# Patient Record
Sex: FEMALE | Race: BLACK OR AFRICAN AMERICAN | Employment: OTHER | URBAN - METROPOLITAN AREA
[De-identification: names, ages, dates, MRNs, and addresses within clinical notes are randomized per-mention and may not be internally consistent; named-entity substitution may affect disease eponyms.]

---

## 2017-10-09 ENCOUNTER — OP HISTORICAL/CONVERTED ENCOUNTER (OUTPATIENT)
Dept: OTHER | Age: 62
End: 2017-10-09

## 2018-02-22 ENCOUNTER — OP HISTORICAL/CONVERTED ENCOUNTER (OUTPATIENT)
Dept: OTHER | Age: 63
End: 2018-02-22

## 2018-02-23 ENCOUNTER — OP HISTORICAL/CONVERTED ENCOUNTER (OUTPATIENT)
Dept: OTHER | Age: 63
End: 2018-02-23

## 2019-05-30 ENCOUNTER — OP HISTORICAL/CONVERTED ENCOUNTER (OUTPATIENT)
Dept: OTHER | Age: 64
End: 2019-05-30

## 2019-06-13 ENCOUNTER — OP HISTORICAL/CONVERTED ENCOUNTER (OUTPATIENT)
Dept: OTHER | Age: 64
End: 2019-06-13

## 2019-08-15 ENCOUNTER — OP HISTORICAL/CONVERTED ENCOUNTER (OUTPATIENT)
Dept: OTHER | Age: 64
End: 2019-08-15

## 2019-09-04 ENCOUNTER — OP HISTORICAL/CONVERTED ENCOUNTER (OUTPATIENT)
Dept: OTHER | Age: 64
End: 2019-09-04

## 2020-01-11 ENCOUNTER — IP HISTORICAL/CONVERTED ENCOUNTER (OUTPATIENT)
Dept: OTHER | Age: 65
End: 2020-01-11

## 2021-06-30 RX ORDER — MONTELUKAST SODIUM 10 MG/1
10 TABLET ORAL DAILY
COMMUNITY

## 2021-06-30 RX ORDER — METHOTREXATE 2.5 MG/1
12.5 TABLET ORAL
COMMUNITY

## 2021-06-30 RX ORDER — METOPROLOL TARTRATE 100 MG/1
100 TABLET ORAL 2 TIMES DAILY
COMMUNITY

## 2021-06-30 RX ORDER — ASPIRIN 81 MG/1
81 TABLET ORAL DAILY
COMMUNITY

## 2021-06-30 RX ORDER — AMLODIPINE BESYLATE 10 MG/1
10 TABLET ORAL DAILY
COMMUNITY

## 2021-06-30 RX ORDER — ATORVASTATIN CALCIUM 40 MG/1
40 TABLET, FILM COATED ORAL DAILY
COMMUNITY

## 2021-06-30 RX ORDER — FOLIC ACID 1 MG/1
1 TABLET ORAL DAILY
COMMUNITY

## 2021-07-02 ENCOUNTER — HOSPITAL ENCOUNTER (OUTPATIENT)
Dept: CT IMAGING | Age: 66
Discharge: HOME | End: 2021-07-02
Attending: INTERNAL MEDICINE
Payer: COMMERCIAL

## 2021-07-02 VITALS
TEMPERATURE: 97.1 F | RESPIRATION RATE: 16 BRPM | OXYGEN SATURATION: 96 % | SYSTOLIC BLOOD PRESSURE: 108 MMHG | HEIGHT: 60 IN | HEART RATE: 66 BPM | WEIGHT: 130 LBS | BODY MASS INDEX: 25.52 KG/M2 | DIASTOLIC BLOOD PRESSURE: 53 MMHG

## 2021-07-02 DIAGNOSIS — D47.2 MONOCLONAL PARAPROTEINEMIA: ICD-10-CM

## 2021-07-02 LAB
ANION GAP SERPL CALC-SCNC: 6 MMOL/L (ref 5–15)
BASOPHILS # BLD: 0 K/UL (ref 0–0.1)
BASOPHILS NFR BLD: 0 % (ref 0–1)
BUN SERPL-MCNC: 39 MG/DL (ref 6–20)
BUN/CREAT SERPL: 26 (ref 12–20)
CA-I BLD-MCNC: 8.8 MG/DL (ref 8.5–10.1)
CHLORIDE SERPL-SCNC: 108 MMOL/L (ref 97–108)
CO2 SERPL-SCNC: 24 MMOL/L (ref 21–32)
CREAT SERPL-MCNC: 1.51 MG/DL (ref 0.55–1.02)
DIFFERENTIAL METHOD BLD: ABNORMAL
EOSINOPHIL # BLD: 0.2 K/UL (ref 0–0.4)
EOSINOPHIL NFR BLD: 3 % (ref 0–7)
ERYTHROCYTE [DISTWIDTH] IN BLOOD BY AUTOMATED COUNT: 14.9 % (ref 11.5–14.5)
GLUCOSE BLD STRIP.AUTO-MCNC: 128 MG/DL (ref 65–117)
GLUCOSE SERPL-MCNC: 131 MG/DL (ref 65–100)
HCT VFR BLD AUTO: 31.1 % (ref 35–47)
HGB BLD-MCNC: 9.8 G/DL (ref 11.5–16)
IMM GRANULOCYTES # BLD AUTO: 0 K/UL (ref 0–0.04)
IMM GRANULOCYTES NFR BLD AUTO: 0 % (ref 0–0.5)
INR PPP: 1.1 (ref 0.9–1.1)
LYMPHOCYTES # BLD: 0.8 K/UL (ref 0.8–3.5)
LYMPHOCYTES NFR BLD: 13 % (ref 12–49)
MCH RBC QN AUTO: 32.9 PG (ref 26–34)
MCHC RBC AUTO-ENTMCNC: 31.5 G/DL (ref 30–36.5)
MCV RBC AUTO: 104.4 FL (ref 80–99)
MONOCYTES # BLD: 0.5 K/UL (ref 0–1)
MONOCYTES NFR BLD: 9 % (ref 5–13)
NEUTS SEG # BLD: 4.5 K/UL (ref 1.8–8)
NEUTS SEG NFR BLD: 75 % (ref 32–75)
NRBC # BLD: 0 K/UL (ref 0–0.01)
NRBC BLD-RTO: 0 PER 100 WBC
PERFORMED BY, TECHID: ABNORMAL
PLATELET # BLD AUTO: 202 K/UL (ref 150–400)
PMV BLD AUTO: 9.3 FL (ref 8.9–12.9)
POTASSIUM SERPL-SCNC: 4.6 MMOL/L (ref 3.5–5.1)
PROTHROMBIN TIME: 13.5 SEC (ref 11.9–14.7)
RBC # BLD AUTO: 2.98 M/UL (ref 3.8–5.2)
SODIUM SERPL-SCNC: 138 MMOL/L (ref 136–145)
WBC # BLD AUTO: 6.1 K/UL (ref 3.6–11)

## 2021-07-02 PROCEDURE — 74011250636 HC RX REV CODE- 250/636: Performed by: RADIOLOGY

## 2021-07-02 PROCEDURE — 85025 COMPLETE CBC W/AUTO DIFF WBC: CPT

## 2021-07-02 PROCEDURE — 36415 COLL VENOUS BLD VENIPUNCTURE: CPT

## 2021-07-02 PROCEDURE — 99152 MOD SED SAME PHYS/QHP 5/>YRS: CPT

## 2021-07-02 PROCEDURE — 85610 PROTHROMBIN TIME: CPT

## 2021-07-02 PROCEDURE — 82962 GLUCOSE BLOOD TEST: CPT

## 2021-07-02 PROCEDURE — 80048 BASIC METABOLIC PNL TOTAL CA: CPT

## 2021-07-02 PROCEDURE — 77030028872 CT BX BONE MARROW AND ASPIRATION

## 2021-07-02 RX ORDER — OXYCODONE HYDROCHLORIDE 10 MG/1
10 TABLET ORAL
Status: DISCONTINUED | OUTPATIENT
Start: 2021-07-02 | End: 2021-07-11 | Stop reason: HOSPADM

## 2021-07-02 RX ORDER — FENTANYL CITRATE 50 UG/ML
25-100 INJECTION, SOLUTION INTRAMUSCULAR; INTRAVENOUS
Status: DISCONTINUED | OUTPATIENT
Start: 2021-07-02 | End: 2021-07-11 | Stop reason: HOSPADM

## 2021-07-02 RX ORDER — MIDAZOLAM HYDROCHLORIDE 1 MG/ML
.25-2 INJECTION, SOLUTION INTRAMUSCULAR; INTRAVENOUS
Status: DISCONTINUED | OUTPATIENT
Start: 2021-07-02 | End: 2021-07-11 | Stop reason: HOSPADM

## 2021-07-02 RX ORDER — OXYCODONE HYDROCHLORIDE 5 MG/1
5 TABLET ORAL
Status: DISCONTINUED | OUTPATIENT
Start: 2021-07-02 | End: 2021-07-11 | Stop reason: HOSPADM

## 2021-07-02 RX ORDER — ONDANSETRON 2 MG/ML
4 INJECTION INTRAMUSCULAR; INTRAVENOUS
Status: DISCONTINUED | OUTPATIENT
Start: 2021-07-02 | End: 2021-07-11 | Stop reason: HOSPADM

## 2021-07-02 RX ADMIN — MIDAZOLAM HYDROCHLORIDE 1 MG: 1 INJECTION, SOLUTION INTRAMUSCULAR; INTRAVENOUS at 09:22

## 2021-07-02 RX ADMIN — FENTANYL CITRATE 50 MCG: 0.05 INJECTION, SOLUTION INTRAMUSCULAR; INTRAVENOUS at 09:32

## 2021-07-02 RX ADMIN — FENTANYL CITRATE 50 MCG: 0.05 INJECTION, SOLUTION INTRAMUSCULAR; INTRAVENOUS at 09:22

## 2021-07-02 NOTE — ROUTINE PROCESS
Attempted  To obtai labs unsucessful x3 ,  duran x 2 and balta x1  Saline lock obtained #[id number] left hand

## 2021-07-02 NOTE — PROCEDURES
Interventional Radiology Post Procedure Note    7/2/2021    Indications: Igg kappa MGUS    Procedure(s): CT-guided BM aspirate and biopsy (right iliac)    Preliminary Findings (full report to follow):  Successful biopsy    Contrast: None    Specimen: None    Estimated blood loss: Minimal    Complications: None    Plan: Bedrest till 1130 before discharge home     Follow Up: PRN

## 2022-02-15 ENCOUNTER — TRANSCRIBE ORDER (OUTPATIENT)
Dept: SCHEDULING | Age: 67
End: 2022-02-15

## 2022-02-15 DIAGNOSIS — Z12.31 ENCOUNTER FOR SCREENING MAMMOGRAM FOR MALIGNANT NEOPLASM OF BREAST: Primary | ICD-10-CM

## 2022-03-22 ENCOUNTER — HOSPITAL ENCOUNTER (OUTPATIENT)
Dept: MAMMOGRAPHY | Age: 67
Discharge: HOME | End: 2022-03-22
Payer: MEDICAID

## 2022-03-22 DIAGNOSIS — Z12.31 ENCOUNTER FOR SCREENING MAMMOGRAM FOR MALIGNANT NEOPLASM OF BREAST: ICD-10-CM

## 2022-03-22 PROCEDURE — 77063 BREAST TOMOSYNTHESIS BI: CPT

## 2022-07-01 ENCOUNTER — HOSPITAL ENCOUNTER (EMERGENCY)
Age: 67
Discharge: HOME | End: 2022-07-01
Attending: EMERGENCY MEDICINE
Payer: MEDICAID

## 2022-07-01 ENCOUNTER — APPOINTMENT (OUTPATIENT)
Dept: NON INVASIVE DIAGNOSTICS | Age: 67
End: 2022-07-01
Attending: NURSE PRACTITIONER
Payer: MEDICAID

## 2022-07-01 ENCOUNTER — APPOINTMENT (OUTPATIENT)
Dept: GENERAL RADIOLOGY | Age: 67
End: 2022-07-01
Attending: EMERGENCY MEDICINE
Payer: MEDICAID

## 2022-07-01 VITALS
SYSTOLIC BLOOD PRESSURE: 139 MMHG | HEART RATE: 72 BPM | TEMPERATURE: 98 F | WEIGHT: 140 LBS | DIASTOLIC BLOOD PRESSURE: 80 MMHG | RESPIRATION RATE: 20 BRPM | BODY MASS INDEX: 26.43 KG/M2 | HEIGHT: 61 IN | OXYGEN SATURATION: 96 %

## 2022-07-01 DIAGNOSIS — Z87.09 HISTORY OF COPD: ICD-10-CM

## 2022-07-01 DIAGNOSIS — R06.2 WHEEZING: ICD-10-CM

## 2022-07-01 DIAGNOSIS — R06.02 SOB (SHORTNESS OF BREATH): Primary | ICD-10-CM

## 2022-07-01 LAB
ALBUMIN SERPL-MCNC: 3.2 G/DL (ref 3.5–5)
ALBUMIN/GLOB SERPL: 0.6 {RATIO} (ref 1.1–2.2)
ALP SERPL-CCNC: 93 U/L (ref 45–117)
ALT SERPL-CCNC: 23 U/L (ref 12–78)
ANION GAP SERPL CALC-SCNC: 4 MMOL/L (ref 5–15)
AST SERPL W P-5'-P-CCNC: 48 U/L (ref 15–37)
ATRIAL RATE: 66 BPM
BASOPHILS # BLD: 0 K/UL (ref 0–0.1)
BASOPHILS NFR BLD: 0 % (ref 0–1)
BILIRUB SERPL-MCNC: 0.4 MG/DL (ref 0.2–1)
BNP SERPL-MCNC: 453 PG/ML
BUN SERPL-MCNC: 26 MG/DL (ref 6–20)
BUN/CREAT SERPL: 16 (ref 12–20)
CA-I BLD-MCNC: 8.8 MG/DL (ref 8.5–10.1)
CALCULATED P AXIS, ECG09: 57 DEGREES
CALCULATED R AXIS, ECG10: 21 DEGREES
CALCULATED T AXIS, ECG11: 59 DEGREES
CHLORIDE SERPL-SCNC: 106 MMOL/L (ref 97–108)
CO2 SERPL-SCNC: 27 MMOL/L (ref 21–32)
CREAT SERPL-MCNC: 1.61 MG/DL (ref 0.55–1.02)
DIAGNOSIS, 93000: NORMAL
DIFFERENTIAL METHOD BLD: ABNORMAL
EOSINOPHIL # BLD: 0.1 K/UL (ref 0–0.4)
EOSINOPHIL NFR BLD: 2 % (ref 0–7)
ERYTHROCYTE [DISTWIDTH] IN BLOOD BY AUTOMATED COUNT: 14.5 % (ref 11.5–14.5)
GLOBULIN SER CALC-MCNC: 5.1 G/DL (ref 2–4)
GLUCOSE SERPL-MCNC: 173 MG/DL (ref 65–100)
HCT VFR BLD AUTO: 29.5 % (ref 35–47)
HGB BLD-MCNC: 9.6 G/DL (ref 11.5–16)
IMM GRANULOCYTES # BLD AUTO: 0 K/UL (ref 0–0.04)
IMM GRANULOCYTES NFR BLD AUTO: 0 % (ref 0–0.5)
LYMPHOCYTES # BLD: 0.5 K/UL (ref 0.8–3.5)
LYMPHOCYTES NFR BLD: 11 % (ref 12–49)
MAGNESIUM SERPL-MCNC: 2.1 MG/DL (ref 1.6–2.4)
MAGNESIUM SERPL-MCNC: 2.2 MG/DL (ref 1.6–2.4)
MCH RBC QN AUTO: 33.7 PG (ref 26–34)
MCHC RBC AUTO-ENTMCNC: 32.5 G/DL (ref 30–36.5)
MCV RBC AUTO: 103.5 FL (ref 80–99)
MONOCYTES # BLD: 0.2 K/UL (ref 0–1)
MONOCYTES NFR BLD: 5 % (ref 5–13)
NEUTS SEG # BLD: 3.5 K/UL (ref 1.8–8)
NEUTS SEG NFR BLD: 82 % (ref 32–75)
NRBC # BLD: 0 K/UL (ref 0–0.01)
NRBC BLD-RTO: 0 PER 100 WBC
P-R INTERVAL, ECG05: 186 MS
PHOSPHATE SERPL-MCNC: 3.7 MG/DL (ref 2.6–4.7)
PLATELET # BLD AUTO: 184 K/UL (ref 150–400)
PMV BLD AUTO: 9.7 FL (ref 8.9–12.9)
POTASSIUM SERPL-SCNC: 4.1 MMOL/L (ref 3.5–5.1)
POTASSIUM SERPL-SCNC: 5 MMOL/L (ref 3.5–5.1)
PROT SERPL-MCNC: 8.3 G/DL (ref 6.4–8.2)
Q-T INTERVAL, ECG07: 402 MS
QRS DURATION, ECG06: 82 MS
QTC CALCULATION (BEZET), ECG08: 421 MS
RBC # BLD AUTO: 2.85 M/UL (ref 3.8–5.2)
SODIUM SERPL-SCNC: 137 MMOL/L (ref 136–145)
TROPONIN-HIGH SENSITIVITY: 13 NG/L (ref 0–51)
TROPONIN-HIGH SENSITIVITY: 14 NG/L (ref 0–51)
VENTRICULAR RATE, ECG03: 66 BPM
WBC # BLD AUTO: 4.3 K/UL (ref 3.6–11)

## 2022-07-01 PROCEDURE — 93970 EXTREMITY STUDY: CPT

## 2022-07-01 PROCEDURE — 74011000250 HC RX REV CODE- 250: Performed by: NURSE PRACTITIONER

## 2022-07-01 PROCEDURE — 99285 EMERGENCY DEPT VISIT HI MDM: CPT

## 2022-07-01 PROCEDURE — 85025 COMPLETE CBC W/AUTO DIFF WBC: CPT

## 2022-07-01 PROCEDURE — 93005 ELECTROCARDIOGRAM TRACING: CPT

## 2022-07-01 PROCEDURE — 84484 ASSAY OF TROPONIN QUANT: CPT

## 2022-07-01 PROCEDURE — 74011250637 HC RX REV CODE- 250/637: Performed by: NURSE PRACTITIONER

## 2022-07-01 PROCEDURE — 96374 THER/PROPH/DIAG INJ IV PUSH: CPT

## 2022-07-01 PROCEDURE — 84100 ASSAY OF PHOSPHORUS: CPT

## 2022-07-01 PROCEDURE — 84132 ASSAY OF SERUM POTASSIUM: CPT

## 2022-07-01 PROCEDURE — 83735 ASSAY OF MAGNESIUM: CPT

## 2022-07-01 PROCEDURE — 71045 X-RAY EXAM CHEST 1 VIEW: CPT

## 2022-07-01 PROCEDURE — 36415 COLL VENOUS BLD VENIPUNCTURE: CPT

## 2022-07-01 PROCEDURE — 94640 AIRWAY INHALATION TREATMENT: CPT

## 2022-07-01 PROCEDURE — 74011250636 HC RX REV CODE- 250/636: Performed by: NURSE PRACTITIONER

## 2022-07-01 PROCEDURE — 83880 ASSAY OF NATRIURETIC PEPTIDE: CPT

## 2022-07-01 RX ORDER — PREDNISONE 20 MG/1
20 TABLET ORAL DAILY
Qty: 5 TABLET | Refills: 0 | Status: SHIPPED | OUTPATIENT
Start: 2022-07-01 | End: 2022-07-06

## 2022-07-01 RX ORDER — ALBUTEROL SULFATE 90 UG/1
AEROSOL, METERED RESPIRATORY (INHALATION)
COMMUNITY

## 2022-07-01 RX ORDER — ERGOCALCIFEROL 1.25 MG/1
50000 CAPSULE ORAL
COMMUNITY

## 2022-07-01 RX ORDER — IPRATROPIUM BROMIDE AND ALBUTEROL SULFATE 2.5; .5 MG/3ML; MG/3ML
3 SOLUTION RESPIRATORY (INHALATION)
Status: COMPLETED | OUTPATIENT
Start: 2022-07-01 | End: 2022-07-01

## 2022-07-01 RX ORDER — EZETIMIBE 10 MG/1
TABLET ORAL
COMMUNITY

## 2022-07-01 RX ORDER — BUDESONIDE AND FORMOTEROL FUMARATE DIHYDRATE 80; 4.5 UG/1; UG/1
2 AEROSOL RESPIRATORY (INHALATION)
COMMUNITY

## 2022-07-01 RX ORDER — GUAIFENESIN 600 MG/1
600 TABLET, EXTENDED RELEASE ORAL 2 TIMES DAILY
Qty: 20 TABLET | Refills: 0 | Status: SHIPPED | OUTPATIENT
Start: 2022-07-01

## 2022-07-01 RX ORDER — GUAIFENESIN 600 MG/1
1200 TABLET, EXTENDED RELEASE ORAL
Status: COMPLETED | OUTPATIENT
Start: 2022-07-01 | End: 2022-07-01

## 2022-07-01 RX ADMIN — IPRATROPIUM BROMIDE AND ALBUTEROL SULFATE 3 ML: 2.5; .5 SOLUTION RESPIRATORY (INHALATION) at 14:42

## 2022-07-01 RX ADMIN — IPRATROPIUM BROMIDE AND ALBUTEROL SULFATE 3 ML: 2.5; .5 SOLUTION RESPIRATORY (INHALATION) at 17:45

## 2022-07-01 RX ADMIN — GUAIFENESIN 1200 MG: 600 TABLET, EXTENDED RELEASE ORAL at 15:08

## 2022-07-01 RX ADMIN — METHYLPREDNISOLONE SODIUM SUCCINATE 40 MG: 40 INJECTION, POWDER, FOR SOLUTION INTRAMUSCULAR; INTRAVENOUS at 15:08

## 2022-07-01 NOTE — ED PROVIDER NOTES
EMERGENCY DEPARTMENT HISTORY AND PHYSICAL EXAM      Date: 7/1/2022  Patient Name: Lavonne Young    History of Presenting Illness     Chief Complaint   Patient presents with    Shortness of Breath       History Provided By: Patient and Patient's Daughter    HPI: Lavonne Young, 79 y.o. female with a significant past medical history of CKD III, DM, Htn,, COPD, and other chronic conditions as listed and reviewed below presents to the ED with intermittent shortness of breath more frequent since COVID earlier this year and bilateral lower extremity swelling right greater than left with intermittent right calf pain. She was sent by her PCP today due to her concerning breath sounds and leg swelling. Her shortness of breath is exacerbated by exertion and unimproved with albuterol inhaler, nebs, or symbicort. Additionally she has had increased urinary frequency and volume without dysuria or GYN symptoms. She denies chest pain currently but reports intermittent upper chest pain when she is short of breath and \"wheezing\". She denies any fever, cough, abdominal pain or swelling, N/V/D, appetite change, constipation, headache, dizziness, or confusion. There are no other complaints, changes, or physical findings at this time. PCP: Maribel Bernal, DO    No current facility-administered medications on file prior to encounter. Current Outpatient Medications on File Prior to Encounter   Medication Sig Dispense Refill    albuterol (PROVENTIL HFA, VENTOLIN HFA, PROAIR HFA) 90 mcg/actuation inhaler Take  by inhalation.  budesonide-formoteroL (Symbicort) 80-4.5 mcg/actuation HFAA Take 2 Puffs by inhalation.  ergocalciferol (ERGOCALCIFEROL) 1,250 mcg (50,000 unit) capsule Take 50,000 Units by mouth.  SITagliptin (Januvia) 50 mg tablet Take 50 mg by mouth daily.  ezetimibe (Zetia) 10 mg tablet Take  by mouth.  aspirin delayed-release 81 mg tablet Take 81 mg by mouth daily.       metoprolol tartrate (LOPRESSOR) 100 mg IR tablet Take 100 mg by mouth two (2) times a day.  amLODIPine (NORVASC) 10 mg tablet Take 10 mg by mouth daily.  montelukast (Singulair) 10 mg tablet Take 10 mg by mouth daily.  folic acid (FOLVITE) 1 mg tablet Take 1 mg by mouth daily.  atorvastatin (LIPITOR) 40 mg tablet Take 40 mg by mouth daily.  methotrexate (RHEUMATREX) 2.5 mg tablet Take 12.5 mg by mouth every Monday. Past History     Past Medical History:  Past Medical History:   Diagnosis Date    Arthritis     Asthma     Chronic kidney disease     Chronic obstructive pulmonary disease ([facility]     Diabetes ([facility]     Hypertension        Past Surgical History:  Past Surgical History:   Procedure Laterality Date    HX OTHER SURGICAL      Eye surgery       Family History:  Family History   Problem Relation Age of Onset    Diabetes Mother     Hypertension Mother    Yamilet Me Diabetes Father     Hypertension Father        Social History:  Social History     Tobacco Use    Smoking status: Never Smoker    Smokeless tobacco: Never Used   Substance Use Topics    Alcohol use: Never    Drug use: Never       Allergies: Allergies   Allergen Reactions    Lisinopril Swelling     Lip swelling    Pineapple Swelling     Lip swelling       Review of Systems   Review of Systems   Constitutional: Negative. HENT: Negative. Eyes: Negative. Respiratory: Positive for chest tightness, shortness of breath and wheezing. Negative for cough. Cardiovascular: Positive for chest pain and leg swelling. Negative for palpitations. Gastrointestinal: Negative. Negative for abdominal distention. Endocrine: Positive for polyuria. Negative for polydipsia and polyphagia. Genitourinary: Positive for frequency. Negative for decreased urine volume, dysuria, flank pain, pelvic pain, urgency and vaginal discharge. Musculoskeletal: Negative. Negative for back pain. Skin: Negative. Neurological: Negative. Hematological: Negative. Psychiatric/Behavioral: Negative. All other systems reviewed and are negative. Physical Exam   Physical Exam  Vitals and nursing note reviewed. Constitutional:       General: She is not in acute distress. Appearance: She is well-developed and normal weight. She is not ill-appearing, toxic-appearing or diaphoretic. HENT:      Head: Normocephalic and atraumatic. Mouth/Throat:      Mouth: Mucous membranes are moist.   Cardiovascular:      Rate and Rhythm: Normal rate and regular rhythm. Pulses: Normal pulses. Pulmonary:      Effort: Pulmonary effort is normal. No tachypnea, accessory muscle usage or respiratory distress. Breath sounds: Examination of the right-middle field reveals rhonchi. Examination of the left-middle field reveals rhonchi. Examination of the right-lower field reveals rhonchi. Examination of the left-lower field reveals rhonchi. Rhonchi present. No wheezing or rales. Chest:      Chest wall: No tenderness. Abdominal:      General: Bowel sounds are normal.      Palpations: Abdomen is soft. Tenderness: There is no abdominal tenderness. Musculoskeletal:         General: Normal range of motion. Cervical back: Normal range of motion and neck supple. Right lower leg: Tenderness present. Edema present. Left lower leg: No tenderness. Edema present. Skin:     General: Skin is warm and dry. Capillary Refill: Capillary refill takes less than 2 seconds. Coloration: Skin is not cyanotic. Findings: No ecchymosis or erythema. Neurological:      General: No focal deficit present. Mental Status: She is alert and oriented to person, place, and time.    Psychiatric:         Behavior: Behavior normal.         Lab and Diagnostic Study Results   Labs -     Recent Results (from the past 12 hour(s))   EKG, 12 LEAD, INITIAL    Collection Time: 07/01/22 10:21 AM   Result Value Ref Range    Ventricular Rate 66 BPM Atrial Rate 66 BPM    P-R Interval 186 ms    QRS Duration 82 ms    Q-T Interval 402 ms    QTC Calculation (Bezet) 421 ms    Calculated P Axis 57 degrees    Calculated R Axis 21 degrees    Calculated T Axis 59 degrees    Diagnosis       Normal sinus rhythm  Normal ECG  No previous ECGs available  Confirmed by WILBER MAYS, Kevin Hein ([id number]) on 7/1/2022 39:89:12 PM     METABOLIC PANEL, COMPREHENSIVE    Collection Time: 07/01/22 11:24 AM   Result Value Ref Range    Sodium 137 136 - 145 mmol/L    Potassium 5.0 3.5 - 5.1 mmol/L    Chloride 106 97 - 108 mmol/L    CO2 27 21 - 32 mmol/L    Anion gap 4 (L) 5 - 15 mmol/L    Glucose 173 (H) 65 - 100 mg/dL    BUN 26 (H) 6 - 20 mg/dL    Creatinine 1.61 (H) 0.55 - 1.02 mg/dL    BUN/Creatinine ratio 16 12 - 20      GFR est AA 39 (L) >60 ml/min/1.73m2    GFR est non-AA 32 (L) >60 ml/min/1.73m2    Calcium 8.8 8.5 - 10.1 mg/dL    Bilirubin, total 0.4 0.2 - 1.0 mg/dL    AST (SGOT) 48 (H) 15 - 37 U/L    ALT (SGPT) 23 12 - 78 U/L    Alk.  phosphatase 93 45 - 117 U/L    Protein, total 8.3 (H) 6.4 - 8.2 g/dL    Albumin 3.2 (L) 3.5 - 5.0 g/dL    Globulin 5.1 (H) 2.0 - 4.0 g/dL    A-G Ratio 0.6 (L) 1.1 - 2.2     TROPONIN-HIGH SENSITIVITY    Collection Time: 07/01/22 11:24 AM   Result Value Ref Range    Troponin-High Sensitivity 13 0 - 51 ng/L   NT-PRO BNP    Collection Time: 07/01/22 11:24 AM   Result Value Ref Range    NT pro- (H) <125 pg/mL   PHOSPHORUS    Collection Time: 07/01/22 11:24 AM   Result Value Ref Range    Phosphorus 3.7 2.6 - 4.7 mg/dL   MAGNESIUM    Collection Time: 07/01/22 11:24 AM   Result Value Ref Range    Magnesium 2.2 1.6 - 2.4 mg/dL   CBC WITH AUTOMATED DIFF    Collection Time: 07/01/22  1:04 PM   Result Value Ref Range    WBC 4.3 3.6 - 11.0 K/uL    RBC 2.85 (L) 3.80 - 5.20 M/uL    HGB 9.6 (L) 11.5 - 16.0 g/dL    HCT 29.5 (L) 35.0 - 47.0 %    .5 (H) 80.0 - 99.0 FL    MCH 33.7 26.0 - 34.0 PG    MCHC 32.5 30.0 - 36.5 g/dL    RDW 14.5 11.5 - 14.5 %    PLATELET 847 150 - 400 K/uL    MPV 9.7 8.9 - 12.9 FL    NRBC 0.0 0.0  WBC    ABSOLUTE NRBC 0.00 0.00 - 0.01 K/uL    NEUTROPHILS 82 (H) 32 - 75 %    LYMPHOCYTES 11 (L) 12 - 49 %    MONOCYTES 5 5 - 13 %    EOSINOPHILS 2 0 - 7 %    BASOPHILS 0 0 - 1 %    IMMATURE GRANULOCYTES 0 0 - 0.5 %    ABS. NEUTROPHILS 3.5 1.8 - 8.0 K/UL    ABS. LYMPHOCYTES 0.5 (L) 0.8 - 3.5 K/UL    ABS. MONOCYTES 0.2 0.0 - 1.0 K/UL    ABS. EOSINOPHILS 0.1 0.0 - 0.4 K/UL    ABS. BASOPHILS 0.0 0.0 - 0.1 K/UL    ABS. IMM. GRANS. 0.0 0.00 - 0.04 K/UL    DF AUTOMATED     TROPONIN-HIGH SENSITIVITY    Collection Time: 07/01/22  3:33 PM   Result Value Ref Range    Troponin-High Sensitivity 14 0 - 51 ng/L   MAGNESIUM    Collection Time: 07/01/22  3:33 PM   Result Value Ref Range    Magnesium 2.1 1.6 - 2.4 mg/dL   POTASSIUM    Collection Time: 07/01/22  3:33 PM   Result Value Ref Range    Potassium 4.1 3.5 - 5.1 mmol/L       Radiologic Studies -   @lastxrresult@  CT Results  (Last 48 hours)    None        CXR Results  (Last 48 hours)               07/01/22 1044  XR CHEST PORT Final result    Impression:  1. Enlarged cardiac silhouette, bibasilar atelectasis           Narrative:  INDICATION:  sob        EXAM: Single portable view of chest 1128 . Comparison:1/31/2020       Findings: Cardiac silhouette is enlarged. Pulmonary vasculature is not engorged. There are no focal parenchymal opacities, effusions, or pneumothorax. Bibasilar   atelectasis                 Medical Decision Making and ED Course   - I am the first provider for this patient. I reviewed the vital signs, available nursing notes, past medical history, past surgical history, family history and social history. - Initial assessment performed. The patients presenting problems have been discussed, and they are in agreement with the care plan formulated and outlined with them. I have encouraged them to ask questions as they arise throughout their visit.     Vital Signs-Reviewed the patient's vital signs. Patient Vitals for the past 12 hrs:   Temp Pulse Resp BP SpO2   07/01/22 1442 -- -- -- -- 96 %   07/01/22 1026 98 °F (36.7 °C) 72 20 139/80 97 %       Differential Diagnosis & Medical Decision Making Provider Note:     66-year-old female presents from PCP office with shortness of breath and lower extremity edema. Differentials include COPD exacerbation, heart failure exacerbation, PE, DVT, pneumonia, pulmonary edema, LEIGHTON, DKA, UTI, sequela of COVID, bronchitis. She is afebrile, no hypoxia on room air. Will work-up with basic and cardiac labs, chest x-ray, duplex of lower extremities to rule out DVT given calf pain and subjective report of right lower extremity edema more often and greater than left. Urinalysis for evidence of UTI. Chemistry for evidence of DKA or hyperglycemia with polyuria however she has no polydipsia or polyphagia. No sudden onset of chest pain, tachycardia or hypoxia concerning for pulmonary embolism and she has no history. Await duplex results. Serial troponins and EKG. Symptomatic treatment after chest x-ray, BNP resulted for COPD versus CHF. MDM  Number of Diagnoses or Management Options  History of COPD: new, needed workup  SOB (shortness of breath): new, needed workup  Wheezing: new, needed workup     Amount and/or Complexity of Data Reviewed  Clinical lab tests: ordered and reviewed  Tests in the radiology section of CPT®: ordered and reviewed  Review and summarize past medical records: yes  Discuss the patient with other providers: yes    Patient Progress  Patient progress: improved       ED Course:   ED Course as of 07/01/22 2202 Fri Jul 01, 2022   1054 No evidence of pulmonary edema or effusion on CXR. Otherwise unremarkable. [KR]   1130 EKG ventricular rate 66 bpm, ID interval 186 ms, QRS duration 82 ms,  ms,  ms, interpretation normal sinus rhythm, normal ECG. Reviewed by Dr. Padmaja Clark. No previous for comparison.  [KR]   1231 Per technician preliminary duplex results negative for DVT bilaterally. [KR]   1416 Renal function appears at baseline for CKD III, negative troponin, Mildly elevated BNP. Will repeat potassium and magnesium as well as troponin. Will treat with steroids and nebulizer and monitor response in absence of acute fluid overload, elevated BNP which can reflect renal status as well. [KR]   1446 Updated patient and daughter. Remains with no hypoxia. Receiving neb treatment. Plan for reassessment of breath sounds post medications for improvement. Await repeat labs and troponin and CBC for disposition. [KR]   0660 Still with scant wheezing but improved, patient subjectively reports improvement in breathing as well as daughter voices she believes there is been improvement as well. We will give 1 more nebulizer and await the labs with plan for discharge if no acute findings and she continues to improve. Remains without hypoxia [KR]   1644 Signed out to JUDI Tellez NP to continue care, await labs and disposition. [KR]   1730 Troponin normal, no chest pain. CBC stable-anemia at baseline. Discussed results with patient. She states she is feeling better and is ready to go home. No hypoxia, lungs diminished throughout,no further wheezing. [LP]      ED Course User Index  [KR] Rinku Gallardo NP  [LP] Tiffany Perry NP         Disposition   Disposition: Condition stable and improved  DC- Adult Discharges: All of the diagnostic tests were reviewed and questions answered. Diagnosis, care plan and treatment options were discussed. The patient understands the instructions and will follow up as directed. The patients results have been reviewed with them. They have been counseled regarding their diagnosis. The patient and family member patient and daughter verbally convey understanding and agreement of the signs, symptoms, diagnosis, treatment and prognosis and additionally agrees to follow up as recommended with their PCP in 24 - 48 hours. They also agree with the care-plan and convey that all of their questions have been answered. I have also put together some discharge instructions for them that include: 1) educational information regarding their diagnosis, 2) how to care for their diagnosis at home, as well a 3) list of reasons why they would want to return to the ED prior to their follow-up appointment, should their condition change. DISCHARGE PLAN:  1. Current Discharge Medication List      CONTINUE these medications which have NOT CHANGED    Details   albuterol (PROVENTIL HFA, VENTOLIN HFA, PROAIR HFA) 90 mcg/actuation inhaler Take  by inhalation. budesonide-formoteroL (Symbicort) 80-4.5 mcg/actuation HFAA Take 2 Puffs by inhalation. ergocalciferol (ERGOCALCIFEROL) 1,250 mcg (50,000 unit) capsule Take 50,000 Units by mouth. SITagliptin (Januvia) 50 mg tablet Take 50 mg by mouth daily. ezetimibe (Zetia) 10 mg tablet Take  by mouth. aspirin delayed-release 81 mg tablet Take 81 mg by mouth daily. metoprolol tartrate (LOPRESSOR) 100 mg IR tablet Take 100 mg by mouth two (2) times a day. amLODIPine (NORVASC) 10 mg tablet Take 10 mg by mouth daily. montelukast (Singulair) 10 mg tablet Take 10 mg by mouth daily. folic acid (FOLVITE) 1 mg tablet Take 1 mg by mouth daily. atorvastatin (LIPITOR) 40 mg tablet Take 40 mg by mouth daily. methotrexate (RHEUMATREX) 2.5 mg tablet Take 12.5 mg by mouth every Monday. 2.   Follow-up Information     Follow up With Specialties Details Why [id number] [facility] [address] [place] [address] In 2 days  Mariah Waite [place] [address]  [phone]      Follow up with primary care, urgent care, or this Emergency department   As needed, If symptoms worsen     [facility]  Go to   [address]  [phone]        3. Return to ED if worse   4.    Discharge Medication List as of 7/1/2022  5:59 PM      START taking these medications    Details   predniSONE (DELTASONE) 20 mg tablet Take 1 Tablet by mouth daily for 5 days. With Breakfast, Normal, Disp-5 Tablet, R-0      guaiFENesin ER (MUCINEX) 600 mg ER tablet Take 1 Tablet by mouth two (2) times a day., Normal, Disp-20 Tablet, R-0         CONTINUE these medications which have NOT CHANGED    Details   albuterol (PROVENTIL HFA, VENTOLIN HFA, PROAIR HFA) 90 mcg/actuation inhaler Take  by inhalation. , Historical Med      budesonide-formoteroL (Symbicort) 80-4.5 mcg/actuation HFAA Take 2 Puffs by inhalation. , Historical Med      ergocalciferol (ERGOCALCIFEROL) 1,250 mcg (50,000 unit) capsule Take 50,000 Units by mouth., Historical Med      SITagliptin (Januvia) 50 mg tablet Take 50 mg by mouth daily. , Historical Med      ezetimibe (Zetia) 10 mg tablet Take  by mouth., Historical Med      aspirin delayed-release 81 mg tablet Take 81 mg by mouth daily. , Historical Med      metoprolol tartrate (LOPRESSOR) 100 mg IR tablet Take 100 mg by mouth two (2) times a day., Historical Med      amLODIPine (NORVASC) 10 mg tablet Take 10 mg by mouth daily. , Historical Med      montelukast (Singulair) 10 mg tablet Take 10 mg by mouth daily. , Historical Med      folic acid (FOLVITE) 1 mg tablet Take 1 mg by mouth daily. , Historical Med      atorvastatin (LIPITOR) 40 mg tablet Take 40 mg by mouth daily. , Historical Med      methotrexate (RHEUMATREX) 2.5 mg tablet Take 12.5 mg by mouth every Monday., Historical Med               Diagnosis/Clinical Impression     Clinical Impression:   1. SOB (shortness of breath)    2. Wheezing    3. History of COPD        Attestations: Pawan Forte NP    Please note that this dictation was completed with [employer], the [employer] voice recognition software. Quite often unanticipated grammatical, syntax, homophones, and other interpretive errors are inadvertently transcribed by the computer software.   Please disregard these errors. Please excuse any errors that have escaped final proofreading. Thank you.

## 2022-07-01 NOTE — DISCHARGE INSTRUCTIONS
Continue to use your nebulizer and your inhalers as prescribed  Monitor your blood sugar closely while on prednisone  Return immediately for worsening shortness of breath, any development of chest pain, fever, return of swelling in legs and ankles.

## 2022-07-01 NOTE — ED TRIAGE NOTES
Pt sent by PCP for BLE swelling and shortness of breath over past 2 weeks, pt reports increasingly worse edema and pain.

## 2022-12-07 ENCOUNTER — HOSPITAL ENCOUNTER (INPATIENT)
Age: 67
LOS: 7 days | Discharge: HOME | DRG: 133 | End: 2022-12-14
Attending: EMERGENCY MEDICINE | Admitting: INTERNAL MEDICINE
Payer: MEDICAID

## 2022-12-07 ENCOUNTER — APPOINTMENT (OUTPATIENT)
Dept: GENERAL RADIOLOGY | Age: 67
DRG: 133 | End: 2022-12-07
Attending: EMERGENCY MEDICINE
Payer: MEDICAID

## 2022-12-07 DIAGNOSIS — J18.9 COMMUNITY ACQUIRED PNEUMONIA, UNSPECIFIED LATERALITY: Primary | ICD-10-CM

## 2022-12-07 LAB
ANION GAP SERPL CALC-SCNC: 6 MMOL/L (ref 5–15)
APPEARANCE UR: CLEAR
ATRIAL RATE: 58 BPM
BACTERIA URNS QL MICRO: NEGATIVE /HPF
BASE EXCESS BLD CALC-SCNC: 0.1 MMOL/L
BASOPHILS # BLD: 0 K/UL (ref 0–0.1)
BASOPHILS NFR BLD: 1 % (ref 0–1)
BILIRUB UR QL: NEGATIVE
BNP SERPL-MCNC: 2981 PG/ML
BUN SERPL-MCNC: 23 MG/DL (ref 6–20)
BUN/CREAT SERPL: 15 (ref 12–20)
CA-I BLD-MCNC: 8.7 MG/DL (ref 8.5–10.1)
CALCULATED P AXIS, ECG09: 54 DEGREES
CALCULATED R AXIS, ECG10: 22 DEGREES
CALCULATED T AXIS, ECG11: 58 DEGREES
CHLORIDE SERPL-SCNC: 103 MMOL/L (ref 97–108)
CO2 SERPL-SCNC: 31 MMOL/L (ref 21–32)
COLOR UR: ABNORMAL
CREAT SERPL-MCNC: 1.57 MG/DL (ref 0.55–1.02)
DIAGNOSIS, 93000: NORMAL
DIFFERENTIAL METHOD BLD: ABNORMAL
EOSINOPHIL # BLD: 0.1 K/UL (ref 0–0.4)
EOSINOPHIL NFR BLD: 1 % (ref 0–7)
EPITH CASTS URNS QL MICRO: ABNORMAL /LPF
ERYTHROCYTE [DISTWIDTH] IN BLOOD BY AUTOMATED COUNT: 13.7 % (ref 11.5–14.5)
FLUAV AG NPH QL IA: NEGATIVE
FLUAV RNA SPEC QL NAA+PROBE: NOT DETECTED
FLUBV AG NOSE QL IA: NEGATIVE
FLUBV RNA SPEC QL NAA+PROBE: NOT DETECTED
GLUCOSE SERPL-MCNC: 147 MG/DL (ref 65–100)
GLUCOSE UR STRIP.AUTO-MCNC: NEGATIVE MG/DL
HCO3 BLD-SCNC: 26.2 MMOL/L (ref 19–28)
HCT VFR BLD AUTO: 27.1 % (ref 35–47)
HGB BLD-MCNC: 8.5 G/DL (ref 11.5–16)
HGB UR QL STRIP: NEGATIVE
IMM GRANULOCYTES # BLD AUTO: 0 K/UL (ref 0–0.04)
IMM GRANULOCYTES NFR BLD AUTO: 0 % (ref 0–0.5)
KETONES UR QL STRIP.AUTO: NEGATIVE MG/DL
LEUKOCYTE ESTERASE UR QL STRIP.AUTO: ABNORMAL
LYMPHOCYTES # BLD: 0.6 K/UL (ref 0.8–3.5)
LYMPHOCYTES NFR BLD: 8 % (ref 12–49)
MCH RBC QN AUTO: 32.7 PG (ref 26–34)
MCHC RBC AUTO-ENTMCNC: 31.4 G/DL (ref 30–36.5)
MCV RBC AUTO: 104.2 FL (ref 80–99)
MONOCYTES # BLD: 1 K/UL (ref 0–1)
MONOCYTES NFR BLD: 12 % (ref 5–13)
NEUTS SEG # BLD: 6.4 K/UL (ref 1.8–8)
NEUTS SEG NFR BLD: 78 % (ref 32–75)
NITRITE UR QL STRIP.AUTO: NEGATIVE
P-R INTERVAL, ECG05: 194 MS
PCO2 BLD: 48.6 MMHG (ref 35–45)
PERFORMED BY, TECHID: ABNORMAL
PH BLD: 7.34 [PH] (ref 7.35–7.45)
PH UR STRIP: 5 [PH] (ref 5–8)
PLATELET # BLD AUTO: 185 K/UL (ref 150–400)
PMV BLD AUTO: 9.5 FL (ref 8.9–12.9)
PO2 BLD: 100 MMHG (ref 75–100)
POTASSIUM SERPL-SCNC: 4.3 MMOL/L (ref 3.5–5.1)
PROT UR STRIP-MCNC: 100 MG/DL
Q-T INTERVAL, ECG07: 406 MS
QRS DURATION, ECG06: 90 MS
QTC CALCULATION (BEZET), ECG08: 398 MS
RBC # BLD AUTO: 2.6 M/UL (ref 3.8–5.2)
RBC #/AREA URNS HPF: ABNORMAL /HPF (ref 0–5)
RSV AG NPH QL IA: NEGATIVE
SAO2 % BLD: 97.2 %
SARS-COV-2, COV2: NOT DETECTED
SODIUM SERPL-SCNC: 140 MMOL/L (ref 136–145)
SP GR UR REFRACTOMETRY: 1.02 (ref 1–1.03)
SPECIMEN TYPE: ABNORMAL
TROPONIN-HIGH SENSITIVITY: 13 NG/L (ref 0–51)
UA: UC IF INDICATED,UAUC: ABNORMAL
UROBILINOGEN UR QL STRIP.AUTO: 0.1 EU/DL (ref 0.2–1)
VENTRICULAR RATE, ECG03: 58 BPM
WBC # BLD AUTO: 8.1 K/UL (ref 3.6–11)
WBC URNS QL MICRO: ABNORMAL /HPF (ref 0–4)

## 2022-12-07 PROCEDURE — 87807 RSV ASSAY W/OPTIC: CPT

## 2022-12-07 PROCEDURE — 80048 BASIC METABOLIC PNL TOTAL CA: CPT

## 2022-12-07 PROCEDURE — 65270000029 HC RM PRIVATE

## 2022-12-07 PROCEDURE — 74011000250 HC RX REV CODE- 250

## 2022-12-07 PROCEDURE — 87636 SARSCOV2 & INF A&B AMP PRB: CPT

## 2022-12-07 PROCEDURE — 74011250636 HC RX REV CODE- 250/636

## 2022-12-07 PROCEDURE — 85025 COMPLETE CBC W/AUTO DIFF WBC: CPT

## 2022-12-07 PROCEDURE — 87804 INFLUENZA ASSAY W/OPTIC: CPT

## 2022-12-07 PROCEDURE — 87086 URINE CULTURE/COLONY COUNT: CPT

## 2022-12-07 PROCEDURE — 96365 THER/PROPH/DIAG IV INF INIT: CPT

## 2022-12-07 PROCEDURE — 96375 TX/PRO/DX INJ NEW DRUG ADDON: CPT

## 2022-12-07 PROCEDURE — 99285 EMERGENCY DEPT VISIT HI MDM: CPT

## 2022-12-07 PROCEDURE — 81001 URINALYSIS AUTO W/SCOPE: CPT

## 2022-12-07 PROCEDURE — 83880 ASSAY OF NATRIURETIC PEPTIDE: CPT

## 2022-12-07 PROCEDURE — 84484 ASSAY OF TROPONIN QUANT: CPT

## 2022-12-07 PROCEDURE — 93005 ELECTROCARDIOGRAM TRACING: CPT

## 2022-12-07 PROCEDURE — 71046 X-RAY EXAM CHEST 2 VIEWS: CPT

## 2022-12-07 RX ORDER — SODIUM CHLORIDE 9 MG/ML
100 INJECTION, SOLUTION INTRAVENOUS CONTINUOUS
Status: DISCONTINUED | OUTPATIENT
Start: 2022-12-07 | End: 2022-12-08

## 2022-12-07 RX ORDER — IPRATROPIUM BROMIDE AND ALBUTEROL SULFATE 2.5; .5 MG/3ML; MG/3ML
3 SOLUTION RESPIRATORY (INHALATION)
Status: COMPLETED | OUTPATIENT
Start: 2022-12-07 | End: 2022-12-07

## 2022-12-07 RX ADMIN — SODIUM CHLORIDE 100 ML/HR: 9 INJECTION, SOLUTION INTRAVENOUS at 17:43

## 2022-12-07 RX ADMIN — IPRATROPIUM BROMIDE AND ALBUTEROL SULFATE 3 ML: .5; 3 SOLUTION RESPIRATORY (INHALATION) at 13:02

## 2022-12-07 RX ADMIN — CEFTRIAXONE SODIUM 1 G: 1 INJECTION, POWDER, FOR SOLUTION INTRAMUSCULAR; INTRAVENOUS at 12:48

## 2022-12-07 RX ADMIN — METHYLPREDNISOLONE SODIUM SUCCINATE 125 MG: 125 INJECTION, POWDER, FOR SOLUTION INTRAMUSCULAR; INTRAVENOUS at 12:46

## 2022-12-07 RX ADMIN — IPRATROPIUM BROMIDE AND ALBUTEROL SULFATE 3 ML: .5; 3 SOLUTION RESPIRATORY (INHALATION) at 11:50

## 2022-12-07 RX ADMIN — SODIUM CHLORIDE 500 ML: 9 INJECTION, SOLUTION INTRAVENOUS at 13:34

## 2022-12-07 RX ADMIN — AZITHROMYCIN DIHYDRATE 500 MG: 500 INJECTION, POWDER, LYOPHILIZED, FOR SOLUTION INTRAVENOUS at 12:51

## 2022-12-07 NOTE — Clinical Note
Status[de-identified] INPATIENT [101]   Type of Bed: Telemetry [19]   Cardiac Monitoring Required?: Yes   Inpatient Hospitalization Certified Necessary for the Following Reasons: 3.  Patient receiving treatment that can only be provided in an inpatient setting (further clarification in H&P documentation)   Admitting Diagnosis: Community acquired pneumonia [082959]   Admitting Physician: Kristie Flores [[id number]]   Attending Physician: Kristie Flores [[id number]]   Estimated Length of Stay: 2 Midnights   Discharge Plan[de-identified] Home with Office Follow-up

## 2022-12-07 NOTE — ED TRIAGE NOTES
Nausea, congestion, cough, body aches, nausea and diarrhea. Sx began Sunday. Pt is on home O2 @ 2L currently but usually is just on O2 while she is up and walking around. Pt daughter states O2 level last night was 45, currently on 2L NC SpO2 is 98%. Pt is speaking in full sentences, no difficulty breathing observed. +hx of COPD.

## 2022-12-07 NOTE — ED PROVIDER NOTES
EMERGENCY DEPARTMENT HISTORY AND PHYSICAL EXAM      Date: 12/7/2022  Patient Name: Keesha Joshi    History of Presenting Illness     Chief Complaint   Patient presents with    Flu Like Symptoms       History Provided By: Patient and Patient's Daughter    HPI: Keesha Joshi, 79 y.o. female with a history of asthma, COPD, kidney disease, diabetes, and hypertension presents with congestion, rhinorrhea, nausea, and diarrhea x2 days. Daughter states patient uses home oxygen as needed but has increased requirement at this time. She reports room air saturation of 45%. They deny fevers, chest pain, difficulty breathing, abdominal pain, vomiting, dysuria, confusion, or rash. They have not used anything to treat her symptoms. There are no other complaints, changes, or physical findings at this time. Past History     Past Medical History:  Past Medical History:   Diagnosis Date    Arthritis     Asthma     Chronic kidney disease     Chronic obstructive pulmonary disease ([facility] [address]     Diabetes ([facility] [address]     Hypertension        Past Surgical History:  Past Surgical History:   Procedure Laterality Date    HX OTHER SURGICAL      Eye surgery       Family History:  Family History   Problem Relation Age of Onset    Diabetes Mother     Hypertension Mother     Diabetes Father     Hypertension Father        Social History:  Social History     Tobacco Use    Smoking status: Never    Smokeless tobacco: Never   Substance Use Topics    Alcohol use: Never    Drug use: Never       Allergies: Allergies   Allergen Reactions    Lisinopril Swelling     Lip swelling    Pineapple Swelling     Lip swelling       PCP: Crystal Whaley, DO    No current facility-administered medications on file prior to encounter. Current Outpatient Medications on File Prior to Encounter   Medication Sig Dispense Refill    albuterol (PROVENTIL HFA, VENTOLIN HFA, PROAIR HFA) 90 mcg/actuation inhaler Take  by inhalation.       budesonide-formoteroL (Symbicort) 80-4.5 mcg/actuation HFAA Take 2 Puffs by inhalation. ergocalciferol (ERGOCALCIFEROL) 1,250 mcg (50,000 unit) capsule Take 50,000 Units by mouth. SITagliptin (Januvia) 50 mg tablet Take 50 mg by mouth daily. ezetimibe (Zetia) 10 mg tablet Take  by mouth.      guaiFENesin ER (MUCINEX) 600 mg ER tablet Take 1 Tablet by mouth two (2) times a day. 20 Tablet 0    aspirin delayed-release 81 mg tablet Take 81 mg by mouth daily. metoprolol tartrate (LOPRESSOR) 100 mg IR tablet Take 100 mg by mouth two (2) times a day. amLODIPine (NORVASC) 10 mg tablet Take 10 mg by mouth daily. montelukast (Singulair) 10 mg tablet Take 10 mg by mouth daily. folic acid (FOLVITE) 1 mg tablet Take 1 mg by mouth daily. atorvastatin (LIPITOR) 40 mg tablet Take 40 mg by mouth daily. methotrexate (RHEUMATREX) 2.5 mg tablet Take 12.5 mg by mouth every Monday. Review of Systems   Review of Systems   Constitutional:  Positive for fatigue. Negative for appetite change, chills, fever and unexpected weight change. HENT:  Positive for congestion and rhinorrhea. Negative for sore throat and trouble swallowing. Eyes: Negative. Negative for photophobia. Respiratory:  Positive for cough and wheezing. Negative for shortness of breath. Cardiovascular: Negative. Negative for chest pain, palpitations and leg swelling. Gastrointestinal:  Positive for diarrhea and nausea. Negative for abdominal pain, constipation and vomiting. Endocrine: Negative. Genitourinary: Negative. Negative for decreased urine volume, dysuria, frequency and hematuria. Musculoskeletal: Negative. Negative for back pain and myalgias. Skin: Negative. Allergic/Immunologic: Negative. Neurological:  Positive for weakness. Negative for dizziness, speech difficulty, light-headedness and numbness. Hematological: Negative. Psychiatric/Behavioral: Negative. Negative for confusion.       Physical Exam   Physical Exam  Vitals and nursing note reviewed. Constitutional:       General: She is not in acute distress. Appearance: She is not ill-appearing. HENT:      Head: Normocephalic. Nose: Congestion and rhinorrhea present. Mouth/Throat:      Mouth: Mucous membranes are moist.      Pharynx: Oropharynx is clear. No posterior oropharyngeal erythema. Eyes:      Extraocular Movements: Extraocular movements intact. Conjunctiva/sclera: Conjunctivae normal.      Pupils: Pupils are equal, round, and reactive to light. Cardiovascular:      Rate and Rhythm: Normal rate and regular rhythm. Pulses: Normal pulses. Heart sounds: Murmur heard. Pulmonary:      Effort: Pulmonary effort is normal.      Breath sounds: Wheezing present. Abdominal:      General: Bowel sounds are normal. There is no distension. Palpations: Abdomen is soft. Tenderness: There is no abdominal tenderness. There is left CVA tenderness. There is no guarding. Musculoskeletal:         General: Normal range of motion. Cervical back: Normal range of motion and neck supple. No tenderness. Lymphadenopathy:      Cervical: No cervical adenopathy. Skin:     General: Skin is warm and dry. Neurological:      General: No focal deficit present. Mental Status: She is alert and oriented to person, place, and time. Cranial Nerves: No cranial nerve deficit. Sensory: No sensory deficit. Motor: No weakness.    Psychiatric:         Mood and Affect: Mood normal.         Behavior: Behavior normal.       Lab and Diagnostic Study Results   Labs -     Recent Results (from the past 12 hour(s))   INFLUENZA A & B AG (RAPID TEST)    Collection Time: 12/07/22 11:01 AM   Result Value Ref Range    Influenza A Antigen Negative Negative      Influenza B Antigen Negative Negative     EKG, 12 LEAD, INITIAL    Collection Time: 12/07/22 11:55 AM   Result Value Ref Range    Ventricular Rate 58 BPM    Atrial Rate 58 BPM    P-R Interval 194 ms    QRS Duration 90 ms    Q-T Interval 406 ms    QTC Calculation (Bezet) 398 ms    Calculated P Axis 54 degrees    Calculated R Axis 22 degrees    Calculated T Axis 58 degrees    Diagnosis       Sinus bradycardia with Premature atrial complexes  Otherwise normal ECG  When compared with ECG of 01-JUL-2022 10:21,  Premature atrial complexes are now Present  Confirmed by Dany Laguerre ([id number]) on 12/7/2022 1:09:58 PM     CBC WITH AUTOMATED DIFF    Collection Time: 12/07/22 11:56 AM   Result Value Ref Range    WBC 8.1 3.6 - 11.0 K/uL    RBC 2.60 (L) 3.80 - 5.20 M/uL    HGB 8.5 (L) 11.5 - 16.0 g/dL    HCT 27.1 (L) 35.0 - 47.0 %    .2 (H) 80.0 - 99.0 FL    MCH 32.7 26.0 - 34.0 PG    MCHC 31.4 30.0 - 36.5 g/dL    RDW 13.7 11.5 - 14.5 %    PLATELET 431 198 - 934 K/uL    MPV 9.5 8.9 - 12.9 FL    NEUTROPHILS 78 (H) 32 - 75 %    LYMPHOCYTES 8 (L) 12 - 49 %    MONOCYTES 12 5 - 13 %    EOSINOPHILS 1 0 - 7 %    BASOPHILS 1 0 - 1 %    IMMATURE GRANULOCYTES 0 0.0 - 0.5 %    ABS. NEUTROPHILS 6.4 1.8 - 8.0 K/UL    ABS. LYMPHOCYTES 0.6 (L) 0.8 - 3.5 K/UL    ABS. MONOCYTES 1.0 0.0 - 1.0 K/UL    ABS. EOSINOPHILS 0.1 0.0 - 0.4 K/UL    ABS. BASOPHILS 0.0 0.0 - 0.1 K/UL    ABS. IMM.  GRANS. 0.0 0.00 - 0.04 K/UL    DF AUTOMATED     METABOLIC PANEL, BASIC    Collection Time: 12/07/22 11:56 AM   Result Value Ref Range    Sodium 140 136 - 145 mmol/L    Potassium 4.3 3.5 - 5.1 mmol/L    Chloride 103 97 - 108 mmol/L    CO2 31 21 - 32 mmol/L    Anion gap 6 5 - 15 mmol/L    Glucose 147 (H) 65 - 100 mg/dL    BUN 23 (H) 6 - 20 mg/dL    Creatinine 1.57 (H) 0.55 - 1.02 mg/dL    BUN/Creatinine ratio 15 12 - 20      eGFR 36 (L) >60 ml/min/1.73m2    Calcium 8.7 8.5 - 10.1 mg/dL   TROPONIN-HIGH SENSITIVITY    Collection Time: 12/07/22 11:56 AM   Result Value Ref Range    Troponin-High Sensitivity 13 0 - 51 ng/L   COVID-19 WITH INFLUENZA A/B    Collection Time: 12/07/22  1:00 PM   Result Value Ref Range    SARS-CoV-2 by PCR Not Detected Not Detected      Influenza A by PCR Not Detected Not Detected      Influenza B by PCR Not Detected Not Detected     RSV AG - RAPID    Collection Time: 12/07/22  1:01 PM   Result Value Ref Range    RSV Antigen Negative Negative     NT-PRO BNP    Collection Time: 12/07/22  1:45 PM   Result Value Ref Range    NT pro-BNP 2,981 (H) <125 pg/mL   BLOOD GAS, ARTERIAL POC    Collection Time: 12/07/22  2:10 PM   Result Value Ref Range    pH (POC) 7.34 (L) 7.35 - 7.45      pCO2 (POC) 48.6 (H) 35.0 - 45.0 mmHg    pO2 (POC) 100 75 - 100 mmHg    HCO3 (POC) 26.2 19.0 - 28.0 mmol/L    sO2 (POC) 97.2 %    Base excess (POC) 0.1 mmol/L    Specimen type (POC) Arterial      Performed by Ashu Eduardo    URINALYSIS W/ REFLEX CULTURE    Collection Time: 12/07/22  2:54 PM    Specimen: Urine   Result Value Ref Range    Color Yellow/Straw      Appearance Clear Clear      Specific gravity 1.020 1.003 - 1.030      pH (UA) 5.0 5.0 - 8.0      Protein 100 (A) Negative mg/dL    Glucose Negative Negative mg/dL    Ketone Negative Negative mg/dL    Bilirubin Negative Negative      Blood Negative Negative      Urobilinogen 0.1 (L) 0.2 - 1.0 EU/dL    Nitrites Negative Negative      Leukocyte Esterase Large (A) Negative      WBC 10-20 0 - 4 /hpf    RBC 0-5 0 - 5 /hpf    Epithelial cells Few Few /lpf    Bacteria Negative Negative /hpf    UA:UC IF INDICATED Urine Culture Ordered (A) Culture not indicated by UA result         Radiologic Studies -   @[email or web address]@  CT Results  (Last 48 hours)      None          CXR Results  (Last 48 hours)                 12/07/22 1127  XR CHEST PA LAT Final result    Impression:      Multilobar pneumonia. This is most pronounced in the lingula, but also present   in the right upper lobe and left lower lobe       COPD       Narrative:  EXAM: XR CHEST PA LAT       INDICATION: COPD and hypoxia       COMPARISON: 7/1/2022       TECHNIQUE: PA and lateral chest views       FINDINGS: The cardiac size is stable.  The pulmonary vasculature is within normal   limits. There is a patchy large infiltrate in the left upper lobe. A subcentimeter   opacity is present in the right upper lobe and similar findings are also present   in the left lower lobe. The lungs are hyperinflated. The visualized bones and   upper abdomen are age-appropriate. Medical Decision Making and ED Course   Differential Diagnosis & Medical Decision Making Provider Note:     - I am the first provider for this patient. I reviewed the vital signs, available nursing notes, past medical history, past surgical history, family history and social history. The patients presenting problems have been discussed, and they are in agreement with the care plan formulated and outlined with them. I have encouraged them to ask questions as they arise throughout their visit. Vital Signs-Reviewed the patient's vital signs. Patient Vitals for the past 12 hrs:   Temp Pulse Resp BP SpO2   12/07/22 1819 -- 71 18 112/63 98 %   12/07/22 1648 -- 67 20 103/65 99 %   12/07/22 1536 98.5 °F (36.9 °C) 69 20 127/69 96 %   12/07/22 1400 -- -- -- -- 98 %   12/07/22 1339 -- 69 20 100/73 (!) 82 %   12/07/22 1300 -- -- -- -- 97 %   12/07/22 1217 -- -- -- -- (!) 88 %   12/07/22 1205 -- 61 (!) 36 114/72 96 %   12/07/22 1057 98.7 °F (37.1 °C) 64 16 118/66 100 %       ED Course:   ED Course as of 12/07/22 1915   Wed Dec 07, 2022   [age]-year-old female presents with congestion, rhinorrhea, nausea, and diarrhea. She is on home oxygen as needed but has increased requirement at this time. Differentials include URI, UTI, pyelonephritis, gastritis, pneumonia, pneumothorax, influenza, COVID, MI. [KW]   1138 Will assess labs, EKG, urine, CXR, and provide breathing treatment. Serial reassessments. [BH]   1779 Chest x-ray with pneumonia. Results conveyed to patient. [KW]   1206 Breathing treatment in progress. Reassessment unchanged.   Respiratory rate 36. [KW]   1315 EKG interpreted by Dr. Monica Lagunas. Sinus bradycardia with a rate of 58. No ectopy or STEMI. [KW]   1330 Respiratory rate 26. Lungs are clear, reduced in the bases. No increased work of breathing. [KW]   1332 COVID and flu negative. [KW]   1344 ABG and BMP ordered. Oxygen saturation 82% on room air. Return to oxygen. [KW]   R [address] contacted. Requested return phone call upon completion of results to determine level of care. [KW]   1433 ABG with mild respiratory acidosis. [KW]   1433 Awaiting BNP result. [KW]   6420 UA with large leukocyte Estrace and proteinuria. [KW]   [id number] Spoke with Dr. Lio Stephen via phone. Will accept patient for admission to telemetry. [KW]   1607 BNP elevated. [KW]   1730 Repeat antibiotics ordered for tomorrow and maintenance fluid. Patient taking p.o. without difficulty. Daughter remains at bedside. Updated regarding plan of care. [KW]      ED Course User Index  [KW] Jeffrey Marquez NP     CURB-65 Score: 3      Procedures   Performed by: Mark Jacobs NP  Procedures      Disposition   Disposition: Admitted to telemetry the case was discussed with the admitting physician Dr. Lio Stephen. Diagnosis/Clinical Impression     Clinical Impression:   1. Community acquired pneumonia, unspecified laterality        Attestations: Mark RICH NP, am the primary clinician of record. Please note that this dictation was completed with [employer], the computer voice recognition software. Quite often unanticipated grammatical, syntax, homophones, and other interpretive errors are inadvertently transcribed by the computer software. Please disregard these errors. Please excuse any errors that have escaped final proofreading. Thank you.

## 2022-12-08 ENCOUNTER — APPOINTMENT (OUTPATIENT)
Dept: CT IMAGING | Age: 67
DRG: 133 | End: 2022-12-08
Attending: INTERNAL MEDICINE
Payer: MEDICAID

## 2022-12-08 PROBLEM — J18.9 PNA (PNEUMONIA): Status: ACTIVE | Noted: 2022-12-08

## 2022-12-08 PROBLEM — J44.1 COPD WITH ACUTE EXACERBATION (HCC): Status: ACTIVE | Noted: 2022-12-08

## 2022-12-08 LAB
BACTERIA SPEC CULT: NORMAL
COLONY COUNT,CNT: NORMAL
GLUCOSE BLD STRIP.AUTO-MCNC: 299 MG/DL (ref 65–100)
GLUCOSE BLD STRIP.AUTO-MCNC: 367 MG/DL (ref 65–100)
PERFORMED BY, TECHID: ABNORMAL
PERFORMED BY, TECHID: ABNORMAL
SPECIAL REQUESTS,SREQ: NORMAL

## 2022-12-08 PROCEDURE — 74011636637 HC RX REV CODE- 636/637: Performed by: INTERNAL MEDICINE

## 2022-12-08 PROCEDURE — 74011000250 HC RX REV CODE- 250: Performed by: INTERNAL MEDICINE

## 2022-12-08 PROCEDURE — 94640 AIRWAY INHALATION TREATMENT: CPT

## 2022-12-08 PROCEDURE — 74011250637 HC RX REV CODE- 250/637: Performed by: INTERNAL MEDICINE

## 2022-12-08 PROCEDURE — 71250 CT THORAX DX C-: CPT

## 2022-12-08 PROCEDURE — 65270000029 HC RM PRIVATE

## 2022-12-08 PROCEDURE — 74011250636 HC RX REV CODE- 250/636

## 2022-12-08 PROCEDURE — 74011000250 HC RX REV CODE- 250

## 2022-12-08 PROCEDURE — 74011250636 HC RX REV CODE- 250/636: Performed by: INTERNAL MEDICINE

## 2022-12-08 PROCEDURE — 82962 GLUCOSE BLOOD TEST: CPT

## 2022-12-08 PROCEDURE — 74011000250 HC RX REV CODE- 250: Performed by: EMERGENCY MEDICINE

## 2022-12-08 PROCEDURE — 87040 BLOOD CULTURE FOR BACTERIA: CPT

## 2022-12-08 PROCEDURE — 36415 COLL VENOUS BLD VENIPUNCTURE: CPT

## 2022-12-08 PROCEDURE — 93005 ELECTROCARDIOGRAM TRACING: CPT

## 2022-12-08 RX ORDER — ONDANSETRON 2 MG/ML
4 INJECTION INTRAMUSCULAR; INTRAVENOUS
Status: DISCONTINUED | OUTPATIENT
Start: 2022-12-08 | End: 2022-12-14 | Stop reason: HOSPADM

## 2022-12-08 RX ORDER — EZETIMIBE 10 MG/1
10 TABLET ORAL DAILY
Status: DISCONTINUED | OUTPATIENT
Start: 2022-12-08 | End: 2022-12-14 | Stop reason: HOSPADM

## 2022-12-08 RX ORDER — METOPROLOL TARTRATE 50 MG/1
100 TABLET ORAL 2 TIMES DAILY
Status: DISCONTINUED | OUTPATIENT
Start: 2022-12-08 | End: 2022-12-11

## 2022-12-08 RX ORDER — AMLODIPINE BESYLATE 5 MG/1
10 TABLET ORAL DAILY
Status: DISCONTINUED | OUTPATIENT
Start: 2022-12-08 | End: 2022-12-14 | Stop reason: HOSPADM

## 2022-12-08 RX ORDER — INSULIN LISPRO 100 [IU]/ML
INJECTION, SOLUTION INTRAVENOUS; SUBCUTANEOUS
Status: DISCONTINUED | OUTPATIENT
Start: 2022-12-08 | End: 2022-12-14 | Stop reason: HOSPADM

## 2022-12-08 RX ORDER — POLYETHYLENE GLYCOL 3350 17 G/17G
17 POWDER, FOR SOLUTION ORAL DAILY PRN
Status: DISCONTINUED | OUTPATIENT
Start: 2022-12-08 | End: 2022-12-14 | Stop reason: HOSPADM

## 2022-12-08 RX ORDER — DEXTROSE MONOHYDRATE 100 MG/ML
0-250 INJECTION, SOLUTION INTRAVENOUS AS NEEDED
Status: DISCONTINUED | OUTPATIENT
Start: 2022-12-08 | End: 2022-12-14 | Stop reason: HOSPADM

## 2022-12-08 RX ORDER — SODIUM CHLORIDE 0.9 % (FLUSH) 0.9 %
5-40 SYRINGE (ML) INJECTION AS NEEDED
Status: DISCONTINUED | OUTPATIENT
Start: 2022-12-08 | End: 2022-12-14 | Stop reason: HOSPADM

## 2022-12-08 RX ORDER — ACETAMINOPHEN 650 MG/1
650 SUPPOSITORY RECTAL
Status: DISCONTINUED | OUTPATIENT
Start: 2022-12-08 | End: 2022-12-14 | Stop reason: HOSPADM

## 2022-12-08 RX ORDER — ALOGLIPTIN 12.5 MG/1
12.5 TABLET, FILM COATED ORAL DAILY
Status: DISCONTINUED | OUTPATIENT
Start: 2022-12-12 | End: 2022-12-14 | Stop reason: HOSPADM

## 2022-12-08 RX ORDER — ACETAMINOPHEN 325 MG/1
650 TABLET ORAL
Status: DISCONTINUED | OUTPATIENT
Start: 2022-12-08 | End: 2022-12-14 | Stop reason: HOSPADM

## 2022-12-08 RX ORDER — MAGNESIUM SULFATE 100 %
4 CRYSTALS MISCELLANEOUS AS NEEDED
Status: DISCONTINUED | OUTPATIENT
Start: 2022-12-08 | End: 2022-12-09 | Stop reason: SDUPTHER

## 2022-12-08 RX ORDER — ENOXAPARIN SODIUM 100 MG/ML
30 INJECTION SUBCUTANEOUS DAILY
Status: DISCONTINUED | OUTPATIENT
Start: 2022-12-08 | End: 2022-12-14 | Stop reason: HOSPADM

## 2022-12-08 RX ORDER — IPRATROPIUM BROMIDE AND ALBUTEROL SULFATE 2.5; .5 MG/3ML; MG/3ML
3 SOLUTION RESPIRATORY (INHALATION)
Status: COMPLETED | OUTPATIENT
Start: 2022-12-08 | End: 2022-12-08

## 2022-12-08 RX ORDER — FOLIC ACID 1 MG/1
1 TABLET ORAL DAILY
Status: DISCONTINUED | OUTPATIENT
Start: 2022-12-08 | End: 2022-12-14 | Stop reason: HOSPADM

## 2022-12-08 RX ORDER — ATORVASTATIN CALCIUM 40 MG/1
40 TABLET, FILM COATED ORAL DAILY
Status: DISCONTINUED | OUTPATIENT
Start: 2022-12-08 | End: 2022-12-14 | Stop reason: HOSPADM

## 2022-12-08 RX ORDER — SODIUM CHLORIDE 0.9 % (FLUSH) 0.9 %
5-40 SYRINGE (ML) INJECTION EVERY 8 HOURS
Status: DISCONTINUED | OUTPATIENT
Start: 2022-12-08 | End: 2022-12-14 | Stop reason: HOSPADM

## 2022-12-08 RX ORDER — ASPIRIN 81 MG/1
81 TABLET ORAL DAILY
Status: DISCONTINUED | OUTPATIENT
Start: 2022-12-08 | End: 2022-12-14 | Stop reason: HOSPADM

## 2022-12-08 RX ORDER — IPRATROPIUM BROMIDE AND ALBUTEROL SULFATE 2.5; .5 MG/3ML; MG/3ML
3 SOLUTION RESPIRATORY (INHALATION)
Status: DISCONTINUED | OUTPATIENT
Start: 2022-12-08 | End: 2022-12-09

## 2022-12-08 RX ORDER — FAMOTIDINE 20 MG/1
20 TABLET, FILM COATED ORAL 2 TIMES DAILY
Status: DISCONTINUED | OUTPATIENT
Start: 2022-12-08 | End: 2022-12-09 | Stop reason: DRUGHIGH

## 2022-12-08 RX ORDER — ONDANSETRON 4 MG/1
4 TABLET, ORALLY DISINTEGRATING ORAL
Status: DISCONTINUED | OUTPATIENT
Start: 2022-12-08 | End: 2022-12-14 | Stop reason: HOSPADM

## 2022-12-08 RX ORDER — BUDESONIDE AND FORMOTEROL FUMARATE DIHYDRATE 80; 4.5 UG/1; UG/1
2 AEROSOL RESPIRATORY (INHALATION)
Status: DISCONTINUED | OUTPATIENT
Start: 2022-12-08 | End: 2022-12-14 | Stop reason: HOSPADM

## 2022-12-08 RX ORDER — GUAIFENESIN 600 MG/1
600 TABLET, EXTENDED RELEASE ORAL 2 TIMES DAILY
Status: DISCONTINUED | OUTPATIENT
Start: 2022-12-08 | End: 2022-12-09

## 2022-12-08 RX ORDER — MONTELUKAST SODIUM 10 MG/1
10 TABLET ORAL DAILY
Status: DISCONTINUED | OUTPATIENT
Start: 2022-12-08 | End: 2022-12-14 | Stop reason: HOSPADM

## 2022-12-08 RX ADMIN — SODIUM CHLORIDE 100 ML/HR: 9 INJECTION, SOLUTION INTRAVENOUS at 04:33

## 2022-12-08 RX ADMIN — BUDESONIDE AND FORMOTEROL FUMARATE DIHYDRATE 2 PUFF: 80; 4.5 AEROSOL RESPIRATORY (INHALATION) at 21:29

## 2022-12-08 RX ADMIN — IPRATROPIUM BROMIDE AND ALBUTEROL SULFATE 3 ML: .5; 3 SOLUTION RESPIRATORY (INHALATION) at 03:56

## 2022-12-08 RX ADMIN — ASPIRIN 81 MG: 81 TABLET, COATED ORAL at 17:22

## 2022-12-08 RX ADMIN — CEFTRIAXONE SODIUM 1 G: 1 INJECTION, POWDER, FOR SOLUTION INTRAMUSCULAR; INTRAVENOUS at 17:23

## 2022-12-08 RX ADMIN — INSULIN LISPRO 9 UNITS: 100 INJECTION, SOLUTION INTRAVENOUS; SUBCUTANEOUS at 21:46

## 2022-12-08 RX ADMIN — IPRATROPIUM BROMIDE AND ALBUTEROL SULFATE 3 ML: .5; 2.5 SOLUTION RESPIRATORY (INHALATION) at 20:54

## 2022-12-08 RX ADMIN — AZITHROMYCIN DIHYDRATE 500 MG: 500 INJECTION, POWDER, LYOPHILIZED, FOR SOLUTION INTRAVENOUS at 17:27

## 2022-12-08 RX ADMIN — FAMOTIDINE 20 MG: 20 TABLET, FILM COATED ORAL at 21:37

## 2022-12-08 RX ADMIN — METHYLPREDNISOLONE SODIUM SUCCINATE 40 MG: 40 INJECTION, POWDER, FOR SOLUTION INTRAMUSCULAR; INTRAVENOUS at 21:38

## 2022-12-08 RX ADMIN — FAMOTIDINE 20 MG: 20 TABLET, FILM COATED ORAL at 17:21

## 2022-12-08 RX ADMIN — SODIUM CHLORIDE, PRESERVATIVE FREE 10 ML: 5 INJECTION INTRAVENOUS at 21:48

## 2022-12-08 RX ADMIN — METHYLPREDNISOLONE SODIUM SUCCINATE 40 MG: 40 INJECTION, POWDER, FOR SOLUTION INTRAMUSCULAR; INTRAVENOUS at 17:24

## 2022-12-08 RX ADMIN — INSULIN LISPRO 15 UNITS: 100 INJECTION, SOLUTION INTRAVENOUS; SUBCUTANEOUS at 17:18

## 2022-12-08 RX ADMIN — FOLIC ACID 1 MG: 1 TABLET ORAL at 17:22

## 2022-12-08 RX ADMIN — SODIUM CHLORIDE, PRESERVATIVE FREE 10 ML: 5 INJECTION INTRAVENOUS at 17:25

## 2022-12-08 RX ADMIN — IPRATROPIUM BROMIDE AND ALBUTEROL SULFATE 3 ML: .5; 2.5 SOLUTION RESPIRATORY (INHALATION) at 13:08

## 2022-12-08 RX ADMIN — GUAIFENESIN 600 MG: 600 TABLET, EXTENDED RELEASE ORAL at 17:22

## 2022-12-08 RX ADMIN — GUAIFENESIN 600 MG: 600 TABLET, EXTENDED RELEASE ORAL at 21:37

## 2022-12-08 NOTE — ED NOTES
Bedside and Verbal shift change report given to [facility] [address] (oncoming nurse) by Turner Dexter RN (offgoing nurse). Report included the following information SBAR, ED Summary and MAR.

## 2022-12-08 NOTE — ACP (ADVANCE CARE PLANNING)
Advance Care Planning   Healthcare Decision Maker:       Primary Decision Maker: Corry Arthur - Daughter - [phone]

## 2022-12-08 NOTE — ED NOTES
Pt arrived via ems, from freestanding ed in [facility], pt moved on to our ed stretcher in room 16, vitals taken, o2 via nasal cannula @2L, normal saline infusing through 22 g iv in right hand , only complaint is still not feeling well and pain all over

## 2022-12-08 NOTE — H&P
History and Physical    Patient: Breanna Aguirre MRN: [id number]  SSN: [id number]    YOB: 1955  Age: 79 y.o. Sex: female      Subjective:      Breanna Aguirre is a 79 y.o. female who presents with 2-3d of n/d, chest congestion and sob. Patient is on home O2 but required increase. Work-up in [facility] showed CXR with bilat PNA. Covid, RSV and Flu negative. Patient does have a hx of COPD and is on Symbicort/Albuterol. Remainder of ROS is negative. Past Medical History:   Diagnosis Date    Arthritis     Asthma     Chronic kidney disease     Chronic obstructive pulmonary disease ([facility] [address]     Diabetes ([facility] [address]     Hypertension      Past Surgical History:   Procedure Laterality Date    HX OTHER SURGICAL      Eye surgery      Family History   Problem Relation Age of Onset    Diabetes Mother     Hypertension Mother     Diabetes Father     Hypertension Father      Social History     Tobacco Use    Smoking status: Never    Smokeless tobacco: Never   Substance Use Topics    Alcohol use: Never      Prior to Admission medications    Medication Sig Start Date End Date Taking? Authorizing Provider   albuterol (PROVENTIL HFA, VENTOLIN HFA, PROAIR HFA) 90 mcg/actuation inhaler Take  by inhalation. Narinder Rossi MD   budesonide-formoteroL (Symbicort) 80-4.5 mcg/actuation HFAA Take 2 Puffs by inhalation. Narinder Rossi MD   ergocalciferol (ERGOCALCIFEROL) 1,250 mcg (50,000 unit) capsule Take 50,000 Units by mouth. Narinder Rossi MD   SITagliptin (Januvia) 50 mg tablet Take 50 mg by mouth daily. Narinder Rossi MD   ezetimibe (Zetia) 10 mg tablet Take  by mouth. Narinder Rossi MD   guaiFENesin ER (MUCINEX) 600 mg ER tablet Take 1 Tablet by mouth two (2) times a day. 7/1/22   Deborah Silva NP   aspirin delayed-release 81 mg tablet Take 81 mg by mouth daily. Provider, Historical   metoprolol tartrate (LOPRESSOR) 100 mg IR tablet Take 100 mg by mouth two (2) times a day.     Provider, Historical amLODIPine (NORVASC) 10 mg tablet Take 10 mg by mouth daily. Provider, Historical   montelukast (Singulair) 10 mg tablet Take 10 mg by mouth daily. Provider, Historical   folic acid (FOLVITE) 1 mg tablet Take 1 mg by mouth daily. Provider, Historical   atorvastatin (LIPITOR) 40 mg tablet Take 40 mg by mouth daily. Provider, Historical   methotrexate (RHEUMATREX) 2.5 mg tablet Take 12.5 mg by mouth every Monday. Provider, Historical        Allergies   Allergen Reactions    Lisinopril Swelling     Lip swelling    Pineapple Swelling     Lip swelling       Review of Systems:  Constitutional: No fevers, No chills, No night sweats, No fatigue, No weakness, No significant weight change  Eyes: No visual disturbance, No loss of vision  HENT: No nasal congestion, No sore throat, No head lesion, No hearing deficit  Respiratory: + Sob, congestion, chest tightness  Cardiovascular: No chest pain, No lower extremity edema, No palpitations, No PND, No orthopnea, No AGUILAR  Gastrointestinal: + n/d, No constipation, No abdominal pain, No Melena, No hematemesis, No BRBPR  Genitourinary: No frequency, No dysuria, No hematuria, No discharge  Integument: No rash, No new skin lesion(s), No dryness, No new palpable nodule  Musculoskeletal: No arthralgias, No neck pain, No back pain, No joint pain, No fall or injury  Neurological: No headaches, No dizziness, No confusion,  No seizures, No focal weakness, No LOC, No paresthesia  Heme/Onc: No overt bleeding noted, No obvious swollen glands  Behavioral/Psychiatric: No change in mood; no SI; no hallucination      Objective:     Vitals:    12/08/22 0821 12/08/22 0823 12/08/22 0830 12/08/22 0840   BP: 104/87      Pulse: 72  67 72   Resp: 13  15 22   Temp:  97.7 °F (36.5 °C)     SpO2: 97%  97% 99%   Weight:       Height:            Physical Exam:    General: Awake and responsive, NAD  Head: atraumatic  Eye: No overt orbital findings, PERRLA   ENT: No overt hearing loss noted;  No nasal lesion; Throat clear  Neck: Supple, No overt palpable mass, No significant palpable lymph nodes  Vascular: No carotid bruit, palpable pulses bilat  Lung: Poor bilat breath sounds with tight wheeze  Heart: S1S2, No significant murmur appreciated  Abdomen: Soft, NT, No rigidity, No rebound, Bowel sounds +  Extremities: No edema  M/S: No traumatic change, active mobility noted  Skin: No cyanosis, No rash of significance (other than chronic lesions)  Neurologic: No overt focal motor deficit. AxOx3. Psychiatric: Coherent and age appropriate affect    Recent Results (from the past 24 hour(s))   INFLUENZA A & B AG (RAPID TEST)    Collection Time: 12/07/22 11:01 AM   Result Value Ref Range    Influenza A Antigen Negative Negative      Influenza B Antigen Negative Negative     EKG, 12 LEAD, INITIAL    Collection Time: 12/07/22 11:55 AM   Result Value Ref Range    Ventricular Rate 58 BPM    Atrial Rate 58 BPM    P-R Interval 194 ms    QRS Duration 90 ms    Q-T Interval 406 ms    QTC Calculation (Bezet) 398 ms    Calculated P Axis 54 degrees    Calculated R Axis 22 degrees    Calculated T Axis 58 degrees    Diagnosis       Sinus bradycardia with Premature atrial complexes  Otherwise normal ECG  When compared with ECG of 01-JUL-2022 10:21,  Premature atrial complexes are now Present  Confirmed by Pamela Ugalde ([id number]) on 12/7/2022 1:09:58 PM     CBC WITH AUTOMATED DIFF    Collection Time: 12/07/22 11:56 AM   Result Value Ref Range    WBC 8.1 3.6 - 11.0 K/uL    RBC 2.60 (L) 3.80 - 5.20 M/uL    HGB 8.5 (L) 11.5 - 16.0 g/dL    HCT 27.1 (L) 35.0 - 47.0 %    .2 (H) 80.0 - 99.0 FL    MCH 32.7 26.0 - 34.0 PG    MCHC 31.4 30.0 - 36.5 g/dL    RDW 13.7 11.5 - 14.5 %    PLATELET 154 341 - 848 K/uL    MPV 9.5 8.9 - 12.9 FL    NEUTROPHILS 78 (H) 32 - 75 %    LYMPHOCYTES 8 (L) 12 - 49 %    MONOCYTES 12 5 - 13 %    EOSINOPHILS 1 0 - 7 %    BASOPHILS 1 0 - 1 %    IMMATURE GRANULOCYTES 0 0.0 - 0.5 %    ABS.  NEUTROPHILS 6.4 1.8 - 8.0 K/UL    ABS. LYMPHOCYTES 0.6 (L) 0.8 - 3.5 K/UL    ABS. MONOCYTES 1.0 0.0 - 1.0 K/UL    ABS. EOSINOPHILS 0.1 0.0 - 0.4 K/UL    ABS. BASOPHILS 0.0 0.0 - 0.1 K/UL    ABS. IMM.  GRANS. 0.0 0.00 - 0.04 K/UL    DF AUTOMATED     METABOLIC PANEL, BASIC    Collection Time: 12/07/22 11:56 AM   Result Value Ref Range    Sodium 140 136 - 145 mmol/L    Potassium 4.3 3.5 - 5.1 mmol/L    Chloride 103 97 - 108 mmol/L    CO2 31 21 - 32 mmol/L    Anion gap 6 5 - 15 mmol/L    Glucose 147 (H) 65 - 100 mg/dL    BUN 23 (H) 6 - 20 mg/dL    Creatinine 1.57 (H) 0.55 - 1.02 mg/dL    BUN/Creatinine ratio 15 12 - 20      eGFR 36 (L) >60 ml/min/1.73m2    Calcium 8.7 8.5 - 10.1 mg/dL   TROPONIN-HIGH SENSITIVITY    Collection Time: 12/07/22 11:56 AM   Result Value Ref Range    Troponin-High Sensitivity 13 0 - 51 ng/L   COVID-19 WITH INFLUENZA A/B    Collection Time: 12/07/22  1:00 PM   Result Value Ref Range    SARS-CoV-2 by PCR Not Detected Not Detected      Influenza A by PCR Not Detected Not Detected      Influenza B by PCR Not Detected Not Detected     RSV AG - RAPID    Collection Time: 12/07/22  1:01 PM   Result Value Ref Range    RSV Antigen Negative Negative     NT-PRO BNP    Collection Time: 12/07/22  1:45 PM   Result Value Ref Range    NT pro-BNP 2,981 (H) <125 pg/mL   BLOOD GAS, ARTERIAL POC    Collection Time: 12/07/22  2:10 PM   Result Value Ref Range    pH (POC) 7.34 (L) 7.35 - 7.45      pCO2 (POC) 48.6 (H) 35.0 - 45.0 mmHg    pO2 (POC) 100 75 - 100 mmHg    HCO3 (POC) 26.2 19.0 - 28.0 mmol/L    sO2 (POC) 97.2 %    Base excess (POC) 0.1 mmol/L    Specimen type (POC) Arterial      Performed by Missael Duarte    URINALYSIS W/ REFLEX CULTURE    Collection Time: 12/07/22  2:54 PM    Specimen: Urine   Result Value Ref Range    Color Yellow/Straw      Appearance Clear Clear      Specific gravity 1.020 1.003 - 1.030      pH (UA) 5.0 5.0 - 8.0      Protein 100 (A) Negative mg/dL    Glucose Negative Negative mg/dL    Ketone Negative Negative mg/dL    Bilirubin Negative Negative      Blood Negative Negative      Urobilinogen 0.1 (L) 0.2 - 1.0 EU/dL    Nitrites Negative Negative      Leukocyte Esterase Large (A) Negative      WBC 10-20 0 - 4 /hpf    RBC 0-5 0 - 5 /hpf    Epithelial cells Few Few /lpf    Bacteria Negative Negative /hpf    UA:UC IF INDICATED Urine Culture Ordered (A) Culture not indicated by UA result         XR Results (maximum last 3): Results from [place] encounter on 12/07/22    XR CHEST PA LAT    Narrative  EXAM: XR CHEST PA LAT    INDICATION: COPD and hypoxia    COMPARISON: 7/1/2022    TECHNIQUE: PA and lateral chest views    FINDINGS: The cardiac size is stable. The pulmonary vasculature is within normal  limits. There is a patchy large infiltrate in the left upper lobe. A subcentimeter  opacity is present in the right upper lobe and similar findings are also present  in the left lower lobe. The lungs are hyperinflated. The visualized bones and  upper abdomen are age-appropriate. Impression  Multilobar pneumonia. This is most pronounced in the lingula, but also present  in the right upper lobe and left lower lobe    COPD      Results from Hospital Encounter encounter on 07/01/22    XR CHEST PORT    Narrative  INDICATION:  sob    EXAM: Single portable view of chest 1128 . Comparison:1/31/2020    Findings: Cardiac silhouette is enlarged. Pulmonary vasculature is not engorged. There are no focal parenchymal opacities, effusions, or pneumothorax. Bibasilar  atelectasis    Impression  1. Enlarged cardiac silhouette, bibasilar atelectasis    Assessment/Plan:     AECOPD with Bilat CAP and Chronic Resp Failure on Home O2  - IV Rocephin/Zithro  - Solu-medrol, Duoneb  - Continue home O2  - CT Chest for better assessment  - Covid, Flu and RSV negative  - Bld Cx; procal    # Elevated BNP (etiology?; d/t COPD/PNA with CKD? )  - Echo    # Hx of CKD, DM, HTN    Disposition Status: Admit to inpatient  DVT Prophylaxis: Lovenox  GI Prophylaxis: Pepcid  Code Status: Presumed Full  POA: Daughter    Signed By: Roscoe Easley MD     December 8, 2022

## 2022-12-09 LAB
ALBUMIN SERPL-MCNC: 2.7 G/DL (ref 3.5–5)
ALBUMIN/GLOB SERPL: 0.6 {RATIO} (ref 1.1–2.2)
ALP SERPL-CCNC: 93 U/L (ref 45–117)
ALT SERPL-CCNC: 21 U/L (ref 12–78)
ANION GAP SERPL CALC-SCNC: 4 MMOL/L (ref 5–15)
AST SERPL W P-5'-P-CCNC: 16 U/L (ref 15–37)
BASOPHILS # BLD: 0 K/UL (ref 0–0.1)
BASOPHILS NFR BLD: 0 % (ref 0–1)
BILIRUB SERPL-MCNC: 0.2 MG/DL (ref 0.2–1)
BNP SERPL-MCNC: 7156 PG/ML
BUN SERPL-MCNC: 22 MG/DL (ref 6–20)
BUN/CREAT SERPL: 17 (ref 12–20)
CA-I BLD-MCNC: 8.7 MG/DL (ref 8.5–10.1)
CHLORIDE SERPL-SCNC: 109 MMOL/L (ref 97–108)
CO2 SERPL-SCNC: 28 MMOL/L (ref 21–32)
CREAT SERPL-MCNC: 1.3 MG/DL (ref 0.55–1.02)
CRP SERPL-MCNC: 8.04 MG/DL (ref 0–0.6)
DIFFERENTIAL METHOD BLD: ABNORMAL
EOSINOPHIL # BLD: 0 K/UL (ref 0–0.4)
EOSINOPHIL NFR BLD: 0 % (ref 0–7)
ERYTHROCYTE [DISTWIDTH] IN BLOOD BY AUTOMATED COUNT: 14.2 % (ref 11.5–14.5)
EST. AVERAGE GLUCOSE BLD GHB EST-MCNC: 131 MG/DL
GLOBULIN SER CALC-MCNC: 4.6 G/DL (ref 2–4)
GLUCOSE BLD STRIP.AUTO-MCNC: 125 MG/DL (ref 65–100)
GLUCOSE BLD STRIP.AUTO-MCNC: 312 MG/DL (ref 65–100)
GLUCOSE BLD STRIP.AUTO-MCNC: 366 MG/DL (ref 65–100)
GLUCOSE BLD STRIP.AUTO-MCNC: 373 MG/DL (ref 65–100)
GLUCOSE SERPL-MCNC: 382 MG/DL (ref 65–100)
HBA1C MFR BLD: 6.2 % (ref 4–5.6)
HCT VFR BLD AUTO: 25.7 % (ref 35–47)
HGB BLD-MCNC: 8.1 G/DL (ref 11.5–16)
IMM GRANULOCYTES # BLD AUTO: 0.1 K/UL (ref 0–0.04)
IMM GRANULOCYTES NFR BLD AUTO: 1 % (ref 0–0.5)
LYMPHOCYTES # BLD: 0.3 K/UL (ref 0.8–3.5)
LYMPHOCYTES NFR BLD: 2 % (ref 12–49)
MAGNESIUM SERPL-MCNC: 2.3 MG/DL (ref 1.6–2.4)
MCH RBC QN AUTO: 32 PG (ref 26–34)
MCHC RBC AUTO-ENTMCNC: 31.5 G/DL (ref 30–36.5)
MCV RBC AUTO: 101.6 FL (ref 80–99)
MONOCYTES # BLD: 0.4 K/UL (ref 0–1)
MONOCYTES NFR BLD: 3 % (ref 5–13)
NEUTS SEG # BLD: 13.6 K/UL (ref 1.8–8)
NEUTS SEG NFR BLD: 94 % (ref 32–75)
NRBC # BLD: 0 K/UL (ref 0–0.01)
NRBC BLD-RTO: 0 PER 100 WBC
PERFORMED BY, TECHID: ABNORMAL
PHOSPHATE SERPL-MCNC: 2.8 MG/DL (ref 2.6–4.7)
PLATELET # BLD AUTO: 241 K/UL (ref 150–400)
PMV BLD AUTO: 9.7 FL (ref 8.9–12.9)
POTASSIUM SERPL-SCNC: 4.4 MMOL/L (ref 3.5–5.1)
PROCALCITONIN SERPL-MCNC: 0.31 NG/ML
PROT SERPL-MCNC: 7.3 G/DL (ref 6.4–8.2)
RBC # BLD AUTO: 2.53 M/UL (ref 3.8–5.2)
SODIUM SERPL-SCNC: 141 MMOL/L (ref 136–145)
TROPONIN-HIGH SENSITIVITY: 41 NG/L (ref 0–51)
WBC # BLD AUTO: 14.4 K/UL (ref 3.6–11)

## 2022-12-09 PROCEDURE — 84100 ASSAY OF PHOSPHORUS: CPT

## 2022-12-09 PROCEDURE — 74011250637 HC RX REV CODE- 250/637: Performed by: INTERNAL MEDICINE

## 2022-12-09 PROCEDURE — 83735 ASSAY OF MAGNESIUM: CPT

## 2022-12-09 PROCEDURE — 82962 GLUCOSE BLOOD TEST: CPT

## 2022-12-09 PROCEDURE — 80053 COMPREHEN METABOLIC PANEL: CPT

## 2022-12-09 PROCEDURE — 77010033678 HC OXYGEN DAILY

## 2022-12-09 PROCEDURE — 84484 ASSAY OF TROPONIN QUANT: CPT

## 2022-12-09 PROCEDURE — 74011250636 HC RX REV CODE- 250/636: Performed by: INTERNAL MEDICINE

## 2022-12-09 PROCEDURE — 74011250636 HC RX REV CODE- 250/636

## 2022-12-09 PROCEDURE — 84145 PROCALCITONIN (PCT): CPT

## 2022-12-09 PROCEDURE — 74011000250 HC RX REV CODE- 250: Performed by: INTERNAL MEDICINE

## 2022-12-09 PROCEDURE — 85025 COMPLETE CBC W/AUTO DIFF WBC: CPT

## 2022-12-09 PROCEDURE — 86140 C-REACTIVE PROTEIN: CPT

## 2022-12-09 PROCEDURE — 36415 COLL VENOUS BLD VENIPUNCTURE: CPT

## 2022-12-09 PROCEDURE — 74011000250 HC RX REV CODE- 250: Performed by: NURSE PRACTITIONER

## 2022-12-09 PROCEDURE — 74011250636 HC RX REV CODE- 250/636: Performed by: NURSE PRACTITIONER

## 2022-12-09 PROCEDURE — 74011250637 HC RX REV CODE- 250/637: Performed by: NURSE PRACTITIONER

## 2022-12-09 PROCEDURE — 94640 AIRWAY INHALATION TREATMENT: CPT

## 2022-12-09 PROCEDURE — 65270000029 HC RM PRIVATE

## 2022-12-09 PROCEDURE — 94761 N-INVAS EAR/PLS OXIMETRY MLT: CPT

## 2022-12-09 PROCEDURE — 83036 HEMOGLOBIN GLYCOSYLATED A1C: CPT

## 2022-12-09 PROCEDURE — 74011636637 HC RX REV CODE- 636/637: Performed by: NURSE PRACTITIONER

## 2022-12-09 PROCEDURE — 83880 ASSAY OF NATRIURETIC PEPTIDE: CPT

## 2022-12-09 PROCEDURE — 74011636637 HC RX REV CODE- 636/637: Performed by: INTERNAL MEDICINE

## 2022-12-09 RX ORDER — GUAIFENESIN 100 MG/5ML
200 SOLUTION ORAL
Status: DISCONTINUED | OUTPATIENT
Start: 2022-12-09 | End: 2022-12-14 | Stop reason: HOSPADM

## 2022-12-09 RX ORDER — MAGNESIUM SULFATE 100 %
4 CRYSTALS MISCELLANEOUS AS NEEDED
Status: DISCONTINUED | OUTPATIENT
Start: 2022-12-09 | End: 2022-12-14 | Stop reason: HOSPADM

## 2022-12-09 RX ORDER — INSULIN GLARGINE 100 [IU]/ML
10 INJECTION, SOLUTION SUBCUTANEOUS
Status: DISCONTINUED | OUTPATIENT
Start: 2022-12-09 | End: 2022-12-10

## 2022-12-09 RX ORDER — IPRATROPIUM BROMIDE AND ALBUTEROL SULFATE 2.5; .5 MG/3ML; MG/3ML
3 SOLUTION RESPIRATORY (INHALATION)
Status: DISCONTINUED | OUTPATIENT
Start: 2022-12-09 | End: 2022-12-10

## 2022-12-09 RX ORDER — FAMOTIDINE 20 MG/1
20 TABLET, FILM COATED ORAL DAILY
Status: DISCONTINUED | OUTPATIENT
Start: 2022-12-09 | End: 2022-12-14 | Stop reason: HOSPADM

## 2022-12-09 RX ORDER — INSULIN LISPRO 100 [IU]/ML
5 INJECTION, SOLUTION INTRAVENOUS; SUBCUTANEOUS
Status: DISCONTINUED | OUTPATIENT
Start: 2022-12-09 | End: 2022-12-10

## 2022-12-09 RX ORDER — BENZONATATE 100 MG/1
200 CAPSULE ORAL 3 TIMES DAILY
Status: DISCONTINUED | OUTPATIENT
Start: 2022-12-09 | End: 2022-12-14 | Stop reason: HOSPADM

## 2022-12-09 RX ADMIN — BUDESONIDE AND FORMOTEROL FUMARATE DIHYDRATE 2 PUFF: 80; 4.5 AEROSOL RESPIRATORY (INHALATION) at 09:17

## 2022-12-09 RX ADMIN — INSULIN LISPRO 15 UNITS: 100 INJECTION, SOLUTION INTRAVENOUS; SUBCUTANEOUS at 23:12

## 2022-12-09 RX ADMIN — BUDESONIDE AND FORMOTEROL FUMARATE DIHYDRATE 2 PUFF: 80; 4.5 AEROSOL RESPIRATORY (INHALATION) at 19:40

## 2022-12-09 RX ADMIN — IPRATROPIUM BROMIDE AND ALBUTEROL SULFATE 3 ML: .5; 3 SOLUTION RESPIRATORY (INHALATION) at 19:38

## 2022-12-09 RX ADMIN — GUAIFENESIN 600 MG: 600 TABLET, EXTENDED RELEASE ORAL at 10:12

## 2022-12-09 RX ADMIN — MONTELUKAST 10 MG: 10 TABLET, FILM COATED ORAL at 10:13

## 2022-12-09 RX ADMIN — METHYLPREDNISOLONE SODIUM SUCCINATE 40 MG: 40 INJECTION, POWDER, FOR SOLUTION INTRAMUSCULAR; INTRAVENOUS at 21:34

## 2022-12-09 RX ADMIN — METHYLPREDNISOLONE SODIUM SUCCINATE 40 MG: 40 INJECTION, POWDER, FOR SOLUTION INTRAMUSCULAR; INTRAVENOUS at 16:11

## 2022-12-09 RX ADMIN — SODIUM CHLORIDE, PRESERVATIVE FREE 10 ML: 5 INJECTION INTRAVENOUS at 21:42

## 2022-12-09 RX ADMIN — AMLODIPINE BESYLATE 10 MG: 5 TABLET ORAL at 10:13

## 2022-12-09 RX ADMIN — INSULIN GLARGINE 10 UNITS: 100 INJECTION, SOLUTION SUBCUTANEOUS at 21:38

## 2022-12-09 RX ADMIN — SODIUM CHLORIDE, PRESERVATIVE FREE 10 ML: 5 INJECTION INTRAVENOUS at 10:12

## 2022-12-09 RX ADMIN — INSULIN LISPRO 12 UNITS: 100 INJECTION, SOLUTION INTRAVENOUS; SUBCUTANEOUS at 13:25

## 2022-12-09 RX ADMIN — EZETIMIBE 10 MG: 10 TABLET ORAL at 10:12

## 2022-12-09 RX ADMIN — METOPROLOL TARTRATE 100 MG: 50 TABLET, FILM COATED ORAL at 21:34

## 2022-12-09 RX ADMIN — IPRATROPIUM BROMIDE AND ALBUTEROL SULFATE 3 ML: .5; 3 SOLUTION RESPIRATORY (INHALATION) at 13:24

## 2022-12-09 RX ADMIN — ASPIRIN 81 MG: 81 TABLET, COATED ORAL at 10:13

## 2022-12-09 RX ADMIN — FAMOTIDINE 20 MG: 20 TABLET, FILM COATED ORAL at 10:13

## 2022-12-09 RX ADMIN — INSULIN LISPRO 5 UNITS: 100 INJECTION, SOLUTION INTRAVENOUS; SUBCUTANEOUS at 13:25

## 2022-12-09 RX ADMIN — BENZONATATE 200 MG: 100 CAPSULE ORAL at 21:34

## 2022-12-09 RX ADMIN — SODIUM CHLORIDE, PRESERVATIVE FREE 10 ML: 5 INJECTION INTRAVENOUS at 16:11

## 2022-12-09 RX ADMIN — CEFTRIAXONE SODIUM 1 G: 1 INJECTION, POWDER, FOR SOLUTION INTRAMUSCULAR; INTRAVENOUS at 18:10

## 2022-12-09 RX ADMIN — METOPROLOL TARTRATE 100 MG: 50 TABLET, FILM COATED ORAL at 10:12

## 2022-12-09 RX ADMIN — FOLIC ACID 1 MG: 1 TABLET ORAL at 10:12

## 2022-12-09 RX ADMIN — ATORVASTATIN CALCIUM 40 MG: 40 TABLET, FILM COATED ORAL at 10:13

## 2022-12-09 RX ADMIN — BENZONATATE 200 MG: 100 CAPSULE ORAL at 16:11

## 2022-12-09 RX ADMIN — INSULIN LISPRO 15 UNITS: 100 INJECTION, SOLUTION INTRAVENOUS; SUBCUTANEOUS at 10:13

## 2022-12-09 RX ADMIN — IPRATROPIUM BROMIDE AND ALBUTEROL SULFATE 3 ML: .5; 3 SOLUTION RESPIRATORY (INHALATION) at 09:17

## 2022-12-09 RX ADMIN — AZITHROMYCIN DIHYDRATE 500 MG: 500 INJECTION, POWDER, LYOPHILIZED, FOR SOLUTION INTRAVENOUS at 00:37

## 2022-12-09 NOTE — PROGRESS NOTES
Vascular Access Team Consult - Ultrasound (US)-guided Peripheral IV (PIV) Placement:     22 g 1.75 inch PIV placed with US-guidance x 1 attempt in right [id number] forearm cephalic vein. Brisk blood return noted and flushed easily with 10 mL NS. Patient tolerated well, no complaints. Patient continues to benefit from US-guided PIV placement due to difficulty IV placement. 12/09/22 0910   Peripheral IV 12/09/22 Anterior;Right Forearm   Placement Date/Time: 12/09/22 8516   Number of Attempts: 1  Inserted By: Jeanne Romero RN (US-guided)  Present on Admission/Arrival: No  Size: (c) 22 G  Orientation: (c) Anterior;Right  Location: (c) Forearm   Site Assessment Clean, dry, & intact   Phlebitis Assessment 0   Infiltration Assessment 0   Dressing Status Clean, dry, & intact; New   Dressing Type Transparent;Tape   Hub Color/Line Status Blue;Flushed;Patent; End cap changed; Capped   Action Taken Open ports on tubing capped; Other (comment)  (Primary & secondary tubing discarded for primary care nurse to hang new with next dose due per [facility] protocol.)   Alcohol Cap Used Yes     Primary care nurse, Mariah Carr, attempted by phone; charge nurse, Que Quinteros, notified. Please enter IP PICC Team consult for any further vascular access needs.

## 2022-12-09 NOTE — PROGRESS NOTES
Primary Nurse Ely Diaz RN and John Luis RN performed a dual skin assessment on this patient No impairment noted

## 2022-12-09 NOTE — PROGRESS NOTES
Physician Progress Note      PATIENT:               Bon Hoyos  [facility] #:                  [id number]  :                       1955  ADMIT DATE:       2022 11:03 AM  DISCH DATE:  RESPONDING  PROVIDER #:        Cassandra Castro MD        QUERY TEXT:    Stage of Chronic Kidney Disease: Please provide further specificity, if known. Clinical indicators include: chronic kidney disease, bun, creatinine, bun/creatinine, bnp, pro-bnp, ckd  Options provided:  -- Chronic kidney disease stage 1  -- Chronic kidney disease stage 2  -- Chronic kidney disease stage 3  -- Chronic kidney disease stage 3a  -- Chronic kidney disease stage 3b  -- Chronic kidney disease stage 4  -- Chronic kidney disease stage 5  -- Chronic kidney disease stage 5, requiring dialysis  -- End stage renal disease  -- Other - I will add my own diagnosis  -- Disagree - Not applicable / Not valid  -- Disagree - Clinically Unable to determine / Unknown        PROVIDER RESPONSE TEXT:    The patient has chronic kidney disease stage 2. QUERY TEXT:    Pt admitted with SOB/ Chest congestion and has chronic respiratory failure documented. If possible, please document in progress notes and discharge summary further specificity regarding the type and acuity of respiratory failure: The medical record reflects the following:  Risk Factors: COPD, PNA, Arthritis, Asthma, DM II, HTN, CKD  Clinical Indicators: ED DR: \" Oxygen saturation 82% on room air. \" H&P: \"Patient is on home O2 but required increase. \" \"Respiratory: + Sob, congestion, chest tightness. \"  Treatment: Oxygen supplement, Solu-medrol & Duo nebs, CXR, CT Chest, Pulse Ox monitoring.     Thank you,  ELZBIETA Dubon, RN, [place], [facility] Specialist  [phone] or [email or web address]  Can also be reached on [employer]  Options provided:  -- Acute on chronic respiratory failure with hypoxia  -- Acute on chronic respiratory failure with hypercapnia  -- Acute on chronic respiratory failure with hypoxia and hypercapnia  -- Chronic respiratory failure with hypoxia  -- Chronic respiratory failure with hypercapnia  -- Chronic respiratory failure with hypoxia and hypercapnia  -- Other - I will add my own diagnosis  -- Disagree - Not applicable / Not valid  -- Disagree - Clinically unable to determine / Unknown  -- Refer to Clinical Documentation Reviewer    PROVIDER RESPONSE TEXT:    This patient is in acute on chronic respiratory failure with hypoxia.     Query created by: Ab Kuhn on 12/8/2022 5:04 PM      Electronically signed by:  Claudeen Book MD 12/9/2022 4:15 PM

## 2022-12-09 NOTE — PROGRESS NOTES
Hospitalist Progress Note            Daily Progress Note: 12/9/2022 8:06 AM  Hospital course: The patient is a 59-year-old female with a PMH significant for COPD/asthma, CKD, DM and HTN. She presents to freestanding emergency room with symptoms of congestion, rhinorrhea, nausea without vomiting and diarrhea for the past 2 days PTA. Patient does have home oxygen and has increased her use since the symptoms began. Family state she became hypoxic with saturations of 45% at home prompting a call to EMS. Significant lab findings on admission UA with large leukocyte esterase, negative nitrates or bacteria, the positive pyuria. BUN 23 creatinine 1.57 BNP 2981, ABG pH 7.34 with CO2 48.6, PO2 100. CT of chest revealing bilateral Multi lobar pneumonia with trace bilateral pleural effusions. COVID, flu and RSV tests are negative. Patient was transferred to [facility] for further management of COPD/asthma exacerbation, acute on chronic respiratory failure with hypoxia, community-acquired pneumonia and will assess and rule in or out CHF exacerbation with elevated BNP. Started on IV ceftriaxone for both pneumonia and possible UTI which will be ruled out by urine culture and azithromycin. Also started on IV Solu-Medrol. 2D echo . Subjective:     Examined patient at bedside. She appears to be breathing comfortably on nasal cannula oxygen. No current complaints    Assessment/Plan:   Active Problems:    Community acquired pneumonia (12/7/2022)      COPD with acute exacerbation ([address] (12/8/2022)      PNA (pneumonia) (12/8/2022)      AECOPD with Bilat CAP and Chronic Resp Failure on Home O2  - IV Rocephin/Zithro  - Solu-medrol, Duoneb  - Continue home O2  - CT Chest for better assessment  - Covid, Flu and RSV negative  - Bld Cx; procal     # Elevated BNP (etiology?; d/t COPD/PNA with CKD? )  - Echo     # Hx of CKD  DM with hyperglycemia  HTN  -Accu-Cheks with SSI, will add mealtime bolus of 5 units and add Lantus at night 10 units  -Continue PTA medications aspirin, amlodipine, metoprolol, Zetia and atorvastatin      DVT Prophylaxis: Lovenox  Code Status: Full Code  POA/NOK:    Disposition and discharge barriers:   Improvement in symptoms  Wean oxygen  IV antibiotics  Expected length of stay 48 hours  Care Plan discussed with:     Current Facility-Administered Medications   Medication Dose Route Frequency    albuterol-ipratropium (DUO-NEB) 2.5 MG-0.5 MG/3 ML  3 mL Nebulization Q6HWA RT    famotidine (PEPCID) tablet 20 mg  20 mg Oral DAILY    methylPREDNISolone (PF) (SOLU-MEDROL) injection 40 mg  40 mg IntraVENous Q8H    sodium chloride (NS) flush 5-40 mL  5-40 mL IntraVENous Q8H    sodium chloride (NS) flush 5-40 mL  5-40 mL IntraVENous PRN    acetaminophen (TYLENOL) tablet 650 mg  650 mg Oral Q6H PRN    Or    acetaminophen (TYLENOL) suppository 650 mg  650 mg Rectal Q6H PRN    polyethylene glycol (MIRALAX) packet 17 g  17 g Oral DAILY PRN    ondansetron (ZOFRAN ODT) tablet 4 mg  4 mg Oral Q8H PRN    Or    ondansetron (ZOFRAN) injection 4 mg  4 mg IntraVENous Q6H PRN    enoxaparin (LOVENOX) injection 30 mg  30 mg SubCUTAneous DAILY    amLODIPine (NORVASC) tablet 10 mg  10 mg Oral DAILY    aspirin delayed-release tablet 81 mg  81 mg Oral DAILY    atorvastatin (LIPITOR) tablet 40 mg  40 mg Oral DAILY    budesonide-formoterol (SYMBICORT) 80-4.5 mcg inhaler  2 Puff Inhalation BID RT    ezetimibe (ZETIA) tablet 10 mg  10 mg Oral DAILY    folic acid (FOLVITE) tablet 1 mg  1 mg Oral DAILY    guaiFENesin ER (MUCINEX) tablet 600 mg  600 mg Oral BID    metoprolol tartrate (LOPRESSOR) tablet 100 mg  100 mg Oral BID    montelukast (SINGULAIR) tablet 10 mg  10 mg Oral DAILY    . PHARMACY TO SUBSTITUTE PER PROTOCOL (Reordered from: SITagliptin (Januvia) 50 mg tablet)    Per Protocol    insulin lispro (HUMALOG) injection   SubCUTAneous AC&HS    glucose chewable tablet 16 g  4 Tablet Oral PRN    glucagon (GLUCAGEN) injection 1 mg  1 mg IntraMUSCular PRN    dextrose 10% infusion 0-250 mL  0-250 mL IntraVENous PRN    cefTRIAXone (ROCEPHIN) 1 g in sterile water (preservative free) 10 mL IV syringe  1 g IntraVENous Q24H        REVIEW OF SYSTEMS    Review of Systems   Constitutional:  Positive for malaise/fatigue. Respiratory:  Positive for cough, sputum production and shortness of breath. Musculoskeletal:  Positive for myalgias. Neurological:  Positive for weakness. Psychiatric/Behavioral:  The patient is nervous/anxious. Objective:     Visit Vitals  /67   Pulse 77   Temp 97.7 °F (36.5 °C)   Resp 22   Ht 5' (1.524 m)   Wt 68.5 kg (151 lb 0.2 oz)   SpO2 96%   BMI 29.49 kg/m²    O2 Flow Rate (L/min): 2 l/min O2 Device: Nasal cannula    Temp (24hrs), Av.8 °F (36.6 °C), Min:97.7 °F (36.5 °C), Max:98 °F (36.7 °C)        PHYSICAL EXAM:    Physical Exam  Constitutional:       Appearance: She is obese. She is ill-appearing. HENT:      Nose: Congestion present. Cardiovascular:      Rate and Rhythm: Normal rate and regular rhythm. Pulmonary:      Effort: Respiratory distress present. Abdominal:      General: There is distension. Tenderness: There is no abdominal tenderness. Musculoskeletal:      Right lower leg: Edema present. Left lower leg: Edema present. Comments: Generalized weakness   Skin:     General: Skin is dry. Neurological:      Motor: Weakness present. Gait: Gait abnormal.        Data Review    Recent Results (from the past 24 hour(s))   GLUCOSE, POC    Collection Time: 22  5:02 PM   Result Value Ref Range    Glucose (POC) 367 (H) 65 - 100 mg/dL    Performed by Rio Matthew    GLUCOSE, POC    Collection Time: 22  9:35 PM   Result Value Ref Range    Glucose (POC) 299 (H) 65 - 100 mg/dL    Performed by Miko Cleary        CT CHEST WO CONT   Final Result   Bilateral multilobar pneumonia with trace bilateral pleural effusions.       XR CHEST PA LAT   Final Result      Multilobar pneumonia. This is most pronounced in the lingula, but also present   in the right upper lobe and left lower lobe      COPD          Intake and Output:  Current Shift: No intake/output data recorded. Last three shifts: 12/07 1901 - 12/09 0700  In: 1678.3 [I.V.:1678.3]  Out: -       Lab/Data Review:  Recent Labs     12/07/22  1156   WBC 8.1   HGB 8.5*   HCT 27.1*        Recent Labs     12/07/22  1156      K 4.3      CO2 31   *   BUN 23*   CREA 1.57*   CA 8.7     No results for input(s): PH, PCO2, PO2, HCO3, FIO2 in the last 72 hours. Recent Results (from the past 24 hour(s))   GLUCOSE, POC    Collection Time: 12/08/22  5:02 PM   Result Value Ref Range    Glucose (POC) 367 (H) 65 - 100 mg/dL    Performed by Vinicius Gross    GLUCOSE, POC    Collection Time: 12/08/22  9:35 PM   Result Value Ref Range    Glucose (POC) 299 (H) 65 - 100 mg/dL    Performed by Jules Overton            _____________________________________________________________________________  Time spent in direct care including coordination of service, review of data and examination: > 35 minutes    ______________________________________________________________________________    Seven Stewart NP    This is dictation was done by dragon, computer voice recognition software. Quite often unanticipated grammatical, syntax, homophones and other interpretive errors or inadvertently transcribed by the computer software. Please excuse errors that have escaped final proofreading. Thank you.

## 2022-12-09 NOTE — PROGRESS NOTES
After 30 mins running IV zithromycin, the patient complain arm pain. Stop running the IV. Call supervisor give new IV, succusses. Will call PICC team to give patient IV. IV medication will hold.

## 2022-12-09 NOTE — ED NOTES
TRANSFER - OUT REPORT:    Tubed report given to Jocelyn Rothman on Destiney Hudson  being transferred to [facility](unit) for routine progression of care       Report consisted of patients Situation, Background, Assessment and   Recommendations(SBAR). Information from the following report(s) SBAR, ED Summary, MAR, and Recent Results was reviewed with the receiving nurse. Lines:   Peripheral IV 12/08/22 Distal;Posterior;Right Forearm (Active)        Opportunity for questions and clarification was provided.       Patient transported with:   Registered Nurse  Tech

## 2022-12-10 ENCOUNTER — APPOINTMENT (OUTPATIENT)
Dept: NON INVASIVE DIAGNOSTICS | Age: 67
DRG: 133 | End: 2022-12-10
Attending: INTERNAL MEDICINE
Payer: MEDICAID

## 2022-12-10 LAB
ANION GAP SERPL CALC-SCNC: 5 MMOL/L (ref 5–15)
BASOPHILS # BLD: 0 K/UL (ref 0–0.1)
BASOPHILS NFR BLD: 0 % (ref 0–1)
BUN SERPL-MCNC: 24 MG/DL (ref 6–20)
BUN/CREAT SERPL: 20 (ref 12–20)
CA-I BLD-MCNC: 9.4 MG/DL (ref 8.5–10.1)
CHLORIDE SERPL-SCNC: 105 MMOL/L (ref 97–108)
CO2 SERPL-SCNC: 29 MMOL/L (ref 21–32)
CREAT SERPL-MCNC: 1.21 MG/DL (ref 0.55–1.02)
DIFFERENTIAL METHOD BLD: ABNORMAL
ECHO AO ROOT DIAM: 3.5 CM
ECHO AO ROOT INDEX: 2.11 CM/M2
ECHO AR MAX VEL PISA: 2.4 M/S
ECHO AV PEAK GRADIENT: 13 MMHG
ECHO AV PEAK VELOCITY: 1.8 M/S
ECHO AV REGURGITANT PHT: 1193 MS
ECHO AV VELOCITY RATIO: 0.61
ECHO EST RA PRESSURE: 10 MMHG
ECHO LA DIAMETER INDEX: 2.35 CM/M2
ECHO LA DIAMETER: 3.9 CM
ECHO LA TO AORTIC ROOT RATIO: 1.11
ECHO LV E' SEPTAL VELOCITY: 9 CM/S
ECHO LV FRACTIONAL SHORTENING: 34 % (ref 28–44)
ECHO LV INTERNAL DIMENSION DIASTOLE INDEX: 3.01 CM/M2
ECHO LV INTERNAL DIMENSION DIASTOLIC: 5 CM (ref 3.9–5.3)
ECHO LV INTERNAL DIMENSION SYSTOLIC INDEX: 1.99 CM/M2
ECHO LV INTERNAL DIMENSION SYSTOLIC: 3.3 CM
ECHO LV IVSD: 0.8 CM (ref 0.6–0.9)
ECHO LV MASS 2D: 135.8 G (ref 67–162)
ECHO LV MASS INDEX 2D: 81.8 G/M2 (ref 43–95)
ECHO LV POSTERIOR WALL DIASTOLIC: 0.8 CM (ref 0.6–0.9)
ECHO LV RELATIVE WALL THICKNESS RATIO: 0.32
ECHO LVOT PEAK GRADIENT: 5 MMHG
ECHO LVOT PEAK VELOCITY: 1.1 M/S
ECHO MV A VELOCITY: 0.99 M/S
ECHO MV E DECELERATION TIME (DT): 184 MS
ECHO MV E VELOCITY: 1.19 M/S
ECHO MV E/A RATIO: 1.2
ECHO MV E/E' SEPTAL: 13.22
ECHO PULMONARY ARTERY END DIASTOLIC PRESSURE: 13 MMHG
ECHO PV MAX VELOCITY: 1.5 M/S
ECHO PV PEAK GRADIENT: 9 MMHG
ECHO PV REGURGITANT MAX VELOCITY: 1.8 M/S
ECHO RA AREA 4C: 14.4 CM2
ECHO RA END SYSTOLIC VOLUME APICAL 4 CHAMBER INDEX BSA: 19 ML/M2
ECHO RA VOLUME: 31 ML
ECHO RIGHT VENTRICULAR SYSTOLIC PRESSURE (RVSP): 46 MMHG
ECHO RV BASAL DIMENSION: 3.7 CM
ECHO TV REGURGITANT MAX VELOCITY: 3 M/S
ECHO TV REGURGITANT PEAK GRADIENT: 36 MMHG
EOSINOPHIL # BLD: 0 K/UL (ref 0–0.4)
EOSINOPHIL NFR BLD: 0 % (ref 0–7)
ERYTHROCYTE [DISTWIDTH] IN BLOOD BY AUTOMATED COUNT: 14.6 % (ref 11.5–14.5)
GLUCOSE BLD STRIP.AUTO-MCNC: 162 MG/DL (ref 65–100)
GLUCOSE BLD STRIP.AUTO-MCNC: 191 MG/DL (ref 65–100)
GLUCOSE BLD STRIP.AUTO-MCNC: 286 MG/DL (ref 65–100)
GLUCOSE BLD STRIP.AUTO-MCNC: 425 MG/DL (ref 65–100)
GLUCOSE SERPL-MCNC: 272 MG/DL (ref 65–100)
HCT VFR BLD AUTO: 27.6 % (ref 35–47)
HGB BLD-MCNC: 8.6 G/DL (ref 11.5–16)
IMM GRANULOCYTES # BLD AUTO: 0.1 K/UL (ref 0–0.04)
IMM GRANULOCYTES NFR BLD AUTO: 1 % (ref 0–0.5)
LYMPHOCYTES # BLD: 0.4 K/UL (ref 0.8–3.5)
LYMPHOCYTES NFR BLD: 3 % (ref 12–49)
MCH RBC QN AUTO: 31.9 PG (ref 26–34)
MCHC RBC AUTO-ENTMCNC: 31.2 G/DL (ref 30–36.5)
MCV RBC AUTO: 102.2 FL (ref 80–99)
MONOCYTES # BLD: 0.3 K/UL (ref 0–1)
MONOCYTES NFR BLD: 2 % (ref 5–13)
NEUTS SEG # BLD: 13.9 K/UL (ref 1.8–8)
NEUTS SEG NFR BLD: 94 % (ref 32–75)
NRBC # BLD: 0 K/UL (ref 0–0.01)
NRBC BLD-RTO: 0 PER 100 WBC
PERFORMED BY, TECHID: ABNORMAL
PLATELET # BLD AUTO: 262 K/UL (ref 150–400)
PMV BLD AUTO: 9.8 FL (ref 8.9–12.9)
POTASSIUM SERPL-SCNC: 4.3 MMOL/L (ref 3.5–5.1)
RBC # BLD AUTO: 2.7 M/UL (ref 3.8–5.2)
SODIUM SERPL-SCNC: 139 MMOL/L (ref 136–145)
WBC # BLD AUTO: 14.7 K/UL (ref 3.6–11)

## 2022-12-10 PROCEDURE — 36415 COLL VENOUS BLD VENIPUNCTURE: CPT

## 2022-12-10 PROCEDURE — 74011250636 HC RX REV CODE- 250/636: Performed by: INTERNAL MEDICINE

## 2022-12-10 PROCEDURE — 80048 BASIC METABOLIC PNL TOTAL CA: CPT

## 2022-12-10 PROCEDURE — 74011250636 HC RX REV CODE- 250/636: Performed by: NURSE PRACTITIONER

## 2022-12-10 PROCEDURE — 74011000250 HC RX REV CODE- 250: Performed by: INTERNAL MEDICINE

## 2022-12-10 PROCEDURE — 65270000029 HC RM PRIVATE

## 2022-12-10 PROCEDURE — 74011000250 HC RX REV CODE- 250: Performed by: NURSE PRACTITIONER

## 2022-12-10 PROCEDURE — 74011250637 HC RX REV CODE- 250/637: Performed by: NURSE PRACTITIONER

## 2022-12-10 PROCEDURE — 93306 TTE W/DOPPLER COMPLETE: CPT

## 2022-12-10 PROCEDURE — 94761 N-INVAS EAR/PLS OXIMETRY MLT: CPT

## 2022-12-10 PROCEDURE — 94640 AIRWAY INHALATION TREATMENT: CPT

## 2022-12-10 PROCEDURE — 82962 GLUCOSE BLOOD TEST: CPT

## 2022-12-10 PROCEDURE — 74011636637 HC RX REV CODE- 636/637: Performed by: NURSE PRACTITIONER

## 2022-12-10 PROCEDURE — 74011636637 HC RX REV CODE- 636/637: Performed by: INTERNAL MEDICINE

## 2022-12-10 PROCEDURE — 93005 ELECTROCARDIOGRAM TRACING: CPT

## 2022-12-10 PROCEDURE — 77010033678 HC OXYGEN DAILY

## 2022-12-10 PROCEDURE — 74011250637 HC RX REV CODE- 250/637: Performed by: INTERNAL MEDICINE

## 2022-12-10 PROCEDURE — 85025 COMPLETE CBC W/AUTO DIFF WBC: CPT

## 2022-12-10 RX ORDER — INSULIN LISPRO 100 [IU]/ML
24 INJECTION, SOLUTION INTRAVENOUS; SUBCUTANEOUS ONCE
Status: COMPLETED | OUTPATIENT
Start: 2022-12-10 | End: 2022-12-10

## 2022-12-10 RX ORDER — INSULIN LISPRO 100 [IU]/ML
10 INJECTION, SOLUTION INTRAVENOUS; SUBCUTANEOUS
Status: DISCONTINUED | OUTPATIENT
Start: 2022-12-10 | End: 2022-12-14 | Stop reason: HOSPADM

## 2022-12-10 RX ORDER — IPRATROPIUM BROMIDE AND ALBUTEROL SULFATE 2.5; .5 MG/3ML; MG/3ML
3 SOLUTION RESPIRATORY (INHALATION)
Status: DISCONTINUED | OUTPATIENT
Start: 2022-12-10 | End: 2022-12-14 | Stop reason: HOSPADM

## 2022-12-10 RX ORDER — AZITHROMYCIN 500 MG/1
500 TABLET, FILM COATED ORAL DAILY
Status: DISCONTINUED | OUTPATIENT
Start: 2022-12-11 | End: 2022-12-14 | Stop reason: HOSPADM

## 2022-12-10 RX ORDER — INSULIN GLARGINE 100 [IU]/ML
20 INJECTION, SOLUTION SUBCUTANEOUS
Status: DISCONTINUED | OUTPATIENT
Start: 2022-12-10 | End: 2022-12-11

## 2022-12-10 RX ADMIN — INSULIN LISPRO 3 UNITS: 100 INJECTION, SOLUTION INTRAVENOUS; SUBCUTANEOUS at 18:20

## 2022-12-10 RX ADMIN — SODIUM CHLORIDE, PRESERVATIVE FREE 10 ML: 5 INJECTION INTRAVENOUS at 13:29

## 2022-12-10 RX ADMIN — INSULIN GLARGINE 20 UNITS: 100 INJECTION, SOLUTION SUBCUTANEOUS at 21:17

## 2022-12-10 RX ADMIN — SODIUM CHLORIDE, PRESERVATIVE FREE 10 ML: 5 INJECTION INTRAVENOUS at 21:18

## 2022-12-10 RX ADMIN — ASPIRIN 81 MG: 81 TABLET, COATED ORAL at 08:55

## 2022-12-10 RX ADMIN — FOLIC ACID 1 MG: 1 TABLET ORAL at 08:55

## 2022-12-10 RX ADMIN — INSULIN LISPRO 3 UNITS: 100 INJECTION, SOLUTION INTRAVENOUS; SUBCUTANEOUS at 21:29

## 2022-12-10 RX ADMIN — METHYLPREDNISOLONE SODIUM SUCCINATE 40 MG: 40 INJECTION, POWDER, FOR SOLUTION INTRAMUSCULAR; INTRAVENOUS at 06:11

## 2022-12-10 RX ADMIN — SODIUM CHLORIDE, PRESERVATIVE FREE 10 ML: 5 INJECTION INTRAVENOUS at 06:11

## 2022-12-10 RX ADMIN — METHYLPREDNISOLONE SODIUM SUCCINATE 40 MG: 40 INJECTION, POWDER, FOR SOLUTION INTRAMUSCULAR; INTRAVENOUS at 13:28

## 2022-12-10 RX ADMIN — BUDESONIDE AND FORMOTEROL FUMARATE DIHYDRATE 2 PUFF: 80; 4.5 AEROSOL RESPIRATORY (INHALATION) at 08:01

## 2022-12-10 RX ADMIN — EZETIMIBE 10 MG: 10 TABLET ORAL at 08:55

## 2022-12-10 RX ADMIN — METHYLPREDNISOLONE SODIUM SUCCINATE 40 MG: 40 INJECTION, POWDER, FOR SOLUTION INTRAMUSCULAR; INTRAVENOUS at 21:17

## 2022-12-10 RX ADMIN — FAMOTIDINE 20 MG: 20 TABLET, FILM COATED ORAL at 08:55

## 2022-12-10 RX ADMIN — METOPROLOL TARTRATE 100 MG: 50 TABLET, FILM COATED ORAL at 09:12

## 2022-12-10 RX ADMIN — BENZONATATE 200 MG: 100 CAPSULE ORAL at 18:21

## 2022-12-10 RX ADMIN — INSULIN LISPRO 10 UNITS: 100 INJECTION, SOLUTION INTRAVENOUS; SUBCUTANEOUS at 18:19

## 2022-12-10 RX ADMIN — IPRATROPIUM BROMIDE AND ALBUTEROL SULFATE 3 ML: .5; 3 SOLUTION RESPIRATORY (INHALATION) at 08:01

## 2022-12-10 RX ADMIN — CEFTRIAXONE SODIUM 1 G: 1 INJECTION, POWDER, FOR SOLUTION INTRAMUSCULAR; INTRAVENOUS at 18:19

## 2022-12-10 RX ADMIN — INSULIN LISPRO 9 UNITS: 100 INJECTION, SOLUTION INTRAVENOUS; SUBCUTANEOUS at 09:01

## 2022-12-10 RX ADMIN — METOPROLOL TARTRATE 100 MG: 50 TABLET, FILM COATED ORAL at 21:17

## 2022-12-10 RX ADMIN — MONTELUKAST 10 MG: 10 TABLET, FILM COATED ORAL at 09:12

## 2022-12-10 RX ADMIN — BENZONATATE 200 MG: 100 CAPSULE ORAL at 08:55

## 2022-12-10 RX ADMIN — BENZONATATE 200 MG: 100 CAPSULE ORAL at 21:17

## 2022-12-10 RX ADMIN — INSULIN LISPRO 5 UNITS: 100 INJECTION, SOLUTION INTRAVENOUS; SUBCUTANEOUS at 09:02

## 2022-12-10 RX ADMIN — AMLODIPINE BESYLATE 10 MG: 5 TABLET ORAL at 08:55

## 2022-12-10 RX ADMIN — BUDESONIDE AND FORMOTEROL FUMARATE DIHYDRATE 2 PUFF: 80; 4.5 AEROSOL RESPIRATORY (INHALATION) at 20:39

## 2022-12-10 RX ADMIN — INSULIN LISPRO 24 UNITS: 100 INJECTION, SOLUTION INTRAVENOUS; SUBCUTANEOUS at 13:28

## 2022-12-10 RX ADMIN — ATORVASTATIN CALCIUM 40 MG: 40 TABLET, FILM COATED ORAL at 08:55

## 2022-12-10 NOTE — PROGRESS NOTES
Hospitalist Progress Note            Daily Progress Note: 12/10/2022 8:06 AM  Hospital course: The patient is a 20-year-old female with a PMH significant for COPD/asthma, CKD, DM and HTN. She presents to freestanding emergency room with symptoms of congestion, rhinorrhea, nausea without vomiting and diarrhea for the past 2 days PTA. Patient does have home oxygen and has increased her use since the symptoms began. Family state she became hypoxic with saturations of 45% at home prompting a call to EMS. Significant lab findings on admission UA with large leukocyte esterase, negative nitrates or bacteria, the positive pyuria. BUN 23 creatinine 1.57 BNP 2981, ABG pH 7.34 with CO2 48.6, PO2 100. CT of chest revealing bilateral Multi lobar pneumonia with trace bilateral pleural effusions. COVID, flu and RSV tests are negative. Patient was transferred to [address] [place] [address] for further management of COPD/asthma exacerbation, acute on chronic respiratory failure with hypoxia, community-acquired pneumonia and will assess and rule in or out CHF exacerbation with elevated BNP. Started on IV ceftriaxone for both pneumonia and possible UTI which will be ruled out by urine culture and azithromycin. Also started on IV Solu-Medrol. 2D echo has been ordered. Subjective: Follow-up examination of patient at bedside. This morning she is complaining of chest pain associated with shortness of breath. Will repeat EKG. Review echo results and consult cardiology. Chest pain likely associated with deep breathing due to pneumonia.     Assessment/Plan:   Active Problems:    Community acquired pneumonia (12/7/2022)      COPD with acute exacerbation ([address] (12/8/2022)      PNA (pneumonia) (12/8/2022)    AECOPD with Bilat CAP and Chronic Resp Failure on Home O2  - IV Rocephin/Zithro  - Solu-medrol, Duoneb  - Continue home O2  - CT Chest for better assessment  - Covid, Flu and RSV negative  - Bld Cx; procal     #Chest pain  # Elevated BNP (etiology?; d/t COPD/PNA with CKD? )  - Echo pending  - Consult cardiology     # Hx of CKD  DM with hyperglycemia  HTN  -Accu-Cheks with SSI, will add mealtime bolus of 10 units and add Lantus at night 20 units  -Continue PTA medications aspirin, amlodipine, metoprolol, Zetia and atorvastatin    #UTI  -UA positive for large leukocyte esterase and pyuria  -Urine cultures pending  -On ceftriaxone IV    DVT Prophylaxis: Lovenox  Code Status: Full Code  POA/NOK:    Disposition and discharge barriers:   Improvement in symptoms  Wean oxygen  IV and oral antibiotics  Expected length of stay 48 hours  Care Plan discussed with:     Current Facility-Administered Medications   Medication Dose Route Frequency    albuterol-ipratropium (DUO-NEB) 2.5 MG-0.5 MG/3 ML  3 mL Nebulization Q6HWA RT    famotidine (PEPCID) tablet 20 mg  20 mg Oral DAILY    cefTRIAXone (ROCEPHIN) 1 g in sterile water (preservative free) 10 mL IV syringe  1 g IntraVENous Q24H    glucose chewable tablet 16 g  4 Tablet Oral PRN    glucagon (GLUCAGEN) injection 1 mg  1 mg IntraMUSCular PRN    insulin glargine (LANTUS) injection 10 Units  10 Units SubCUTAneous QHS    insulin lispro (HUMALOG) injection 5 Units  5 Units SubCUTAneous TIDAC    guaiFENesin (ROBITUSSIN) 100 mg/5 mL oral liquid 200 mg  200 mg Oral Q4H PRN    benzonatate (TESSALON) capsule 200 mg  200 mg Oral TID    methylPREDNISolone (PF) (SOLU-MEDROL) injection 40 mg  40 mg IntraVENous Q8H    sodium chloride (NS) flush 5-40 mL  5-40 mL IntraVENous Q8H    sodium chloride (NS) flush 5-40 mL  5-40 mL IntraVENous PRN    acetaminophen (TYLENOL) tablet 650 mg  650 mg Oral Q6H PRN    Or    acetaminophen (TYLENOL) suppository 650 mg  650 mg Rectal Q6H PRN    polyethylene glycol (MIRALAX) packet 17 g  17 g Oral DAILY PRN    ondansetron (ZOFRAN ODT) tablet 4 mg  4 mg Oral Q8H PRN    Or    ondansetron (ZOFRAN) injection 4 mg  4 mg IntraVENous Q6H PRN    enoxaparin (LOVENOX) injection 30 mg  30 mg SubCUTAneous DAILY    amLODIPine (NORVASC) tablet 10 mg  10 mg Oral DAILY    aspirin delayed-release tablet 81 mg  81 mg Oral DAILY    atorvastatin (LIPITOR) tablet 40 mg  40 mg Oral DAILY    budesonide-formoterol (SYMBICORT) 80-4.5 mcg inhaler  2 Puff Inhalation BID RT    ezetimibe (ZETIA) tablet 10 mg  10 mg Oral DAILY    folic acid (FOLVITE) tablet 1 mg  1 mg Oral DAILY    metoprolol tartrate (LOPRESSOR) tablet 100 mg  100 mg Oral BID    montelukast (SINGULAIR) tablet 10 mg  10 mg Oral DAILY    . PHARMACY TO SUBSTITUTE PER PROTOCOL (Reordered from: SITagliptin (Januvia) 50 mg tablet)    Per Protocol    insulin lispro (HUMALOG) injection   SubCUTAneous AC&HS    dextrose 10% infusion 0-250 mL  0-250 mL IntraVENous PRN        REVIEW OF SYSTEMS    Review of Systems   Constitutional:  Positive for malaise/fatigue. Respiratory:  Positive for cough, sputum production and shortness of breath. Cardiovascular:  Positive for chest pain. Associated with deep breathing and cough   Musculoskeletal:  Positive for myalgias. Neurological:  Positive for weakness. Psychiatric/Behavioral:  The patient is nervous/anxious. Objective:     Visit Vitals  BP (!) 144/70   Pulse 82   Temp 98.1 °F (36.7 °C)   Resp 18   Ht 5' (1.524 m)   Wt 69.3 kg (152 lb 12.5 oz)   SpO2 99%   BMI 29.84 kg/m²    O2 Flow Rate (L/min): 3 l/min O2 Device: Nasal cannula    Temp (24hrs), Av °F (36.7 °C), Min:97.4 °F (36.3 °C), Max:98.6 °F (37 °C)        PHYSICAL EXAM:    Physical Exam  Constitutional:       Appearance: She is obese. She is ill-appearing. HENT:      Nose: Congestion present. Cardiovascular:      Rate and Rhythm: Normal rate and regular rhythm. Pulmonary:      Effort: Respiratory distress present. Abdominal:      General: There is distension. Tenderness: There is no abdominal tenderness. Musculoskeletal:      Right lower leg: Edema present. Left lower leg: Edema present.       Comments: Generalized weakness   Skin:     General: Skin is dry. Neurological:      Motor: Weakness present. Gait: Gait abnormal.        Data Review    Recent Results (from the past 24 hour(s))   CBC WITH AUTOMATED DIFF    Collection Time: 12/09/22  9:36 AM   Result Value Ref Range    WBC 14.4 (H) 3.6 - 11.0 K/uL    RBC 2.53 (L) 3.80 - 5.20 M/uL    HGB 8.1 (L) 11.5 - 16.0 g/dL    HCT 25.7 (L) 35.0 - 47.0 %    .6 (H) 80.0 - 99.0 FL    MCH 32.0 26.0 - 34.0 PG    MCHC 31.5 30.0 - 36.5 g/dL    RDW 14.2 11.5 - 14.5 %    PLATELET 262 879 - 842 K/uL    MPV 9.7 8.9 - 12.9 FL    NRBC 0.0 0.0  WBC    ABSOLUTE NRBC 0.00 0.00 - 0.01 K/uL    NEUTROPHILS 94 (H) 32 - 75 %    LYMPHOCYTES 2 (L) 12 - 49 %    MONOCYTES 3 (L) 5 - 13 %    EOSINOPHILS 0 0 - 7 %    BASOPHILS 0 0 - 1 %    IMMATURE GRANULOCYTES 1 (H) 0 - 0.5 %    ABS. NEUTROPHILS 13.6 (H) 1.8 - 8.0 K/UL    ABS. LYMPHOCYTES 0.3 (L) 0.8 - 3.5 K/UL    ABS. MONOCYTES 0.4 0.0 - 1.0 K/UL    ABS. EOSINOPHILS 0.0 0.0 - 0.4 K/UL    ABS. BASOPHILS 0.0 0.0 - 0.1 K/UL    ABS. IMM. GRANS. 0.1 (H) 0.00 - 0.04 K/UL    DF AUTOMATED     METABOLIC PANEL, COMPREHENSIVE    Collection Time: 12/09/22  9:36 AM   Result Value Ref Range    Sodium 141 136 - 145 mmol/L    Potassium 4.4 3.5 - 5.1 mmol/L    Chloride 109 (H) 97 - 108 mmol/L    CO2 28 21 - 32 mmol/L    Anion gap 4 (L) 5 - 15 mmol/L    Glucose 382 (H) 65 - 100 mg/dL    BUN 22 (H) 6 - 20 mg/dL    Creatinine 1.30 (H) 0.55 - 1.02 mg/dL    BUN/Creatinine ratio 17 12 - 20      eGFR 45 (L) >60 ml/min/1.73m2    Calcium 8.7 8.5 - 10.1 mg/dL    Bilirubin, total 0.2 0.2 - 1.0 mg/dL    AST (SGOT) 16 15 - 37 U/L    ALT (SGPT) 21 12 - 78 U/L    Alk.  phosphatase 93 45 - 117 U/L    Protein, total 7.3 6.4 - 8.2 g/dL    Albumin 2.7 (L) 3.5 - 5.0 g/dL    Globulin 4.6 (H) 2.0 - 4.0 g/dL    A-G Ratio 0.6 (L) 1.1 - 2.2     C REACTIVE PROTEIN, QT    Collection Time: 12/09/22  9:36 AM   Result Value Ref Range    C-Reactive protein 8.04 (H) 0.00 - 0.60 mg/dL MAGNESIUM    Collection Time: 12/09/22  9:36 AM   Result Value Ref Range    Magnesium 2.3 1.6 - 2.4 mg/dL   PHOSPHORUS    Collection Time: 12/09/22  9:36 AM   Result Value Ref Range    Phosphorus 2.8 2.6 - 4.7 mg/dL   PROCALCITONIN    Collection Time: 12/09/22  9:36 AM   Result Value Ref Range    Procalcitonin 0.31 (H) 0 ng/mL   TROPONIN-HIGH SENSITIVITY    Collection Time: 12/09/22  9:36 AM   Result Value Ref Range    Troponin-High Sensitivity 41 0 - 51 ng/L   NT-PRO BNP    Collection Time: 12/09/22  9:36 AM   Result Value Ref Range    NT pro-BNP 7,156 (H) <125 pg/mL   HEMOGLOBIN A1C WITH EAG    Collection Time: 12/09/22  9:36 AM   Result Value Ref Range    Hemoglobin A1c 6.2 (H) 4.0 - 5.6 %    Est. average glucose 131 mg/dL   GLUCOSE, POC    Collection Time: 12/09/22 12:48 PM   Result Value Ref Range    Glucose (POC) 312 (H) 65 - 100 mg/dL    Performed by [address] [facility], POC    Collection Time: 12/09/22  4:10 PM   Result Value Ref Range    Glucose (POC) 125 (H) 65 - 100 mg/dL    Performed by [id number] [facility], POC    Collection Time: 12/09/22  9:32 PM   Result Value Ref Range    Glucose (POC) 373 (H) 65 - 100 mg/dL    Performed by Filiberto Rodrigez    GLUCOSE, POC    Collection Time: 12/10/22  8:53 AM   Result Value Ref Range    Glucose (POC) 286 (H) 65 - 100 mg/dL    Performed by Sunni Pascual        CT CHEST WO CONT   Final Result   Bilateral multilobar pneumonia with trace bilateral pleural effusions. XR CHEST PA LAT   Final Result      Multilobar pneumonia. This is most pronounced in the lingula, but also present   in the right upper lobe and left lower lobe      COPD          Intake and Output:  Current Shift: No intake/output data recorded. Last three shifts: No intake/output data recorded.       Lab/Data Review:  Recent Labs     12/09/22 0936 12/07/22  1156   WBC 14.4* 8.1   HGB 8.1* 8.5*   HCT 25.7* 27.1*    185       Recent Labs     12/09/22 0936 12/07/22  1156   NA 141 140   K 4.4 4.3   * 103   CO2 28 31   * 147*   BUN 22* 23*   CREA 1.30* 1.57*   CA 8.7 8.7   MG 2.3  --    PHOS 2.8  --    ALB 2.7*  --    TBILI 0.2  --    ALT 21  --        No results for input(s): PH, PCO2, PO2, HCO3, FIO2 in the last 72 hours. Recent Results (from the past 24 hour(s))   CBC WITH AUTOMATED DIFF    Collection Time: 12/09/22  9:36 AM   Result Value Ref Range    WBC 14.4 (H) 3.6 - 11.0 K/uL    RBC 2.53 (L) 3.80 - 5.20 M/uL    HGB 8.1 (L) 11.5 - 16.0 g/dL    HCT 25.7 (L) 35.0 - 47.0 %    .6 (H) 80.0 - 99.0 FL    MCH 32.0 26.0 - 34.0 PG    MCHC 31.5 30.0 - 36.5 g/dL    RDW 14.2 11.5 - 14.5 %    PLATELET 675 692 - 332 K/uL    MPV 9.7 8.9 - 12.9 FL    NRBC 0.0 0.0  WBC    ABSOLUTE NRBC 0.00 0.00 - 0.01 K/uL    NEUTROPHILS 94 (H) 32 - 75 %    LYMPHOCYTES 2 (L) 12 - 49 %    MONOCYTES 3 (L) 5 - 13 %    EOSINOPHILS 0 0 - 7 %    BASOPHILS 0 0 - 1 %    IMMATURE GRANULOCYTES 1 (H) 0 - 0.5 %    ABS. NEUTROPHILS 13.6 (H) 1.8 - 8.0 K/UL    ABS. LYMPHOCYTES 0.3 (L) 0.8 - 3.5 K/UL    ABS. MONOCYTES 0.4 0.0 - 1.0 K/UL    ABS. EOSINOPHILS 0.0 0.0 - 0.4 K/UL    ABS. BASOPHILS 0.0 0.0 - 0.1 K/UL    ABS. IMM. GRANS. 0.1 (H) 0.00 - 0.04 K/UL    DF AUTOMATED     METABOLIC PANEL, COMPREHENSIVE    Collection Time: 12/09/22  9:36 AM   Result Value Ref Range    Sodium 141 136 - 145 mmol/L    Potassium 4.4 3.5 - 5.1 mmol/L    Chloride 109 (H) 97 - 108 mmol/L    CO2 28 21 - 32 mmol/L    Anion gap 4 (L) 5 - 15 mmol/L    Glucose 382 (H) 65 - 100 mg/dL    BUN 22 (H) 6 - 20 mg/dL    Creatinine 1.30 (H) 0.55 - 1.02 mg/dL    BUN/Creatinine ratio 17 12 - 20      eGFR 45 (L) >60 ml/min/1.73m2    Calcium 8.7 8.5 - 10.1 mg/dL    Bilirubin, total 0.2 0.2 - 1.0 mg/dL    AST (SGOT) 16 15 - 37 U/L    ALT (SGPT) 21 12 - 78 U/L    Alk.  phosphatase 93 45 - 117 U/L    Protein, total 7.3 6.4 - 8.2 g/dL    Albumin 2.7 (L) 3.5 - 5.0 g/dL    Globulin 4.6 (H) 2.0 - 4.0 g/dL    A-G Ratio 0.6 (L) 1.1 - 2.2     C REACTIVE PROTEIN, QT    Collection Time: 12/09/22  9:36 AM   Result Value Ref Range    C-Reactive protein 8.04 (H) 0.00 - 0.60 mg/dL   MAGNESIUM    Collection Time: 12/09/22  9:36 AM   Result Value Ref Range    Magnesium 2.3 1.6 - 2.4 mg/dL   PHOSPHORUS    Collection Time: 12/09/22  9:36 AM   Result Value Ref Range    Phosphorus 2.8 2.6 - 4.7 mg/dL   PROCALCITONIN    Collection Time: 12/09/22  9:36 AM   Result Value Ref Range    Procalcitonin 0.31 (H) 0 ng/mL   TROPONIN-HIGH SENSITIVITY    Collection Time: 12/09/22  9:36 AM   Result Value Ref Range    Troponin-High Sensitivity 41 0 - 51 ng/L   NT-PRO BNP    Collection Time: 12/09/22  9:36 AM   Result Value Ref Range    NT pro-BNP 7,156 (H) <125 pg/mL   HEMOGLOBIN A1C WITH EAG    Collection Time: 12/09/22  9:36 AM   Result Value Ref Range    Hemoglobin A1c 6.2 (H) 4.0 - 5.6 %    Est. average glucose 131 mg/dL   GLUCOSE, POC    Collection Time: 12/09/22 12:48 PM   Result Value Ref Range    Glucose (POC) 312 (H) 65 - 100 mg/dL    Performed by [address] [place] [address], POC    Collection Time: 12/09/22  4:10 PM   Result Value Ref Range    Glucose (POC) 125 (H) 65 - 100 mg/dL    Performed by [address] [place] [address], POC    Collection Time: 12/09/22  9:32 PM   Result Value Ref Range    Glucose (POC) 373 (H) 65 - 100 mg/dL    Performed by Rikki Broussard    GLUCOSE, POC    Collection Time: 12/10/22  8:53 AM   Result Value Ref Range    Glucose (POC) 286 (H) 65 - 100 mg/dL    Performed by Brayden Ortega              _____________________________________________________________________________  Time spent in direct care including coordination of service, review of data and examination: > 35 minutes    ______________________________________________________________________________    Lennox Edgar NP    This is dictation was done by [employer], computer voice recognition software.   Quite often unanticipated grammatical, syntax, homophones and other interpretive errors or inadvertently transcribed by the computer software. Please excuse errors that have escaped final proofreading. Thank you.

## 2022-12-10 NOTE — CONSULTS
CARDIOLOGY CONSULTATION      REASON FOR CONSULT: Chest pain     REQUESTING PROVIDER: David Sweeney NP    CHIEF COMPLAINT:  Chest pain    HISTORY OF PRESENT ILLNESS:  Santino Blanco is a 79y.o. year-old female with past medical history significant for asthma, COPD, CKD, diabetes, and hypertension who was evaluated today due to chest pain. She reports mid sternal chest pain that is constant, worsens with deep breathing and cough. Reports dyspnea on exertion as well. Denies any cardiac history. Hypoxic on admission. Being treated for community acquired pneumonia. Troponins have been negative. No acute ischemic changes on EKG. Records from hospital admission course thus far reviewed. Telemetry reviewed. No events overnight. Sinus rhythm. INPATIENT MEDICATIONS:  Home medications reviewed.     Current Facility-Administered Medications:     albuterol-ipratropium (DUO-NEB) 2.5 MG-0.5 MG/3 ML, 3 mL, Nebulization, Q6H PRN, Shelli Kumar MD    famotidine (PEPCID) tablet 20 mg, 20 mg, Oral, DAILY, ElodiaShelli MD, 20 mg at 12/10/22 0855    cefTRIAXone (ROCEPHIN) 1 g in sterile water (preservative free) 10 mL IV syringe, 1 g, IntraVENous, Q24H, Brian Vazquez NP, 1 g at 12/09/22 1810    glucose chewable tablet 16 g, 4 Tablet, Oral, PRN, Brian Vazquez NP    glucagon (GLUCAGEN) injection 1 mg, 1 mg, IntraMUSCular, PRN, Brian Vazquez NP    insulin glargine (LANTUS) injection 10 Units, 10 Units, SubCUTAneous, QHS, Brian Vazquez NP, 10 Units at 12/09/22 2138    insulin lispro (HUMALOG) injection 5 Units, 5 Units, SubCUTAneous, TIDAC, Brian Vazquez NP, 5 Units at 12/10/22 0902    guaiFENesin (ROBITUSSIN) 100 mg/5 mL oral liquid 200 mg, 200 mg, Oral, Q4H PRN, Brian Vazquez NP    benzonatate (TESSALON) capsule 200 mg, 200 mg, Oral, TID, Brian Vazquez NP, 200 mg at 12/10/22 0855    methylPREDNISolone (PF) (SOLU-MEDROL) injection 40 mg, 40 mg, IntraVENous, Q8H, Shelli Clifton MD, 40 mg at 12/10/22 0611    sodium chloride (NS) flush 5-40 mL, 5-40 mL, IntraVENous, Q8H, Mehrdad Clifton MD, 10 mL at 12/10/22 0657    sodium chloride (NS) flush 5-40 mL, 5-40 mL, IntraVENous, PRN, Mehrdad Ceja MD    acetaminophen (TYLENOL) tablet 650 mg, 650 mg, Oral, Q6H PRN **OR** acetaminophen (TYLENOL) suppository 650 mg, 650 mg, Rectal, Q6H PRN, Mehrdad Ceja MD    polyethylene glycol (MIRALAX) packet 17 g, 17 g, Oral, DAILY PRN, Mehrdad Ceja MD    ondansetron (ZOFRAN ODT) tablet 4 mg, 4 mg, Oral, Q8H PRN **OR** ondansetron (ZOFRAN) injection 4 mg, 4 mg, IntraVENous, Q6H PRN, Mehrdad Clifton MD    enoxaparin (LOVENOX) injection 30 mg, 30 mg, SubCUTAneous, DAILY, Mehrdad Clifton MD    amLODIPine (NORVASC) tablet 10 mg, 10 mg, Oral, DAILY, Gilda Marks MD, 10 mg at 12/10/22 8717    aspirin delayed-release tablet 81 mg, 81 mg, Oral, DAILY, Gilda Marks MD, 81 mg at 12/10/22 0855    atorvastatin (LIPITOR) tablet 40 mg, 40 mg, Oral, DAILY, Gilda Marks MD, 40 mg at 12/10/22 0855    budesonide-formoterol (SYMBICORT) 80-4.5 mcg inhaler, 2 Puff, Inhalation, BID RT, Gilda Marks MD, 2 Puff at 12/10/22 0801    ezetimibe (ZETIA) tablet 10 mg, 10 mg, Oral, DAILY, Gilda Marks MD, 10 mg at 19/32/36 4280    folic acid (FOLVITE) tablet 1 mg, 1 mg, Oral, DAILY, Mehrdad Clifton MD, 1 mg at 12/10/22 0855    metoprolol tartrate (LOPRESSOR) tablet 100 mg, 100 mg, Oral, BID, Gilda Marks MD, 100 mg at 12/10/22 0912    montelukast (SINGULAIR) tablet 10 mg, 10 mg, Oral, DAILY, Gilda Marks MD, 10 mg at 12/10/22 0912    . PHARMACY TO SUBSTITUTE PER PROTOCOL (Reordered from: SITagliptin (Januvia) 50 mg tablet), , , Per Protocol, Mehrdad Clifton MD    insulin lispro (HUMALOG) injection, , SubCUTAneous, AC&HS, Gilda Marks MD, 9 Units at 12/10/22 0901    dextrose 10% infusion 0-250 mL, 0-250 mL, IntraVENous, PRN, Gilda Marks MD     ALLERGIES:  Allergies reviewed with the patient,  Allergies   Allergen Reactions    Lisinopril Swelling     Lip swelling    Pineapple Swelling     Lip swelling    . FAMILY HISTORY:  Family history reviewed. SOCIAL HISTORY:  Notable for denies  tobacco use, no heavy alcohol or illicit drug use. REVIEW OF SYSTEMS:  Complete review of systems performed, pertinents noted above, all other systems are negative. PHYSICAL EXAMINATION:  Vital sign assessment reveal a blood pressure of  137/90  and pulse rate of 83. Cardiovascular exam has a heart with a regular rate and rhythm, normal S1 and S2. No murmur present. There are no rubs or gallops. Good peripheral pulses. No jugular venous distension. No carotid bruits are present. Respiratory exam reveals clear lung fields, no rales or rhonchi. Gastrointestinal exam has soft, nontender abdomen with normal bowel sounds. Lymphatic exam reveals no edema and no varicosities. No notable skin changes. Neurologic exam is nonfocal.  Musculoskeletal exam is notable for a normal gait. Visit Vitals  BP (!) 144/70   Pulse 82   Temp 98.1 °F (36.7 °C)   Resp 18   Ht 5' (1.524 m)   Wt 69.3 kg (152 lb 12.5 oz)   SpO2 99%   BMI 29.84 kg/m²         Recent labs results and imaging reviewed.   Notable findings include   Lab Results   Component Value Date/Time    WBC 14.7 (H) 12/10/2022 08:38 AM    HGB 8.6 (L) 12/10/2022 08:38 AM    HCT 27.6 (L) 12/10/2022 08:38 AM    PLATELET 156 74/22/3986 08:38 AM    .2 (H) 12/10/2022 08:38 AM     Lab Results   Component Value Date/Time    NT pro-BNP 7,156 (H) 12/09/2022 09:36 AM    NT pro-BNP 2,981 (H) 12/07/2022 01:45 PM    NT pro- (H) 07/01/2022 11:24 AM      Lab Results   Component Value Date/Time    Sodium 139 12/10/2022 08:38 AM    Potassium 4.3 12/10/2022 08:38 AM    Chloride 105 12/10/2022 08:38 AM    CO2 29 12/10/2022 08:38 AM    Anion gap 5 12/10/2022 08:38 AM    Glucose 272 (H) 12/10/2022 08:38 AM    BUN 24 (H) 12/10/2022 08:38 AM    Creatinine 1.21 (H) 12/10/2022 08:38 AM BUN/Creatinine ratio 20 12/10/2022 08:38 AM    GFR est AA 39 (L) 07/01/2022 11:24 AM    GFR est non-AA 32 (L) 07/01/2022 11:24 AM    Calcium 9.4 12/10/2022 08:38 AM     .       Discussed case with Dr. Inocencio Plummer and our impression and recommendations are as follows: Atypical chest pain: likely pleuritic given PNA and description. Troponin negative. No acute ischemic changes on EKG. Echocardiogram pending. Consider outpatient ischemic evaluation given increased risk factors. Recommend aspirin 81mg daily. Continue statin. Elevated proBNP: Does not appear to be overtly volume overloaded. CXR without vascular congestion. Echocardiogram pending. Hypertension: Blood pressure is close to goal. Continue home medications. Thank you for involving us in the care of this patient. Please do not hesitate to call me or Dr. Inocencio Plummer if additional questions arise.     Haylie Nixon, LICHA  12/10/2022

## 2022-12-10 NOTE — ROUTINE PROCESS
Bedside shift change report given to Leonel Goetz RN (oncoming nurse) by Teja Rich RN (offgoing nurse). Report included the following information SBAR, Kardex, Intake/Output, MAR, and Recent Results.

## 2022-12-10 NOTE — PROGRESS NOTES
Problem: Falls - Risk of  Goal: *Absence of Falls  Description: Document Corina Wallace Fall Risk and appropriate interventions in the flowsheet.   Outcome: Progressing Towards Goal  Note: Fall Risk Interventions:            Medication Interventions: Assess postural VS orthostatic hypotension, Bed/chair exit alarm, Patient to call before getting OOB, Teach patient to arise slowly                   Problem: Patient Education: Go to Patient Education Activity  Goal: Patient/Family Education  Outcome: Progressing Towards Goal     Problem: Discharge Planning  Goal: *Discharge to safe environment  Outcome: Progressing Towards Goal  Goal: *Knowledge of medication management  Outcome: Progressing Towards Goal  Goal: *Knowledge of discharge instructions  Outcome: Progressing Towards Goal     Problem: Patient Education: Go to Patient Education Activity  Goal: Patient/Family Education  Outcome: Progressing Towards Goal     Problem: General Medical Care Plan  Goal: *Vital signs within specified parameters  Outcome: Progressing Towards Goal  Goal: *Labs within defined limits  Outcome: Progressing Towards Goal  Goal: *Absence of infection signs and symptoms  Outcome: Progressing Towards Goal  Goal: *Optimal pain control at patient's stated goal  Outcome: Progressing Towards Goal  Goal: *Skin integrity maintained  Outcome: Progressing Towards Goal  Goal: *Fluid volume balance  Outcome: Progressing Towards Goal  Goal: *Optimize nutritional status  Outcome: Progressing Towards Goal  Goal: *Anxiety reduced or absent  Outcome: Progressing Towards Goal  Goal: *Progressive mobility and function (eg: ADL's)  Outcome: Progressing Towards Goal     Problem: Patient Education: Go to Patient Education Activity  Goal: Patient/Family Education  Outcome: Progressing Towards Goal

## 2022-12-11 LAB
ANION GAP SERPL CALC-SCNC: 3 MMOL/L (ref 5–15)
ATRIAL RATE: 69 BPM
BASOPHILS # BLD: 0 K/UL (ref 0–0.1)
BASOPHILS NFR BLD: 0 % (ref 0–1)
BUN SERPL-MCNC: 31 MG/DL (ref 6–20)
BUN/CREAT SERPL: 22 (ref 12–20)
CA-I BLD-MCNC: 9.3 MG/DL (ref 8.5–10.1)
CALCULATED P AXIS, ECG09: 57 DEGREES
CALCULATED R AXIS, ECG10: 14 DEGREES
CALCULATED T AXIS, ECG11: 61 DEGREES
CHLORIDE SERPL-SCNC: 101 MMOL/L (ref 97–108)
CO2 SERPL-SCNC: 33 MMOL/L (ref 21–32)
CREAT SERPL-MCNC: 1.38 MG/DL (ref 0.55–1.02)
DIAGNOSIS, 93000: NORMAL
DIFFERENTIAL METHOD BLD: ABNORMAL
EOSINOPHIL # BLD: 0 K/UL (ref 0–0.4)
EOSINOPHIL NFR BLD: 0 % (ref 0–7)
ERYTHROCYTE [DISTWIDTH] IN BLOOD BY AUTOMATED COUNT: 14.4 % (ref 11.5–14.5)
GLUCOSE BLD STRIP.AUTO-MCNC: 193 MG/DL (ref 65–100)
GLUCOSE BLD STRIP.AUTO-MCNC: 282 MG/DL (ref 65–100)
GLUCOSE BLD STRIP.AUTO-MCNC: 286 MG/DL (ref 65–100)
GLUCOSE BLD STRIP.AUTO-MCNC: 290 MG/DL (ref 65–100)
GLUCOSE SERPL-MCNC: 252 MG/DL (ref 65–100)
HCT VFR BLD AUTO: 30.4 % (ref 35–47)
HGB BLD-MCNC: 9.5 G/DL (ref 11.5–16)
IMM GRANULOCYTES # BLD AUTO: 0.2 K/UL (ref 0–0.04)
IMM GRANULOCYTES NFR BLD AUTO: 1 % (ref 0–0.5)
LYMPHOCYTES # BLD: 0.4 K/UL (ref 0.8–3.5)
LYMPHOCYTES NFR BLD: 3 % (ref 12–49)
MCH RBC QN AUTO: 31.7 PG (ref 26–34)
MCHC RBC AUTO-ENTMCNC: 31.3 G/DL (ref 30–36.5)
MCV RBC AUTO: 101.3 FL (ref 80–99)
MONOCYTES # BLD: 0.2 K/UL (ref 0–1)
MONOCYTES NFR BLD: 1 % (ref 5–13)
NEUTS SEG # BLD: 15.9 K/UL (ref 1.8–8)
NEUTS SEG NFR BLD: 95 % (ref 32–75)
NRBC # BLD: 0.02 K/UL (ref 0–0.01)
NRBC BLD-RTO: 0.1 PER 100 WBC
P-R INTERVAL, ECG05: 174 MS
PERFORMED BY, TECHID: ABNORMAL
PLATELET # BLD AUTO: 283 K/UL (ref 150–400)
PMV BLD AUTO: 10.3 FL (ref 8.9–12.9)
POTASSIUM SERPL-SCNC: 3.8 MMOL/L (ref 3.5–5.1)
Q-T INTERVAL, ECG07: 392 MS
QRS DURATION, ECG06: 92 MS
QTC CALCULATION (BEZET), ECG08: 420 MS
RBC # BLD AUTO: 3 M/UL (ref 3.8–5.2)
SODIUM SERPL-SCNC: 137 MMOL/L (ref 136–145)
VENTRICULAR RATE, ECG03: 69 BPM
WBC # BLD AUTO: 16.7 K/UL (ref 3.6–11)

## 2022-12-11 PROCEDURE — 74011250637 HC RX REV CODE- 250/637: Performed by: INTERNAL MEDICINE

## 2022-12-11 PROCEDURE — 94761 N-INVAS EAR/PLS OXIMETRY MLT: CPT

## 2022-12-11 PROCEDURE — 77010033678 HC OXYGEN DAILY

## 2022-12-11 PROCEDURE — 82962 GLUCOSE BLOOD TEST: CPT

## 2022-12-11 PROCEDURE — 74011000250 HC RX REV CODE- 250: Performed by: INTERNAL MEDICINE

## 2022-12-11 PROCEDURE — 94640 AIRWAY INHALATION TREATMENT: CPT

## 2022-12-11 PROCEDURE — 74011250637 HC RX REV CODE- 250/637: Performed by: NURSE PRACTITIONER

## 2022-12-11 PROCEDURE — 74011250636 HC RX REV CODE- 250/636: Performed by: NURSE PRACTITIONER

## 2022-12-11 PROCEDURE — 65270000029 HC RM PRIVATE

## 2022-12-11 PROCEDURE — 74011636637 HC RX REV CODE- 636/637: Performed by: INTERNAL MEDICINE

## 2022-12-11 PROCEDURE — 74011000258 HC RX REV CODE- 258: Performed by: NURSE PRACTITIONER

## 2022-12-11 PROCEDURE — 36415 COLL VENOUS BLD VENIPUNCTURE: CPT

## 2022-12-11 PROCEDURE — 85025 COMPLETE CBC W/AUTO DIFF WBC: CPT

## 2022-12-11 PROCEDURE — 74011636637 HC RX REV CODE- 636/637: Performed by: NURSE PRACTITIONER

## 2022-12-11 PROCEDURE — 80048 BASIC METABOLIC PNL TOTAL CA: CPT

## 2022-12-11 PROCEDURE — 74011250636 HC RX REV CODE- 250/636: Performed by: INTERNAL MEDICINE

## 2022-12-11 RX ORDER — METOPROLOL TARTRATE 50 MG/1
50 TABLET ORAL 2 TIMES DAILY
Status: DISCONTINUED | OUTPATIENT
Start: 2022-12-11 | End: 2022-12-13

## 2022-12-11 RX ORDER — HYDRALAZINE HYDROCHLORIDE 50 MG/1
50 TABLET, FILM COATED ORAL 3 TIMES DAILY
Status: DISCONTINUED | OUTPATIENT
Start: 2022-12-11 | End: 2022-12-14 | Stop reason: HOSPADM

## 2022-12-11 RX ORDER — HYDRALAZINE HYDROCHLORIDE 20 MG/ML
10 INJECTION INTRAMUSCULAR; INTRAVENOUS
Status: DISCONTINUED | OUTPATIENT
Start: 2022-12-11 | End: 2022-12-14 | Stop reason: HOSPADM

## 2022-12-11 RX ORDER — INSULIN GLARGINE 100 [IU]/ML
30 INJECTION, SOLUTION SUBCUTANEOUS
Status: DISCONTINUED | OUTPATIENT
Start: 2022-12-11 | End: 2022-12-14 | Stop reason: HOSPADM

## 2022-12-11 RX ADMIN — DOXYCYCLINE 100 MG: 100 INJECTION, POWDER, LYOPHILIZED, FOR SOLUTION INTRAVENOUS at 22:24

## 2022-12-11 RX ADMIN — BENZONATATE 200 MG: 100 CAPSULE ORAL at 08:07

## 2022-12-11 RX ADMIN — BUDESONIDE AND FORMOTEROL FUMARATE DIHYDRATE 2 PUFF: 80; 4.5 AEROSOL RESPIRATORY (INHALATION) at 20:55

## 2022-12-11 RX ADMIN — FOLIC ACID 1 MG: 1 TABLET ORAL at 08:39

## 2022-12-11 RX ADMIN — INSULIN LISPRO 10 UNITS: 100 INJECTION, SOLUTION INTRAVENOUS; SUBCUTANEOUS at 17:55

## 2022-12-11 RX ADMIN — SODIUM CHLORIDE, PRESERVATIVE FREE 10 ML: 5 INJECTION INTRAVENOUS at 22:24

## 2022-12-11 RX ADMIN — METOPROLOL TARTRATE 50 MG: 50 TABLET, FILM COATED ORAL at 21:21

## 2022-12-11 RX ADMIN — METHYLPREDNISOLONE SODIUM SUCCINATE 40 MG: 40 INJECTION, POWDER, FOR SOLUTION INTRAMUSCULAR; INTRAVENOUS at 17:55

## 2022-12-11 RX ADMIN — ENOXAPARIN SODIUM 30 MG: 100 INJECTION SUBCUTANEOUS at 08:39

## 2022-12-11 RX ADMIN — DOXYCYCLINE 100 MG: 100 INJECTION, POWDER, LYOPHILIZED, FOR SOLUTION INTRAVENOUS at 10:27

## 2022-12-11 RX ADMIN — SODIUM CHLORIDE, PRESERVATIVE FREE 10 ML: 5 INJECTION INTRAVENOUS at 18:04

## 2022-12-11 RX ADMIN — INSULIN LISPRO 10 UNITS: 100 INJECTION, SOLUTION INTRAVENOUS; SUBCUTANEOUS at 08:06

## 2022-12-11 RX ADMIN — HYDRALAZINE HYDROCHLORIDE 50 MG: 50 TABLET, FILM COATED ORAL at 21:21

## 2022-12-11 RX ADMIN — INSULIN GLARGINE 30 UNITS: 100 INJECTION, SOLUTION SUBCUTANEOUS at 21:26

## 2022-12-11 RX ADMIN — INSULIN LISPRO 3 UNITS: 100 INJECTION, SOLUTION INTRAVENOUS; SUBCUTANEOUS at 18:00

## 2022-12-11 RX ADMIN — AMLODIPINE BESYLATE 10 MG: 5 TABLET ORAL at 08:08

## 2022-12-11 RX ADMIN — BENZONATATE 200 MG: 100 CAPSULE ORAL at 21:21

## 2022-12-11 RX ADMIN — METHYLPREDNISOLONE SODIUM SUCCINATE 40 MG: 40 INJECTION, POWDER, FOR SOLUTION INTRAMUSCULAR; INTRAVENOUS at 21:21

## 2022-12-11 RX ADMIN — SODIUM CHLORIDE, PRESERVATIVE FREE 10 ML: 5 INJECTION INTRAVENOUS at 05:21

## 2022-12-11 RX ADMIN — ASPIRIN 81 MG: 81 TABLET, COATED ORAL at 08:39

## 2022-12-11 RX ADMIN — INSULIN LISPRO 10 UNITS: 100 INJECTION, SOLUTION INTRAVENOUS; SUBCUTANEOUS at 12:01

## 2022-12-11 RX ADMIN — METHYLPREDNISOLONE SODIUM SUCCINATE 40 MG: 40 INJECTION, POWDER, FOR SOLUTION INTRAMUSCULAR; INTRAVENOUS at 05:21

## 2022-12-11 RX ADMIN — BUDESONIDE AND FORMOTEROL FUMARATE DIHYDRATE 2 PUFF: 80; 4.5 AEROSOL RESPIRATORY (INHALATION) at 08:17

## 2022-12-11 RX ADMIN — EZETIMIBE 10 MG: 10 TABLET ORAL at 08:39

## 2022-12-11 RX ADMIN — HYDRALAZINE HYDROCHLORIDE 50 MG: 50 TABLET, FILM COATED ORAL at 17:55

## 2022-12-11 RX ADMIN — AZITHROMYCIN MONOHYDRATE 500 MG: 500 TABLET ORAL at 08:39

## 2022-12-11 RX ADMIN — HYDRALAZINE HYDROCHLORIDE 50 MG: 50 TABLET, FILM COATED ORAL at 10:27

## 2022-12-11 RX ADMIN — ATORVASTATIN CALCIUM 40 MG: 40 TABLET, FILM COATED ORAL at 08:39

## 2022-12-11 RX ADMIN — FAMOTIDINE 20 MG: 20 TABLET, FILM COATED ORAL at 08:39

## 2022-12-11 RX ADMIN — MONTELUKAST 10 MG: 10 TABLET, FILM COATED ORAL at 08:08

## 2022-12-11 RX ADMIN — BENZONATATE 200 MG: 100 CAPSULE ORAL at 17:55

## 2022-12-11 RX ADMIN — INSULIN LISPRO 9 UNITS: 100 INJECTION, SOLUTION INTRAVENOUS; SUBCUTANEOUS at 08:06

## 2022-12-11 RX ADMIN — INSULIN LISPRO 9 UNITS: 100 INJECTION, SOLUTION INTRAVENOUS; SUBCUTANEOUS at 21:26

## 2022-12-11 RX ADMIN — INSULIN LISPRO 9 UNITS: 100 INJECTION, SOLUTION INTRAVENOUS; SUBCUTANEOUS at 12:00

## 2022-12-11 NOTE — PROGRESS NOTES
CARDIOLOGY PROGRESS NOTE      Patient Name: Wendy Douglas  Age: 79 y.o. Gender:female  VLAD:6/23/[birth year]6  MRN: [id number]        Patient seen and examined. Wendy Douglas is a 79y.o. year-old female with past medical history significant for asthma, COPD, CKD, diabetes, and hypertension who was evaluated today due to chest pain. Being treated for pneumonia. No other complaints reported. Telemetry reviewed, there were no events noted in the past 24 hours. Sinus bradycardia, HR noted at 50 this am.    Pertinent review of systems items noted above, all other systems are negative. Current medications reviewed. Physical Examination    Allergies   Allergen Reactions    Lisinopril Swelling     Lip swelling    Pineapple Swelling     Lip swelling       Vital signs are stable. Blood pressure 190/77, Pulse 50  No apparent distress. Heart is regular, rate and rhythm. Normal S1, S2, no murmurs are appreciated. Lungs are clear bilaterally. Abdomen is soft, nontender, normal bowel sounds. Extremities have no edema. Labs reviewed: All lab results for the last 24 hours reviewed. Recent Results (from the past 12 hour(s))   GLUCOSE, POC    Collection Time: 12/11/22  7:51 AM   Result Value Ref Range    Glucose (POC) 286 (H) 65 - 100 mg/dL    Performed by Foreign Me[facility]           Case discussed with Dr. Ina Snowden and our impression and recommendations are as follows: Atypical chest pain: likely pleuritic given PNA and description. Troponin negative. No acute ischemic changes on EKG. -  EF 60-65%, no wall motion abnormality.   -  Consider outpatient ischemic evaluation given increased risk factors. -  Recommend aspirin 81mg daily. Continue statin. Elevated proBNP:   -  Possibly r/t LEIGHTON. Does not appear to be overtly volume overloaded. CXR without vascular congestion.   -  Echocardiogram normal per above.   Hypertension: Blood pressure is above goal.   -  Agree w/ addition of hydralazine  -  Continue other home medications however will reduce metroprolol to 50mg BID as HR 50 this am.     Please do not hesitate to call me or Dr. Mg Ayers if additional questions arise.     LIHCA Gordon  12/11/2022

## 2022-12-11 NOTE — PROGRESS NOTES
Problem: Falls - Risk of  Goal: *Absence of Falls  Description: Document [place] Fall Risk and appropriate interventions in the flowsheet.   Outcome: Progressing Towards Goal  Note: Fall Risk Interventions:            Medication Interventions: Patient to call before getting OOB

## 2022-12-11 NOTE — PROGRESS NOTES
Problem: Falls - Risk of  Goal: *Absence of Falls  Description: Document Kathy Don Fall Risk and appropriate interventions in the flowsheet.   Outcome: Progressing Towards Goal  Note: Fall Risk Interventions:            Medication Interventions: Assess postural VS orthostatic hypotension, Bed/chair exit alarm, Patient to call before getting OOB, Teach patient to arise slowly

## 2022-12-11 NOTE — PROGRESS NOTES
Hospitalist Progress Note            Daily Progress Note: 12/11/2022 8:06 AM  Hospital course: The patient is a 59-year-old female with a PMH significant for COPD/asthma, CKD, DM and HTN. She presents to freestanding emergency room with symptoms of congestion, rhinorrhea, nausea without vomiting and diarrhea for the past 2 days PTA. Patient does have home oxygen and has increased her use since the symptoms began. Family state she became hypoxic with saturations of 45% prompting a call to EMS. Significant lab findings on admission UA with large leukocyte esterase, negative nitrates or bacteria, the positive pyuria. BUN 23 creatinine 1.57 BNP 2981, ABG pH 7.34 with CO2 48.6, PO2 100. CT of chest revealing bilateral Multi lobar pneumonia with trace bilateral pleural effusions. COVID, flu and RSV tests are negative. Patient was transferred to [facility] for further management of COPD/asthma exacerbation, acute on chronic respiratory failure with hypoxia, community-acquired pneumonia and will assess and rule in or out CHF exacerbation with elevated BNP. Started on IV ceftriaxone for both pneumonia and possible UTI which will be ruled out by urine culture and azithromycin. Also started on IV Solu-Medrol. 2D echo has been ordered. Blood pressure is remaining high with low heart rate started on hydralazine oral 50 mg 3 times daily and a as needed IV hydralazine for SBP greater than 160. We will add IV doxycycline to antibiotic regimen due to lack of improvement in pneumonia symptoms. 2D echo with normal findings cardiology recommending outpatient work-up for ischemic evaluation no further studies at this time recommended. Chest pain likely pleuritic. Drink she has    Subjective: Follow-up examination of patient at bedside. Patient is resting comfortably in bed. No complaints today.     Assessment/Plan:   Active Problems:    Community acquired pneumonia (12/7/2022)      COPD with acute exacerbation ([address] (12/8/2022)      PNA (pneumonia) (12/8/2022)    AECOPD with Bilat pneumonitis container  Chronic Resp Failure on Home O2  - IV Rocephin completed 3 days  -Oral Zithro, added IV doxycycline  - Solu-medrol, Duoneb  - Continue home O2  - CT Chest bilateral Multi lobar pneumonia with trace bilateral pleural effusions  - Covid, Flu and RSV negative  - Bld Cx; procal     #Chest pain  # Elevated BNP (etiology?; d/t COPD/PNA with CKD? )  - Echo normal EF 60 to 11% normal diastolic function, mild to moderate MVR, elevated RVSP  - Cardiology following-chest pain likely pleuritic. Consider outpatient ischemic evaluation given increased risk factors. Recommend aspirin 81mg daily.  Continue statin     # Hx of CKD  DM with hyperglycemia  HTN  -Accu-Cheks with SSI, will add mealtime bolus of 10 units and add Lantus at night 30 units  -Continue PTA medications aspirin, amlodipine, metoprolol, Zetia and atorvastatin  -Added oral hydralazine 50 mg 3 times daily and as needed IV hydralazine 10 mg IV push for SBP greater than 160    #UTI  -UA positive for large leukocyte esterase and pyuria  -Urine cultures negative  -ceftriaxone IV treated for 3 days    DVT Prophylaxis: Lovenox  Code Status: Full Code  POA/NOK:    Disposition and discharge barriers:   Improvement in symptoms  Wean oxygen  IV and oral antibiotics  Expected length of stay 48 hours  Care Plan discussed with:     Current Facility-Administered Medications   Medication Dose Route Frequency    insulin glargine (LANTUS) injection 30 Units  30 Units SubCUTAneous QHS    hydrALAZINE (APRESOLINE) tablet 50 mg  50 mg Oral TID    hydrALAZINE (APRESOLINE) 20 mg/mL injection 10 mg  10 mg IntraVENous Q4H PRN    doxycycline (VIBRAMYCIN) 100 mg in 0.9% sodium chloride (MBP/ADV) 100 mL MBP  100 mg IntraVENous Q12H    albuterol-ipratropium (DUO-NEB) 2.5 MG-0.5 MG/3 ML  3 mL Nebulization Q6H PRN    insulin lispro (HUMALOG) injection 10 Units  10 Units SubCUTAneous TIDAC    azithromycin (ZITHROMAX) tablet 500 mg  500 mg Oral DAILY    famotidine (PEPCID) tablet 20 mg  20 mg Oral DAILY    glucose chewable tablet 16 g  4 Tablet Oral PRN    glucagon (GLUCAGEN) injection 1 mg  1 mg IntraMUSCular PRN    guaiFENesin (ROBITUSSIN) 100 mg/5 mL oral liquid 200 mg  200 mg Oral Q4H PRN    benzonatate (TESSALON) capsule 200 mg  200 mg Oral TID    methylPREDNISolone (PF) (SOLU-MEDROL) injection 40 mg  40 mg IntraVENous Q8H    sodium chloride (NS) flush 5-40 mL  5-40 mL IntraVENous Q8H    sodium chloride (NS) flush 5-40 mL  5-40 mL IntraVENous PRN    acetaminophen (TYLENOL) tablet 650 mg  650 mg Oral Q6H PRN    Or    acetaminophen (TYLENOL) suppository 650 mg  650 mg Rectal Q6H PRN    polyethylene glycol (MIRALAX) packet 17 g  17 g Oral DAILY PRN    ondansetron (ZOFRAN ODT) tablet 4 mg  4 mg Oral Q8H PRN    Or    ondansetron (ZOFRAN) injection 4 mg  4 mg IntraVENous Q6H PRN    enoxaparin (LOVENOX) injection 30 mg  30 mg SubCUTAneous DAILY    amLODIPine (NORVASC) tablet 10 mg  10 mg Oral DAILY    aspirin delayed-release tablet 81 mg  81 mg Oral DAILY    atorvastatin (LIPITOR) tablet 40 mg  40 mg Oral DAILY    budesonide-formoterol (SYMBICORT) 80-4.5 mcg inhaler  2 Puff Inhalation BID RT    ezetimibe (ZETIA) tablet 10 mg  10 mg Oral DAILY    folic acid (FOLVITE) tablet 1 mg  1 mg Oral DAILY    metoprolol tartrate (LOPRESSOR) tablet 100 mg  100 mg Oral BID    montelukast (SINGULAIR) tablet 10 mg  10 mg Oral DAILY    . PHARMACY TO SUBSTITUTE PER PROTOCOL (Reordered from: SITagliptin (Januvia) 50 mg tablet)    Per Protocol    insulin lispro (HUMALOG) injection   SubCUTAneous AC&HS    dextrose 10% infusion 0-250 mL  0-250 mL IntraVENous PRN        REVIEW OF SYSTEMS    Review of Systems   Constitutional:  Positive for malaise/fatigue. Respiratory:  Positive for cough, sputum production and shortness of breath. Cardiovascular:  Positive for chest pain.         Associated with deep breathing and cough   Musculoskeletal: Positive for myalgias. Neurological:  Positive for weakness. Psychiatric/Behavioral:  The patient is nervous/anxious. Objective:     Visit Vitals  BP (!) 190/77 (BP 1 Location: Right upper arm, BP Patient Position: Semi fowlers)   Pulse (!) 54   Temp 98 °F (36.7 °C)   Resp 17   Ht 5' (1.524 m)   Wt 69.3 kg (152 lb 12.5 oz)   SpO2 99%   BMI 29.84 kg/m²    O2 Flow Rate (L/min): 2 l/min (Home O2) O2 Device: Nasal cannula    Temp (24hrs), Av °F (36.7 °C), Min:97.7 °F (36.5 °C), Max:98.1 °F (36.7 °C)        PHYSICAL EXAM:    Physical Exam  Constitutional:       Appearance: She is obese. She is ill-appearing. HENT:      Nose: Congestion present. Cardiovascular:      Rate and Rhythm: Normal rate and regular rhythm. Pulmonary:      Effort: Respiratory distress present. Abdominal:      General: There is distension. Tenderness: There is no abdominal tenderness. Musculoskeletal:      Right lower leg: Edema present. Left lower leg: Edema present. Comments: Generalized weakness   Skin:     General: Skin is dry. Neurological:      Motor: Weakness present.       Gait: Gait abnormal.        Data Review    Recent Results (from the past 24 hour(s))   ECHO ADULT COMPLETE    Collection Time: 12/10/22  9:55 AM   Result Value Ref Range    RA Area 4C 14.4 cm2    RA Volume 31 ml    Est. RA Pressure 10 mmHg    AR PHT 1,193.0 ms    AR Max Velocity PISA 2.4 m/s    AV Peak Velocity 1.8 m/s    AV Peak Gradient 13 mmHg    Aortic Root 3.5 cm    IVSd 0.8 0.6 - 0.9 cm    LVIDd 5.0 3.9 - 5.3 cm    LVIDs 3.3 cm    LVOT Peak Velocity 1.1 m/s    LVOT Peak Gradient 5 mmHg    LVPWd 0.8 0.6 - 0.9 cm    LV E' Septal Velocity 9 cm/s    LA Diameter 3.9 cm    MV E Wave Deceleration Time 184.0 ms    MV A Velocity 0.99 m/s    MV E Velocity 1.19 m/s    IN Max Velocity 1.8 m/s    Pulmonary Artery EDP 13 mmHg    PV Max Velocity 1.5 m/s    PV Peak Gradient 9 mmHg    RV Basal Dimension 3.7 cm    TR Max Velocity 3.00 m/s    TR Peak Gradient 36 mmHg    Fractional Shortening 2D 34 28 - 44 %    LVIDd Index 3.01 cm/m2    LVIDs Index 1.99 cm/m2    LV RWT Ratio 0.32     LV Mass 2D 135.8 67 - 162 g    LV Mass 2D Index 81.8 43 - 95 g/m2    MV E/A 1.20     E/E' Septal 13.22     LA Size Index 2.35 cm/m2    LA/AO Root Ratio 1.11     RA Volume Index A4C 19 mL/m2    Ao Root Index 2.11 cm/m2    AV Velocity Ratio 0.61     RVSP 46 mmHg   GLUCOSE, POC    Collection Time: 12/10/22 12:50 PM   Result Value Ref Range    Glucose (POC) 425 (H) 65 - 100 mg/dL    Performed by Epigami, POC    Collection Time: 12/10/22  5:51 PM   Result Value Ref Range    Glucose (POC) 191 (H) 65 - 100 mg/dL    Performed by Silke Reddy    GLUCOSE, POC    Collection Time: 12/10/22  9:16 PM   Result Value Ref Range    Glucose (POC) 162 (H) 65 - 100 mg/dL    Performed by Rikki Broussard    GLUCOSE, POC    Collection Time: 12/11/22  7:51 AM   Result Value Ref Range    Glucose (POC) 286 (H) 65 - 100 mg/dL    Performed by Silke Reddy        CT CHEST WO CONT   Final Result   Bilateral multilobar pneumonia with trace bilateral pleural effusions. XR CHEST PA LAT   Final Result      Multilobar pneumonia. This is most pronounced in the lingula, but also present   in the right upper lobe and left lower lobe      COPD          Intake and Output:  Current Shift: No intake/output data recorded. Last three shifts: No intake/output data recorded. Lab/Data Review:  Recent Labs     12/10/22  0838 12/09/22 0936   WBC 14.7* 14.4*   HGB 8.6* 8.1*   HCT 27.6* 25.7*    241       Recent Labs     12/10/22  0838 12/09/22 0936    141   K 4.3 4.4    109*   CO2 29 28   * 382*   BUN 24* 22*   CREA 1.21* 1.30*   CA 9.4 8.7   MG  --  2.3   PHOS  --  2.8   ALB  --  2.7*   TBILI  --  0.2   ALT  --  21       No results for input(s): PH, PCO2, PO2, HCO3, FIO2 in the last 72 hours.   Recent Results (from the past 24 hour(s))   ECHO ADULT COMPLETE Collection Time: 12/10/22  9:55 AM   Result Value Ref Range    RA Area 4C 14.4 cm2    RA Volume 31 ml    Est. RA Pressure 10 mmHg    AR PHT 1,193.0 ms    AR Max Velocity PISA 2.4 m/s    AV Peak Velocity 1.8 m/s    AV Peak Gradient 13 mmHg    Aortic Root 3.5 cm    IVSd 0.8 0.6 - 0.9 cm    LVIDd 5.0 3.9 - 5.3 cm    LVIDs 3.3 cm    LVOT Peak Velocity 1.1 m/s    LVOT Peak Gradient 5 mmHg    LVPWd 0.8 0.6 - 0.9 cm    LV E' Septal Velocity 9 cm/s    LA Diameter 3.9 cm    MV E Wave Deceleration Time 184.0 ms    MV A Velocity 0.99 m/s    MV E Velocity 1.19 m/s    WV Max Velocity 1.8 m/s    Pulmonary Artery EDP 13 mmHg    PV Max Velocity 1.5 m/s    PV Peak Gradient 9 mmHg    RV Basal Dimension 3.7 cm    TR Max Velocity 3.00 m/s    TR Peak Gradient 36 mmHg    Fractional Shortening 2D 34 28 - 44 %    LVIDd Index 3.01 cm/m2    LVIDs Index 1.99 cm/m2    LV RWT Ratio 0.32     LV Mass 2D 135.8 67 - 162 g    LV Mass 2D Index 81.8 43 - 95 g/m2    MV E/A 1.20     E/E' Septal 13.22     LA Size Index 2.35 cm/m2    LA/AO Root Ratio 1.11     RA Volume Index A4C 19 mL/m2    Ao Root Index 2.11 cm/m2    AV Velocity Ratio 0.61     RVSP 46 mmHg   GLUCOSE, POC    Collection Time: 12/10/22 12:50 PM   Result Value Ref Range    Glucose (POC) 425 (H) 65 - 100 mg/dL    Performed by [address] [place] [address], POC    Collection Time: 12/10/22  5:51 PM   Result Value Ref Range    Glucose (POC) 191 (H) 65 - 100 mg/dL    Performed by Dennis Rothman    GLUCOSE, POC    Collection Time: 12/10/22  9:16 PM   Result Value Ref Range    Glucose (POC) 162 (H) 65 - 100 mg/dL    Performed by Jennifer Car    GLUCOSE, POC    Collection Time: 12/11/22  7:51 AM   Result Value Ref Range    Glucose (POC) 286 (H) 65 - 100 mg/dL    Performed by Dennis Rothman              _____________________________________________________________________________  Time spent in direct care including coordination of service, review of data and examination: > 35 minutes    ______________________________________________________________________________    Charan Lashon, NP    This is dictation was done by dragon, computer voice recognition software. Quite often unanticipated grammatical, syntax, homophones and other interpretive errors or inadvertently transcribed by the computer software. Please excuse errors that have escaped final proofreading. Thank you.

## 2022-12-11 NOTE — ROUTINE PROCESS
Bedside shift change report given to Matt Chino RN (oncoming nurse) by Baljeet Elizabeth RN (offgoing nurse). Report included the following information SBAR, Kardex, Intake/Output, MAR, Recent Results, and Cardiac Rhythm Sinus bradycardic .

## 2022-12-12 LAB
ANION GAP SERPL CALC-SCNC: 4 MMOL/L (ref 5–15)
ATRIAL RATE: 83 BPM
BASOPHILS # BLD: 0 K/UL (ref 0–0.1)
BASOPHILS NFR BLD: 0 % (ref 0–1)
BUN SERPL-MCNC: 38 MG/DL (ref 6–20)
BUN/CREAT SERPL: 26 (ref 12–20)
CA-I BLD-MCNC: 9.1 MG/DL (ref 8.5–10.1)
CALCULATED P AXIS, ECG09: 75 DEGREES
CALCULATED R AXIS, ECG10: 74 DEGREES
CALCULATED T AXIS, ECG11: 87 DEGREES
CHLORIDE SERPL-SCNC: 104 MMOL/L (ref 97–108)
CO2 SERPL-SCNC: 32 MMOL/L (ref 21–32)
CREAT SERPL-MCNC: 1.49 MG/DL (ref 0.55–1.02)
DIAGNOSIS, 93000: NORMAL
DIFFERENTIAL METHOD BLD: ABNORMAL
EOSINOPHIL # BLD: 0 K/UL (ref 0–0.4)
EOSINOPHIL NFR BLD: 0 % (ref 0–7)
ERYTHROCYTE [DISTWIDTH] IN BLOOD BY AUTOMATED COUNT: 14.3 % (ref 11.5–14.5)
GLUCOSE BLD STRIP.AUTO-MCNC: 216 MG/DL (ref 65–100)
GLUCOSE BLD STRIP.AUTO-MCNC: 233 MG/DL (ref 65–100)
GLUCOSE BLD STRIP.AUTO-MCNC: 308 MG/DL (ref 65–100)
GLUCOSE BLD STRIP.AUTO-MCNC: 323 MG/DL (ref 65–100)
GLUCOSE SERPL-MCNC: 215 MG/DL (ref 65–100)
HCT VFR BLD AUTO: 29.5 % (ref 35–47)
HGB BLD-MCNC: 9.2 G/DL (ref 11.5–16)
IMM GRANULOCYTES # BLD AUTO: 0.3 K/UL (ref 0–0.04)
IMM GRANULOCYTES NFR BLD AUTO: 2 % (ref 0–0.5)
LYMPHOCYTES # BLD: 0.4 K/UL (ref 0.8–3.5)
LYMPHOCYTES NFR BLD: 3 % (ref 12–49)
MCH RBC QN AUTO: 31.4 PG (ref 26–34)
MCHC RBC AUTO-ENTMCNC: 31.2 G/DL (ref 30–36.5)
MCV RBC AUTO: 100.7 FL (ref 80–99)
MONOCYTES # BLD: 0.4 K/UL (ref 0–1)
MONOCYTES NFR BLD: 2 % (ref 5–13)
NEUTS SEG # BLD: 15.7 K/UL (ref 1.8–8)
NEUTS SEG NFR BLD: 93 % (ref 32–75)
NRBC # BLD: 0.02 K/UL (ref 0–0.01)
NRBC BLD-RTO: 0.1 PER 100 WBC
P-R INTERVAL, ECG05: 170 MS
PERFORMED BY, TECHID: ABNORMAL
PLATELET # BLD AUTO: 335 K/UL (ref 150–400)
PMV BLD AUTO: 9.5 FL (ref 8.9–12.9)
POTASSIUM SERPL-SCNC: 3.8 MMOL/L (ref 3.5–5.1)
Q-T INTERVAL, ECG07: 354 MS
QRS DURATION, ECG06: 90 MS
QTC CALCULATION (BEZET), ECG08: 415 MS
RBC # BLD AUTO: 2.93 M/UL (ref 3.8–5.2)
SODIUM SERPL-SCNC: 140 MMOL/L (ref 136–145)
VENTRICULAR RATE, ECG03: 83 BPM
WBC # BLD AUTO: 16.8 K/UL (ref 3.6–11)

## 2022-12-12 PROCEDURE — 74011636637 HC RX REV CODE- 636/637: Performed by: INTERNAL MEDICINE

## 2022-12-12 PROCEDURE — 36415 COLL VENOUS BLD VENIPUNCTURE: CPT

## 2022-12-12 PROCEDURE — 80048 BASIC METABOLIC PNL TOTAL CA: CPT

## 2022-12-12 PROCEDURE — 97116 GAIT TRAINING THERAPY: CPT

## 2022-12-12 PROCEDURE — 74011636637 HC RX REV CODE- 636/637: Performed by: NURSE PRACTITIONER

## 2022-12-12 PROCEDURE — 97161 PT EVAL LOW COMPLEX 20 MIN: CPT

## 2022-12-12 PROCEDURE — 74011000258 HC RX REV CODE- 258: Performed by: NURSE PRACTITIONER

## 2022-12-12 PROCEDURE — 82962 GLUCOSE BLOOD TEST: CPT

## 2022-12-12 PROCEDURE — 77010033678 HC OXYGEN DAILY

## 2022-12-12 PROCEDURE — 74011250637 HC RX REV CODE- 250/637: Performed by: INTERNAL MEDICINE

## 2022-12-12 PROCEDURE — 85025 COMPLETE CBC W/AUTO DIFF WBC: CPT

## 2022-12-12 PROCEDURE — 94640 AIRWAY INHALATION TREATMENT: CPT

## 2022-12-12 PROCEDURE — 74011250636 HC RX REV CODE- 250/636: Performed by: INTERNAL MEDICINE

## 2022-12-12 PROCEDURE — 74011250637 HC RX REV CODE- 250/637: Performed by: NURSE PRACTITIONER

## 2022-12-12 PROCEDURE — 74011250636 HC RX REV CODE- 250/636: Performed by: NURSE PRACTITIONER

## 2022-12-12 PROCEDURE — 65270000029 HC RM PRIVATE

## 2022-12-12 PROCEDURE — 74011000250 HC RX REV CODE- 250: Performed by: INTERNAL MEDICINE

## 2022-12-12 PROCEDURE — 94761 N-INVAS EAR/PLS OXIMETRY MLT: CPT

## 2022-12-12 RX ADMIN — FOLIC ACID 1 MG: 1 TABLET ORAL at 08:52

## 2022-12-12 RX ADMIN — INSULIN LISPRO 12 UNITS: 100 INJECTION, SOLUTION INTRAVENOUS; SUBCUTANEOUS at 12:05

## 2022-12-12 RX ADMIN — METHYLPREDNISOLONE SODIUM SUCCINATE 40 MG: 40 INJECTION, POWDER, FOR SOLUTION INTRAMUSCULAR; INTRAVENOUS at 06:13

## 2022-12-12 RX ADMIN — METHYLPREDNISOLONE SODIUM SUCCINATE 40 MG: 40 INJECTION, POWDER, FOR SOLUTION INTRAMUSCULAR; INTRAVENOUS at 21:10

## 2022-12-12 RX ADMIN — BUDESONIDE AND FORMOTEROL FUMARATE DIHYDRATE 2 PUFF: 80; 4.5 AEROSOL RESPIRATORY (INHALATION) at 06:23

## 2022-12-12 RX ADMIN — DOXYCYCLINE 100 MG: 100 INJECTION, POWDER, LYOPHILIZED, FOR SOLUTION INTRAVENOUS at 11:45

## 2022-12-12 RX ADMIN — HYDRALAZINE HYDROCHLORIDE 50 MG: 50 TABLET, FILM COATED ORAL at 21:09

## 2022-12-12 RX ADMIN — ALOGLIPTIN 12.5 MG: 12.5 TABLET, FILM COATED ORAL at 11:45

## 2022-12-12 RX ADMIN — HYDRALAZINE HYDROCHLORIDE 50 MG: 50 TABLET, FILM COATED ORAL at 15:43

## 2022-12-12 RX ADMIN — DOXYCYCLINE 100 MG: 100 INJECTION, POWDER, LYOPHILIZED, FOR SOLUTION INTRAVENOUS at 21:07

## 2022-12-12 RX ADMIN — AZITHROMYCIN MONOHYDRATE 500 MG: 500 TABLET ORAL at 08:52

## 2022-12-12 RX ADMIN — INSULIN LISPRO 10 UNITS: 100 INJECTION, SOLUTION INTRAVENOUS; SUBCUTANEOUS at 12:06

## 2022-12-12 RX ADMIN — BUDESONIDE AND FORMOTEROL FUMARATE DIHYDRATE 2 PUFF: 80; 4.5 AEROSOL RESPIRATORY (INHALATION) at 21:17

## 2022-12-12 RX ADMIN — AMLODIPINE BESYLATE 10 MG: 5 TABLET ORAL at 07:55

## 2022-12-12 RX ADMIN — ATORVASTATIN CALCIUM 40 MG: 40 TABLET, FILM COATED ORAL at 08:52

## 2022-12-12 RX ADMIN — SODIUM CHLORIDE, PRESERVATIVE FREE 10 ML: 5 INJECTION INTRAVENOUS at 14:48

## 2022-12-12 RX ADMIN — SODIUM CHLORIDE, PRESERVATIVE FREE 10 ML: 5 INJECTION INTRAVENOUS at 22:26

## 2022-12-12 RX ADMIN — BENZONATATE 200 MG: 100 CAPSULE ORAL at 21:09

## 2022-12-12 RX ADMIN — METHYLPREDNISOLONE SODIUM SUCCINATE 40 MG: 40 INJECTION, POWDER, FOR SOLUTION INTRAMUSCULAR; INTRAVENOUS at 14:48

## 2022-12-12 RX ADMIN — ASPIRIN 81 MG: 81 TABLET, COATED ORAL at 08:52

## 2022-12-12 RX ADMIN — FAMOTIDINE 20 MG: 20 TABLET, FILM COATED ORAL at 08:52

## 2022-12-12 RX ADMIN — METOPROLOL TARTRATE 50 MG: 50 TABLET, FILM COATED ORAL at 21:09

## 2022-12-12 RX ADMIN — ENOXAPARIN SODIUM 30 MG: 100 INJECTION SUBCUTANEOUS at 08:53

## 2022-12-12 RX ADMIN — HYDRALAZINE HYDROCHLORIDE 50 MG: 50 TABLET, FILM COATED ORAL at 07:54

## 2022-12-12 RX ADMIN — BENZONATATE 200 MG: 100 CAPSULE ORAL at 15:43

## 2022-12-12 RX ADMIN — BENZONATATE 200 MG: 100 CAPSULE ORAL at 08:52

## 2022-12-12 RX ADMIN — INSULIN LISPRO 6 UNITS: 100 INJECTION, SOLUTION INTRAVENOUS; SUBCUTANEOUS at 15:51

## 2022-12-12 RX ADMIN — INSULIN LISPRO 10 UNITS: 100 INJECTION, SOLUTION INTRAVENOUS; SUBCUTANEOUS at 15:52

## 2022-12-12 RX ADMIN — INSULIN LISPRO 12 UNITS: 100 INJECTION, SOLUTION INTRAVENOUS; SUBCUTANEOUS at 21:08

## 2022-12-12 RX ADMIN — INSULIN GLARGINE 30 UNITS: 100 INJECTION, SOLUTION SUBCUTANEOUS at 21:07

## 2022-12-12 RX ADMIN — INSULIN LISPRO 6 UNITS: 100 INJECTION, SOLUTION INTRAVENOUS; SUBCUTANEOUS at 07:30

## 2022-12-12 RX ADMIN — INSULIN LISPRO 10 UNITS: 100 INJECTION, SOLUTION INTRAVENOUS; SUBCUTANEOUS at 07:30

## 2022-12-12 RX ADMIN — SODIUM CHLORIDE, PRESERVATIVE FREE 10 ML: 5 INJECTION INTRAVENOUS at 06:13

## 2022-12-12 RX ADMIN — MONTELUKAST 10 MG: 10 TABLET, FILM COATED ORAL at 08:52

## 2022-12-12 RX ADMIN — EZETIMIBE 10 MG: 10 TABLET ORAL at 08:52

## 2022-12-12 NOTE — PROGRESS NOTES
CM discussed discharge planning with patient at bedside. Therapy recommend [facility]. Patient agreed. Choice letter reviewed. Choices [address] [facility] and [facility]. Patient declined by all agencies. Due to payor source. [place] referral sent. CM placed call to daughter to inform. Unable to leave message mailbox not set up.

## 2022-12-12 NOTE — PROGRESS NOTES
Patient discharged to home with family.  Patient given discharge instructions, patient states understanding. IV removed.  Patient in no apparent distress, pain or SOB.  Patient is awaiting ride from family, is currently waiting to hear back what time a family member will be able to pick her up.   Problem: Mobility Impaired (Adult and Pediatric)  Goal: *Acute Goals and Plan of Care (Insert Text)  Description: FUNCTIONAL STATUS PRIOR TO ADMISSION: Patient was modified independent using a single point cane for functional mobility. Patient required minimal assistance for basic and instrumental ADLs. HOME SUPPORT PRIOR TO ADMISSION: The patient lived with daughter and required minimal assistance/contact guard assist for ADL's. Physical Therapy Goals  Initiated 12/12/2022  Patient/family stated goal: to get stronger  1. Patient will move from supine to sit and sit to supine  in bed with modified independence within 7 day(s). 2.  Patient will transfer from bed to chair and chair to bed with modified independence using the least restrictive device within 7 day(s). 3.  Patient will perform sit to stand with modified independence within 7 day(s). 4.  Patient will ambulate with modified independence for 350 feet with the least restrictive device within 7 day(s). 5.  Patient will ascend/descend 4 stairs with B handrail(s) with supervision/set-up within 7 day(s). 6.  Patient will participate in lower extremity therapeutic exercise/activities with supervision/set-up for 10 minutes within 7 day(s). Outcome: Not Met  PHYSICAL THERAPY EVALUATION  Patient: Santino Blanco (51 y.o. female)  Date: 12/12/2022  Primary Diagnosis: Community acquired pneumonia [J18.9]  PNA (pneumonia) [J18.9]  COPD with acute exacerbation [facility] [J44.1]       Precautions: fall      ASSESSMENT  Pt is a 79 y.o. female admitted on 12/7/2022 for congestion, runny nose, diarrhea, nausea and SOB; pt currently being treated for B multilobar pneumonia with trace B pleural effusions . Pt semi-supine upon PT arrival, agreeable to evaluation. Pt alert to self and place, but not time. Pt reports 1 fall in the past 3 months.     Based on the objective data described, the patient presents with generalized weakness, impaired functional mobility, impaired amb, impaired balance (See below for objective details and assist levels). Pt currently on 2 LO2 with O2 sats at 99% at rest. Pt with good sitting balance and able to ambulate 200ft in the room with 2LO2 with slow jeaneth, decreased step length, and decreased trunk rotation during ambulation with no AD. Pt was steady on her feet, requiring cues to increase step length with O2 sats dropping to 97% at the lowest during ambulation. Overall pt tolerated session well today with no reports of pain and stable vital signs. Pt will benefit from continued skilled PT to address above deficits and return to PLOF. Current PT DC recommendation Home with Home Health Therapy once medically appropriate. Current Level of Function Impacting Discharge (mobility/balance): impaired mobility, level of assist         PLAN :  Recommendations and Planned Interventions: bed mobility training, transfer training, gait training, therapeutic exercises, patient and family training/education, and therapeutic activities      Recommend for staff: Out of bed to chair for meals and Amb to bathroom for toileting with gt belt and AD    Frequency/Duration: Patient will be followed by physical therapy:  2-3x/week to address goals. Recommendation for discharge: (in order for the patient to meet his/her long term goals)  Home with [address] [place] [address]    This discharge recommendation:  Has been made in collaboration with the attending provider and/or case management    IF patient discharges home will need the following DME: none         SUBJECTIVE:   Patient stated I'm feeling much better.     OBJECTIVE DATA SUMMARY:   HISTORY:    Past Medical History:   Diagnosis Date    Arthritis     Asthma     Chronic kidney disease     Chronic obstructive pulmonary disease ([facility]     Diabetes ([facility]     Hypertension      Past Surgical History:   Procedure Laterality Date    HX OTHER SURGICAL      Eye surgery       Home Situation  Home Environment: Trailer/mobile home  # Steps to Enter: 3  Rails to Enter: Yes  Hand Rails : Bilateral  One/Two Story Residence: One story  Living Alone: No  Support Systems: Child(deo)  Patient Expects to be Discharged to[de-identified] Home  Current DME Used/Available at Home: John Paul Adam, straight, Shower chair, Walker, rolling    EXAMINATION/PRESENTATION/DECISION MAKING:   Critical Behavior:  Neurologic State: Alert  Orientation Level: Oriented to person, Oriented to place, Disoriented to time  Cognition: Follows commands     Hearing: Auditory  Auditory Impairment: None  Range Of Motion:  AROM: Within functional limits                       Strength:    Strength: Within functional limits      4/5 throughout B LE's                 Functional Mobility:  Bed Mobility:  Rolling: Stand-by assistance  Supine to Sit: Stand-by assistance  Sit to Supine: Stand-by assistance  Scooting: Stand-by assistance  Transfers:  Sit to Stand: Stand-by assistance  Stand to Sit: Stand-by assistance                       Balance:   Sitting: Intact  Standing: Impaired  Standing - Static: Good; Unsupported  Standing - Dynamic : Fair;Unsupported  Ambulation/Gait Training:  Distance (ft): 200 Feet (ft)  Assistive Device: Gait belt  Ambulation - Level of Assistance: Stand-by assistance     Gait Description (WDL): Exceptions to WDL                 Speed/Octavia: Slow  Step Length: Left shortened;Right shortened                   Functional Measure:  [address] [facility] [address] [address] AM-PAC 6 Clicks         Basic Mobility Inpatient Short Form  How much difficulty does the patient currently have. .. Unable A Lot A Little None   1. Turning over in bed (including adjusting bedclothes, sheets and blankets)? [] 1   [] 2   [] 3   [x] 4   2. Sitting down on and standing up from a chair with arms ( e.g., wheelchair, bedside commode, etc.)   [] 1   [] 2   [] 3   [x] 4   3. Moving from lying on back to sitting on the side of the bed?    [] 1   [] 2   [] 3   [x] 4          How much help from another person does the patient currently need. .. Total A Lot A Little None   4. Moving to and from a bed to a chair (including a wheelchair)? [] 1   [] 2   [x] 3   [] 4   5. Need to walk in hospital room? [] 1   [] 2   [x] 3   [] 4   6. Climbing 3-5 steps with a railing? [] 1   [] 2   [x] 3   [] 4   © , Trustees of [facility] [employer], under license to [employer]. All rights reserved     Score:  Initial: 21 Most Recent: X (Date: 22 )   Interpretation of Tool:  Represents activities that are increasingly more difficult (i.e. Bed mobility, Transfers, Gait). Score 24 23 22-20 19-15 14-10 9-7 6   Modifier CH CI CJ CK CL CM CN         Physical Therapy Evaluation Charge Determination   History Examination Presentation Decision-Making   MEDIUM  Complexity : 1-2 comorbidities / personal factors will impact the outcome/ POC  LOW Complexity : 1-2 Standardized tests and measures addressing body structure, function, activity limitation and / or participation in recreation  LOW Complexity : Stable, uncomplicated  Other outcome measures [facility] 6  low      Based on the above components, the patient evaluation is determined to be of the following complexity level: LOW     Pain Ratin/10     Activity Tolerance:   Fair, requires rest breaks, and observed SOB with activity    After treatment patient left in no apparent distress:   Bed locked and in lowest position Caregiver / family present and sitting at EOB  and nursing updated. COMMUNICATION/EDUCATION:   The patients plan of care was discussed with: Registered nurse. Patient/family agree to work toward stated goals and plan of care.          Thank you for this referral.  Will Valles, PT   Time Calculation: 22 mins

## 2022-12-12 NOTE — PROGRESS NOTES
Problem: General Medical Care Plan  Goal: *Vital signs within specified parameters  Outcome: Progressing Towards Goal     Problem: General Medical Care Plan  Goal: *Labs within defined limits  Outcome: Progressing Towards Goal     Problem: General Medical Care Plan  Goal: *Skin integrity maintained  Outcome: Progressing Towards Goal

## 2022-12-12 NOTE — PROGRESS NOTES
CARDIOLOGY PROGRESS NOTE      Patient Name: Vernon Zhu  Age: 79 y.o. Gender:female  NRS:9/34/3305  MRN: [id number]    Patient seen and examined. Vernon Zhu is a 79y.o. year-old female with past medical history significant for asthma, COPD, CKD, diabetes, and hypertension who was evaluated today due to chest pain. Being treated for pneumonia. Feeling well today, no chest pain. Has been up walking. Breathing okay, still some wheezes. No other complaints reported. Pertinent review of systems items noted above, all other systems are negative. Current medications reviewed. Physical Examination    Allergies   Allergen Reactions    Lisinopril Swelling     Lip swelling    Pineapple Swelling     Lip swelling       Vital signs are stable. No apparent distress. Heart is regular, rate and rhythm. Normal S1, S2, no murmurs are appreciated. Lungs are clear bilaterally. Abdomen is soft, nontender, normal bowel sounds. Extremities have no edema. Labs reviewed: All lab results for the last 24 hours reviewed. Recent Results (from the past 12 hour(s))   GLUCOSE, POC    Collection Time: 12/12/22  7:27 AM   Result Value Ref Range    Glucose (POC) 216 (H) 65 - 100 mg/dL    Performed by Jenn Kemp    CBC WITH AUTOMATED DIFF    Collection Time: 12/12/22  8:04 AM   Result Value Ref Range    WBC 16.8 (H) 3.6 - 11.0 K/uL    RBC 2.93 (L) 3.80 - 5.20 M/uL    HGB 9.2 (L) 11.5 - 16.0 g/dL    HCT 29.5 (L) 35.0 - 47.0 %    .7 (H) 80.0 - 99.0 FL    MCH 31.4 26.0 - 34.0 PG    MCHC 31.2 30.0 - 36.5 g/dL    RDW 14.3 11.5 - 14.5 %    PLATELET 533 254 - 042 K/uL    MPV 9.5 8.9 - 12.9 FL    NRBC 0.1 (H) 0.0  WBC    ABSOLUTE NRBC 0.02 (H) 0.00 - 0.01 K/uL    NEUTROPHILS 93 (H) 32 - 75 %    LYMPHOCYTES 3 (L) 12 - 49 %    MONOCYTES 2 (L) 5 - 13 %    EOSINOPHILS 0 0 - 7 %    BASOPHILS 0 0 - 1 %    IMMATURE GRANULOCYTES 2 (H) 0 - 0.5 %    ABS. NEUTROPHILS 15.7 (H) 1.8 - 8.0 K/UL    ABS.  LYMPHOCYTES 0.4 (L) 0.8 - 3.5 K/UL    ABS. MONOCYTES 0.4 0.0 - 1.0 K/UL    ABS. EOSINOPHILS 0.0 0.0 - 0.4 K/UL    ABS. BASOPHILS 0.0 0.0 - 0.1 K/UL    ABS. IMM. GRANS. 0.3 (H) 0.00 - 0.04 K/UL    DF AUTOMATED     METABOLIC PANEL, BASIC    Collection Time: 12/12/22  8:04 AM   Result Value Ref Range    Sodium 140 136 - 145 mmol/L    Potassium 3.8 3.5 - 5.1 mmol/L    Chloride 104 97 - 108 mmol/L    CO2 32 21 - 32 mmol/L    Anion gap 4 (L) 5 - 15 mmol/L    Glucose 215 (H) 65 - 100 mg/dL    BUN 38 (H) 6 - 20 mg/dL    Creatinine 1.49 (H) 0.55 - 1.02 mg/dL    BUN/Creatinine ratio 26 (H) 12 - 20      eGFR 38 (L) >60 ml/min/1.73m2    Calcium 9.1 8.5 - 10.1 mg/dL   GLUCOSE, POC    Collection Time: 12/12/22 11:57 AM   Result Value Ref Range    Glucose (POC) 308 (H) 65 - 100 mg/dL    Performed by Grazyna Welch           Case discussed with Dr. Dionne Dietz and our impression and recommendations are as follows: Atypical chest pain: likely pleuritic given PNA and description. Troponin negative. No acute ischemic changes on EKG. -  EF 60-65%, no wall motion abnormality.   -  Consider outpatient ischemic evaluation given increased risk factors. -  On aspirin 81mg daily. Continue statin and zetia. Elevated proBNP:   -  Possibly r/t LEIGHTON. Does not appear to be overtly volume overloaded. CXR without vascular congestion.   -  Echocardiogram normal per above. Hypertension: Blood pressure is above goal.   -  Agree w/ addition of hydralazine  -  Consider changing Lopressor to coreg if bp remains elevated    Please do not hesitate to call me or Dr. Dionne Dietz if additional questions arise.     Jamie Maravilla NP  12/12/2022

## 2022-12-12 NOTE — PROGRESS NOTES
Hospitalist Progress Note    Subjective:   Daily Progress Note: 12/12/2022 8:38 AM    Hospital Course: The patient is a 59-year-old female with a PMH significant for COPD/asthma, CKD, DM and HTN. She presents to freestanding emergency room with symptoms of congestion, rhinorrhea, nausea without vomiting and diarrhea for the past 2 days PTA. Patient does have home oxygen and has increased her use since the symptoms began. Family state she became hypoxic with saturations of 45% prompting a call to EMS. Significant lab findings on admission UA with large leukocyte esterase, negative nitrates or bacteria, the positive pyuria. BUN 23 creatinine 1.57 BNP 2981, ABG pH 7.34 with CO2 48.6, PO2 100. CT of chest revealing bilateral Multi lobar pneumonia with trace bilateral pleural effusions. COVID, flu and RSV tests are negative. Patient was transferred to [address] [place] [address] for further management of COPD/asthma exacerbation, acute on chronic respiratory failure with hypoxia, community-acquired pneumonia and will assess and rule in or out CHF exacerbation with elevated BNP. Started on IV ceftriaxone for both pneumonia and possible UTI. Urine culture remains negative along with blood cultures. Started on IV Solu-Medrol. Blood pressure is remaining high with low heart rate started on hydralazine oral 50 mg 3 times daily and a as needed IV hydralazine for SBP greater than 160. We will add IV doxycycline to antibiotic regimen due to lack of improvement in pneumonia symptoms. 2D echo with normal findings cardiology recommending outpatient work-up for ischemic evaluation no further studies at this time recommended. Chest pain likely pleuritic. Subjective: Patient says that she is feeling much better. She is on her normal 2 L of oxygen.   Has a minor cough with no production    Current Facility-Administered Medications   Medication Dose Route Frequency    insulin glargine (LANTUS) injection 30 Units  30 Units SubCUTAneous QHS hydrALAZINE (APRESOLINE) tablet 50 mg  50 mg Oral TID    hydrALAZINE (APRESOLINE) 20 mg/mL injection 10 mg  10 mg IntraVENous Q4H PRN    doxycycline (VIBRAMYCIN) 100 mg in 0.9% sodium chloride (MBP/ADV) 100 mL MBP  100 mg IntraVENous Q12H    metoprolol tartrate (LOPRESSOR) tablet 50 mg  50 mg Oral BID    albuterol-ipratropium (DUO-NEB) 2.5 MG-0.5 MG/3 ML  3 mL Nebulization Q6H PRN    insulin lispro (HUMALOG) injection 10 Units  10 Units SubCUTAneous TIDAC    azithromycin (ZITHROMAX) tablet 500 mg  500 mg Oral DAILY    famotidine (PEPCID) tablet 20 mg  20 mg Oral DAILY    glucose chewable tablet 16 g  4 Tablet Oral PRN    glucagon (GLUCAGEN) injection 1 mg  1 mg IntraMUSCular PRN    guaiFENesin (ROBITUSSIN) 100 mg/5 mL oral liquid 200 mg  200 mg Oral Q4H PRN    benzonatate (TESSALON) capsule 200 mg  200 mg Oral TID    methylPREDNISolone (PF) (SOLU-MEDROL) injection 40 mg  40 mg IntraVENous Q8H    sodium chloride (NS) flush 5-40 mL  5-40 mL IntraVENous Q8H    sodium chloride (NS) flush 5-40 mL  5-40 mL IntraVENous PRN    acetaminophen (TYLENOL) tablet 650 mg  650 mg Oral Q6H PRN    Or    acetaminophen (TYLENOL) suppository 650 mg  650 mg Rectal Q6H PRN    polyethylene glycol (MIRALAX) packet 17 g  17 g Oral DAILY PRN    ondansetron (ZOFRAN ODT) tablet 4 mg  4 mg Oral Q8H PRN    Or    ondansetron (ZOFRAN) injection 4 mg  4 mg IntraVENous Q6H PRN    enoxaparin (LOVENOX) injection 30 mg  30 mg SubCUTAneous DAILY    amLODIPine (NORVASC) tablet 10 mg  10 mg Oral DAILY    aspirin delayed-release tablet 81 mg  81 mg Oral DAILY    atorvastatin (LIPITOR) tablet 40 mg  40 mg Oral DAILY    budesonide-formoterol (SYMBICORT) 80-4.5 mcg inhaler  2 Puff Inhalation BID RT    ezetimibe (ZETIA) tablet 10 mg  10 mg Oral DAILY    folic acid (FOLVITE) tablet 1 mg  1 mg Oral DAILY    montelukast (SINGULAIR) tablet 10 mg  10 mg Oral DAILY    . PHARMACY TO SUBSTITUTE PER PROTOCOL (Reordered from: SITagliptin (Januvia) 50 mg tablet)    Per Protocol    insulin lispro (HUMALOG) injection   SubCUTAneous AC&HS    dextrose 10% infusion 0-250 mL  0-250 mL IntraVENous PRN        Review of Systems  Constitutional: No fevers, No chills, No sweats, No fatigue, No Weakness  Eyes: No redness  Ears, nose, mouth, throat, and face: No nasal congestion, No sore throat, No voice change  Respiratory: + Shortness of Breath, + cough, No wheezing  Cardiovascular: No chest pain, No palpitations, No extremity edema  Gastrointestinal: No nausea, No vomiting, No diarrhea, No abdominal pain  Genitourinary: No frequency, No dysuria, No hematuria  Integument/breast: No skin lesion(s)   Neurological: No Confusion, No headaches, No dizziness      Objective:     Visit Vitals  BP (!) 163/74 (BP 1 Location: Right upper arm, BP Patient Position: At rest;Semi fowlers)   Pulse (!) 54   Temp 97.8 °F (36.6 °C)   Resp 17   Ht 5' (1.524 m)   Wt 69.3 kg (152 lb 12.5 oz)   SpO2 98%   BMI 29.84 kg/m²    O2 Flow Rate (L/min): 2 l/min O2 Device: Nasal cannula    Temp (24hrs), Av.9 °F (36.6 °C), Min:97.5 °F (36.4 °C), Max:98.2 °F (36.8 °C)      No intake/output data recorded. No intake/output data recorded. PHYSICAL EXAM:  Constitutional: No acute distress  Skin: Extremities and face reveal no rashes. HEENT: Sclerae anicteric. Extra-occular muscles are intact. No oral ulcers. The neck is supple and no masses. Cardiovascular: Regular rate and rhythm. Respiratory:  nonlabored, diminished, faint ext wheezing  GI: Abdomen nondistended, soft, and nontender. Normal active bowel sounds. Musculoskeletal: No pitting edema of the lower legs. Able to move all ext  Neurological:  Patient is alert and oriented.  Cranial nerves II-XII grossly intact  Psychiatric: Mood appears appropriate       Data Review    Recent Results (from the past 24 hour(s))   CBC WITH AUTOMATED DIFF    Collection Time: 22  8:54 AM   Result Value Ref Range    WBC 16.7 (H) 3.6 - 11.0 K/uL    RBC 3.00 (L) 3.80 - 5.20 M/uL    HGB 9.5 (L) 11.5 - 16.0 g/dL    HCT 30.4 (L) 35.0 - 47.0 %    .3 (H) 80.0 - 99.0 FL    MCH 31.7 26.0 - 34.0 PG    MCHC 31.3 30.0 - 36.5 g/dL    RDW 14.4 11.5 - 14.5 %    PLATELET 861 288 - 801 K/uL    MPV 10.3 8.9 - 12.9 FL    NRBC 0.1 (H) 0.0  WBC    ABSOLUTE NRBC 0.02 (H) 0.00 - 0.01 K/uL    NEUTROPHILS 95 (H) 32 - 75 %    LYMPHOCYTES 3 (L) 12 - 49 %    MONOCYTES 1 (L) 5 - 13 %    EOSINOPHILS 0 0 - 7 %    BASOPHILS 0 0 - 1 %    IMMATURE GRANULOCYTES 1 (H) 0 - 0.5 %    ABS. NEUTROPHILS 15.9 (H) 1.8 - 8.0 K/UL    ABS. LYMPHOCYTES 0.4 (L) 0.8 - 3.5 K/UL    ABS. MONOCYTES 0.2 0.0 - 1.0 K/UL    ABS. EOSINOPHILS 0.0 0.0 - 0.4 K/UL    ABS. BASOPHILS 0.0 0.0 - 0.1 K/UL    ABS. IMM. GRANS. 0.2 (H) 0.00 - 0.04 K/UL    DF AUTOMATED     METABOLIC PANEL, BASIC    Collection Time: 12/11/22  8:54 AM   Result Value Ref Range    Sodium 137 136 - 145 mmol/L    Potassium 3.8 3.5 - 5.1 mmol/L    Chloride 101 97 - 108 mmol/L    CO2 33 (H) 21 - 32 mmol/L    Anion gap 3 (L) 5 - 15 mmol/L    Glucose 252 (H) 65 - 100 mg/dL    BUN 31 (H) 6 - 20 mg/dL    Creatinine 1.38 (H) 0.55 - 1.02 mg/dL    BUN/Creatinine ratio 22 (H) 12 - 20      eGFR 42 (L) >60 ml/min/1.73m2    Calcium 9.3 8.5 - 10.1 mg/dL   GLUCOSE, POC    Collection Time: 12/11/22 11:25 AM   Result Value Ref Range    Glucose (POC) 282 (H) 65 - 100 mg/dL    Performed by [facility]    GLUCOSE, POC    Collection Time: 12/11/22  4:01 PM   Result Value Ref Range    Glucose (POC) 193 (H) 65 - 100 mg/dL    Performed by Suleman Wickliffe    GLUCOSE, POC    Collection Time: 12/11/22  8:54 PM   Result Value Ref Range    Glucose (POC) 290 (H) 65 - 100 mg/dL    Performed by Guillermo [place]    GLUCOSE, POC    Collection Time: 12/12/22  7:27 AM   Result Value Ref Range    Glucose (POC) 216 (H) 65 - 100 mg/dL    Performed by Dc Golden        Radiology review: Chest xray    Assessment:   1.   Acute on chronic hypoxic respiratory failure secondary to bilateral pneumonitis/COPD exacerbation  2. Atypical chest pain acute coronary syndrome ruled out  3. History of CKD  4. Type 2 diabetes with hyperglycemia  5. Hypertension  6. UTI    Plan:   1. Patient completed 3 days of IV Rocephin. On oral azithromycin and doxycycline. Continue IV Solu-Medrol and duo nebs. Patient down to her home oxygen 2 L nasal cannula. Rapid COVID, flu, RSV negative. Blood cultures remain negative. CT of the chest showed bilateral multilobar pneumonia and trace bilateral pleural effusions. Continue respiratory therapy care  2. Cardiology consulted. 2D echo showed an EF of 60 to 65% with normal diastolic function and mild to moderate mitral valve regurgitation. Continue aspirin and statin. Ischemic work-up in the outpatient setting  3. Renal function is stable. Continue to monitor. Hold nephrotoxic medications. 4.  Glucose level stable. On Lantus 30 units at bedtime, Humalog 10 units with each meal along with insulin sliding scale. 5.  Blood pressure stable. Continue to monitor per unit protocol. Patient on hydralazine and metoprolol. No ACE or ARB due to renal function  6. Urine culture negative. Completed dose of IV Rocephin. No urinary complaints at this time  7. CBC BMP in the a.m. Dispo: 24-48 hours. Barriers include improvement in pulmonary function and symptoms. Suspect home with no needs    CODE STATUS Full     DVT prophylaxis: Lovenox  Ulcer prophylaxis: Pepcid    Care Plan discussed with: Patient/Family, Nurse, and     Total time spent with patient: 34 minutes.

## 2022-12-12 NOTE — PROGRESS NOTES
Problem: Falls - Risk of  Goal: *Absence of Falls  Description: Document Virlinda Gamma Fall Risk and appropriate interventions in the flowsheet.   Outcome: Progressing Towards Goal  Note: Fall Risk Interventions:            Medication Interventions: Patient to call before getting OOB

## 2022-12-13 LAB
ANION GAP SERPL CALC-SCNC: 8 MMOL/L (ref 5–15)
BASOPHILS # BLD: 0 K/UL (ref 0–0.1)
BASOPHILS NFR BLD: 0 % (ref 0–1)
BUN SERPL-MCNC: 45 MG/DL (ref 6–20)
BUN/CREAT SERPL: 27 (ref 12–20)
CA-I BLD-MCNC: 8.4 MG/DL (ref 8.5–10.1)
CHLORIDE SERPL-SCNC: 104 MMOL/L (ref 97–108)
CO2 SERPL-SCNC: 29 MMOL/L (ref 21–32)
CREAT SERPL-MCNC: 1.68 MG/DL (ref 0.55–1.02)
DIFFERENTIAL METHOD BLD: ABNORMAL
EOSINOPHIL # BLD: 0 K/UL (ref 0–0.4)
EOSINOPHIL NFR BLD: 0 % (ref 0–7)
ERYTHROCYTE [DISTWIDTH] IN BLOOD BY AUTOMATED COUNT: 14.3 % (ref 11.5–14.5)
GLUCOSE BLD STRIP.AUTO-MCNC: 177 MG/DL (ref 65–100)
GLUCOSE BLD STRIP.AUTO-MCNC: 180 MG/DL (ref 65–100)
GLUCOSE BLD STRIP.AUTO-MCNC: 225 MG/DL (ref 65–100)
GLUCOSE BLD STRIP.AUTO-MCNC: 269 MG/DL (ref 65–100)
GLUCOSE SERPL-MCNC: 249 MG/DL (ref 65–100)
HCT VFR BLD AUTO: 29.1 % (ref 35–47)
HGB BLD-MCNC: 9.4 G/DL (ref 11.5–16)
IMM GRANULOCYTES # BLD AUTO: 0.1 K/UL (ref 0–0.04)
IMM GRANULOCYTES NFR BLD AUTO: 1 % (ref 0–0.5)
LYMPHOCYTES # BLD: 0.3 K/UL (ref 0.8–3.5)
LYMPHOCYTES NFR BLD: 2 % (ref 12–49)
MCH RBC QN AUTO: 32.2 PG (ref 26–34)
MCHC RBC AUTO-ENTMCNC: 32.3 G/DL (ref 30–36.5)
MCV RBC AUTO: 99.7 FL (ref 80–99)
MONOCYTES # BLD: 0.3 K/UL (ref 0–1)
MONOCYTES NFR BLD: 2 % (ref 5–13)
NEUTS SEG # BLD: 16.6 K/UL (ref 1.8–8)
NEUTS SEG NFR BLD: 95 % (ref 32–75)
NRBC # BLD: 0 K/UL (ref 0–0.01)
NRBC BLD-RTO: 0 PER 100 WBC
PERFORMED BY, TECHID: ABNORMAL
PLATELET # BLD AUTO: 348 K/UL (ref 150–400)
PMV BLD AUTO: 9.8 FL (ref 8.9–12.9)
POTASSIUM SERPL-SCNC: 3.7 MMOL/L (ref 3.5–5.1)
RBC # BLD AUTO: 2.92 M/UL (ref 3.8–5.2)
SODIUM SERPL-SCNC: 141 MMOL/L (ref 136–145)
WBC # BLD AUTO: 17.4 K/UL (ref 3.6–11)

## 2022-12-13 PROCEDURE — 94640 AIRWAY INHALATION TREATMENT: CPT

## 2022-12-13 PROCEDURE — 36415 COLL VENOUS BLD VENIPUNCTURE: CPT

## 2022-12-13 PROCEDURE — 74011636637 HC RX REV CODE- 636/637: Performed by: INTERNAL MEDICINE

## 2022-12-13 PROCEDURE — 85025 COMPLETE CBC W/AUTO DIFF WBC: CPT

## 2022-12-13 PROCEDURE — 77010033678 HC OXYGEN DAILY

## 2022-12-13 PROCEDURE — 74011636637 HC RX REV CODE- 636/637: Performed by: PHYSICIAN ASSISTANT

## 2022-12-13 PROCEDURE — 74011250637 HC RX REV CODE- 250/637: Performed by: NURSE PRACTITIONER

## 2022-12-13 PROCEDURE — 94761 N-INVAS EAR/PLS OXIMETRY MLT: CPT

## 2022-12-13 PROCEDURE — 74011000258 HC RX REV CODE- 258: Performed by: NURSE PRACTITIONER

## 2022-12-13 PROCEDURE — 74011250636 HC RX REV CODE- 250/636: Performed by: INTERNAL MEDICINE

## 2022-12-13 PROCEDURE — 82962 GLUCOSE BLOOD TEST: CPT

## 2022-12-13 PROCEDURE — 80048 BASIC METABOLIC PNL TOTAL CA: CPT

## 2022-12-13 PROCEDURE — 74011250636 HC RX REV CODE- 250/636: Performed by: NURSE PRACTITIONER

## 2022-12-13 PROCEDURE — 74011250637 HC RX REV CODE- 250/637: Performed by: INTERNAL MEDICINE

## 2022-12-13 PROCEDURE — 74011636637 HC RX REV CODE- 636/637: Performed by: NURSE PRACTITIONER

## 2022-12-13 PROCEDURE — 74011000250 HC RX REV CODE- 250: Performed by: INTERNAL MEDICINE

## 2022-12-13 PROCEDURE — 65270000029 HC RM PRIVATE

## 2022-12-13 RX ORDER — PREDNISONE 20 MG/1
40 TABLET ORAL 2 TIMES DAILY WITH MEALS
Status: DISCONTINUED | OUTPATIENT
Start: 2022-12-13 | End: 2022-12-14 | Stop reason: HOSPADM

## 2022-12-13 RX ORDER — CARVEDILOL 3.12 MG/1
6.25 TABLET ORAL 2 TIMES DAILY WITH MEALS
Status: DISCONTINUED | OUTPATIENT
Start: 2022-12-13 | End: 2022-12-14 | Stop reason: HOSPADM

## 2022-12-13 RX ADMIN — PREDNISONE 40 MG: 20 TABLET ORAL at 09:55

## 2022-12-13 RX ADMIN — ALOGLIPTIN 12.5 MG: 12.5 TABLET, FILM COATED ORAL at 09:55

## 2022-12-13 RX ADMIN — SODIUM CHLORIDE, PRESERVATIVE FREE 10 ML: 5 INJECTION INTRAVENOUS at 16:52

## 2022-12-13 RX ADMIN — FOLIC ACID 1 MG: 1 TABLET ORAL at 09:55

## 2022-12-13 RX ADMIN — ATORVASTATIN CALCIUM 40 MG: 40 TABLET, FILM COATED ORAL at 09:56

## 2022-12-13 RX ADMIN — INSULIN LISPRO 3 UNITS: 100 INJECTION, SOLUTION INTRAVENOUS; SUBCUTANEOUS at 11:59

## 2022-12-13 RX ADMIN — INSULIN LISPRO 10 UNITS: 100 INJECTION, SOLUTION INTRAVENOUS; SUBCUTANEOUS at 12:00

## 2022-12-13 RX ADMIN — BENZONATATE 200 MG: 100 CAPSULE ORAL at 09:55

## 2022-12-13 RX ADMIN — HYDRALAZINE HYDROCHLORIDE 50 MG: 50 TABLET, FILM COATED ORAL at 21:18

## 2022-12-13 RX ADMIN — DOXYCYCLINE 100 MG: 100 INJECTION, POWDER, LYOPHILIZED, FOR SOLUTION INTRAVENOUS at 21:18

## 2022-12-13 RX ADMIN — FAMOTIDINE 20 MG: 20 TABLET, FILM COATED ORAL at 09:55

## 2022-12-13 RX ADMIN — DOXYCYCLINE 100 MG: 100 INJECTION, POWDER, LYOPHILIZED, FOR SOLUTION INTRAVENOUS at 12:00

## 2022-12-13 RX ADMIN — EZETIMIBE 10 MG: 10 TABLET ORAL at 09:55

## 2022-12-13 RX ADMIN — INSULIN LISPRO 10 UNITS: 100 INJECTION, SOLUTION INTRAVENOUS; SUBCUTANEOUS at 16:51

## 2022-12-13 RX ADMIN — ASPIRIN 81 MG: 81 TABLET, COATED ORAL at 09:55

## 2022-12-13 RX ADMIN — INSULIN LISPRO 3 UNITS: 100 INJECTION, SOLUTION INTRAVENOUS; SUBCUTANEOUS at 08:09

## 2022-12-13 RX ADMIN — INSULIN LISPRO 10 UNITS: 100 INJECTION, SOLUTION INTRAVENOUS; SUBCUTANEOUS at 08:10

## 2022-12-13 RX ADMIN — BENZONATATE 200 MG: 100 CAPSULE ORAL at 21:18

## 2022-12-13 RX ADMIN — ENOXAPARIN SODIUM 30 MG: 100 INJECTION SUBCUTANEOUS at 09:55

## 2022-12-13 RX ADMIN — BENZONATATE 200 MG: 100 CAPSULE ORAL at 16:53

## 2022-12-13 RX ADMIN — PREDNISONE 40 MG: 20 TABLET ORAL at 16:53

## 2022-12-13 RX ADMIN — HYDRALAZINE HYDROCHLORIDE 50 MG: 50 TABLET, FILM COATED ORAL at 16:53

## 2022-12-13 RX ADMIN — AMLODIPINE BESYLATE 10 MG: 5 TABLET ORAL at 09:55

## 2022-12-13 RX ADMIN — HYDRALAZINE HYDROCHLORIDE 50 MG: 50 TABLET, FILM COATED ORAL at 09:56

## 2022-12-13 RX ADMIN — SODIUM CHLORIDE, PRESERVATIVE FREE 10 ML: 5 INJECTION INTRAVENOUS at 05:13

## 2022-12-13 RX ADMIN — BUDESONIDE AND FORMOTEROL FUMARATE DIHYDRATE 2 PUFF: 80; 4.5 AEROSOL RESPIRATORY (INHALATION) at 20:13

## 2022-12-13 RX ADMIN — CARVEDILOL 6.25 MG: 3.12 TABLET, FILM COATED ORAL at 16:53

## 2022-12-13 RX ADMIN — INSULIN LISPRO 3 UNITS: 100 INJECTION, SOLUTION INTRAVENOUS; SUBCUTANEOUS at 21:26

## 2022-12-13 RX ADMIN — AZITHROMYCIN MONOHYDRATE 500 MG: 500 TABLET ORAL at 09:56

## 2022-12-13 RX ADMIN — MONTELUKAST 10 MG: 10 TABLET, FILM COATED ORAL at 09:55

## 2022-12-13 RX ADMIN — METHYLPREDNISOLONE SODIUM SUCCINATE 40 MG: 40 INJECTION, POWDER, FOR SOLUTION INTRAMUSCULAR; INTRAVENOUS at 05:13

## 2022-12-13 RX ADMIN — INSULIN GLARGINE 30 UNITS: 100 INJECTION, SOLUTION SUBCUTANEOUS at 21:18

## 2022-12-13 RX ADMIN — INSULIN LISPRO 9 UNITS: 100 INJECTION, SOLUTION INTRAVENOUS; SUBCUTANEOUS at 16:52

## 2022-12-13 NOTE — PROGRESS NOTES
Problem: Falls - Risk of  Goal: *Absence of Falls  Description: Document Julián Grimm Fall Risk and appropriate interventions in the flowsheet.   Outcome: Progressing Towards Goal  Note: Fall Risk Interventions:            Medication Interventions: Patient to call before getting OOB

## 2022-12-13 NOTE — PROGRESS NOTES
CARDIOLOGY PROGRESS NOTE      Patient Name: Pramod Owens  Age: 79 y.o. Gender:female  YBM:9/63/[birth year]89  MRN: [id number]    Patient seen and examined. Pramod Owens is a 79y.o. year-old female with past medical history significant for asthma, COPD, CKD, diabetes, and hypertension who was evaluated today due to chest pain. Being treated for pneumonia. Feeling well today, eating lunch. Breathing improving. Denies chest pain or palpitations. No other complaints reported. Pertinent review of systems items noted above, all other systems are negative. Current medications reviewed. Physical Examination    Allergies   Allergen Reactions    Lisinopril Swelling     Lip swelling    Pineapple Swelling     Lip swelling       Vital signs are stable. No apparent distress. Heart is mildly bradycardic. Lungs are clear bilaterally. Abdomen is soft, nontender, normal bowel sounds. Extremities have no edema. Labs reviewed: All lab results for the last 24 hours reviewed.   Recent Results (from the past 12 hour(s))   GLUCOSE, POC    Collection Time: 12/13/22  7:53 AM   Result Value Ref Range    Glucose (POC) 177 (H) 65 - 100 mg/dL    Performed by Jodie Cushing    METABOLIC PANEL, BASIC    Collection Time: 12/13/22  9:39 AM   Result Value Ref Range    Sodium 141 136 - 145 mmol/L    Potassium 3.7 3.5 - 5.1 mmol/L    Chloride 104 97 - 108 mmol/L    CO2 29 21 - 32 mmol/L    Anion gap 8 5 - 15 mmol/L    Glucose 249 (H) 65 - 100 mg/dL    BUN 45 (H) 6 - 20 mg/dL    Creatinine 1.68 (H) 0.55 - 1.02 mg/dL    BUN/Creatinine ratio 27 (H) 12 - 20      eGFR 33 (L) >60 ml/min/1.73m2    Calcium 8.4 (L) 8.5 - 10.1 mg/dL   CBC WITH AUTOMATED DIFF    Collection Time: 12/13/22  9:39 AM   Result Value Ref Range    WBC 17.4 (H) 3.6 - 11.0 K/uL    RBC 2.92 (L) 3.80 - 5.20 M/uL    HGB 9.4 (L) 11.5 - 16.0 g/dL    HCT 29.1 (L) 35.0 - 47.0 %    MCV 99.7 (H) 80.0 - 99.0 FL    MCH 32.2 26.0 - 34.0 PG    MCHC 32.3 30.0 - 36.5 g/dL    RDW 14.3 11.5 - 14.5 %    PLATELET 958 426 - 231 K/uL    MPV 9.8 8.9 - 12.9 FL    NRBC 0.0 0.0  WBC    ABSOLUTE NRBC 0.00 0.00 - 0.01 K/uL    NEUTROPHILS 95 (H) 32 - 75 %    LYMPHOCYTES 2 (L) 12 - 49 %    MONOCYTES 2 (L) 5 - 13 %    EOSINOPHILS 0 0 - 7 %    BASOPHILS 0 0 - 1 %    IMMATURE GRANULOCYTES 1 (H) 0 - 0.5 %    ABS. NEUTROPHILS 16.6 (H) 1.8 - 8.0 K/UL    ABS. LYMPHOCYTES 0.3 (L) 0.8 - 3.5 K/UL    ABS. MONOCYTES 0.3 0.0 - 1.0 K/UL    ABS. EOSINOPHILS 0.0 0.0 - 0.4 K/UL    ABS. BASOPHILS 0.0 0.0 - 0.1 K/UL    ABS. IMM. GRANS. 0.1 (H) 0.00 - 0.04 K/UL    DF AUTOMATED     GLUCOSE, POC    Collection Time: 12/13/22 11:54 AM   Result Value Ref Range    Glucose (POC) 225 (H) 65 - 100 mg/dL    Performed by Demarco Vera           Case discussed with Dr. Dipak Lua and our impression and recommendations are as follows: Atypical chest pain: likely pleuritic. Troponin negative. No acute ischemic changes on EKG. -  EF 60-65%, no wall motion abnormality.   -  Consider outpatient ischemic evaluation given increased risk factors. -  On aspirin 81mg daily. Continue statin and zetia. Elevated proBNP:   -  Possibly r/t LEIGHTON. Does not appear to be overtly volume overloaded. CXR without vascular congestion.   -  Echocardiogram normal per above. Hypertension: Blood pressure is above goal, holding lopressor due to bradycardia   -  Agree w/ addition of hydralazine  -  Changed Lopressor to coreg for better BP control    Please do not hesitate to call me or Dr. Quesada Ask if additional questions arise.     Eduarda Alas, NP  12/13/2022

## 2022-12-13 NOTE — PROGRESS NOTES
Problem: Falls - Risk of  Goal: *Absence of Falls  Description: Document Verna Skill Fall Risk and appropriate interventions in the flowsheet.   Note: Fall Risk Interventions:            Medication Interventions: Patient to call before getting OOB

## 2022-12-13 NOTE — PROGRESS NOTES
CM spoke with patient daughter Akbar Strange [phone] to inform unable to secure in home therapy. Daughter is patient paid caregiver. She is willing to take patient for outpatient therapy. CM provided daughter with outpatient therapy services.

## 2022-12-13 NOTE — PROGRESS NOTES
Hospitalist Progress Note    Subjective:   Daily Progress Note: 12/13/2022 8:38 AM    Hospital Course: The patient is a 78-year-old female with a PMH significant for COPD/asthma, CKD, DM and HTN. She presents to freestanding emergency room with symptoms of congestion, rhinorrhea, nausea without vomiting and diarrhea for the past 2 days PTA. Patient does have home oxygen and has increased her use since the symptoms began. Family state she became hypoxic with saturations of 45% prompting a call to EMS. Significant lab findings on admission UA with large leukocyte esterase, negative nitrates or bacteria, the positive pyuria. BUN 23 creatinine 1.57 BNP 2981, ABG pH 7.34 with CO2 48.6, PO2 100. CT of chest revealing bilateral Multi lobar pneumonia with trace bilateral pleural effusions. COVID, flu and RSV tests are negative. Patient was transferred to [facility] for further management of COPD/asthma exacerbation, acute on chronic respiratory failure with hypoxia, community-acquired pneumonia and will assess and rule in or out CHF exacerbation with elevated BNP. Started on IV ceftriaxone for both pneumonia and possible UTI. Urine culture remains negative along with blood cultures. Started on IV Solu-Medrol. Blood pressure is remaining high with low heart rate started on hydralazine oral 50 mg 3 times daily and a as needed IV hydralazine for SBP greater than 160. We will add IV doxycycline to antibiotic regimen due to lack of improvement in pneumonia symptoms. 2D echo with normal findings cardiology recommending outpatient work-up for ischemic evaluation no further studies at this time recommended. Chest pain likely pleuritic. Subjective: Patient says she is doing better. No cough and breathing better.  Still has some wheezing    Current Facility-Administered Medications   Medication Dose Route Frequency    insulin glargine (LANTUS) injection 30 Units  30 Units SubCUTAneous QHS    hydrALAZINE (APRESOLINE) tablet 50 mg  50 mg Oral TID    hydrALAZINE (APRESOLINE) 20 mg/mL injection 10 mg  10 mg IntraVENous Q4H PRN    doxycycline (VIBRAMYCIN) 100 mg in 0.9% sodium chloride (MBP/ADV) 100 mL MBP  100 mg IntraVENous Q12H    metoprolol tartrate (LOPRESSOR) tablet 50 mg  50 mg Oral BID    albuterol-ipratropium (DUO-NEB) 2.5 MG-0.5 MG/3 ML  3 mL Nebulization Q6H PRN    insulin lispro (HUMALOG) injection 10 Units  10 Units SubCUTAneous TIDAC    azithromycin (ZITHROMAX) tablet 500 mg  500 mg Oral DAILY    famotidine (PEPCID) tablet 20 mg  20 mg Oral DAILY    glucose chewable tablet 16 g  4 Tablet Oral PRN    glucagon (GLUCAGEN) injection 1 mg  1 mg IntraMUSCular PRN    guaiFENesin (ROBITUSSIN) 100 mg/5 mL oral liquid 200 mg  200 mg Oral Q4H PRN    benzonatate (TESSALON) capsule 200 mg  200 mg Oral TID    methylPREDNISolone (PF) (SOLU-MEDROL) injection 40 mg  40 mg IntraVENous Q8H    sodium chloride (NS) flush 5-40 mL  5-40 mL IntraVENous Q8H    sodium chloride (NS) flush 5-40 mL  5-40 mL IntraVENous PRN    acetaminophen (TYLENOL) tablet 650 mg  650 mg Oral Q6H PRN    Or    acetaminophen (TYLENOL) suppository 650 mg  650 mg Rectal Q6H PRN    polyethylene glycol (MIRALAX) packet 17 g  17 g Oral DAILY PRN    ondansetron (ZOFRAN ODT) tablet 4 mg  4 mg Oral Q8H PRN    Or    ondansetron (ZOFRAN) injection 4 mg  4 mg IntraVENous Q6H PRN    enoxaparin (LOVENOX) injection 30 mg  30 mg SubCUTAneous DAILY    amLODIPine (NORVASC) tablet 10 mg  10 mg Oral DAILY    aspirin delayed-release tablet 81 mg  81 mg Oral DAILY    atorvastatin (LIPITOR) tablet 40 mg  40 mg Oral DAILY    budesonide-formoterol (SYMBICORT) 80-4.5 mcg inhaler  2 Puff Inhalation BID RT    ezetimibe (ZETIA) tablet 10 mg  10 mg Oral DAILY    folic acid (FOLVITE) tablet 1 mg  1 mg Oral DAILY    montelukast (SINGULAIR) tablet 10 mg  10 mg Oral DAILY    alogliptin (NESINA) tablet 12.5 mg  12.5 mg Oral DAILY    insulin lispro (HUMALOG) injection   SubCUTAneous AC&HS    dextrose 10% infusion 0-250 mL  0-250 mL IntraVENous PRN        Review of Systems  Constitutional: No fevers, No chills, No sweats, No fatigue, No Weakness  Eyes: No redness  Ears, nose, mouth, throat, and face: No nasal congestion, No sore throat, No voice change  Respiratory: + Shortness of Breath, + cough, + wheezing  Cardiovascular: No chest pain, No palpitations, No extremity edema  Gastrointestinal: No nausea, No vomiting, No diarrhea, No abdominal pain  Genitourinary: No frequency, No dysuria, No hematuria  Integument/breast: No skin lesion(s)   Neurological: No Confusion, No headaches, No dizziness      Objective:     Visit Vitals  BP (!) 153/71 (BP 1 Location: Left upper arm, BP Patient Position: At rest;Lying right side)   Pulse (!) 56   Temp 98.6 °F (37 °C)   Resp 16   Ht 5' (1.524 m)   Wt 69.3 kg (152 lb 12.5 oz)   SpO2 100%   BMI 29.84 kg/m²    O2 Flow Rate (L/min): 2 l/min O2 Device: Nasal cannula    Temp (24hrs), Av.3 °F (36.8 °C), Min:97.7 °F (36.5 °C), Max:98.6 °F (37 °C)      No intake/output data recorded. No intake/output data recorded. PHYSICAL EXAM:  Constitutional: No acute distress  Skin: Extremities and face reveal no rashes. HEENT: Sclerae anicteric. Extra-occular muscles are intact. No oral ulcers. The neck is supple and no masses. Cardiovascular: Regular rate and rhythm. Respiratory:  nonlabored, diminished, faint ext wheezing with overall improvement from yesterday  GI: Abdomen nondistended, soft, and nontender. Normal active bowel sounds. Musculoskeletal: No pitting edema of the lower legs. Able to move all ext  Neurological:  Patient is alert and oriented.  Cranial nerves II-XII grossly intact  Psychiatric: Mood appears appropriate       Data Review    Recent Results (from the past 24 hour(s))   GLUCOSE, POC    Collection Time: 22 11:57 AM   Result Value Ref Range    Glucose (POC) 308 (H) 65 - 100 mg/dL    Performed by EUGENIE Mc    Collection Time: 12/12/22  3:42 PM   Result Value Ref Range    Glucose (POC) 233 (H) 65 - 100 mg/dL    Performed by Brandi Cote, POC    Collection Time: 12/12/22  8:46 PM   Result Value Ref Range    Glucose (POC) 323 (H) 65 - 100 mg/dL    Performed by [facility], POC    Collection Time: 12/13/22  7:53 AM   Result Value Ref Range    Glucose (POC) 177 (H) 65 - 100 mg/dL    Performed by Norman Pichardo        Radiology review: Chest xray    Assessment:   1. Acute on chronic hypoxic respiratory failure secondary to bilateral pneumonitis/COPD exacerbation  2. Atypical chest pain acute coronary syndrome ruled out  3. History of CKD  4. Type 2 diabetes with hyperglycemia  5. Hypertension  6. UTI    Plan:   1. Patient completed 3 days of IV Rocephin. On oral azithromycin and doxycycline. Continue IV Solu-Medrol (switch to PO prednisone today) and duo nebs. Patient down to her home oxygen 2 L nasal cannula. Rapid COVID, flu, RSV negative. Blood cultures remain negative. CT of the chest showed bilateral multilobar pneumonia and trace bilateral pleural effusions. Continue respiratory therapy care  2. Cardiology consulted. 2D echo showed an EF of 60 to 65% with normal diastolic function and mild to moderate mitral valve regurgitation. Continue aspirin and statin. Ischemic work-up in the outpatient setting  3. Renal function is stable. Continue to monitor. Hold nephrotoxic medications. 4.  Glucose level stable. On Lantus 30 units at bedtime, Humalog 10 units with each meal along with insulin sliding scale. 5.  Blood pressure stable. Continue to monitor per unit protocol. Patient on hydralazine and metoprolol. No ACE or ARB due to renal function  6. Urine culture negative. Completed dose of IV Rocephin. No urinary complaints at this time  7. CBC BMP in the a.m. Dispo: 24 hours. Barriers include improvement in pulmonary function and symptoms.  Suspect home with home health if accepting agency     CODE STATUS Full     DVT prophylaxis: Lovenox  Ulcer prophylaxis: Pepcid    Care Plan discussed with: Patient/Family, Nurse, and     Total time spent with patient: 33 minutes.

## 2022-12-14 VITALS
WEIGHT: 152.78 LBS | SYSTOLIC BLOOD PRESSURE: 152 MMHG | BODY MASS INDEX: 29.99 KG/M2 | RESPIRATION RATE: 16 BRPM | HEART RATE: 58 BPM | TEMPERATURE: 98.1 F | HEIGHT: 60 IN | DIASTOLIC BLOOD PRESSURE: 70 MMHG | OXYGEN SATURATION: 100 %

## 2022-12-14 LAB
ANION GAP SERPL CALC-SCNC: 7 MMOL/L (ref 5–15)
BASOPHILS # BLD: 0 K/UL (ref 0–0.1)
BASOPHILS NFR BLD: 0 % (ref 0–1)
BUN SERPL-MCNC: 46 MG/DL (ref 6–20)
BUN/CREAT SERPL: 30 (ref 12–20)
CA-I BLD-MCNC: 8.6 MG/DL (ref 8.5–10.1)
CHLORIDE SERPL-SCNC: 103 MMOL/L (ref 97–108)
CO2 SERPL-SCNC: 31 MMOL/L (ref 21–32)
CREAT SERPL-MCNC: 1.52 MG/DL (ref 0.55–1.02)
DIFFERENTIAL METHOD BLD: ABNORMAL
EOSINOPHIL # BLD: 0 K/UL (ref 0–0.4)
EOSINOPHIL NFR BLD: 0 % (ref 0–7)
ERYTHROCYTE [DISTWIDTH] IN BLOOD BY AUTOMATED COUNT: 14.7 % (ref 11.5–14.5)
GLUCOSE BLD STRIP.AUTO-MCNC: 125 MG/DL (ref 65–100)
GLUCOSE BLD STRIP.AUTO-MCNC: 200 MG/DL (ref 65–100)
GLUCOSE SERPL-MCNC: 152 MG/DL (ref 65–100)
HCT VFR BLD AUTO: 33.4 % (ref 35–47)
HGB BLD-MCNC: 10.8 G/DL (ref 11.5–16)
IMM GRANULOCYTES # BLD AUTO: 0.2 K/UL (ref 0–0.04)
IMM GRANULOCYTES NFR BLD AUTO: 1 % (ref 0–0.5)
LYMPHOCYTES # BLD: 0.5 K/UL (ref 0.8–3.5)
LYMPHOCYTES NFR BLD: 3 % (ref 12–49)
MCH RBC QN AUTO: 32.3 PG (ref 26–34)
MCHC RBC AUTO-ENTMCNC: 32.3 G/DL (ref 30–36.5)
MCV RBC AUTO: 100 FL (ref 80–99)
MONOCYTES # BLD: 0.7 K/UL (ref 0–1)
MONOCYTES NFR BLD: 4 % (ref 5–13)
NEUTS SEG # BLD: 18.4 K/UL (ref 1.8–8)
NEUTS SEG NFR BLD: 92 % (ref 32–75)
NRBC # BLD: 0.03 K/UL (ref 0–0.01)
NRBC BLD-RTO: 0.2 PER 100 WBC
PERFORMED BY, TECHID: ABNORMAL
PERFORMED BY, TECHID: ABNORMAL
PLATELET # BLD AUTO: 422 K/UL (ref 150–400)
PMV BLD AUTO: 9.7 FL (ref 8.9–12.9)
POTASSIUM SERPL-SCNC: 3.6 MMOL/L (ref 3.5–5.1)
RBC # BLD AUTO: 3.34 M/UL (ref 3.8–5.2)
SODIUM SERPL-SCNC: 141 MMOL/L (ref 136–145)
WBC # BLD AUTO: 19.9 K/UL (ref 3.6–11)

## 2022-12-14 PROCEDURE — 74011250637 HC RX REV CODE- 250/637: Performed by: INTERNAL MEDICINE

## 2022-12-14 PROCEDURE — 77010033678 HC OXYGEN DAILY

## 2022-12-14 PROCEDURE — 74011636637 HC RX REV CODE- 636/637: Performed by: NURSE PRACTITIONER

## 2022-12-14 PROCEDURE — 74011000250 HC RX REV CODE- 250: Performed by: INTERNAL MEDICINE

## 2022-12-14 PROCEDURE — 80048 BASIC METABOLIC PNL TOTAL CA: CPT

## 2022-12-14 PROCEDURE — 74011250636 HC RX REV CODE- 250/636: Performed by: NURSE PRACTITIONER

## 2022-12-14 PROCEDURE — 94761 N-INVAS EAR/PLS OXIMETRY MLT: CPT

## 2022-12-14 PROCEDURE — 36415 COLL VENOUS BLD VENIPUNCTURE: CPT

## 2022-12-14 PROCEDURE — 74011250636 HC RX REV CODE- 250/636: Performed by: INTERNAL MEDICINE

## 2022-12-14 PROCEDURE — 74011636637 HC RX REV CODE- 636/637: Performed by: PHYSICIAN ASSISTANT

## 2022-12-14 PROCEDURE — 74011000258 HC RX REV CODE- 258: Performed by: NURSE PRACTITIONER

## 2022-12-14 PROCEDURE — 94640 AIRWAY INHALATION TREATMENT: CPT

## 2022-12-14 PROCEDURE — 74011250637 HC RX REV CODE- 250/637: Performed by: NURSE PRACTITIONER

## 2022-12-14 PROCEDURE — 74011636637 HC RX REV CODE- 636/637: Performed by: INTERNAL MEDICINE

## 2022-12-14 PROCEDURE — 85025 COMPLETE CBC W/AUTO DIFF WBC: CPT

## 2022-12-14 PROCEDURE — 82962 GLUCOSE BLOOD TEST: CPT

## 2022-12-14 RX ORDER — AZITHROMYCIN 500 MG/1
500 TABLET, FILM COATED ORAL DAILY
Qty: 2 TABLET | Refills: 0 | Status: SHIPPED | OUTPATIENT
Start: 2022-12-14 | End: 2022-12-16

## 2022-12-14 RX ORDER — HYDRALAZINE HYDROCHLORIDE 50 MG/1
50 TABLET, FILM COATED ORAL 3 TIMES DAILY
Qty: 90 TABLET | Refills: 0 | Status: SHIPPED | OUTPATIENT
Start: 2022-12-14 | End: 2023-01-13

## 2022-12-14 RX ORDER — CARVEDILOL 6.25 MG/1
6.25 TABLET ORAL 2 TIMES DAILY WITH MEALS
Qty: 60 TABLET | Refills: 0 | Status: SHIPPED | OUTPATIENT
Start: 2022-12-14 | End: 2023-01-13

## 2022-12-14 RX ORDER — BENZONATATE 200 MG/1
200 CAPSULE ORAL 3 TIMES DAILY
Qty: 21 CAPSULE | Refills: 0 | Status: SHIPPED | OUTPATIENT
Start: 2022-12-14 | End: 2022-12-21

## 2022-12-14 RX ORDER — PREDNISONE 20 MG/1
20 TABLET ORAL DAILY
Qty: 7 TABLET | Refills: 0 | Status: SHIPPED | OUTPATIENT
Start: 2022-12-14 | End: 2022-12-21

## 2022-12-14 RX ADMIN — FAMOTIDINE 20 MG: 20 TABLET, FILM COATED ORAL at 09:10

## 2022-12-14 RX ADMIN — ENOXAPARIN SODIUM 30 MG: 100 INJECTION SUBCUTANEOUS at 09:10

## 2022-12-14 RX ADMIN — BENZONATATE 200 MG: 100 CAPSULE ORAL at 09:09

## 2022-12-14 RX ADMIN — INSULIN LISPRO 10 UNITS: 100 INJECTION, SOLUTION INTRAVENOUS; SUBCUTANEOUS at 11:30

## 2022-12-14 RX ADMIN — BUDESONIDE AND FORMOTEROL FUMARATE DIHYDRATE 2 PUFF: 80; 4.5 AEROSOL RESPIRATORY (INHALATION) at 08:00

## 2022-12-14 RX ADMIN — SODIUM CHLORIDE, PRESERVATIVE FREE 10 ML: 5 INJECTION INTRAVENOUS at 00:18

## 2022-12-14 RX ADMIN — DOXYCYCLINE 100 MG: 100 INJECTION, POWDER, LYOPHILIZED, FOR SOLUTION INTRAVENOUS at 09:11

## 2022-12-14 RX ADMIN — AZITHROMYCIN MONOHYDRATE 500 MG: 500 TABLET ORAL at 09:10

## 2022-12-14 RX ADMIN — FOLIC ACID 1 MG: 1 TABLET ORAL at 09:10

## 2022-12-14 RX ADMIN — SODIUM CHLORIDE, PRESERVATIVE FREE 10 ML: 5 INJECTION INTRAVENOUS at 06:00

## 2022-12-14 RX ADMIN — CARVEDILOL 6.25 MG: 3.12 TABLET, FILM COATED ORAL at 09:09

## 2022-12-14 RX ADMIN — EZETIMIBE 10 MG: 10 TABLET ORAL at 09:09

## 2022-12-14 RX ADMIN — ASPIRIN 81 MG: 81 TABLET, COATED ORAL at 09:09

## 2022-12-14 RX ADMIN — PREDNISONE 40 MG: 20 TABLET ORAL at 09:09

## 2022-12-14 RX ADMIN — AMLODIPINE BESYLATE 10 MG: 5 TABLET ORAL at 09:10

## 2022-12-14 RX ADMIN — ATORVASTATIN CALCIUM 40 MG: 40 TABLET, FILM COATED ORAL at 09:10

## 2022-12-14 RX ADMIN — HYDRALAZINE HYDROCHLORIDE 50 MG: 50 TABLET, FILM COATED ORAL at 09:09

## 2022-12-14 RX ADMIN — ALOGLIPTIN 12.5 MG: 12.5 TABLET, FILM COATED ORAL at 09:10

## 2022-12-14 RX ADMIN — INSULIN LISPRO 10 UNITS: 100 INJECTION, SOLUTION INTRAVENOUS; SUBCUTANEOUS at 09:09

## 2022-12-14 RX ADMIN — INSULIN LISPRO 6 UNITS: 100 INJECTION, SOLUTION INTRAVENOUS; SUBCUTANEOUS at 11:30

## 2022-12-14 RX ADMIN — MONTELUKAST 10 MG: 10 TABLET, FILM COATED ORAL at 09:10

## 2022-12-14 NOTE — DISCHARGE SUMMARY
Hospitalist Discharge Summary     Patient ID:    Carlie Constantino  [id number]  14 y.o.  1955    Admit date: 12/7/2022    Discharge date : 12/14/2022    Final Diagnoses:   1. Acute on chronic hypoxic respiratory failure secondary to bilateral pneumonitis/COPD exacerbation  2. Atypical chest pain acute coronary syndrome ruled out  3. History of CKD  4. Type 2 diabetes with hyperglycemia  5. Hypertension  6. UTI    Reason for Hospitalization: Congestion and SOB      Hospital Course: The patient is a 49-year-old female with a PMH significant for COPD/asthma, CKD, DM and HTN. She presents to freestanding emergency room with symptoms of congestion, rhinorrhea, nausea without vomiting and diarrhea for the past 2 days PTA. Patient does have home oxygen and has increased her use since the symptoms began. Family state she became hypoxic with saturations of 45% prompting a call to EMS. Significant lab findings on admission UA with large leukocyte esterase, negative nitrates or bacteria, the positive pyuria. BUN 23 creatinine 1.57 BNP 2981, ABG pH 7.34 with CO2 48.6, PO2 100. CT of chest revealing bilateral Multi lobar pneumonia with trace bilateral pleural effusions. COVID, flu and RSV tests are negative. Patient was transferred to [address] [place] [address] for further management of COPD/asthma exacerbation, acute on chronic respiratory failure with hypoxia, community-acquired pneumonia and will assess and rule in or out CHF exacerbation with elevated BNP. Started on IV ceftriaxone for both pneumonia and possible UTI. Urine culture remains negative along with blood cultures. Started on IV Solu-Medrol. Blood pressure is remaining high with low heart rate started on hydralazine oral 50 mg 3 times daily and a as needed IV hydralazine for SBP greater than 160. We will add IV doxycycline to antibiotic regimen due to lack of improvement in pneumonia symptoms.   2D echo with normal findings cardiology recommending outpatient work-up for ischemic evaluation no further studies at this time recommended. Chest pain likely pleuritic. Discharge Medications:   Current Discharge Medication List        START taking these medications    Details   azithromycin (ZITHROMAX) 500 mg tab Take 1 Tablet by mouth daily for 2 doses. Qty: 2 Tablet, Refills: 0  Start date: 12/14/2022, End date: 12/16/2022      benzonatate (TESSALON) 200 mg capsule Take 1 Capsule by mouth three (3) times daily for 7 days. Qty: 21 Capsule, Refills: 0  Start date: 12/14/2022, End date: 12/21/2022      carvediloL (COREG) 6.25 mg tablet Take 1 Tablet by mouth two (2) times daily (with meals) for 30 days. Qty: 60 Tablet, Refills: 0  Start date: 12/14/2022, End date: 1/13/2023      predniSONE (DELTASONE) 20 mg tablet Take 1 Tablet by mouth daily for 7 days. Qty: 7 Tablet, Refills: 0  Start date: 12/14/2022, End date: 12/21/2022      hydrALAZINE (APRESOLINE) 50 mg tablet Take 1 Tablet by mouth three (3) times daily for 30 days. Qty: 90 Tablet, Refills: 0  Start date: 12/14/2022, End date: 1/13/2023           CONTINUE these medications which have NOT CHANGED    Details   albuterol (PROVENTIL HFA, VENTOLIN HFA, PROAIR HFA) 90 mcg/actuation inhaler Take  by inhalation. budesonide-formoteroL (SYMBICORT) 80-4.5 mcg/actuation HFAA Take 2 Puffs by inhalation. ergocalciferol (ERGOCALCIFEROL) 1,250 mcg (50,000 unit) capsule Take 50,000 Units by mouth. SITagliptin phosphate (JANUVIA) 50 mg tablet Take 50 mg by mouth daily. ezetimibe (ZETIA) 10 mg tablet Take  by mouth.      guaiFENesin ER (MUCINEX) 600 mg ER tablet Take 1 Tablet by mouth two (2) times a day. Qty: 20 Tablet, Refills: 0      aspirin delayed-release 81 mg tablet Take 81 mg by mouth daily. amLODIPine (NORVASC) 10 mg tablet Take 10 mg by mouth daily. montelukast (SINGULAIR) 10 mg tablet Take 10 mg by mouth daily.       folic acid (FOLVITE) 1 mg tablet Take 1 mg by mouth daily. atorvastatin (LIPITOR) 40 mg tablet Take 40 mg by mouth daily. methotrexate (RHEUMATREX) 2.5 mg tablet Take 12.5 mg by mouth every Monday. STOP taking these medications       metoprolol tartrate (LOPRESSOR) 100 mg IR tablet Comments:   Reason for Stopping: Follow up Care:    Heidy JovanaRashaunshDO silvia in 1-2 weeks. Follow-up Information       Follow up With Specialties Details Why [address] [facility], [place], [place] [facility] Follow up in 2 week(s)  [facility] Rashaun Flores MD Cardio Vascular Surgery, Clinical Cardiac Electrophysiology Physician, Cardiovascular Disease Physician, Interventional Cardiology Physician Follow up in 2 week(s)  CHELA Staton   [phone]                Patient Follow Up Instructions: Activity: Activity as tolerated  Diet:  Cardiac Diet  Wound care: None Needed    Condition at Discharge:  Stable  __________________________________________________________________    Disposition  Home with family, no needs  ____________________________________________________________________    Code Status: Full Code  ___________________________________________________________________    Discharge Exam:  Patient seen and examined by me on discharge day. Pertinent Findings:  Gen:    Not in distress  Chest: Diminsihed, faint wheezing, nonlabored  CVS:   Regular rhythm. No edema  Abd:  Soft, not distended, not tender  Neuro:  Alert    CONSULTATIONS: Cardiology    Significant Diagnostic Studies:   12/7/2022: BUN 23 mg/dL (H; Ref range: 6 - 20 mg/dL); Calcium 8.7 mg/dL (Ref range: 8.5 - 10.1 mg/dL); CO2 31 mmol/L (Ref range: 21 - 32 mmol/L); Creatinine 1.57 mg/dL (H; Ref range: 0.55 - 1.02 mg/dL); Glucose 147 mg/dL (H; Ref range: 65 - 100 mg/dL); HCT 27.1 % (L; Ref range: 35.0 - 47.0 %); HGB 8.5 g/dL (L; Ref range: 11.5 - 16.0 g/dL);  Potassium 4.3 mmol/L (Ref range: 3.5 - 5.1 mmol/L); Sodium 140 mmol/L (Ref range: 136 - 145 mmol/L)  Recent Labs     12/13/22  0939 12/12/22  0804   WBC 17.4* 16.8*   HGB 9.4* 9.2*   HCT 29.1* 29.5*    335     Recent Labs     12/13/22  0939 12/12/22  0804 12/11/22  0854    140 137   K 3.7 3.8 3.8    104 101   CO2 29 32 33*   BUN 45* 38* 31*   CREA 1.68* 1.49* 1.38*   * 215* 252*   CA 8.4* 9.1 9.3     No results for input(s): ALT, AP, TBIL, TBILI, TP, ALB, GLOB, GGT, AML, LPSE in the last 72 hours. No lab exists for component: SGOT, GPT, AMYP, HLPSE  No results for input(s): INR, PTP, APTT, INREXT in the last 72 hours. No results for input(s): FE, TIBC, PSAT, FERR in the last 72 hours. No results for input(s): PH, PCO2, PO2 in the last 72 hours. No results for input(s): CPK, CKMB in the last 72 hours.     No lab exists for component: TROPONINI  Lab Results   Component Value Date/Time    Glucose (POC) 125 (H) 12/14/2022 08:15 AM    Glucose (POC) 180 (H) 12/13/2022 07:47 PM    Glucose (POC) 269 (H) 12/13/2022 04:19 PM    Glucose (POC) 225 (H) 12/13/2022 11:54 AM    Glucose (POC) 177 (H) 12/13/2022 07:53 AM         Total Time: >30m in     Signed:  Farida Green PA-C  12/14/2022  8:39 AM

## 2022-12-14 NOTE — PROGRESS NOTES
Problem: Falls - Risk of  Goal: *Absence of Falls  Description: Document [place] Fall Risk and appropriate interventions in the flowsheet.   Outcome: Progressing Towards Goal  Note: Fall Risk Interventions:            Medication Interventions: Teach patient to arise slowly                   Problem: Patient Education: Go to Patient Education Activity  Goal: Patient/Family Education  Outcome: Progressing Towards Goal     Problem: Discharge Planning  Goal: *Discharge to safe environment  Outcome: Progressing Towards Goal  Goal: *Knowledge of medication management  Outcome: Progressing Towards Goal  Goal: *Knowledge of discharge instructions  Outcome: Progressing Towards Goal     Problem: Patient Education: Go to Patient Education Activity  Goal: Patient/Family Education  Outcome: Progressing Towards Goal     Problem: General Medical Care Plan  Goal: *Vital signs within specified parameters  Outcome: Progressing Towards Goal  Goal: *Labs within defined limits  Outcome: Progressing Towards Goal  Goal: *Absence of infection signs and symptoms  Outcome: Progressing Towards Goal  Goal: *Optimal pain control at patient's stated goal  Outcome: Progressing Towards Goal  Goal: *Skin integrity maintained  Outcome: Progressing Towards Goal  Goal: *Fluid volume balance  Outcome: Progressing Towards Goal  Goal: *Optimize nutritional status  Outcome: Progressing Towards Goal  Goal: *Anxiety reduced or absent  Outcome: Progressing Towards Goal  Goal: *Progressive mobility and function (eg: ADL's)  Outcome: Progressing Towards Goal     Problem: Patient Education: Go to Patient Education Activity  Goal: Patient/Family Education  Outcome: Progressing Towards Goal     Problem: Patient Education: Go to Patient Education Activity  Goal: Patient/Family Education  Outcome: Progressing Towards Goal

## 2022-12-14 NOTE — DISCHARGE INSTRUCTIONS
Hospitalist Discharge Instructions     Patient ID:    Kelechi Jerry  [id number]  84 y.o.  1955    Admit date: 12/7/2022    Discharge date : 12/14/2022    Final Diagnoses:   1. Acute on chronic hypoxic respiratory failure secondary to bilateral pneumonitis/COPD exacerbation  2. Atypical chest pain acute coronary syndrome ruled out  3. History of CKD  4. Type 2 diabetes with hyperglycemia  5. Hypertension  6. UTI    Reason for Hospitalization: Congestion and SOB      Discharge Medications:   Current Discharge Medication List        START taking these medications    Details   azithromycin (ZITHROMAX) 500 mg tab Take 1 Tablet by mouth daily for 2 doses. Qty: 2 Tablet, Refills: 0  Start date: 12/14/2022, End date: 12/16/2022      benzonatate (TESSALON) 200 mg capsule Take 1 Capsule by mouth three (3) times daily for 7 days. Qty: 21 Capsule, Refills: 0  Start date: 12/14/2022, End date: 12/21/2022      carvediloL (COREG) 6.25 mg tablet Take 1 Tablet by mouth two (2) times daily (with meals) for 30 days. Qty: 60 Tablet, Refills: 0  Start date: 12/14/2022, End date: 1/13/2023      predniSONE (DELTASONE) 20 mg tablet Take 1 Tablet by mouth daily for 7 days. Qty: 7 Tablet, Refills: 0  Start date: 12/14/2022, End date: 12/21/2022      hydrALAZINE (APRESOLINE) 50 mg tablet Take 1 Tablet by mouth three (3) times daily for 30 days. Qty: 90 Tablet, Refills: 0  Start date: 12/14/2022, End date: 1/13/2023           CONTINUE these medications which have NOT CHANGED    Details   albuterol (PROVENTIL HFA, VENTOLIN HFA, PROAIR HFA) 90 mcg/actuation inhaler Take  by inhalation. budesonide-formoteroL (SYMBICORT) 80-4.5 mcg/actuation HFAA Take 2 Puffs by inhalation. ergocalciferol (ERGOCALCIFEROL) 1,250 mcg (50,000 unit) capsule Take 50,000 Units by mouth. SITagliptin phosphate (JANUVIA) 50 mg tablet Take 50 mg by mouth daily. ezetimibe (ZETIA) 10 mg tablet Take  by mouth. guaiFENesin ER (MUCINEX) 600 mg ER tablet Take 1 Tablet by mouth two (2) times a day. Qty: 20 Tablet, Refills: 0      aspirin delayed-release 81 mg tablet Take 81 mg by mouth daily. amLODIPine (NORVASC) 10 mg tablet Take 10 mg by mouth daily. montelukast (SINGULAIR) 10 mg tablet Take 10 mg by mouth daily. folic acid (FOLVITE) 1 mg tablet Take 1 mg by mouth daily. atorvastatin (LIPITOR) 40 mg tablet Take 40 mg by mouth daily. methotrexate (RHEUMATREX) 2.5 mg tablet Take 12.5 mg by mouth every Monday. STOP taking these medications       metoprolol tartrate (LOPRESSOR) 100 mg IR tablet Comments:   Reason for Stopping: Follow up Care:    1. Yelena Whaley DO in 1-2 weeks. Follow-up Information       Follow up With Specialties Details Stephen [address] [facility] up in 2 week(s[facility]  [address]      Codey Guzman MD Cardio Vascular Surgery, Clinical Cardiac Electrophysiology Physician, Cardiovascular Disease Physician, Interventional Cardiology Physician Follow up in 2 week(s)  CHELA Staton   [phone]                Patient Follow Up Instructions:    Activity: Activity as tolerated  Diet:  Cardiac Diet  Wound care: None Needed    Condition at Discharge:  Stable  __________________________________________________________________    Disposition  Home with family, no needs  ____________________________________________________________________    Code Status: Full Code  ___________________________________________________________________    CONSULTATIONS: Cardiology

## 2022-12-14 NOTE — PROGRESS NOTES
Rounded on patient. Patient resting with eyes closed. Call bell within reach. Will continue to monitor.

## 2022-12-14 NOTE — PROGRESS NOTES
19:50 Bedside shift change report given to Que RN (oncoming nurse) by Jass Rivera RN (offgoing nurse). Report included the following information SBAR, Kardex, Intake/Output, MAR, and Recent Results Patient alert and oriented, no distress noted. Kaley Rollins

## 2022-12-14 NOTE — PROGRESS NOTES
CARDIOLOGY PROGRESS NOTE      Patient Name: Sushma Morales  Age: 79 y.o. Gender:female  OCO:8/50/0179  MRN: [id number]    Patient seen and examined. Sushma Morales is a 79y.o. year-old female with past medical history significant for asthma, COPD, CKD, diabetes, and hypertension who was evaluated today due to chest pain. Being treated for pneumonia. Feeling better. Breathing and wheezing getting better. Denies chest pain or palpitations. No other complaints reported. Pertinent review of systems items noted above, all other systems are negative. Current medications reviewed. Physical Examination    Allergies   Allergen Reactions    Lisinopril Swelling     Lip swelling    Pineapple Swelling     Lip swelling       Vital signs are stable. No apparent distress. Heart is mildly bradycardic. Lungs are diminished  Abdomen is soft, nontender, normal bowel sounds. Extremities have no edema. Labs reviewed: All lab results for the last 24 hours reviewed. Recent Results (from the past 12 hour(s))   GLUCOSE, POC    Collection Time: 12/14/22  8:15 AM   Result Value Ref Range    Glucose (POC) 125 (H) 65 - 100 mg/dL    Performed by Alexandra Bowie    CBC WITH AUTOMATED DIFF    Collection Time: 12/14/22  9:07 AM   Result Value Ref Range    WBC 19.9 (H) 3.6 - 11.0 K/uL    RBC 3.34 (L) 3.80 - 5.20 M/uL    HGB 10.8 (L) 11.5 - 16.0 g/dL    HCT 33.4 (L) 35.0 - 47.0 %    .0 (H) 80.0 - 99.0 FL    MCH 32.3 26.0 - 34.0 PG    MCHC 32.3 30.0 - 36.5 g/dL    RDW 14.7 (H) 11.5 - 14.5 %    PLATELET 453 (H) 896 - 400 K/uL    MPV 9.7 8.9 - 12.9 FL    NRBC 0.2 (H) 0.0  WBC    ABSOLUTE NRBC 0.03 (H) 0.00 - 0.01 K/uL    NEUTROPHILS 92 (H) 32 - 75 %    LYMPHOCYTES 3 (L) 12 - 49 %    MONOCYTES 4 (L) 5 - 13 %    EOSINOPHILS 0 0 - 7 %    BASOPHILS 0 0 - 1 %    IMMATURE GRANULOCYTES 1 (H) 0 - 0.5 %    ABS. NEUTROPHILS 18.4 (H) 1.8 - 8.0 K/UL    ABS. LYMPHOCYTES 0.5 (L) 0.8 - 3.5 K/UL    ABS.  MONOCYTES 0.7 0.0 - 1.0 K/UL    ABS. EOSINOPHILS 0.0 0.0 - 0.4 K/UL    ABS. BASOPHILS 0.0 0.0 - 0.1 K/UL    ABS. IMM. GRANS. 0.2 (H) 0.00 - 0.04 K/UL    DF AUTOMATED     METABOLIC PANEL, BASIC    Collection Time: 12/14/22  9:07 AM   Result Value Ref Range    Sodium 141 136 - 145 mmol/L    Potassium 3.6 3.5 - 5.1 mmol/L    Chloride 103 97 - 108 mmol/L    CO2 31 21 - 32 mmol/L    Anion gap 7 5 - 15 mmol/L    Glucose 152 (H) 65 - 100 mg/dL    BUN 46 (H) 6 - 20 mg/dL    Creatinine 1.52 (H) 0.55 - 1.02 mg/dL    BUN/Creatinine ratio 30 (H) 12 - 20      eGFR 37 (L) >60 ml/min/1.73m2    Calcium 8.6 8.5 - 10.1 mg/dL   GLUCOSE, POC    Collection Time: 12/14/22 11:12 AM   Result Value Ref Range    Glucose (POC) 200 (H) 65 - 100 mg/dL    Performed by Nell Do           Case discussed with Dr. Dejah Allen and our impression and recommendations are as follows: Atypical chest pain: likely pleuritic. Troponin negative. No acute ischemic changes on EKG. -  EF 60-65%, no wall motion abnormality.   -  Consider outpatient ischemic evaluation given increased risk factors. -  On aspirin 81mg daily. Continue statin and zetia. Elevated proBNP:   -  Possibly r/t LEIGHTON. Volume seems stable. CXR without vascular congestion.   -  Echocardiogram normal per above. Hypertension: Blood pressure acceptable  -  Agree w/ addition of hydralazine  -  Changed Lopressor to coreg for better BP control    Follow up in 2 weeks in office. Please do not hesitate to call me or Dr. Dejah Allen if additional questions arise.     Roula Giron, SUSY  12/14/2022

## 2022-12-14 NOTE — PROGRESS NOTES
Problem: Patient Education: Go to Patient Education Activity  Goal: Patient/Family Education  Outcome: Progressing Towards Goal     Problem: General Medical Care Plan  Goal: *Vital signs within specified parameters  Outcome: Progressing Towards Goal     Problem: General Medical Care Plan  Goal: *Labs within defined limits  Outcome: Progressing Towards Goal     Problem: General Medical Care Plan  Goal: *Absence of infection signs and symptoms  Outcome: Progressing Towards Goal

## 2022-12-14 NOTE — PROGRESS NOTES
Patient to discharge home today with outpatient therapy services. Family to transport at discharge. Primary nurse is aware. CM informed daughter Carli Sers of discharge. No further CM services required. Discharge plan of care/case management plan validated with provider discharge order.

## 2022-12-14 NOTE — PROGRESS NOTES
Bedside and Verbal shift change report given to Viral Gonzalez RN (oncoming nurse) by Yosvany Manley RN (offgoing nurse). Report included the following information SBAR and Kardex.

## 2022-12-15 LAB
BACTERIA SPEC CULT: NORMAL
SPECIAL REQUESTS,SREQ: NORMAL

## 2023-02-28 ENCOUNTER — TRANSCRIBE ORDER (OUTPATIENT)
Dept: SCHEDULING | Age: 68
End: 2023-02-28

## 2023-02-28 DIAGNOSIS — Z12.31 VISIT FOR SCREENING MAMMOGRAM: Primary | ICD-10-CM

## 2023-03-23 ENCOUNTER — HOSPITAL ENCOUNTER (OUTPATIENT)
Dept: MAMMOGRAPHY | Age: 68
Discharge: HOME | End: 2023-03-23
Payer: MEDICAID

## 2023-03-23 DIAGNOSIS — Z12.31 VISIT FOR SCREENING MAMMOGRAM: ICD-10-CM

## 2023-03-23 PROCEDURE — 77063 BREAST TOMOSYNTHESIS BI: CPT

## 2023-03-27 ENCOUNTER — TRANSCRIBE ORDER (OUTPATIENT)
Dept: SCHEDULING | Age: 68
End: 2023-03-27

## 2023-03-27 DIAGNOSIS — R92.8 OTHER ABNORMAL AND INCONCLUSIVE FINDINGS ON DIAGNOSTIC IMAGING OF BREAST: Primary | ICD-10-CM

## 2023-04-04 ENCOUNTER — TRANSCRIBE ORDER (OUTPATIENT)
Dept: SCHEDULING | Age: 68
End: 2023-04-04

## 2023-04-19 ENCOUNTER — HOSPITAL ENCOUNTER (OUTPATIENT)
Dept: MAMMOGRAPHY | Age: 68
Discharge: HOME | End: 2023-04-19
Payer: MEDICAID

## 2023-04-19 ENCOUNTER — HOSPITAL ENCOUNTER (OUTPATIENT)
Dept: MAMMOGRAPHY | Age: 68
End: 2023-04-19
Payer: MEDICAID

## 2023-04-19 DIAGNOSIS — R92.8 OTHER ABNORMAL AND INCONCLUSIVE FINDINGS ON DIAGNOSTIC IMAGING OF BREAST: ICD-10-CM

## 2023-04-19 PROCEDURE — 77061 BREAST TOMOSYNTHESIS UNI: CPT

## 2023-04-19 PROCEDURE — 76642 ULTRASOUND BREAST LIMITED: CPT

## 2023-04-23 DIAGNOSIS — R92.8 OTHER ABNORMAL AND INCONCLUSIVE FINDINGS ON DIAGNOSTIC IMAGING OF BREAST: Primary | ICD-10-CM

## 2023-04-24 DIAGNOSIS — R92.8 OTHER ABNORMAL AND INCONCLUSIVE FINDINGS ON DIAGNOSTIC IMAGING OF BREAST: Primary | ICD-10-CM

## 2023-05-21 RX ORDER — FOLIC ACID 1 MG/1
1 TABLET ORAL DAILY
COMMUNITY

## 2023-05-21 RX ORDER — ATORVASTATIN CALCIUM 40 MG/1
40 TABLET, FILM COATED ORAL DAILY
COMMUNITY

## 2023-05-21 RX ORDER — GUAIFENESIN 600 MG/1
600 TABLET, EXTENDED RELEASE ORAL 2 TIMES DAILY
COMMUNITY
Start: 2022-07-01

## 2023-05-21 RX ORDER — EZETIMIBE 10 MG/1
TABLET ORAL
COMMUNITY

## 2023-05-21 RX ORDER — AMLODIPINE BESYLATE 10 MG/1
10 TABLET ORAL DAILY
COMMUNITY

## 2023-05-21 RX ORDER — ERGOCALCIFEROL 1.25 MG/1
50000 CAPSULE ORAL
COMMUNITY

## 2023-05-21 RX ORDER — ASPIRIN 81 MG/1
81 TABLET ORAL DAILY
COMMUNITY

## 2023-05-21 RX ORDER — ALBUTEROL SULFATE 90 UG/1
AEROSOL, METERED RESPIRATORY (INHALATION)
COMMUNITY

## 2023-05-21 RX ORDER — MONTELUKAST SODIUM 10 MG/1
10 TABLET ORAL DAILY
COMMUNITY

## 2023-06-01 NOTE — PROGRESS NOTES
Reason for Admission:  PNA                     RUR Score:   10%                  Plan for utilizing home health:  None @ this time/uses cane/home O2 via [employer]. PCP: First and Last name:  Adrian Cunha DO     Name of Practice:    Are you a current patient: Yes/No: Yes   Approximate date of last visit: Seen 3 mos ago. Can you participate in a virtual visit with your PCP: Yes, with assist. Pt does have cell phone. Current Advanced Directive/Advance Care Plan: No Order      Healthcare Decision Maker:              Primary Decision Maker: Christian Ford - Daughter - [phone]                  Transition of Care Plan:                    Pt asked CM to call daughter Olesya Sosa for interview questions. Per daughter D/C Plan is home with her & daughter to transport. Send Rxs to [facility] in [place] upon discharge. none

## 2023-08-02 ENCOUNTER — APPOINTMENT (OUTPATIENT)
Facility: HOSPITAL | Age: 68
End: 2023-08-02
Payer: MEDICAID

## 2023-08-02 ENCOUNTER — HOSPITAL ENCOUNTER (EMERGENCY)
Facility: HOSPITAL | Age: 68
Discharge: HOME | End: 2023-08-02
Attending: EMERGENCY MEDICINE
Payer: MEDICAID

## 2023-08-02 VITALS
RESPIRATION RATE: 20 BRPM | TEMPERATURE: 97.9 F | SYSTOLIC BLOOD PRESSURE: 140 MMHG | OXYGEN SATURATION: 92 % | HEIGHT: 60 IN | HEART RATE: 71 BPM | WEIGHT: 128 LBS | DIASTOLIC BLOOD PRESSURE: 89 MMHG | BODY MASS INDEX: 25.13 KG/M2

## 2023-08-02 DIAGNOSIS — R42 LIGHTHEADEDNESS: Primary | ICD-10-CM

## 2023-08-02 DIAGNOSIS — E86.0 DEHYDRATION: ICD-10-CM

## 2023-08-02 DIAGNOSIS — I95.1 ORTHOSTATIC HYPOTENSION: ICD-10-CM

## 2023-08-02 LAB
ALBUMIN SERPL-MCNC: 3.1 G/DL (ref 3.5–5)
ALBUMIN/GLOB SERPL: 0.6 (ref 1.1–2.2)
ALP SERPL-CCNC: 87 U/L (ref 45–117)
ALT SERPL-CCNC: 21 U/L (ref 12–78)
ANION GAP SERPL CALC-SCNC: 4 MMOL/L (ref 5–15)
AST SERPL W P-5'-P-CCNC: 38 U/L (ref 15–37)
BASE EXCESS BLDV CALC-SCNC: 3.9 MMOL/L
BASOPHILS # BLD: 0 K/UL (ref 0–0.1)
BASOPHILS NFR BLD: 0 % (ref 0–1)
BILIRUB DIRECT SERPL-MCNC: <0.1 MG/DL (ref 0–0.2)
BILIRUB SERPL-MCNC: 0.4 MG/DL (ref 0.2–1)
BUN SERPL-MCNC: 21 MG/DL (ref 6–20)
BUN/CREAT SERPL: 12 (ref 12–20)
CA-I BLD-MCNC: 8.8 MG/DL (ref 8.5–10.1)
CHLORIDE SERPL-SCNC: 103 MMOL/L (ref 97–108)
CO2 SERPL-SCNC: 29 MMOL/L (ref 21–32)
CREAT SERPL-MCNC: 1.71 MG/DL (ref 0.55–1.02)
DIFFERENTIAL METHOD BLD: ABNORMAL
EOSINOPHIL # BLD: 0.1 K/UL (ref 0–0.4)
EOSINOPHIL NFR BLD: 1 % (ref 0–7)
ERYTHROCYTE [DISTWIDTH] IN BLOOD BY AUTOMATED COUNT: 14.2 % (ref 11.5–14.5)
GLOBULIN SER CALC-MCNC: 5.2 G/DL (ref 2–4)
GLUCOSE SERPL-MCNC: 162 MG/DL (ref 65–100)
HCO3 BLDV-SCNC: 32.3 MMOL/L (ref 22–26)
HCT VFR BLD AUTO: 30 % (ref 35–47)
HGB BLD-MCNC: 9.5 G/DL (ref 11.5–16)
IMM GRANULOCYTES # BLD AUTO: 0 K/UL
IMM GRANULOCYTES NFR BLD AUTO: 0 %
LYMPHOCYTES # BLD: 0.6 K/UL (ref 0.8–3.5)
LYMPHOCYTES NFR BLD: 10 % (ref 12–49)
MCH RBC QN AUTO: 32.4 PG (ref 26–34)
MCHC RBC AUTO-ENTMCNC: 31.7 G/DL (ref 30–36.5)
MCV RBC AUTO: 102.4 FL (ref 80–99)
MONOCYTES # BLD: 0.2 K/UL (ref 0–1)
MONOCYTES NFR BLD: 4 % (ref 5–13)
NEUTS SEG # BLD: 5.3 K/UL (ref 1.8–8)
NEUTS SEG NFR BLD: 85 % (ref 32–75)
NRBC # BLD: 0 K/UL (ref 0–0.01)
NRBC BLD-RTO: 0 PER 100 WBC
PCO2 BLDV: 68.5 MMHG (ref 41–52)
PERFORMED BY:: ABNORMAL
PH BLDV: 7.28 (ref 7.23–7.44)
PLATELET # BLD AUTO: 291 K/UL (ref 150–400)
PMV BLD AUTO: 9.1 FL (ref 8.9–12.9)
PO2 BLDV: 29 MMHG (ref 25–40)
POTASSIUM SERPL-SCNC: 4.7 MMOL/L (ref 3.5–5.1)
POTASSIUM SERPL-SCNC: ABNORMAL MMOL/L (ref 3.5–5.1)
PROT SERPL-MCNC: 8.3 G/DL (ref 6.4–8.2)
RBC # BLD AUTO: 2.93 M/UL (ref 3.8–5.2)
RBC MORPH BLD: ABNORMAL
SAO2 % BLDV: 44.8 %
SODIUM SERPL-SCNC: 136 MMOL/L (ref 136–145)
SPECIMEN TYPE: ABNORMAL
TROPONIN I SERPL HS-MCNC: 21 NG/L (ref 0–51)
WBC # BLD AUTO: 6.2 K/UL (ref 3.6–11)

## 2023-08-02 PROCEDURE — 84484 ASSAY OF TROPONIN QUANT: CPT

## 2023-08-02 PROCEDURE — 2580000003 HC RX 258

## 2023-08-02 PROCEDURE — 96361 HYDRATE IV INFUSION ADD-ON: CPT

## 2023-08-02 PROCEDURE — 93005 ELECTROCARDIOGRAM TRACING: CPT | Performed by: EMERGENCY MEDICINE

## 2023-08-02 PROCEDURE — 36415 COLL VENOUS BLD VENIPUNCTURE: CPT

## 2023-08-02 PROCEDURE — 70450 CT HEAD/BRAIN W/O DYE: CPT

## 2023-08-02 PROCEDURE — 99284 EMERGENCY DEPT VISIT MOD MDM: CPT

## 2023-08-02 PROCEDURE — 85025 COMPLETE CBC W/AUTO DIFF WBC: CPT

## 2023-08-02 PROCEDURE — 84132 ASSAY OF SERUM POTASSIUM: CPT

## 2023-08-02 PROCEDURE — 96360 HYDRATION IV INFUSION INIT: CPT

## 2023-08-02 PROCEDURE — 80048 BASIC METABOLIC PNL TOTAL CA: CPT

## 2023-08-02 PROCEDURE — 80076 HEPATIC FUNCTION PANEL: CPT

## 2023-08-02 RX ORDER — 0.9 % SODIUM CHLORIDE 0.9 %
1000 INTRAVENOUS SOLUTION INTRAVENOUS ONCE
Status: COMPLETED | OUTPATIENT
Start: 2023-08-02 | End: 2023-08-02

## 2023-08-02 RX ADMIN — SODIUM CHLORIDE 1000 ML: 9 INJECTION, SOLUTION INTRAVENOUS at 17:19

## 2023-08-02 ASSESSMENT — PAIN - FUNCTIONAL ASSESSMENT
PAIN_FUNCTIONAL_ASSESSMENT: 0-10
PAIN_FUNCTIONAL_ASSESSMENT: NONE - DENIES PAIN

## 2023-08-02 ASSESSMENT — LIFESTYLE VARIABLES
HOW OFTEN DO YOU HAVE A DRINK CONTAINING ALCOHOL: NEVER
HOW MANY STANDARD DRINKS CONTAINING ALCOHOL DO YOU HAVE ON A TYPICAL DAY: PATIENT DOES NOT DRINK

## 2023-08-02 ASSESSMENT — PAIN SCALES - GENERAL: PAINLEVEL_OUTOF10: 4

## 2023-08-02 NOTE — ED PROVIDER NOTES
interpretive errors are inadvertently transcribed by the computer software. Please disregards these errors.  Please excuse any errors that have escaped final proofreading.)     Rosie Bryant  08/02/23 1951

## 2023-08-03 LAB
EKG ATRIAL RATE: 62 BPM
EKG DIAGNOSIS: NORMAL
EKG P AXIS: 48 DEGREES
EKG P-R INTERVAL: 202 MS
EKG Q-T INTERVAL: 402 MS
EKG QRS DURATION: 90 MS
EKG QTC CALCULATION (BAZETT): 408 MS
EKG R AXIS: 12 DEGREES
EKG T AXIS: 54 DEGREES
EKG VENTRICULAR RATE: 62 BPM

## 2023-11-13 ENCOUNTER — HOSPITAL ENCOUNTER (OUTPATIENT)
Facility: HOSPITAL | Age: 68
Discharge: HOME | End: 2023-11-16
Payer: MEDICAID

## 2023-11-13 VITALS — HEIGHT: 60 IN | WEIGHT: 128 LBS | BODY MASS INDEX: 25.13 KG/M2

## 2023-11-13 DIAGNOSIS — R92.8 OTHER ABNORMAL AND INCONCLUSIVE FINDINGS ON DIAGNOSTIC IMAGING OF BREAST: ICD-10-CM

## 2023-11-13 PROCEDURE — 77065 DX MAMMO INCL CAD UNI: CPT

## 2023-12-02 ENCOUNTER — HOSPITAL ENCOUNTER (EMERGENCY)
Facility: HOSPITAL | Age: 68
Discharge: HOME | End: 2023-12-02
Payer: MEDICAID

## 2023-12-02 ENCOUNTER — APPOINTMENT (OUTPATIENT)
Facility: HOSPITAL | Age: 68
End: 2023-12-02
Payer: MEDICAID

## 2023-12-02 VITALS
TEMPERATURE: 97.5 F | OXYGEN SATURATION: 98 % | HEART RATE: 58 BPM | SYSTOLIC BLOOD PRESSURE: 114 MMHG | DIASTOLIC BLOOD PRESSURE: 69 MMHG | BODY MASS INDEX: 26.66 KG/M2 | RESPIRATION RATE: 16 BRPM | WEIGHT: 127 LBS | HEIGHT: 58 IN

## 2023-12-02 DIAGNOSIS — S09.90XA CLOSED HEAD INJURY, INITIAL ENCOUNTER: Primary | ICD-10-CM

## 2023-12-02 PROCEDURE — 6370000000 HC RX 637 (ALT 250 FOR IP)

## 2023-12-02 PROCEDURE — 70450 CT HEAD/BRAIN W/O DYE: CPT

## 2023-12-02 PROCEDURE — 99284 EMERGENCY DEPT VISIT MOD MDM: CPT

## 2023-12-02 RX ORDER — ACETAMINOPHEN 500 MG
1000 TABLET ORAL
Status: COMPLETED | OUTPATIENT
Start: 2023-12-02 | End: 2023-12-02

## 2023-12-02 RX ADMIN — ACETAMINOPHEN 1000 MG: 500 TABLET ORAL at 21:00

## 2023-12-02 ASSESSMENT — PAIN SCALES - GENERAL
PAINLEVEL_OUTOF10: 2
PAINLEVEL_OUTOF10: 2

## 2023-12-02 ASSESSMENT — PAIN - FUNCTIONAL ASSESSMENT: PAIN_FUNCTIONAL_ASSESSMENT: 0-10

## 2023-12-03 NOTE — DISCHARGE INSTRUCTIONS
Thank you! Thank you for allowing me to care for you in the emergency department. It is my goal to provide you with excellent care. If you have not received excellent quality care, please ask to speak to the nurse manager. Please fill out the survey that will come to you by mail or email since we listen to your feedback! Below you will find a list of your tests from today's visit. Should you have any questions, please do not hesitate to call the emergency department. Labs  No results found for this or any previous visit (from the past 12 hour(s)). Radiologic Studies  CT HEAD WO CONTRAST   Final Result   No acute intracranial process. ------------------------------------------------------------------------------------------------------------  The exam and treatment you received in the Emergency Department were for an urgent problem and are not intended as complete care. It is important that you follow-up with a doctor, nurse practitioner, or physician assistant to:  (1) confirm your diagnosis,  (2) re-evaluation of changes in your illness and treatment, and  (3) for ongoing care. Please take your discharge instructions with you when you go to your follow-up appointment. If you have any problem arranging a follow-up appointment, contact the Emergency Department. If your symptoms become worse or you do not improve as expected and you are unable to reach your health care provider, please return to the Emergency Department. We are available 24 hours a day. If a prescription has been provided, please have it filled as soon as possible to prevent a delay in treatment. If you have any questions or reservations about taking the medication due to side effects or interactions with other medications, please call your primary care provider or contact the ER.

## 2023-12-03 NOTE — ED TRIAGE NOTES
States her legs have been hurting all day. Rgoer Eves about an hour ago. States she hit her forehead on a table. No signs of injury noted. Denies blood thinners.

## 2023-12-03 NOTE — ED PROVIDER NOTES
equal, round, and reactive to light. Musculoskeletal:         General: Signs of injury (Forehead injury) present. No swelling, tenderness or deformity. Cervical back: Normal range of motion and neck supple. No rigidity or tenderness. Skin:     General: Skin is warm and dry. Findings: No bruising or erythema. Neurological:      General: No focal deficit present. Mental Status: She is alert and oriented to person, place, and time. Sensory: No sensory deficit. Motor: No weakness. Coordination: Coordination normal.      Gait: Gait normal.   Psychiatric:         Mood and Affect: Mood normal.         Behavior: Behavior normal.         Thought Content: Thought content normal.         Judgment: Judgment normal.         SCREENINGS                  LAB, EKG AND DIAGNOSTIC RESULTS   Labs:  No results found for this or any previous visit (from the past 12 hour(s)). EKG: Not Applicable    Radiologic Studies:  Non-plain film images such as CT, Ultrasound and MRI are read by the radiologist. Plain radiographic images are visualized and preliminarily interpreted by the ED Physician with the following findings: Not Applicable. Interpretation per the Radiologist below, if available at the time of this note:  [address] [place] [address]    (Results Pending)        ED COURSE and DIFFERENTIAL DIAGNOSIS/MDM   CC/HPI Summary, DDx, ED Course, and Reassessment. Disposition Considerations (Tests not done, Shared Decision Making, Pt Expectation of Test or Treatment.):     Patient presents with head injury due to mechanical ground-level fall. No neurodeficits. Given her age, I think is reasonable to obtain a CT head to rule out ICH/fractures. Discussed plan of care with patient and her daughter, everyone verbalized understanding and is in agreement. See ED course for further documentation of pt encounter.      Clinical Management Tools:  Not Applicable    Records Reviewed (source and summary of

## 2023-12-11 ENCOUNTER — APPOINTMENT (OUTPATIENT)
Facility: HOSPITAL | Age: 68
End: 2023-12-11
Payer: MEDICAID

## 2023-12-11 ENCOUNTER — HOSPITAL ENCOUNTER (EMERGENCY)
Facility: HOSPITAL | Age: 68
Discharge: HOME | End: 2023-12-11
Attending: EMERGENCY MEDICINE
Payer: MEDICAID

## 2023-12-11 VITALS
RESPIRATION RATE: 22 BRPM | WEIGHT: 127 LBS | HEART RATE: 55 BPM | DIASTOLIC BLOOD PRESSURE: 71 MMHG | BODY MASS INDEX: 29.39 KG/M2 | SYSTOLIC BLOOD PRESSURE: 153 MMHG | OXYGEN SATURATION: 98 % | HEIGHT: 55 IN | TEMPERATURE: 98.6 F

## 2023-12-11 DIAGNOSIS — J06.9 ACUTE UPPER RESPIRATORY INFECTION: ICD-10-CM

## 2023-12-11 DIAGNOSIS — J44.1 COPD EXACERBATION (HCC): Primary | ICD-10-CM

## 2023-12-11 LAB
ALBUMIN SERPL-MCNC: 3.2 G/DL (ref 3.5–5)
ALBUMIN/GLOB SERPL: 0.8 (ref 1.1–2.2)
ALP SERPL-CCNC: 91 U/L (ref 45–117)
ALT SERPL-CCNC: 20 U/L (ref 12–78)
ANION GAP BLD CALC-SCNC: 10
ANION GAP SERPL CALC-SCNC: 7 MMOL/L (ref 5–15)
APPEARANCE UR: CLEAR
AST SERPL W P-5'-P-CCNC: ABNORMAL U/L (ref 15–37)
BACTERIA URNS QL MICRO: NEGATIVE /HPF
BASOPHILS # BLD: 0 K/UL (ref 0–0.1)
BASOPHILS NFR BLD: 0 % (ref 0–1)
BILIRUB SERPL-MCNC: 0.5 MG/DL (ref 0.2–1)
BILIRUB UR QL: NEGATIVE
BUN SERPL-MCNC: 21 MG/DL (ref 6–20)
BUN/CREAT SERPL: 15 (ref 12–20)
CA-I BLD-MCNC: 1.1 MMOL/L (ref 1.12–1.32)
CA-I BLD-MCNC: 8.7 MG/DL (ref 8.5–10.1)
CHLORIDE BLD-SCNC: 105 MMOL/L (ref 98–107)
CHLORIDE SERPL-SCNC: 105 MMOL/L (ref 97–108)
CO2 BLD-SCNC: 26 MMOL/L
CO2 SERPL-SCNC: 27 MMOL/L (ref 21–32)
COLOR UR: NORMAL
CREAT SERPL-MCNC: 1.44 MG/DL (ref 0.55–1.02)
CREAT UR-MCNC: 1.27 MG/DL (ref 0.6–1.3)
DEPRECATED S PYO AG THROAT QL EIA: NEGATIVE
DIFFERENTIAL METHOD BLD: ABNORMAL
EOSINOPHIL # BLD: 0.1 K/UL (ref 0–0.4)
EOSINOPHIL NFR BLD: 2 % (ref 0–7)
EPITH CASTS URNS QL MICRO: NORMAL /LPF
ERYTHROCYTE [DISTWIDTH] IN BLOOD BY AUTOMATED COUNT: 17.7 % (ref 11.5–14.5)
FLUAV AG NPH QL IA: NEGATIVE
FLUBV AG NOSE QL IA: NEGATIVE
GLOBULIN SER CALC-MCNC: 3.9 G/DL (ref 2–4)
GLUCOSE BLD STRIP.AUTO-MCNC: 101 MG/DL (ref 65–100)
GLUCOSE SERPL-MCNC: 164 MG/DL (ref 65–100)
GLUCOSE UR STRIP.AUTO-MCNC: NEGATIVE MG/DL
HCT VFR BLD AUTO: 30.2 % (ref 35–47)
HGB BLD-MCNC: 9.7 G/DL (ref 11.5–16)
HGB UR QL STRIP: NEGATIVE
IMM GRANULOCYTES # BLD AUTO: 0 K/UL (ref 0–0.04)
IMM GRANULOCYTES NFR BLD AUTO: 0 % (ref 0–0.5)
KETONES UR QL STRIP.AUTO: NEGATIVE MG/DL
LEUKOCYTE ESTERASE UR QL STRIP.AUTO: NEGATIVE
LYMPHOCYTES # BLD: 0.5 K/UL (ref 0.8–3.5)
LYMPHOCYTES NFR BLD: 11 % (ref 12–49)
MAGNESIUM SERPL-MCNC: NORMAL MG/DL (ref 1.6–2.4)
MCH RBC QN AUTO: 32.2 PG (ref 26–34)
MCHC RBC AUTO-ENTMCNC: 32.1 G/DL (ref 30–36.5)
MCV RBC AUTO: 100.3 FL (ref 80–99)
MONOCYTES # BLD: 0.5 K/UL (ref 0–1)
MONOCYTES NFR BLD: 10 % (ref 5–13)
NEUTS SEG # BLD: 3.5 K/UL (ref 1.8–8)
NEUTS SEG NFR BLD: 77 % (ref 32–75)
NITRITE UR QL STRIP.AUTO: NEGATIVE
NRBC # BLD: 0 K/UL (ref 0–0.01)
NRBC BLD-RTO: 0 PER 100 WBC
PH UR STRIP: 6 (ref 5–8)
PLATELET # BLD AUTO: 166 K/UL (ref 150–400)
PMV BLD AUTO: 9.5 FL (ref 8.9–12.9)
POTASSIUM BLD-SCNC: 4.1 MMOL/L (ref 3.5–5.5)
POTASSIUM SERPL-SCNC: ABNORMAL MMOL/L (ref 3.5–5.1)
PROT SERPL-MCNC: 7.1 G/DL (ref 6.4–8.2)
PROT UR STRIP-MCNC: NEGATIVE MG/DL
RBC # BLD AUTO: 3.01 M/UL (ref 3.8–5.2)
RBC #/AREA URNS HPF: NORMAL /HPF (ref 0–5)
SARS-COV-2 RDRP RESP QL NAA+PROBE: NOT DETECTED
SODIUM BLD-SCNC: 140 MMOL/L (ref 136–145)
SODIUM SERPL-SCNC: 139 MMOL/L (ref 136–145)
SP GR UR REFRACTOMETRY: 1 (ref 1–1.03)
TROPONIN I SERPL HS-MCNC: 15 NG/L (ref 0–51)
UROBILINOGEN UR QL STRIP.AUTO: 0.1 EU/DL (ref 0.1–1)
WBC # BLD AUTO: 4.6 K/UL (ref 3.6–11)
WBC URNS QL MICRO: NORMAL /HPF (ref 0–4)

## 2023-12-11 PROCEDURE — 81001 URINALYSIS AUTO W/SCOPE: CPT

## 2023-12-11 PROCEDURE — 87880 STREP A ASSAY W/OPTIC: CPT

## 2023-12-11 PROCEDURE — 84484 ASSAY OF TROPONIN QUANT: CPT

## 2023-12-11 PROCEDURE — 93005 ELECTROCARDIOGRAM TRACING: CPT | Performed by: EMERGENCY MEDICINE

## 2023-12-11 PROCEDURE — 94640 AIRWAY INHALATION TREATMENT: CPT

## 2023-12-11 PROCEDURE — 80053 COMPREHEN METABOLIC PANEL: CPT

## 2023-12-11 PROCEDURE — 6370000000 HC RX 637 (ALT 250 FOR IP): Performed by: EMERGENCY MEDICINE

## 2023-12-11 PROCEDURE — 87070 CULTURE OTHR SPECIMN AEROBIC: CPT

## 2023-12-11 PROCEDURE — 36415 COLL VENOUS BLD VENIPUNCTURE: CPT

## 2023-12-11 PROCEDURE — 96374 THER/PROPH/DIAG INJ IV PUSH: CPT

## 2023-12-11 PROCEDURE — 83735 ASSAY OF MAGNESIUM: CPT

## 2023-12-11 PROCEDURE — 99285 EMERGENCY DEPT VISIT HI MDM: CPT

## 2023-12-11 PROCEDURE — 87635 SARS-COV-2 COVID-19 AMP PRB: CPT

## 2023-12-11 PROCEDURE — 6360000002 HC RX W HCPCS: Performed by: EMERGENCY MEDICINE

## 2023-12-11 PROCEDURE — 2580000003 HC RX 258: Performed by: EMERGENCY MEDICINE

## 2023-12-11 PROCEDURE — 87804 INFLUENZA ASSAY W/OPTIC: CPT

## 2023-12-11 PROCEDURE — 71045 X-RAY EXAM CHEST 1 VIEW: CPT

## 2023-12-11 PROCEDURE — 85025 COMPLETE CBC W/AUTO DIFF WBC: CPT

## 2023-12-11 PROCEDURE — 80047 BASIC METABLC PNL IONIZED CA: CPT

## 2023-12-11 RX ORDER — FLUTICASONE PROPIONATE 50 MCG
2 SPRAY, SUSPENSION (ML) NASAL DAILY
Qty: 16 G | Refills: 0 | Status: SHIPPED | OUTPATIENT
Start: 2023-12-11

## 2023-12-11 RX ORDER — IPRATROPIUM BROMIDE AND ALBUTEROL SULFATE 2.5; .5 MG/3ML; MG/3ML
1 SOLUTION RESPIRATORY (INHALATION) EVERY 4 HOURS PRN
Status: DISCONTINUED | OUTPATIENT
Start: 2023-12-11 | End: 2023-12-11 | Stop reason: HOSPADM

## 2023-12-11 RX ORDER — ALBUTEROL SULFATE 90 UG/1
2 AEROSOL, METERED RESPIRATORY (INHALATION) EVERY 6 HOURS PRN
Status: DISCONTINUED | OUTPATIENT
Start: 2023-12-11 | End: 2023-12-11 | Stop reason: HOSPADM

## 2023-12-11 RX ORDER — PREDNISONE 20 MG/1
60 TABLET ORAL DAILY
Qty: 30 TABLET | Refills: 0 | Status: SHIPPED | OUTPATIENT
Start: 2023-12-11 | End: 2023-12-21

## 2023-12-11 RX ADMIN — IPRATROPIUM BROMIDE AND ALBUTEROL SULFATE 1 DOSE: 2.5; .5 SOLUTION RESPIRATORY (INHALATION) at 09:43

## 2023-12-11 RX ADMIN — METHYLPREDNISOLONE SODIUM SUCCINATE 125 MG: 125 INJECTION INTRAMUSCULAR; INTRAVENOUS at 08:56

## 2023-12-11 ASSESSMENT — PAIN - FUNCTIONAL ASSESSMENT: PAIN_FUNCTIONAL_ASSESSMENT: 0-10

## 2023-12-11 ASSESSMENT — PAIN SCALES - GENERAL: PAINLEVEL_OUTOF10: 6

## 2023-12-11 NOTE — DISCHARGE INSTRUCTIONS
Thank you! Thank you for allowing me to care for you in the emergency department. It is my goal to provide you with excellent care. If you have not received excellent quality care, please ask to speak to the nurse manager. Please fill out the survey that will come to you by mail or email since we listen to your feedback! Below you will find a list of your tests from today's visit. Should you have any questions, please do not hesitate to call the emergency department.     Labs  Recent Results (from the past 12 hour(s))   CBC with Auto Differential    Collection Time: 12/11/23  8:08 AM   Result Value Ref Range    WBC 4.6 3.6 - 11.0 K/uL    RBC 3.01 (L) 3.80 - 5.20 M/uL    Hemoglobin 9.7 (L) 11.5 - 16.0 g/dL    Hematocrit 30.2 (L) 35.0 - 47.0 %    .3 (H) 80.0 - 99.0 FL    MCH 32.2 26.0 - 34.0 PG    MCHC 32.1 30.0 - 36.5 g/dL    RDW 17.7 (H) 11.5 - 14.5 %    Platelets 756 258 - 204 K/uL    MPV 9.5 8.9 - 12.9 FL    Nucleated RBCs 0.0 0.0  WBC    nRBC 0.00 0.00 - 0.01 K/uL    Neutrophils % 77 (H) 32 - 75 %    Lymphocytes % 11 (L) 12 - 49 %    Monocytes % 10 5 - 13 %    Eosinophils % 2 0 - 7 %    Basophils % 0 0 - 1 %    Immature Granulocytes 0 0 - 0.5 %    Neutrophils Absolute 3.5 1.8 - 8.0 K/UL    Lymphocytes Absolute 0.5 (L) 0.8 - 3.5 K/UL    Monocytes Absolute 0.5 0.0 - 1.0 K/UL    Eosinophils Absolute 0.1 0.0 - 0.4 K/UL    Basophils Absolute 0.0 0.0 - 0.1 K/UL    Absolute Immature Granulocyte 0.0 0.00 - 0.04 K/UL    Differential Type AUTOMATED     CMP    Collection Time: 12/11/23  8:08 AM   Result Value Ref Range    Sodium 139 136 - 145 mmol/L    Potassium Hemolyzed, Recollection Recommended 3.5 - 5.1 mmol/L    Chloride 105 97 - 108 mmol/L    CO2 27 21 - 32 mmol/L    Anion Gap 7 5 - 15 mmol/L    Glucose 164 (H) 65 - 100 mg/dL    BUN 21 (H) 6 - 20 mg/dL    Creatinine 1.44 (H) 0.55 - 1.02 mg/dL    Bun/Cre Ratio 15 12 - 20      Est, Glom Filt Rate 40 (L) >60 ml/min/1.73m2    Calcium 8.7 8.5 - 10.1

## 2023-12-12 LAB
BACTERIA SPEC CULT: NORMAL
EKG ATRIAL RATE: 62 BPM
EKG DIAGNOSIS: NORMAL
EKG P AXIS: 74 DEGREES
EKG P-R INTERVAL: 236 MS
EKG Q-T INTERVAL: 412 MS
EKG QRS DURATION: 92 MS
EKG QTC CALCULATION (BAZETT): 414 MS
EKG R AXIS: 71 DEGREES
EKG T AXIS: 80 DEGREES
EKG VENTRICULAR RATE: 61 BPM
Lab: NORMAL

## 2024-02-28 ENCOUNTER — TRANSCRIBE ORDERS (OUTPATIENT)
Facility: HOSPITAL | Age: 69
End: 2024-02-28

## 2024-02-28 DIAGNOSIS — R92.8 OTHER ABNORMAL AND INCONCLUSIVE FINDINGS ON DIAGNOSTIC IMAGING OF BREAST: Primary | ICD-10-CM

## 2024-05-15 ENCOUNTER — HOSPITAL ENCOUNTER (OUTPATIENT)
Facility: HOSPITAL | Age: 69
Discharge: HOME | End: 2024-05-18
Payer: MEDICAID

## 2024-05-15 DIAGNOSIS — R92.8 OTHER ABNORMAL AND INCONCLUSIVE FINDINGS ON DIAGNOSTIC IMAGING OF BREAST: ICD-10-CM

## 2024-05-15 PROCEDURE — G0279 TOMOSYNTHESIS, MAMMO: HCPCS

## 2024-12-13 ENCOUNTER — HOSPITAL ENCOUNTER (EMERGENCY)
Facility: HOSPITAL | Age: 69
Discharge: HOME | End: 2024-12-13
Attending: EMERGENCY MEDICINE
Payer: MEDICAID

## 2024-12-13 ENCOUNTER — APPOINTMENT (OUTPATIENT)
Facility: HOSPITAL | Age: 69
End: 2024-12-13
Payer: MEDICAID

## 2024-12-13 VITALS
OXYGEN SATURATION: 99 % | BODY MASS INDEX: 28.05 KG/M2 | RESPIRATION RATE: 15 BRPM | DIASTOLIC BLOOD PRESSURE: 77 MMHG | TEMPERATURE: 98.9 F | HEART RATE: 60 BPM | SYSTOLIC BLOOD PRESSURE: 150 MMHG | WEIGHT: 130 LBS | HEIGHT: 57 IN

## 2024-12-13 DIAGNOSIS — R53.1 GENERALIZED WEAKNESS: Primary | ICD-10-CM

## 2024-12-13 LAB
ALBUMIN SERPL-MCNC: 3.2 G/DL (ref 3.5–5)
ALBUMIN/GLOB SERPL: 0.7 (ref 1.1–2.2)
ALP SERPL-CCNC: 82 U/L (ref 45–117)
ALT SERPL-CCNC: 22 U/L (ref 12–78)
ANION GAP SERPL CALC-SCNC: 4 MMOL/L (ref 2–12)
AST SERPL W P-5'-P-CCNC: ABNORMAL U/L (ref 15–37)
BASOPHILS # BLD: 0 K/UL (ref 0–0.1)
BASOPHILS NFR BLD: 1 % (ref 0–1)
BILIRUB SERPL-MCNC: 0.5 MG/DL (ref 0.2–1)
BUN SERPL-MCNC: 28 MG/DL (ref 6–20)
BUN/CREAT SERPL: 18 (ref 12–20)
CA-I BLD-MCNC: 8.7 MG/DL (ref 8.5–10.1)
CHLORIDE SERPL-SCNC: 110 MMOL/L (ref 97–108)
CO2 SERPL-SCNC: 26 MMOL/L (ref 21–32)
CREAT SERPL-MCNC: 1.54 MG/DL (ref 0.55–1.02)
DIFFERENTIAL METHOD BLD: ABNORMAL
EOSINOPHIL # BLD: 0.2 K/UL (ref 0–0.4)
EOSINOPHIL NFR BLD: 6 % (ref 0–7)
ERYTHROCYTE [DISTWIDTH] IN BLOOD BY AUTOMATED COUNT: 14.4 % (ref 11.5–14.5)
FLUAV RNA SPEC QL NAA+PROBE: NOT DETECTED
FLUBV RNA SPEC QL NAA+PROBE: NOT DETECTED
GLOBULIN SER CALC-MCNC: 4.4 G/DL (ref 2–4)
GLUCOSE SERPL-MCNC: 91 MG/DL (ref 65–100)
HCT VFR BLD AUTO: 27.6 % (ref 35–47)
HGB BLD-MCNC: 9 G/DL (ref 11.5–16)
IMM GRANULOCYTES # BLD AUTO: 0 K/UL (ref 0–0.04)
IMM GRANULOCYTES NFR BLD AUTO: 0 % (ref 0–0.5)
LYMPHOCYTES # BLD: 0.7 K/UL (ref 0.8–3.5)
LYMPHOCYTES NFR BLD: 20 % (ref 12–49)
MAGNESIUM SERPL-MCNC: NORMAL MG/DL (ref 1.6–2.4)
MCH RBC QN AUTO: 35 PG (ref 26–34)
MCHC RBC AUTO-ENTMCNC: 32.6 G/DL (ref 30–36.5)
MCV RBC AUTO: 107.4 FL (ref 80–99)
MONOCYTES # BLD: 0.3 K/UL (ref 0–1)
MONOCYTES NFR BLD: 10 % (ref 5–13)
NEUTS SEG # BLD: 2.2 K/UL (ref 1.8–8)
NEUTS SEG NFR BLD: 63 % (ref 32–75)
NRBC # BLD: 0 K/UL (ref 0–0.01)
NRBC BLD-RTO: 0 PER 100 WBC
PLATELET # BLD AUTO: 203 K/UL (ref 150–400)
PMV BLD AUTO: 9.8 FL (ref 8.9–12.9)
POTASSIUM SERPL-SCNC: ABNORMAL MMOL/L (ref 3.5–5.1)
PROT SERPL-MCNC: 7.6 G/DL (ref 6.4–8.2)
RBC # BLD AUTO: 2.57 M/UL (ref 3.8–5.2)
RBC MORPH BLD: ABNORMAL
SARS-COV-2 RNA RESP QL NAA+PROBE: NOT DETECTED
SODIUM SERPL-SCNC: 140 MMOL/L (ref 136–145)
TROPONIN I SERPL HS-MCNC: 22 NG/L (ref 0–51)
WBC # BLD AUTO: 3.4 K/UL (ref 3.6–11)

## 2024-12-13 PROCEDURE — 83735 ASSAY OF MAGNESIUM: CPT

## 2024-12-13 PROCEDURE — 93005 ELECTROCARDIOGRAM TRACING: CPT | Performed by: EMERGENCY MEDICINE

## 2024-12-13 PROCEDURE — 99285 EMERGENCY DEPT VISIT HI MDM: CPT

## 2024-12-13 PROCEDURE — 84484 ASSAY OF TROPONIN QUANT: CPT

## 2024-12-13 PROCEDURE — 87636 SARSCOV2 & INF A&B AMP PRB: CPT

## 2024-12-13 PROCEDURE — 36415 COLL VENOUS BLD VENIPUNCTURE: CPT

## 2024-12-13 PROCEDURE — 85025 COMPLETE CBC W/AUTO DIFF WBC: CPT

## 2024-12-13 PROCEDURE — 71045 X-RAY EXAM CHEST 1 VIEW: CPT

## 2024-12-13 PROCEDURE — 80053 COMPREHEN METABOLIC PANEL: CPT

## 2024-12-13 ASSESSMENT — PAIN SCALES - GENERAL: PAINLEVEL_OUTOF10: 0

## 2024-12-13 ASSESSMENT — PAIN - FUNCTIONAL ASSESSMENT: PAIN_FUNCTIONAL_ASSESSMENT: 0-10

## 2024-12-13 NOTE — ED TRIAGE NOTES
Pt arrives with family for nosebleed, cp, sob and htn. Pt's daughter states pt frequently has sob due to copd and asthma, but today seemed different, pt was upset and asking to come to hospital which is unusual. Pt's only complaint during triage is \"not feeling good\"

## 2024-12-13 NOTE — DISCHARGE INSTRUCTIONS
Thank you for choosing our Emergency Department for your care.  It is our privilege to care for you in your time of need.  In the next several days, you may receive a survey via email or mailed to your home about your experience with our team.  We would greatly appreciate you taking a few minutes to complete the survey, as we use this information to learn what we have done well and what we could be doing better. Thank you for trusting us with your care!    Below you will find a list of your tests from today's visit.   Labs and Radiology Studies  Recent Results (from the past 12 hour(s))   CBC with Auto Differential    Collection Time: 12/13/24  4:04 PM   Result Value Ref Range    WBC 3.4 (L) 3.6 - 11.0 K/uL    RBC 2.57 (L) 3.80 - 5.20 M/uL    Hemoglobin 9.0 (L) 11.5 - 16.0 g/dL    Hematocrit 27.6 (L) 35.0 - 47.0 %    .4 (H) 80.0 - 99.0 FL    MCH 35.0 (H) 26.0 - 34.0 PG    MCHC 32.6 30.0 - 36.5 g/dL    RDW 14.4 11.5 - 14.5 %    Platelets 203 150 - 400 K/uL    MPV 9.8 8.9 - 12.9 FL    Nucleated RBCs 0.0 0.0  WBC    nRBC 0.00 0.00 - 0.01 K/uL    Neutrophils % 63 32 - 75 %    Lymphocytes % 20 12 - 49 %    Monocytes % 10 5 - 13 %    Eosinophils % 6 0 - 7 %    Basophils % 1 0 - 1 %    Immature Granulocytes % 0 0 - 0.5 %    Neutrophils Absolute 2.2 1.8 - 8.0 K/UL    Lymphocytes Absolute 0.7 (L) 0.8 - 3.5 K/UL    Monocytes Absolute 0.3 0.0 - 1.0 K/UL    Eosinophils Absolute 0.2 0.0 - 0.4 K/UL    Basophils Absolute 0.0 0.0 - 0.1 K/UL    Immature Granulocytes Absolute 0.0 0.00 - 0.04 K/UL    Differential Type Smear Scanned      RBC Comment Macrocytosis  2+       Comprehensive Metabolic Panel    Collection Time: 12/13/24  4:04 PM   Result Value Ref Range    Sodium 140 136 - 145 mmol/L    Potassium Hemolyzed, Recollection Recommended 3.5 - 5.1 mmol/L    Chloride 110 (H) 97 - 108 mmol/L    CO2 26 21 - 32 mmol/L    Anion Gap 4 2 - 12 mmol/L    Glucose 91 65 - 100 mg/dL    BUN 28 (H) 6 - 20 mg/dL    Creatinine

## 2024-12-13 NOTE — ED PROVIDER NOTES
[facility] EMERGENCY DEPT  EMERGENCY DEPARTMENT HISTORY AND PHYSICAL EXAM      Date: 12/13/2024  Patient Name: Lizbeth Hogue  MRN: [id number]  Birthdate 1955  Date of evaluation: 12/13/2024  Provider: Yogesh Salazar MD   Note Started: 4:21 PM EST 12/13/24    HISTORY OF PRESENT ILLNESS     Chief Complaint   Patient presents with    Fatigue       History Provided By: Patient    HPI: Lizbeth Hogue is a 69 y.o. female presents for evaluation of shortness of breath and hypertension.  Patient's daughter states the patient is frequently short of breath due to COPD and asthma.  Patient's daughter reported concern because patient was asking to come to the hospital.  Patient right now just complaining of generalized weakness but no specific somatic complaint.  At this time, denies any chest pain or shortness of breath.    PAST MEDICAL HISTORY   Past Medical History:  Past Medical History:   Diagnosis Date    Arthritis     Asthma     Chronic kidney disease     Chronic obstructive pulmonary disease (HCC)     Diabetes (HCC)     Hypertension        Past Surgical History:  Past Surgical History:   Procedure Laterality Date    OTHER SURGICAL HISTORY      Eye surgery       Family History:  Family History   Problem Relation Age of Onset    Diabetes Mother     Hypertension Father     Diabetes Father     Hypertension Mother        Social History:  Social History     Tobacco Use    Smoking status: Never    Smokeless tobacco: Never   Substance Use Topics    Alcohol use: Never    Drug use: Never       Allergies:  Allergies   Allergen Reactions    Lisinopril Swelling     Lip swelling    Pineapple Swelling     Lip swelling       PCP: Jasmin Shankar DO    Current Meds:   No current facility-administered medications for this encounter.     Current Outpatient Medications   Medication Sig Dispense Refill    fluticasone (FLONASE) 50 MCG/ACT nasal spray 2 sprays by Each Nostril route daily 16 g 0    albuterol sulfate HFA     Basophils Absolute 0.0 0.0 - 0.1 K/UL    Immature Granulocytes Absolute 0.0 0.00 - 0.04 K/UL    Differential Type Smear Scanned      RBC Comment Macrocytosis  2+       Comprehensive Metabolic Panel    Collection Time: 12/13/24  4:04 PM   Result Value Ref Range    Sodium 140 136 - 145 mmol/L    Potassium Hemolyzed, Recollection Recommended 3.5 - 5.1 mmol/L    Chloride 110 (H) 97 - 108 mmol/L    CO2 26 21 - 32 mmol/L    Anion Gap 4 2 - 12 mmol/L    Glucose 91 65 - 100 mg/dL    BUN 28 (H) 6 - 20 mg/dL    Creatinine 1.54 (H) 0.55 - 1.02 mg/dL    BUN/Creatinine Ratio 18 12 - 20      Est, Glom Filt Rate 36 (L) >60 ml/min/1.73m2    Calcium 8.7 8.5 - 10.1 mg/dL    Total Bilirubin 0.5 0.2 - 1.0 mg/dL    AST Hemolyzed, Recollection Recommended 15 - 37 U/L    ALT 22 12 - 78 U/L    Alk Phosphatase 82 45 - 117 U/L    Total Protein 7.6 6.4 - 8.2 g/dL    Albumin 3.2 (L) 3.5 - 5.0 g/dL    Globulin 4.4 (H) 2.0 - 4.0 g/dL    Albumin/Globulin Ratio 0.7 (L) 1.1 - 2.2     Magnesium    Collection Time: 12/13/24  4:04 PM   Result Value Ref Range    Magnesium Hemolyzed, Recollection Recommended 1.6 - 2.4 mg/dL   Troponin “IF” patient is greater than 40 years of age or has a history of cardiac disease.    Collection Time: 12/13/24  4:04 PM   Result Value Ref Range    Troponin, High Sensitivity 22 0 - 51 ng/L       EKG:.EKG interpreted by me. Shows Normal Sinus Rhythm with a HR of 60 beats minute.  Nonspecific ST abnormality.  Abnormal EKG.  No STEMI.    Radiologic Studies:  Non-plain film images such as CT, Ultrasound and MRI are read by the radiologist. Plain radiographic images are visualized and preliminarily interpreted by the ED Provider with the following findings: Not Applicable.    Interpretation per the Radiologist below, if available at the time of this note:  XR CHEST 1 VIEW   Final Result   No evidence of acute cardiopulmonary process.         Electronically signed by Addison Parkinson MD           ED COURSE and DIFFERENTIAL

## 2024-12-14 LAB
EKG ATRIAL RATE: 60 BPM
EKG DIAGNOSIS: NORMAL
EKG P AXIS: 55 DEGREES
EKG P-R INTERVAL: 200 MS
EKG Q-T INTERVAL: 420 MS
EKG QRS DURATION: 86 MS
EKG QTC CALCULATION (BAZETT): 420 MS
EKG R AXIS: 8 DEGREES
EKG T AXIS: 61 DEGREES
EKG VENTRICULAR RATE: 60 BPM

## 2025-03-21 ENCOUNTER — TRANSCRIBE ORDERS (OUTPATIENT)
Facility: HOSPITAL | Age: 70
End: 2025-03-21

## 2025-03-21 DIAGNOSIS — D50.9 IRON DEFICIENCY ANEMIA, UNSPECIFIED IRON DEFICIENCY ANEMIA TYPE: Primary | ICD-10-CM

## 2025-03-21 DIAGNOSIS — E85.9 MYELOMA ASSOCIATED AMYLOIDOSIS: ICD-10-CM

## 2025-03-21 DIAGNOSIS — D47.2 MONOCLONAL PARAPROTEINEMIA: ICD-10-CM

## 2025-03-21 DIAGNOSIS — D63.1 ANEMIA OF CHRONIC RENAL FAILURE, UNSPECIFIED CKD STAGE: ICD-10-CM

## 2025-03-21 DIAGNOSIS — C90.00 MYELOMA ASSOCIATED AMYLOIDOSIS: ICD-10-CM

## 2025-03-21 DIAGNOSIS — N18.9 ANEMIA OF CHRONIC RENAL FAILURE, UNSPECIFIED CKD STAGE: ICD-10-CM

## 2025-03-28 ENCOUNTER — HOSPITAL ENCOUNTER (OUTPATIENT)
Facility: HOSPITAL | Age: 70
Discharge: HOME | End: 2025-03-31
Attending: INTERNAL MEDICINE
Payer: MEDICAID

## 2025-03-28 VITALS
OXYGEN SATURATION: 95 % | WEIGHT: 125 LBS | RESPIRATION RATE: 20 BRPM | DIASTOLIC BLOOD PRESSURE: 57 MMHG | BODY MASS INDEX: 26.97 KG/M2 | HEIGHT: 57 IN | HEART RATE: 58 BPM | SYSTOLIC BLOOD PRESSURE: 105 MMHG | TEMPERATURE: 97.7 F

## 2025-03-28 DIAGNOSIS — D47.2 MONOCLONAL PARAPROTEINEMIA: ICD-10-CM

## 2025-03-28 DIAGNOSIS — N18.6 ANEMIA IN END-STAGE RENAL DISEASE: ICD-10-CM

## 2025-03-28 DIAGNOSIS — D63.1 ANEMIA IN END-STAGE RENAL DISEASE: ICD-10-CM

## 2025-03-28 LAB
ANION GAP SERPL CALC-SCNC: 4 MMOL/L (ref 2–12)
BASOPHILS # BLD: 0.04 K/UL (ref 0–0.1)
BASOPHILS NFR BLD: 0.8 % (ref 0–1)
BUN SERPL-MCNC: 24 MG/DL (ref 6–20)
BUN/CREAT SERPL: 9 (ref 12–20)
CA-I BLD-MCNC: 8.9 MG/DL (ref 8.5–10.1)
CHLORIDE SERPL-SCNC: 100 MMOL/L (ref 97–108)
CO2 SERPL-SCNC: 28 MMOL/L (ref 21–32)
CREAT SERPL-MCNC: 2.74 MG/DL (ref 0.55–1.02)
DIFFERENTIAL METHOD BLD: ABNORMAL
EOSINOPHIL # BLD: 0.35 K/UL (ref 0–0.4)
EOSINOPHIL NFR BLD: 7.2 % (ref 0–7)
ERYTHROCYTE [DISTWIDTH] IN BLOOD BY AUTOMATED COUNT: 17.1 % (ref 11.5–14.5)
GLUCOSE SERPL-MCNC: 86 MG/DL (ref 65–100)
HCT VFR BLD AUTO: 25.1 % (ref 35–47)
HGB BLD-MCNC: 8.1 G/DL (ref 11.5–16)
IMM GRANULOCYTES # BLD AUTO: 0.02 K/UL (ref 0–0.04)
IMM GRANULOCYTES NFR BLD AUTO: 0.4 % (ref 0–0.5)
INR PPP: 1 (ref 0.9–1.1)
LYMPHOCYTES # BLD: 1.12 K/UL (ref 0.8–3.5)
LYMPHOCYTES NFR BLD: 23 % (ref 12–49)
MCH RBC QN AUTO: 33.9 PG (ref 26–34)
MCHC RBC AUTO-ENTMCNC: 32.3 G/DL (ref 30–36.5)
MCV RBC AUTO: 105 FL (ref 80–99)
MONOCYTES # BLD: 0.71 K/UL (ref 0–1)
MONOCYTES NFR BLD: 14.6 % (ref 5–13)
NEUTS SEG # BLD: 2.62 K/UL (ref 1.8–8)
NEUTS SEG NFR BLD: 54 % (ref 32–75)
NRBC # BLD: 0 K/UL (ref 0–0.01)
NRBC BLD-RTO: 0 PER 100 WBC
PLATELET # BLD AUTO: 246 K/UL (ref 150–400)
PMV BLD AUTO: 9.2 FL (ref 8.9–12.9)
POTASSIUM SERPL-SCNC: 5 MMOL/L (ref 3.5–5.1)
PROTHROMBIN TIME: 13.2 SEC (ref 11.9–14.6)
RBC # BLD AUTO: 2.39 M/UL (ref 3.8–5.2)
SODIUM SERPL-SCNC: 132 MMOL/L (ref 136–145)
WBC # BLD AUTO: 4.9 K/UL (ref 3.6–11)

## 2025-03-28 PROCEDURE — C1830 POWER BONE MARROW BX NEEDLE: HCPCS

## 2025-03-28 PROCEDURE — 88305 TISSUE EXAM BY PATHOLOGIST: CPT

## 2025-03-28 PROCEDURE — 7100000010 HC PHASE II RECOVERY - FIRST 15 MIN

## 2025-03-28 PROCEDURE — 88365 INSITU HYBRIDIZATION (FISH): CPT

## 2025-03-28 PROCEDURE — 85025 COMPLETE CBC W/AUTO DIFF WBC: CPT

## 2025-03-28 PROCEDURE — 85610 PROTHROMBIN TIME: CPT

## 2025-03-28 PROCEDURE — 88311 DECALCIFY TISSUE: CPT

## 2025-03-28 PROCEDURE — 88360 TUMOR IMMUNOHISTOCHEM/MANUAL: CPT

## 2025-03-28 PROCEDURE — 7100000011 HC PHASE II RECOVERY - ADDTL 15 MIN

## 2025-03-28 PROCEDURE — 36415 COLL VENOUS BLD VENIPUNCTURE: CPT

## 2025-03-28 PROCEDURE — 99156 MOD SED OTH PHYS/QHP 5/>YRS: CPT

## 2025-03-28 PROCEDURE — 88313 SPECIAL STAINS GROUP 2: CPT

## 2025-03-28 PROCEDURE — 80048 BASIC METABOLIC PNL TOTAL CA: CPT

## 2025-03-28 PROCEDURE — 6360000002 HC RX W HCPCS: Performed by: RADIOLOGY

## 2025-03-28 PROCEDURE — 88364 INSITU HYBRIDIZATION (FISH): CPT

## 2025-03-28 RX ORDER — FENTANYL CITRATE 50 UG/ML
INJECTION INTRAMUSCULAR; INTRAVENOUS PRN
Status: COMPLETED | OUTPATIENT
Start: 2025-03-28 | End: 2025-03-28

## 2025-03-28 RX ORDER — MIDAZOLAM HYDROCHLORIDE 5 MG/ML
INJECTION, SOLUTION INTRAMUSCULAR; INTRAVENOUS PRN
Status: COMPLETED | OUTPATIENT
Start: 2025-03-28 | End: 2025-03-28

## 2025-03-28 RX ADMIN — MIDAZOLAM HYDROCHLORIDE 1 MG: 5 INJECTION, SOLUTION INTRAMUSCULAR; INTRAVENOUS at 11:38

## 2025-03-28 RX ADMIN — MIDAZOLAM HYDROCHLORIDE 1 MG: 5 INJECTION, SOLUTION INTRAMUSCULAR; INTRAVENOUS at 11:47

## 2025-03-28 RX ADMIN — FENTANYL CITRATE 25 MCG: 50 INJECTION INTRAMUSCULAR; INTRAVENOUS at 11:38

## 2025-03-28 RX ADMIN — FENTANYL CITRATE 25 MCG: 50 INJECTION INTRAMUSCULAR; INTRAVENOUS at 11:45

## 2025-03-28 ASSESSMENT — PAIN - FUNCTIONAL ASSESSMENT
PAIN_FUNCTIONAL_ASSESSMENT: NONE - DENIES PAIN
PAIN_FUNCTIONAL_ASSESSMENT: NONE - DENIES PAIN

## 2025-03-28 NOTE — PROCEDURES
PROCEDURE NOTE  Date: 3/28/2025   Name: Lizbeth Hogue  YOB: 1955    Procedures    Interventional Radiology Brief Postoperative Note    Procedure Date:  3/28/2025    Procedure:  CT guided bone marrow biopsy    :  SHERIDAN Santana    Attending:  Kenia Ardon MD    Preoperative Diagnosis:  Monoclonal paraproteinemia    Postoperative Diagnosis:  Monoclonal paraproteinemia    Complications:  none    Estimated Blood Loss:  less than 6cc    Procedure Findings:  Technically successful CT guided bone marrow biopsy    Specimens:  5cc aspirate; 2cm core    Condition of Patient:  The patient tolerated the procedure without difficulty and remained in stable condition throughout.

## 2025-03-28 NOTE — H&P
INTERVENTIONAL RADIOLOGY  Preoperative History and Physical      Patient:  Lizbeth Hogue  :  1955  Age:  69 y.o.  MRN:  [id number]  Today's Date:  3/28/2025      CC / HPI   Lizbeth Hogue is a 69 y.o. female with a history of Monoclonal paraproteinemia   who presents for CT guided bone marrow biopsy.    PAST MEDICAL HISTORY  Past Medical History:   Diagnosis Date    Arthritis     Asthma     Chronic kidney disease     Chronic obstructive pulmonary disease (HCC)     Diabetes (HCC)     Hypertension     Sleep apnea     no cpap       PAST SURGICAL HISTORY  Past Surgical History:   Procedure Laterality Date    OTHER SURGICAL HISTORY Bilateral     Eye surgery    WRIST SURGERY Left     pins and screws       SOCIAL HISTORY  Social History     Socioeconomic History    Marital status: Single     Spouse name: Not on file    Number of children: Not on file    Years of education: Not on file    Highest education level: Not on file   Occupational History    Not on file   Tobacco Use    Smoking status: Never    Smokeless tobacco: Never   Substance and Sexual Activity    Alcohol use: Never    Drug use: Never    Sexual activity: Not on file   Other Topics Concern    Not on file   Social History Narrative    Not on file     Social Drivers of Health     Financial Resource Strain: Not on file   Food Insecurity: Not on file   Transportation Needs: Not on file   Physical Activity: Not on file   Stress: Not on file   Social Connections: Not on file   Intimate Partner Violence: Not on file   Housing Stability: Not on file       FAMILY HISTORY  Family History   Problem Relation Age of Onset    Diabetes Mother     Hypertension Father     Diabetes Father     Hypertension Mother        CURRENT MEDICATIONS  Current Outpatient Medications   Medication Sig Dispense Refill    albuterol sulfate HFA (PROVENTIL;VENTOLIN;PROAIR) 108 (90 Base) MCG/ACT inhaler Inhale into the lungs      amLODIPine (NORVASC) 10 MG tablet Take 1 tablet by mouth

## 2025-03-28 NOTE — PERIOP NOTE
Patient alert and oriented x4, VS stable, no complaints of pain at this time. Daughter at bedside. Bed in low position, call bell within reach.    Patient states okay to review and give discharge instructions to vijay Herrera.

## 2025-04-30 ENCOUNTER — TRANSCRIBE ORDERS (OUTPATIENT)
Facility: HOSPITAL | Age: 70
End: 2025-04-30

## 2025-04-30 DIAGNOSIS — Z12.31 VISIT FOR SCREENING MAMMOGRAM: Primary | ICD-10-CM

## 2025-05-02 ENCOUNTER — HOSPITAL ENCOUNTER (OUTPATIENT)
Facility: HOSPITAL | Age: 70
Setting detail: INFUSION SERIES
Discharge: HOME | End: 2025-05-02
Payer: MEDICAID

## 2025-05-02 VITALS
SYSTOLIC BLOOD PRESSURE: 133 MMHG | DIASTOLIC BLOOD PRESSURE: 69 MMHG | HEART RATE: 77 BPM | RESPIRATION RATE: 18 BRPM | BODY MASS INDEX: 29.85 KG/M2 | HEIGHT: 55 IN | OXYGEN SATURATION: 100 % | WEIGHT: 129 LBS | TEMPERATURE: 98.2 F

## 2025-05-02 LAB
HCT VFR BLD AUTO: 23.2 % (ref 35–47)
HGB BLD-MCNC: 7.5 G/DL (ref 11.5–16)

## 2025-05-02 PROCEDURE — 36430 TRANSFUSION BLD/BLD COMPNT: CPT

## 2025-05-02 PROCEDURE — 85018 HEMOGLOBIN: CPT

## 2025-05-02 PROCEDURE — 36415 COLL VENOUS BLD VENIPUNCTURE: CPT

## 2025-05-02 PROCEDURE — 85014 HEMATOCRIT: CPT

## 2025-05-02 PROCEDURE — P9016 RBC LEUKOCYTES REDUCED: HCPCS

## 2025-05-02 PROCEDURE — 86923 COMPATIBILITY TEST ELECTRIC: CPT

## 2025-05-02 PROCEDURE — 86900 BLOOD TYPING SEROLOGIC ABO: CPT

## 2025-05-02 PROCEDURE — 86901 BLOOD TYPING SEROLOGIC RH(D): CPT

## 2025-05-02 PROCEDURE — 86850 RBC ANTIBODY SCREEN: CPT

## 2025-05-02 RX ORDER — SODIUM CHLORIDE 9 MG/ML
INJECTION, SOLUTION INTRAVENOUS PRN
Status: DISCONTINUED | OUTPATIENT
Start: 2025-05-02 | End: 2025-05-03 | Stop reason: HOSPADM

## 2025-05-02 ASSESSMENT — PAIN SCALES - GENERAL
PAINLEVEL_OUTOF10: 0

## 2025-05-02 NOTE — PROGRESS NOTES
0930-- Pt to \[facility]\"" for 1 unit of PRBC's. Pt ambulated self onto unit with daughter present. VS obtained and stable. Allergies, medications and history reviewed with pt. PIV x1 inserted with ultrasound guidance with brisk blood return noted. Labs sent to lab per MD. Waiting for lab results to begin transfusion. Pt condition stable. Call bell within reach.   1222-- PRBC's transfusion initiated at this time. VS obtained and stable prior to initiation. Pt monitored for initial 15 minutes of transfusion. Pt tolerating well with no issues or reactions noted. VS obtained and stable after monitoring. Pt condition stable, call bell within reach.   1451-- SBAR/handoff given to MANISH Estrada at patient's bedside. Pt tolerating with no issues or reactions noted. Pt condition stable at time of discharge.

## 2025-05-02 NOTE — PROGRESS NOTES
Blood transfusion 1 unit infused as ordered. Pt tolerated without complaints or reactions. Pt wishes to not stay 1 hour post transfusion. Pt dc to home with her daughter. Side effects and reactions reviewed with the pt s daughter.Pt escorted out via wheelchair to front entrance for dc home.

## 2025-05-03 LAB
ABO + RH BLD: NORMAL
BLD PROD TYP BPU: NORMAL
BLOOD BANK BLOOD PRODUCT EXPIRATION DATE: NORMAL
BLOOD BANK DISPENSE STATUS: NORMAL
BLOOD BANK ISBT PRODUCT BLOOD TYPE: 6200
BLOOD BANK PRODUCT CODE: NORMAL
BLOOD BANK UNIT TYPE AND RH: NORMAL
BLOOD GROUP ANTIBODIES SERPL: NEGATIVE
BPU ID: NORMAL
CROSSMATCH RESULT: NORMAL
SPECIMEN EXP DATE BLD: NORMAL
TRANSFUSION STATUS PATIENT QL: NORMAL
UNIT DIVISION: 0
UNIT ISSUE DATE/TIME: NORMAL

## 2025-05-12 ENCOUNTER — HOSPITAL ENCOUNTER (OUTPATIENT)
Facility: HOSPITAL | Age: 70
Discharge: HOME | End: 2025-05-15
Attending: INTERNAL MEDICINE
Payer: MEDICAID

## 2025-05-12 DIAGNOSIS — E85.9 MYELOMA ASSOCIATED AMYLOIDOSIS (HCC): ICD-10-CM

## 2025-05-12 DIAGNOSIS — D47.2 MONOCLONAL PARAPROTEINEMIA: ICD-10-CM

## 2025-05-12 DIAGNOSIS — D50.9 IRON DEFICIENCY ANEMIA, UNSPECIFIED IRON DEFICIENCY ANEMIA TYPE: ICD-10-CM

## 2025-05-12 DIAGNOSIS — D63.1 ANEMIA OF CHRONIC RENAL FAILURE, UNSPECIFIED CKD STAGE: ICD-10-CM

## 2025-05-12 DIAGNOSIS — C90.00 MYELOMA ASSOCIATED AMYLOIDOSIS (HCC): ICD-10-CM

## 2025-05-12 DIAGNOSIS — N18.9 ANEMIA OF CHRONIC RENAL FAILURE, UNSPECIFIED CKD STAGE: ICD-10-CM

## 2025-05-12 PROCEDURE — A9609 HC RX DIAGNOSTIC RADIOPHARMACEUTICAL: HCPCS | Performed by: INTERNAL MEDICINE

## 2025-05-12 PROCEDURE — 3430000000 HC RX DIAGNOSTIC RADIOPHARMACEUTICAL: Performed by: INTERNAL MEDICINE

## 2025-05-12 PROCEDURE — 78816 PET IMAGE W/CT FULL BODY: CPT

## 2025-05-12 RX ADMIN — FLUDEOXYGLUCOSE F-18 9.79 MILLICURIE: 500 INJECTION INTRAVENOUS at 15:16

## 2025-05-13 RX ORDER — FLUDEOXYGLUCOSE F-18 500 MCI/ML
9.79 INJECTION INTRAVENOUS
Status: COMPLETED | OUTPATIENT
Start: 2025-05-12 | End: 2025-05-12

## 2025-05-20 ENCOUNTER — HOSPITAL ENCOUNTER (EMERGENCY)
Facility: HOSPITAL | Age: 70
Discharge: HOME | End: 2025-05-20
Attending: EMERGENCY MEDICINE
Payer: MEDICAID

## 2025-05-20 ENCOUNTER — APPOINTMENT (OUTPATIENT)
Facility: HOSPITAL | Age: 70
End: 2025-05-20
Payer: MEDICAID

## 2025-05-20 VITALS
WEIGHT: 130 LBS | HEART RATE: 75 BPM | DIASTOLIC BLOOD PRESSURE: 89 MMHG | SYSTOLIC BLOOD PRESSURE: 160 MMHG | HEIGHT: 56 IN | RESPIRATION RATE: 18 BRPM | TEMPERATURE: 98.6 F | OXYGEN SATURATION: 99 % | BODY MASS INDEX: 29.25 KG/M2

## 2025-05-20 DIAGNOSIS — R07.9 CHEST PAIN, UNSPECIFIED TYPE: Primary | ICD-10-CM

## 2025-05-20 LAB
ALBUMIN SERPL-MCNC: 3.3 G/DL (ref 3.5–5)
ALBUMIN/GLOB SERPL: 0.8 (ref 1.1–2.2)
ALP SERPL-CCNC: 87 U/L (ref 45–117)
ALT SERPL-CCNC: 22 U/L (ref 12–78)
ANION GAP SERPL CALC-SCNC: 5 MMOL/L (ref 2–12)
APPEARANCE UR: CLEAR
AST SERPL W P-5'-P-CCNC: 20 U/L (ref 15–37)
BACTERIA URNS QL MICRO: NEGATIVE /HPF
BASOPHILS # BLD: 0.01 K/UL (ref 0–0.1)
BASOPHILS NFR BLD: 0.2 % (ref 0–1)
BILIRUB SERPL-MCNC: 0.3 MG/DL (ref 0.2–1)
BILIRUB UR QL: NEGATIVE
BUN SERPL-MCNC: 25 MG/DL (ref 6–20)
BUN/CREAT SERPL: 16 (ref 12–20)
CA-I BLD-MCNC: 9.1 MG/DL (ref 8.5–10.1)
CHLORIDE SERPL-SCNC: 103 MMOL/L (ref 97–108)
CO2 SERPL-SCNC: 29 MMOL/L (ref 21–32)
COLOR UR: YELLOW
CREAT SERPL-MCNC: 1.52 MG/DL (ref 0.55–1.02)
DIFFERENTIAL METHOD BLD: ABNORMAL
EKG ATRIAL RATE: 68 BPM
EKG DIAGNOSIS: NORMAL
EKG P AXIS: 31 DEGREES
EKG P-R INTERVAL: 166 MS
EKG Q-T INTERVAL: 418 MS
EKG QRS DURATION: 92 MS
EKG QTC CALCULATION (BAZETT): 444 MS
EKG R AXIS: 5 DEGREES
EKG T AXIS: 60 DEGREES
EKG VENTRICULAR RATE: 68 BPM
EOSINOPHIL # BLD: 0 K/UL (ref 0–0.4)
EOSINOPHIL NFR BLD: 0 % (ref 0–7)
EPITH CASTS URNS QL MICRO: ABNORMAL /LPF
ERYTHROCYTE [DISTWIDTH] IN BLOOD BY AUTOMATED COUNT: 15.7 % (ref 11.5–14.5)
GLOBULIN SER CALC-MCNC: 4.4 G/DL (ref 2–4)
GLUCOSE SERPL-MCNC: 234 MG/DL (ref 65–100)
GLUCOSE UR STRIP.AUTO-MCNC: 200 MG/DL
HCT VFR BLD AUTO: 30.2 % (ref 35–47)
HGB BLD-MCNC: 9.8 G/DL (ref 11.5–16)
HGB UR QL STRIP: NEGATIVE
IMM GRANULOCYTES # BLD AUTO: 0.01 K/UL (ref 0–0.04)
IMM GRANULOCYTES NFR BLD AUTO: 0.2 % (ref 0–0.5)
KETONES UR QL STRIP.AUTO: NEGATIVE MG/DL
LEUKOCYTE ESTERASE UR QL STRIP.AUTO: NEGATIVE
LYMPHOCYTES # BLD: 0.13 K/UL (ref 0.8–3.5)
LYMPHOCYTES NFR BLD: 2.4 % (ref 12–49)
MAGNESIUM SERPL-MCNC: 2.1 MG/DL (ref 1.6–2.4)
MCH RBC QN AUTO: 33.1 PG (ref 26–34)
MCHC RBC AUTO-ENTMCNC: 32.5 G/DL (ref 30–36.5)
MCV RBC AUTO: 102 FL (ref 80–99)
MONOCYTES # BLD: 0.06 K/UL (ref 0–1)
MONOCYTES NFR BLD: 1.1 % (ref 5–13)
NEUTS SEG # BLD: 5.17 K/UL (ref 1.8–8)
NEUTS SEG NFR BLD: 96.1 % (ref 32–75)
NITRITE UR QL STRIP.AUTO: NEGATIVE
NRBC # BLD: 0 K/UL (ref 0–0.01)
NRBC BLD-RTO: 0 PER 100 WBC
PH UR STRIP: 7 (ref 5–8)
PLATELET # BLD AUTO: 275 K/UL (ref 150–400)
PMV BLD AUTO: 10 FL (ref 8.9–12.9)
POTASSIUM SERPL-SCNC: 4.9 MMOL/L (ref 3.5–5.1)
PROT SERPL-MCNC: 7.7 G/DL (ref 6.4–8.2)
PROT UR STRIP-MCNC: ABNORMAL MG/DL
RBC # BLD AUTO: 2.96 M/UL (ref 3.8–5.2)
RBC #/AREA URNS HPF: ABNORMAL /HPF (ref 0–5)
SODIUM SERPL-SCNC: 137 MMOL/L (ref 136–145)
SP GR UR REFRACTOMETRY: 1 (ref 1–1.03)
TROPONIN I SERPL HS-MCNC: 10 NG/L (ref 0–51)
TROPONIN I SERPL HS-MCNC: 13 NG/L (ref 0–51)
UROBILINOGEN UR QL STRIP.AUTO: 0.1 EU/DL (ref 0.1–1)
WBC # BLD AUTO: 5.4 K/UL (ref 3.6–11)
WBC URNS QL MICRO: ABNORMAL /HPF (ref 0–4)

## 2025-05-20 PROCEDURE — 80053 COMPREHEN METABOLIC PANEL: CPT

## 2025-05-20 PROCEDURE — 71046 X-RAY EXAM CHEST 2 VIEWS: CPT

## 2025-05-20 PROCEDURE — 99285 EMERGENCY DEPT VISIT HI MDM: CPT

## 2025-05-20 PROCEDURE — 83735 ASSAY OF MAGNESIUM: CPT

## 2025-05-20 PROCEDURE — 93005 ELECTROCARDIOGRAM TRACING: CPT | Performed by: EMERGENCY MEDICINE

## 2025-05-20 PROCEDURE — 36415 COLL VENOUS BLD VENIPUNCTURE: CPT

## 2025-05-20 PROCEDURE — 81001 URINALYSIS AUTO W/SCOPE: CPT

## 2025-05-20 PROCEDURE — 84484 ASSAY OF TROPONIN QUANT: CPT

## 2025-05-20 PROCEDURE — 6370000000 HC RX 637 (ALT 250 FOR IP): Performed by: EMERGENCY MEDICINE

## 2025-05-20 PROCEDURE — 85025 COMPLETE CBC W/AUTO DIFF WBC: CPT

## 2025-05-20 RX ORDER — ACETAMINOPHEN 500 MG
1000 TABLET ORAL
Status: COMPLETED | OUTPATIENT
Start: 2025-05-20 | End: 2025-05-20

## 2025-05-20 RX ORDER — FAMOTIDINE 40 MG/1
40 TABLET, FILM COATED ORAL DAILY
Qty: 30 TABLET | Refills: 0 | Status: SHIPPED | OUTPATIENT
Start: 2025-05-20

## 2025-05-20 RX ORDER — LIDOCAINE 50 MG/G
1 PATCH TOPICAL DAILY
Qty: 10 PATCH | Refills: 0 | Status: SHIPPED | OUTPATIENT
Start: 2025-05-20 | End: 2025-05-30

## 2025-05-20 RX ADMIN — ACETAMINOPHEN 1000 MG: 500 TABLET ORAL at 14:55

## 2025-05-20 ASSESSMENT — PAIN SCALES - GENERAL
PAINLEVEL_OUTOF10: 8
PAINLEVEL_OUTOF10: 8

## 2025-05-20 ASSESSMENT — LIFESTYLE VARIABLES
HOW MANY STANDARD DRINKS CONTAINING ALCOHOL DO YOU HAVE ON A TYPICAL DAY: PATIENT DOES NOT DRINK
HOW OFTEN DO YOU HAVE A DRINK CONTAINING ALCOHOL: NEVER

## 2025-05-20 ASSESSMENT — PAIN DESCRIPTION - LOCATION: LOCATION: CHEST

## 2025-05-20 NOTE — DISCHARGE INSTR - COC
{YES / NO:}  Bladder: {YES / NO:}  Urinary Catheter: {Urinary Catheter:893773450}   Colostomy/Ileostomy/Ileal Conduit: {YES / NO:}       Date of Last BM: ***  No intake or output data in the 24 hours ending 25  No intake/output data recorded.    Safety Concerns:     { MAGEN Safety Concerns:743259615}    Impairments/Disabilities:      { MAGEN Impairments/Disabilities:895951542}    Nutrition Therapy:  Current Nutrition Therapy:   { MAGEN Diet List:410842739}    Routes of Feeding: {[facility] DME Other Feedings:954822839}  Liquids: {Slp liquid thickness:18924}  Daily Fluid Restriction: {[facility] DME Yes amt example:990162380}  Last Modified Barium Swallow with Video (Video Swallowing Test): {Done Not Done Date:419691681}    Treatments at the Time of Hospital Discharge:   Respiratory Treatments: ***  Oxygen Therapy:  {Therapy; copd oxygen:14656}  Ventilator:    { CC Vent List:461002071}    Rehab Therapies: {THERAPEUTIC INTERVENTION:5616448311}  Weight Bearing Status/Restrictions: {[facility] Weight Bearin}  Other Medical Equipment (for information only, NOT a DME order):  {EQUIPMENT:153250780}  Other Treatments: ***    Patient's personal belongings (please select all that are sent with patient):  {[facility] DME Belongings:214653411}    RN SIGNATURE:  {Esignature:963494109}    CASE MANAGEMENT/SOCIAL WORK SECTION    Inpatient Status Date: ***    Readmission Risk Assessment Score:  [facility] RISK OF UNPLANNED READMISSION 2.0             0 Total Score        Discharging to Facility/ Agency   Name:   Address:  Phone:  Fax:    Dialysis Facility (if applicable)   Name:  Address:  Dialysis Schedule:  Phone:  Fax:    / signature: {Esignature:625285011}    PHYSICIAN SECTION    Prognosis: {Prognosis:4807145876}    Condition at Discharge: { Patient Condition:223741043}    Rehab Potential (if transferring to Rehab): {Prognosis:3032313293}    Recommended Labs or Other Treatments After Discharge:

## 2025-05-20 NOTE — ED PROVIDER NOTES
ms    QTc Calculation (Bazett) 444 ms    P Axis 31 degrees    R Axis 5 degrees    T Axis 60 degrees    Diagnosis       Normal sinus rhythm  Septal infarct , age undetermined  Abnormal ECG  When compared with ECG of 13-DEC-2024 17:24,  Septal infarct is now Present  Confirmed by CHARITY SPANGLER MD ([id number]) on 5/20/2025 1:31:47 PM     CBC with Auto Differential    Collection Time: 05/20/25  2:29 PM   Result Value Ref Range    WBC 5.4 3.6 - 11.0 K/uL    RBC 2.96 (L) 3.80 - 5.20 M/uL    Hemoglobin 9.8 (L) 11.5 - 16.0 g/dL    Hematocrit 30.2 (L) 35.0 - 47.0 %    .0 (H) 80.0 - 99.0 FL    MCH 33.1 26.0 - 34.0 PG    MCHC 32.5 30.0 - 36.5 g/dL    RDW 15.7 (H) 11.5 - 14.5 %    Platelets 275 150 - 400 K/uL    MPV 10.0 8.9 - 12.9 FL    Nucleated RBCs 0.0 0.0  WBC    nRBC 0.00 0.00 - 0.01 K/uL    Neutrophils % 96.1 (H) 32.0 - 75.0 %    Lymphocytes % 2.4 (L) 12.0 - 49.0 %    Monocytes % 1.1 (L) 5.0 - 13.0 %    Eosinophils % 0.0 0.0 - 7.0 %    Basophils % 0.2 0.0 - 1.0 %    Immature Granulocytes % 0.2 0 - 0.5 %    Neutrophils Absolute 5.17 1.80 - 8.00 K/UL    Lymphocytes Absolute 0.13 (L) 0.80 - 3.50 K/UL    Monocytes Absolute 0.06 0.00 - 1.00 K/UL    Eosinophils Absolute 0.00 0.00 - 0.40 K/UL    Basophils Absolute 0.01 0.00 - 0.10 K/UL    Immature Granulocytes Absolute 0.01 0.00 - 0.04 K/UL    Differential Type AUTOMATED     Troponin    Collection Time: 05/20/25  2:29 PM   Result Value Ref Range    Troponin, High Sensitivity 13 0 - 51 ng/L   Comprehensive Metabolic Panel    Collection Time: 05/20/25  2:29 PM   Result Value Ref Range    Sodium 137 136 - 145 mmol/L    Potassium 4.9 3.5 - 5.1 mmol/L    Chloride 103 97 - 108 mmol/L    CO2 29 21 - 32 mmol/L    Anion Gap 5 2 - 12 mmol/L    Glucose 234 (H) 65 - 100 mg/dL    BUN 25 (H) 6 - 20 mg/dL    Creatinine 1.52 (H) 0.55 - 1.02 mg/dL    BUN/Creatinine Ratio 16 12 - 20      Est, Glom Filt Rate 37 (L) >60 ml/min/1.73m2    Calcium 9.1 8.5 - 10.1 mg/dL    Total Bilirubin 0.3

## 2025-05-20 NOTE — ED TRIAGE NOTES
Pt reports recently started chemo and other meds for multiple myeloma. Pt reports left sided chest pain and pain under her breast that radiates to her back. Pt reports sob at this time as well with fatigue.

## 2025-05-30 ENCOUNTER — HOSPITAL ENCOUNTER (OUTPATIENT)
Facility: HOSPITAL | Age: 70
Discharge: HOME | End: 2025-05-30
Payer: MEDICAID

## 2025-05-30 ENCOUNTER — OFFICE VISIT (OUTPATIENT)
Age: 70
End: 2025-05-30
Payer: MEDICAID

## 2025-05-30 VITALS — BODY MASS INDEX: 29.15 KG/M2 | HEIGHT: 56 IN | SYSTOLIC BLOOD PRESSURE: 132 MMHG | DIASTOLIC BLOOD PRESSURE: 70 MMHG

## 2025-05-30 DIAGNOSIS — H93.13 TINNITUS OF BOTH EARS: ICD-10-CM

## 2025-05-30 DIAGNOSIS — M54.2 MYOFASCIAL NECK PAIN: Primary | ICD-10-CM

## 2025-05-30 DIAGNOSIS — Z12.31 VISIT FOR SCREENING MAMMOGRAM: ICD-10-CM

## 2025-05-30 DIAGNOSIS — H69.90 DYSFUNCTION OF EUSTACHIAN TUBE, UNSPECIFIED LATERALITY: ICD-10-CM

## 2025-05-30 DIAGNOSIS — J30.9 ALLERGIC RHINITIS, UNSPECIFIED SEASONALITY, UNSPECIFIED TRIGGER: ICD-10-CM

## 2025-05-30 PROCEDURE — 99203 OFFICE O/P NEW LOW 30 MIN: CPT

## 2025-05-30 PROCEDURE — 1123F ACP DISCUSS/DSCN MKR DOCD: CPT

## 2025-05-30 PROCEDURE — 77063 BREAST TOMOSYNTHESIS BI: CPT

## 2025-05-30 RX ORDER — FLUTICASONE PROPIONATE 50 MCG
2 SPRAY, SUSPENSION (ML) NASAL DAILY
Qty: 16 G | Refills: 0 | Status: SHIPPED | OUTPATIENT
Start: 2025-05-30

## 2025-05-30 NOTE — PROGRESS NOTES
breath  Neurologic: denies seizures, numbness or tingling, syncope  Hematologic: denies easy bleeding or bruising     Past Medical History:  Past Medical History:   Diagnosis Date    Arthritis     Asthma     Chronic kidney disease     Chronic obstructive pulmonary disease (HCC)     Diabetes (HCC)     Hypertension     Sleep apnea     no cpap       Past Surgical History:  Past Surgical History:   Procedure Laterality Date    OTHER SURGICAL HISTORY Bilateral     Eye surgery    WRIST SURGERY Left     pins and screws        Family Medical History:  Family History   Problem Relation Age of Onset    Diabetes Mother     Hypertension Father     Diabetes Father     Hypertension Mother        Social History:  Social History     Tobacco Use    Smoking status: Never    Smokeless tobacco: Never   Substance Use Topics    Alcohol use: Never        Medications:  Prior to Admission medications    Medication Sig Start Date End Date Taking? Authorizing Provider   fluticasone (FLONASE) 50 MCG/ACT nasal spray 2 sprays by Each Nostril route daily 5/30/25  Yes Joe Graham PA-C   famotidine (PEPCID) 40 MG tablet Take 1 tablet by mouth daily 5/20/25   Saba Bates MD   albuterol sulfate HFA (PROVENTIL;VENTOLIN;PROAIR) 108 (90 Base) MCG/ACT inhaler Inhale into the lungs    Automatic Reconciliation, Ar   amLODIPine (NORVASC) 10 MG tablet Take 1 tablet by mouth daily    Automatic Reconciliation, Ar   aspirin 81 MG EC tablet Take 1 tablet by mouth daily    Automatic Reconciliation, Ar   atorvastatin (LIPITOR) 40 MG tablet Take 1 tablet by mouth daily    Automatic Reconciliation, Ar   ergocalciferol (ERGOCALCIFEROL) 1.25 MG (33352 UT) capsule Take 1 capsule by mouth    Automatic Reconciliation, Ar   ezetimibe (ZETIA) 10 MG tablet Take by mouth    Automatic Reconciliation, Ar   folic acid (FOLVITE) 1 MG tablet Take 1 tablet by mouth daily    Automatic Reconciliation, Ar   methotrexate (RHEUMATREX) 2.5 MG chemo tablet Take 5 tablets by

## 2025-06-06 ENCOUNTER — APPOINTMENT (OUTPATIENT)
Facility: HOSPITAL | Age: 70
End: 2025-06-06
Payer: MEDICAID

## 2025-06-06 ENCOUNTER — HOSPITAL ENCOUNTER (EMERGENCY)
Facility: HOSPITAL | Age: 70
Discharge: HOME | End: 2025-06-06
Payer: MEDICAID

## 2025-06-06 VITALS
TEMPERATURE: 98.4 F | OXYGEN SATURATION: 100 % | DIASTOLIC BLOOD PRESSURE: 102 MMHG | HEART RATE: 76 BPM | WEIGHT: 125 LBS | RESPIRATION RATE: 16 BRPM | BODY MASS INDEX: 28.12 KG/M2 | SYSTOLIC BLOOD PRESSURE: 126 MMHG | HEIGHT: 56 IN

## 2025-06-06 DIAGNOSIS — J02.9 PHARYNGITIS, UNSPECIFIED ETIOLOGY: Primary | ICD-10-CM

## 2025-06-06 LAB
FLUAV RNA SPEC QL NAA+PROBE: NOT DETECTED
FLUBV RNA SPEC QL NAA+PROBE: NOT DETECTED
SARS-COV-2 RNA RESP QL NAA+PROBE: NOT DETECTED

## 2025-06-06 PROCEDURE — 87636 SARSCOV2 & INF A&B AMP PRB: CPT

## 2025-06-06 PROCEDURE — 70360 X-RAY EXAM OF NECK: CPT

## 2025-06-06 PROCEDURE — 99284 EMERGENCY DEPT VISIT MOD MDM: CPT

## 2025-06-06 RX ORDER — LIDOCAINE HYDROCHLORIDE 20 MG/ML
15 SOLUTION OROPHARYNGEAL PRN
Qty: 100 ML | Refills: 0 | Status: SHIPPED | OUTPATIENT
Start: 2025-06-06 | End: 2025-06-11

## 2025-06-06 ASSESSMENT — PAIN SCALES - GENERAL: PAINLEVEL_OUTOF10: 10

## 2025-06-06 ASSESSMENT — PAIN - FUNCTIONAL ASSESSMENT: PAIN_FUNCTIONAL_ASSESSMENT: 0-10

## 2025-06-06 ASSESSMENT — PAIN DESCRIPTION - LOCATION: LOCATION: THROAT

## 2025-06-06 NOTE — ED TRIAGE NOTES
C/o sore throat and bumps on the back of the tongue that started last week, stopped, but has now came back. Pain when eating

## 2025-06-06 NOTE — DISCHARGE INSTRUCTIONS
.    Thank you for choosing our Emergency Department for your care.  It is our privilege to care for you in your time of need.  In the next several days, you may receive a survey via email or mailed to your home about your experience with our team.  We would greatly appreciate you taking a few minutes to complete the survey, as we use this information to learn what we have done well and what we could be doing better. Thank you for trusting us with your care!    Below you will find a list of your tests from today's visit.   Labs and Radiology Studies  Recent Results (from the past 12 hours)   COVID-19 & Influenza Combo    Collection Time: 06/06/25  2:28 PM    Specimen: Nasopharyngeal   Result Value Ref Range    SARS-CoV-2, PCR Not Detected Not Detected      Rapid Influenza A By PCR Not Detected Not Detected      Rapid Influenza B By PCR Not Detected Not Detected       XR NECK SOFT TISSUE  Result Date: 6/6/2025  Indication: Throat pain for 1 1/2 weeks Examination: 2 views soft tissue neck FINDINGS: No soft tissue swelling or foreign body.     1. No acute abnormality  Electronically signed by Lindsey Centeno    ------------------------------------------------------------------------------------------------------------  The evaluation and treatment you received in the Emergency Department were for an urgent problem. It is important that you follow-up with a doctor, nurse practitioner, or physician assistant to:  (1) confirm your diagnosis,  (2) re-evaluation of changes in your illness and treatment, and (3) for ongoing care. Please take your discharge instructions with you when you go to your follow-up appointment.     If you have any problem arranging a follow-up appointment, contact us!  If your symptoms become worse or you do not improve as expected, please return to us. We are available 24 hours a day.     If a prescription has been provided, please fill it as soon as possible to prevent a delay in treatment. If you  have any questions or reservations about taking the medication due to side effects or interactions with other medications, please call your primary care provider or contact us directly.  Again, THANK YOU for choosing us to care for YOU!

## 2025-06-06 NOTE — ED PROVIDER NOTES
as needed.  Recommended following up with her ENT for further evaluation and management.  She has an endoscopy and colonoscopy scheduled with GI in August to be evaluated for the uptake noted on PET scan and her colon, but is hoping to be seen sooner.  I discussed strict return precautions with the patient and her daughter who verbalized understanding.  Explained that if the patient is unable to tolerate her oral secretions, has difficulty breathing, or an inability to swallow she is to be seen emergently.  They both verbalized understanding.     Vitals:    Vitals:    06/06/25 1412   BP: (!) 126/102   Pulse: 76   Resp: 16   Temp: 98.4 °F (36.9 °C)   TempSrc: Oral   SpO2: 100%   Weight: 56.7 kg (125 lb)   Height: 1.422 m (4' 8\")       ED COURSE       Clinical Management Tools:  Not Applicable    Smoking Cessation: Not Applicable    Patient was given the following medications:  Medications - No data to display    CONSULTS: See ED Course/MDM for further details.  None   PROCEDURES   Unless otherwise noted above, none  Procedures    SEPSIS REASSESSMENT & CRITICAL CARE TIME   SEPSIS REASSESSMENT: Patient does NOT meet Sepsis criteria after ED workup    Patient does not meet Critical Care Time, 0 minutes  CLINICAL IMPRESSIONS     1. Pharyngitis, unspecified etiology       SDOH/DISPOSITION/PLAN   Social Determinants affecting Treatment Plan: None    DISPOSITION Decision To Discharge 06/06/2025 04:01:10 PM   DISPOSITION CONDITION Stable         Discharge Note: The patient is stable for discharge home. The signs, symptoms, diagnosis, and discharge instructions have been discussed, understanding conveyed, and agreed upon. The patient is to follow up as recommended or return to ER should their symptoms worsen.      PATIENT REFERRED TO:  Jasmin Shankar DO  [address] [facility] [id number] [phone]    Schedule an appointment as soon as possible for a visit       [facility]  Melani E Carmen  proofreading.)       Pamela Zayas PA-C  06/06/25 1635       Pamela Zayas PA-C  06/06/25 164

## 2025-06-08 ENCOUNTER — APPOINTMENT (OUTPATIENT)
Facility: HOSPITAL | Age: 70
End: 2025-06-08
Payer: MEDICAID

## 2025-06-08 ENCOUNTER — HOSPITAL ENCOUNTER (EMERGENCY)
Facility: HOSPITAL | Age: 70
Discharge: HOME | End: 2025-06-08
Attending: EMERGENCY MEDICINE
Payer: MEDICAID

## 2025-06-08 VITALS
OXYGEN SATURATION: 100 % | RESPIRATION RATE: 19 BRPM | SYSTOLIC BLOOD PRESSURE: 136 MMHG | DIASTOLIC BLOOD PRESSURE: 51 MMHG | HEART RATE: 68 BPM | TEMPERATURE: 98.6 F

## 2025-06-08 DIAGNOSIS — J40 BRONCHITIS: Primary | ICD-10-CM

## 2025-06-08 LAB
ALBUMIN SERPL-MCNC: 2.7 G/DL (ref 3.5–5)
ALBUMIN/GLOB SERPL: 0.9 (ref 1.1–2.2)
ALP SERPL-CCNC: 63 U/L (ref 45–117)
ALT SERPL-CCNC: 25 U/L (ref 12–78)
ANION GAP SERPL CALC-SCNC: 5 MMOL/L (ref 2–12)
AST SERPL W P-5'-P-CCNC: 21 U/L (ref 15–37)
BASOPHILS # BLD: 0 K/UL (ref 0–0.1)
BASOPHILS NFR BLD: 0 % (ref 0–1)
BILIRUB SERPL-MCNC: 0.6 MG/DL (ref 0.2–1)
BNP SERPL-MCNC: 1441 PG/ML
BUN SERPL-MCNC: 24 MG/DL (ref 6–20)
BUN/CREAT SERPL: 16 (ref 12–20)
CA-I BLD-MCNC: 8.2 MG/DL (ref 8.5–10.1)
CHLORIDE SERPL-SCNC: 108 MMOL/L (ref 97–108)
CO2 SERPL-SCNC: 28 MMOL/L (ref 21–32)
CREAT SERPL-MCNC: 1.53 MG/DL (ref 0.55–1.02)
DIFFERENTIAL METHOD BLD: ABNORMAL
EOSINOPHIL # BLD: 0.05 K/UL (ref 0–0.4)
EOSINOPHIL NFR BLD: 1 % (ref 0–7)
ERYTHROCYTE [DISTWIDTH] IN BLOOD BY AUTOMATED COUNT: 15.6 % (ref 11.5–14.5)
GLOBULIN SER CALC-MCNC: 2.9 G/DL (ref 2–4)
GLUCOSE SERPL-MCNC: 110 MG/DL (ref 65–100)
HCT VFR BLD AUTO: 22.2 % (ref 35–47)
HGB BLD-MCNC: 7.2 G/DL (ref 11.5–16)
IMM GRANULOCYTES # BLD AUTO: 0 K/UL
IMM GRANULOCYTES NFR BLD AUTO: 0 %
LYMPHOCYTES # BLD: 0.38 K/UL (ref 0.8–3.5)
LYMPHOCYTES NFR BLD: 7 % (ref 12–49)
MCH RBC QN AUTO: 32.6 PG (ref 26–34)
MCHC RBC AUTO-ENTMCNC: 32.4 G/DL (ref 30–36.5)
MCV RBC AUTO: 100.5 FL (ref 80–99)
MONOCYTES # BLD: 0.27 K/UL (ref 0–1)
MONOCYTES NFR BLD: 5 % (ref 5–13)
NEUTS SEG # BLD: 4.7 K/UL (ref 1.8–8)
NEUTS SEG NFR BLD: 87 % (ref 32–75)
NRBC # BLD: 0 K/UL (ref 0–0.01)
NRBC BLD-RTO: 0 PER 100 WBC
PLATELET # BLD AUTO: 100 K/UL (ref 150–400)
PMV BLD AUTO: 11.6 FL (ref 8.9–12.9)
POTASSIUM SERPL-SCNC: 3.9 MMOL/L (ref 3.5–5.1)
PROT SERPL-MCNC: 5.6 G/DL (ref 6.4–8.2)
RBC # BLD AUTO: 2.21 M/UL (ref 3.8–5.2)
RBC MORPH BLD: ABNORMAL
SODIUM SERPL-SCNC: 141 MMOL/L (ref 136–145)
TROPONIN I SERPL HS-MCNC: 37 NG/L (ref 0–51)
TROPONIN I SERPL HS-MCNC: 40 NG/L (ref 0–51)
WBC # BLD AUTO: 5.4 K/UL (ref 3.6–11)

## 2025-06-08 PROCEDURE — 99284 EMERGENCY DEPT VISIT MOD MDM: CPT

## 2025-06-08 PROCEDURE — 71045 X-RAY EXAM CHEST 1 VIEW: CPT

## 2025-06-08 PROCEDURE — 84484 ASSAY OF TROPONIN QUANT: CPT

## 2025-06-08 PROCEDURE — 80053 COMPREHEN METABOLIC PANEL: CPT

## 2025-06-08 PROCEDURE — 85025 COMPLETE CBC W/AUTO DIFF WBC: CPT

## 2025-06-08 PROCEDURE — 36415 COLL VENOUS BLD VENIPUNCTURE: CPT

## 2025-06-08 PROCEDURE — 83880 ASSAY OF NATRIURETIC PEPTIDE: CPT

## 2025-06-08 RX ORDER — PREDNISONE 20 MG/1
20 TABLET ORAL DAILY
Qty: 10 TABLET | Refills: 0 | Status: ON HOLD | OUTPATIENT
Start: 2025-06-08 | End: 2025-06-18

## 2025-06-08 ASSESSMENT — PAIN DESCRIPTION - LOCATION: LOCATION: GENERALIZED

## 2025-06-08 ASSESSMENT — PAIN - FUNCTIONAL ASSESSMENT: PAIN_FUNCTIONAL_ASSESSMENT: 0-10

## 2025-06-08 NOTE — ED TRIAGE NOTES
Pt is coming from home with some difficulty breathing for the past 3 days. Family placed on 2 L and called EMS. When they arrived pt was 89 on 2L and bumped her up to 4 L with sats up to 92%    Pt has a history of COPD and received 2 albuterol treatments prior to EMS arrival.

## 2025-06-08 NOTE — ED PROVIDER NOTES
[facility] EMERGENCY DEPT  EMERGENCY DEPARTMENT HISTORY AND PHYSICAL EXAM      Date of evaluation: 6/8/2025  Patient Name: Lizbeth Hogue  Birthdate 1955  MRN: [id number]  ED Provider: Alexis Grier MD   Note Started: 2:13 PM EDT 6/8/25    HISTORY OF PRESENT ILLNESS     Chief Complaint   Patient presents with    Shortness of Breath    Nasal Congestion       History Provided By: Patient, only     HPI: Lizbeth Hogue is a 70 y.o. female no past medical history significant for asthma and COPD with 2 L oxygen requirement presents with shortness of breath and nasal congestion going on for the past couple of days progressively getting worse.  She denies any fever, chills, chest pain, rash or diarrhea, headache, night sweats.    PAST MEDICAL HISTORY   Past Medical History:  Past Medical History:   Diagnosis Date    Arthritis     Asthma     Chronic kidney disease     Chronic obstructive pulmonary disease (HCC)     Diabetes (HCC)     Hypertension     Sleep apnea     no cpap       Past Surgical History:  Past Surgical History:   Procedure Laterality Date    OTHER SURGICAL HISTORY Bilateral     Eye surgery    WRIST SURGERY Left     pins and screws       Family History:  Family History   Problem Relation Age of Onset    Diabetes Mother     Hypertension Father     Diabetes Father     Hypertension Mother        Social History:  Social History     Tobacco Use    Smoking status: Never    Smokeless tobacco: Never   Substance Use Topics    Alcohol use: Never    Drug use: Never       Allergies:  Allergies   Allergen Reactions    Lisinopril Swelling     Lip swelling    Pineapple Swelling     Lip swelling       PCP: Jasmin Shankar DO    Current Meds:   No current facility-administered medications for this encounter.     Current Outpatient Medications   Medication Sig Dispense Refill    predniSONE (DELTASONE) 20 MG tablet Take 1 tablet by mouth daily for 10 days 10 tablet 0    lidocaine viscous hcl (XYLOCAINE) 2 % SOLN solution Take

## 2025-06-13 ENCOUNTER — APPOINTMENT (OUTPATIENT)
Facility: HOSPITAL | Age: 70
DRG: 231 | End: 2025-06-13
Payer: MEDICAID

## 2025-06-13 ENCOUNTER — HOSPITAL ENCOUNTER (INPATIENT)
Facility: HOSPITAL | Age: 70
LOS: 13 days | Discharge: HOME HEALTH | DRG: 231 | End: 2025-06-26
Attending: EMERGENCY MEDICINE | Admitting: INTERNAL MEDICINE
Payer: MEDICAID

## 2025-06-13 DIAGNOSIS — J96.22 ACUTE ON CHRONIC RESPIRATORY FAILURE WITH HYPOXIA AND HYPERCAPNIA (HCC): Primary | ICD-10-CM

## 2025-06-13 DIAGNOSIS — Z85.79 HISTORY OF MULTIPLE MYELOMA: ICD-10-CM

## 2025-06-13 DIAGNOSIS — J96.21 ACUTE ON CHRONIC RESPIRATORY FAILURE WITH HYPOXIA AND HYPERCAPNIA (HCC): Primary | ICD-10-CM

## 2025-06-13 DIAGNOSIS — R06.02 SHORTNESS OF BREATH: ICD-10-CM

## 2025-06-13 DIAGNOSIS — K63.89 MASS OF COLON: ICD-10-CM

## 2025-06-13 DIAGNOSIS — Z01.810 PRE-OPERATIVE CARDIOVASCULAR EXAMINATION: ICD-10-CM

## 2025-06-13 DIAGNOSIS — D64.9 ANEMIA, UNSPECIFIED TYPE: ICD-10-CM

## 2025-06-13 LAB
ALBUMIN SERPL-MCNC: 2.9 G/DL (ref 3.5–5)
ALBUMIN/GLOB SERPL: 0.9 (ref 1.1–2.2)
ALP SERPL-CCNC: 68 U/L (ref 45–117)
ALT SERPL-CCNC: 39 U/L (ref 12–78)
AMPHET UR QL SCN: NEGATIVE
ANION GAP SERPL CALC-SCNC: 10 MMOL/L (ref 2–12)
ANION GAP SERPL CALC-SCNC: 10 MMOL/L (ref 2–12)
ANION GAP SERPL CALC-SCNC: 8 MMOL/L (ref 2–12)
APPEARANCE UR: CLEAR
ARTERIAL PATENCY WRIST A: YES
AST SERPL W P-5'-P-CCNC: 49 U/L (ref 15–37)
B PERT DNA SPEC QL NAA+PROBE: NOT DETECTED
BACTERIA URNS QL MICRO: NEGATIVE /HPF
BARBITURATES UR QL SCN: NEGATIVE
BASE DEFICIT BLD-SCNC: 2.6 MMOL/L
BASE DEFICIT BLDA-SCNC: 2.5 MMOL/L
BASE EXCESS BLD CALC-SCNC: 1.2 MMOL/L
BASOPHILS # BLD: 0 K/UL (ref 0–0.1)
BASOPHILS # BLD: 0 K/UL (ref 0–0.1)
BASOPHILS NFR BLD: 0 % (ref 0–1)
BASOPHILS NFR BLD: 0 % (ref 0–1)
BDY SITE: ABNORMAL
BENZODIAZ UR QL: NEGATIVE
BILIRUB SERPL-MCNC: 0.3 MG/DL (ref 0.2–1)
BILIRUB UR QL: NEGATIVE
BNP SERPL-MCNC: 3681 PG/ML
BODY TEMPERATURE: 97.8
BORDETELLA PARAPERTUSSIS BY PCR: NOT DETECTED
BUN SERPL-MCNC: 23 MG/DL (ref 6–20)
BUN SERPL-MCNC: 24 MG/DL (ref 6–20)
BUN SERPL-MCNC: 25 MG/DL (ref 6–20)
BUN/CREAT SERPL: 16 (ref 12–20)
BUN/CREAT SERPL: 16 (ref 12–20)
BUN/CREAT SERPL: 18 (ref 12–20)
C PNEUM DNA SPEC QL NAA+PROBE: NOT DETECTED
CA-I BLD-MCNC: 7.8 MG/DL (ref 8.5–10.1)
CA-I BLD-MCNC: 8.1 MG/DL (ref 8.5–10.1)
CA-I BLD-MCNC: 8.2 MG/DL (ref 8.5–10.1)
CANNABINOIDS UR QL SCN: NEGATIVE
CHLORIDE SERPL-SCNC: 102 MMOL/L (ref 97–108)
CHLORIDE SERPL-SCNC: 91 MMOL/L (ref 97–108)
CHLORIDE SERPL-SCNC: 96 MMOL/L (ref 97–108)
CO2 SERPL-SCNC: 20 MMOL/L (ref 21–32)
CO2 SERPL-SCNC: 23 MMOL/L (ref 21–32)
CO2 SERPL-SCNC: 24 MMOL/L (ref 21–32)
COCAINE UR QL SCN: NEGATIVE
COHGB MFR BLD: 0.3 % (ref 1–2)
COLOR UR: ABNORMAL
CORTIS SERPL-MCNC: 29.1 UG/DL
CREAT SERPL-MCNC: 1.37 MG/DL (ref 0.55–1.02)
CREAT SERPL-MCNC: 1.44 MG/DL (ref 0.55–1.02)
CREAT SERPL-MCNC: 1.53 MG/DL (ref 0.55–1.02)
DIFFERENTIAL METHOD BLD: ABNORMAL
DIFFERENTIAL METHOD BLD: ABNORMAL
ECHO AO ASC DIAM: 3 CM
ECHO AO ROOT DIAM: 3.9 CM
ECHO AR MAX VEL PISA: 3.7 M/S
ECHO AV AREA PEAK VELOCITY: 1.2 CM2
ECHO AV AREA VTI: 1.2 CM2
ECHO AV MEAN GRADIENT: 15 MMHG
ECHO AV MEAN VELOCITY: 1.8 M/S
ECHO AV PEAK GRADIENT: 25 MMHG
ECHO AV PEAK VELOCITY: 2.4 M/S
ECHO AV REGURGITANT PHT: 396 MS
ECHO AV VELOCITY RATIO: 0.46
ECHO AV VTI: 57.4 CM
ECHO EST RA PRESSURE: 8 MMHG
ECHO LA AREA 4C: 14.1 CM2
ECHO LA DIAMETER: 4.3 CM
ECHO LA MAJOR AXIS: 4.7 CM
ECHO LA TO AORTIC ROOT RATIO: 1.1
ECHO LA VOL MOD A4C: 36 ML (ref 22–52)
ECHO LV E' LATERAL VELOCITY: 9.63 CM/S
ECHO LV E' SEPTAL VELOCITY: 8.01 CM/S
ECHO LV EDV A2C: 84 ML
ECHO LV EDV A4C: 100 ML
ECHO LV EF PHYSICIAN: 60 %
ECHO LV EJECTION FRACTION A2C: 59 %
ECHO LV EJECTION FRACTION A4C: 69 %
ECHO LV EJECTION FRACTION BIPLANE: 64 % (ref 55–100)
ECHO LV ESV A2C: 34 ML
ECHO LV ESV A4C: 30 ML
ECHO LV FRACTIONAL SHORTENING: 38 % (ref 28–44)
ECHO LV INTERNAL DIMENSION DIASTOLIC: 5.2 CM (ref 3.9–5.3)
ECHO LV INTERNAL DIMENSION SYSTOLIC: 3.2 CM
ECHO LV IVSD: 0.9 CM (ref 0.6–0.9)
ECHO LV MASS 2D: 169 G (ref 67–162)
ECHO LV POSTERIOR WALL DIASTOLIC: 0.9 CM (ref 0.6–0.9)
ECHO LV RELATIVE WALL THICKNESS RATIO: 0.35
ECHO LVOT AREA: 2.8 CM2
ECHO LVOT AV VTI INDEX: 0.41
ECHO LVOT DIAM: 1.9 CM
ECHO LVOT MEAN GRADIENT: 2 MMHG
ECHO LVOT PEAK GRADIENT: 4 MMHG
ECHO LVOT PEAK VELOCITY: 1.1 M/S
ECHO LVOT SV: 66.3 ML
ECHO LVOT VTI: 23.4 CM
ECHO MV A VELOCITY: 0.98 M/S
ECHO MV AREA VTI: 1.3 CM2
ECHO MV E DECELERATION TIME (DT): 218 MS
ECHO MV E VELOCITY: 1.28 M/S
ECHO MV E/A RATIO: 1.31
ECHO MV E/E' LATERAL: 13.29
ECHO MV E/E' RATIO (AVERAGED): 14.64
ECHO MV E/E' SEPTAL: 15.98
ECHO MV LVOT VTI INDEX: 2.15
ECHO MV MAX VELOCITY: 1.5 M/S
ECHO MV MEAN GRADIENT: 6 MMHG
ECHO MV MEAN VELOCITY: 1.1 M/S
ECHO MV PEAK GRADIENT: 9 MMHG
ECHO MV REGURGITANT PEAK GRADIENT: 41 MMHG
ECHO MV REGURGITANT PEAK VELOCITY: 3.2 M/S
ECHO MV VTI: 50.3 CM
ECHO PULMONARY ARTERY END DIASTOLIC PRESSURE: 13 MMHG
ECHO PV ACCELERATION TIME (AT): 92 MS
ECHO PV MAX VELOCITY: 1 M/S
ECHO PV MEAN GRADIENT: 3 MMHG
ECHO PV MEAN VELOCITY: 0.8 M/S
ECHO PV PEAK GRADIENT: 4 MMHG
ECHO PV REGURGITANT MAX VELOCITY: 1.8 M/S
ECHO PV VTI: 25.5 CM
ECHO RA AREA 4C: 15.4 CM2
ECHO RA VOLUME: 38 ML
ECHO RIGHT VENTRICULAR SYSTOLIC PRESSURE (RVSP): 29 MMHG
ECHO RV BASAL DIMENSION: 4.1 CM
ECHO RV FREE WALL PEAK S': 10.8 CM/S
ECHO RV TAPSE: 2.9 CM (ref 1.7–?)
ECHO TV REGURGITANT MAX VELOCITY: 2.3 M/S
ECHO TV REGURGITANT PEAK GRADIENT: 21 MMHG
EKG ATRIAL RATE: 80 BPM
EKG DIAGNOSIS: NORMAL
EKG P AXIS: 72 DEGREES
EKG P-R INTERVAL: 158 MS
EKG Q-T INTERVAL: 370 MS
EKG QRS DURATION: 90 MS
EKG QTC CALCULATION (BAZETT): 426 MS
EKG R AXIS: 44 DEGREES
EKG T AXIS: 79 DEGREES
EKG VENTRICULAR RATE: 80 BPM
EOSINOPHIL # BLD: 0 K/UL (ref 0–0.4)
EOSINOPHIL # BLD: 0.06 K/UL (ref 0–0.4)
EOSINOPHIL NFR BLD: 0 % (ref 0–7)
EOSINOPHIL NFR BLD: 1.1 % (ref 0–7)
EPITH CASTS URNS QL MICRO: ABNORMAL /LPF
ERYTHROCYTE [DISTWIDTH] IN BLOOD BY AUTOMATED COUNT: 15.8 % (ref 11.5–14.5)
ERYTHROCYTE [DISTWIDTH] IN BLOOD BY AUTOMATED COUNT: 15.9 % (ref 11.5–14.5)
EST. AVERAGE GLUCOSE BLD GHB EST-MCNC: 154 MG/DL
FIO2 ON VENT: 40 %
FLUAV SUBTYP SPEC NAA+PROBE: NOT DETECTED
FLUBV RNA SPEC QL NAA+PROBE: NOT DETECTED
GAS FLOW.O2 O2 DELIVERY SYS: 30 L/MIN
GLOBULIN SER CALC-MCNC: 3.4 G/DL (ref 2–4)
GLUCOSE BLD STRIP.AUTO-MCNC: 249 MG/DL (ref 65–100)
GLUCOSE BLD STRIP.AUTO-MCNC: 308 MG/DL (ref 65–100)
GLUCOSE BLD STRIP.AUTO-MCNC: 321 MG/DL (ref 65–100)
GLUCOSE BLD STRIP.AUTO-MCNC: 388 MG/DL (ref 65–100)
GLUCOSE SERPL-MCNC: 223 MG/DL (ref 65–100)
GLUCOSE SERPL-MCNC: 234 MG/DL (ref 65–100)
GLUCOSE SERPL-MCNC: 328 MG/DL (ref 65–100)
GLUCOSE UR STRIP.AUTO-MCNC: 150 MG/DL
HADV DNA SPEC QL NAA+PROBE: NOT DETECTED
HBA1C MFR BLD: 7 % (ref 4–5.6)
HCO3 BLD-SCNC: 24 MMOL/L (ref 19–28)
HCO3 BLD-SCNC: 26.8 MMOL/L (ref 19–28)
HCO3 BLDA-SCNC: 23 MMOL/L (ref 22–26)
HCOV 229E RNA SPEC QL NAA+PROBE: NOT DETECTED
HCOV HKU1 RNA SPEC QL NAA+PROBE: NOT DETECTED
HCOV NL63 RNA SPEC QL NAA+PROBE: NOT DETECTED
HCOV OC43 RNA SPEC QL NAA+PROBE: NOT DETECTED
HCT VFR BLD AUTO: 19.2 % (ref 35–47)
HCT VFR BLD AUTO: 21.9 % (ref 35–47)
HGB BLD-MCNC: 6.3 G/DL (ref 11.5–16)
HGB BLD-MCNC: 7.1 G/DL (ref 11.5–16)
HGB UR QL STRIP: NEGATIVE
HMPV RNA SPEC QL NAA+PROBE: NOT DETECTED
HPIV1 RNA SPEC QL NAA+PROBE: NOT DETECTED
HPIV2 RNA SPEC QL NAA+PROBE: NOT DETECTED
HPIV3 RNA SPEC QL NAA+PROBE: NOT DETECTED
HPIV4 RNA SPEC QL NAA+PROBE: NOT DETECTED
IMM GRANULOCYTES # BLD AUTO: 0 K/UL
IMM GRANULOCYTES # BLD AUTO: 0.04 K/UL (ref 0–0.04)
IMM GRANULOCYTES NFR BLD AUTO: 0 %
IMM GRANULOCYTES NFR BLD AUTO: 0.7 % (ref 0–0.5)
INR PPP: 1 (ref 0.9–1.1)
KETONES UR QL STRIP.AUTO: NEGATIVE MG/DL
LACTATE BLD-SCNC: 1.13 MMOL/L (ref 0.4–2)
LEUKOCYTE ESTERASE UR QL STRIP.AUTO: NEGATIVE
LYMPHOCYTES # BLD: 0.14 K/UL (ref 0.8–3.5)
LYMPHOCYTES # BLD: 0.17 K/UL (ref 0.8–3.5)
LYMPHOCYTES NFR BLD: 2.5 % (ref 12–49)
LYMPHOCYTES NFR BLD: 3 % (ref 12–49)
Lab: NORMAL
M PNEUMO DNA SPEC QL NAA+PROBE: NOT DETECTED
MAGNESIUM SERPL-MCNC: 2.5 MG/DL (ref 1.6–2.4)
MCH RBC QN AUTO: 32.7 PG (ref 26–34)
MCH RBC QN AUTO: 32.8 PG (ref 26–34)
MCHC RBC AUTO-ENTMCNC: 32.4 G/DL (ref 30–36.5)
MCHC RBC AUTO-ENTMCNC: 32.8 G/DL (ref 30–36.5)
MCV RBC AUTO: 100 FL (ref 80–99)
MCV RBC AUTO: 100.9 FL (ref 80–99)
METHADONE UR QL: NEGATIVE
METHGB MFR BLD: 0.1 % (ref 0–1.4)
MONOCYTES # BLD: 0.06 K/UL (ref 0–1)
MONOCYTES # BLD: 0.77 K/UL (ref 0–1)
MONOCYTES NFR BLD: 1 % (ref 5–13)
MONOCYTES NFR BLD: 13.7 % (ref 5–13)
MRSA DNA SPEC QL NAA+PROBE: DETECTED
MUCOUS THREADS URNS QL MICRO: ABNORMAL /LPF
NEUTS BAND NFR BLD MANUAL: 4 % (ref 0–6)
NEUTS SEG # BLD: 4.59 K/UL (ref 1.8–8)
NEUTS SEG # BLD: 5.57 K/UL (ref 1.8–8)
NEUTS SEG NFR BLD: 82 % (ref 32–75)
NEUTS SEG NFR BLD: 92 % (ref 32–75)
NITRITE UR QL STRIP.AUTO: NEGATIVE
NRBC # BLD: 0 K/UL (ref 0–0.01)
NRBC # BLD: 0 K/UL (ref 0–0.01)
NRBC BLD-RTO: 0 PER 100 WBC
NRBC BLD-RTO: 0 PER 100 WBC
OPIATES UR QL: NEGATIVE
OSMOLALITY SERPL: 294 MOSM/KG H2O
OSMOLALITY UR: 182 MOSM/KG H2O
OSMOLALITY UR: 204 MOSM/KG H2O
OXYHGB MFR BLD: 95.6 % (ref 95–99)
PCO2 BLD: 47.1 MMHG (ref 35–45)
PCO2 BLD: 49.9 MMHG (ref 35–45)
PCO2 BLDA: 39 MMHG (ref 35–45)
PCP UR QL: NEGATIVE
PERFORMED BY:: ABNORMAL
PH BLD: 7.29 (ref 7.35–7.45)
PH BLD: 7.36 (ref 7.35–7.45)
PH BLDA: 7.38 (ref 7.35–7.45)
PH UR STRIP: 5 (ref 5–8)
PLATELET # BLD AUTO: 191 K/UL (ref 150–400)
PLATELET # BLD AUTO: 226 K/UL (ref 150–400)
PLATELET COMMENT: ABNORMAL
PMV BLD AUTO: 11.7 FL (ref 8.9–12.9)
PMV BLD AUTO: 11.8 FL (ref 8.9–12.9)
PO2 BLD: 39 MMHG (ref 75–100)
PO2 BLD: 88 MMHG (ref 75–100)
PO2 BLDA: 93 MMHG (ref 80–100)
POTASSIUM SERPL-SCNC: 4.8 MMOL/L (ref 3.5–5.1)
POTASSIUM SERPL-SCNC: 4.9 MMOL/L (ref 3.5–5.1)
POTASSIUM SERPL-SCNC: 4.9 MMOL/L (ref 3.5–5.1)
PROCALCITONIN SERPL-MCNC: 0.18 NG/ML
PROT SERPL-MCNC: 6.3 G/DL (ref 6.4–8.2)
PROT UR STRIP-MCNC: NEGATIVE MG/DL
PROTHROMBIN TIME: 13.3 SEC (ref 11.9–14.6)
RBC # BLD AUTO: 1.92 M/UL (ref 3.8–5.2)
RBC # BLD AUTO: 2.17 M/UL (ref 3.8–5.2)
RBC #/AREA URNS HPF: ABNORMAL /HPF (ref 0–5)
RBC MORPH BLD: ABNORMAL
RSV RNA SPEC QL NAA+PROBE: NOT DETECTED
RV+EV RNA SPEC QL NAA+PROBE: NOT DETECTED
SAO2 % BLD: 70.8 %
SAO2 % BLD: 95.5 %
SAO2 % BLD: 96 % (ref 95–99)
SAO2% DEVICE SAO2% SENSOR NAME: ABNORMAL
SARS-COV-2 RNA RESP QL NAA+PROBE: NOT DETECTED
SODIUM SERPL-SCNC: 125 MMOL/L (ref 136–145)
SODIUM SERPL-SCNC: 126 MMOL/L (ref 136–145)
SODIUM SERPL-SCNC: 129 MMOL/L (ref 136–145)
SODIUM SERPL-SCNC: 133 MMOL/L (ref 136–145)
SODIUM UR-SCNC: 37 MMOL/L
SODIUM UR-SCNC: 40 MMOL/L
SP GR UR REFRACTOMETRY: 1 (ref 1–1.03)
SPECIMEN SITE: ABNORMAL
SPECIMEN TYPE: ABNORMAL
SPECIMEN TYPE: ABNORMAL
T4 FREE SERPL-MCNC: 1.2 NG/DL (ref 0.8–1.5)
TROPONIN I SERPL HS-MCNC: 39 NG/L (ref 0–51)
TROPONIN I SERPL HS-MCNC: 45 NG/L (ref 0–51)
TROPONIN I SERPL HS-MCNC: 48 NG/L (ref 0–51)
TSH SERPL DL<=0.05 MIU/L-ACNC: 0.17 UIU/ML (ref 0.36–3.74)
URINE CULTURE IF INDICATED: ABNORMAL
UROBILINOGEN UR QL STRIP.AUTO: 0.1 EU/DL (ref 0.1–1)
WBC # BLD AUTO: 5.6 K/UL (ref 3.6–11)
WBC # BLD AUTO: 5.8 K/UL (ref 3.6–11)
WBC URNS QL MICRO: ABNORMAL /HPF (ref 0–4)

## 2025-06-13 PROCEDURE — 87641 MR-STAPH DNA AMP PROBE: CPT

## 2025-06-13 PROCEDURE — 82962 GLUCOSE BLOOD TEST: CPT

## 2025-06-13 PROCEDURE — 93005 ELECTROCARDIOGRAM TRACING: CPT | Performed by: EMERGENCY MEDICINE

## 2025-06-13 PROCEDURE — 2500000003 HC RX 250 WO HCPCS: Performed by: EMERGENCY MEDICINE

## 2025-06-13 PROCEDURE — 80048 BASIC METABOLIC PNL TOTAL CA: CPT

## 2025-06-13 PROCEDURE — 99285 EMERGENCY DEPT VISIT HI MDM: CPT

## 2025-06-13 PROCEDURE — 85610 PROTHROMBIN TIME: CPT

## 2025-06-13 PROCEDURE — 82533 TOTAL CORTISOL: CPT

## 2025-06-13 PROCEDURE — 84439 ASSAY OF FREE THYROXINE: CPT

## 2025-06-13 PROCEDURE — 86900 BLOOD TYPING SEROLOGIC ABO: CPT

## 2025-06-13 PROCEDURE — 84484 ASSAY OF TROPONIN QUANT: CPT

## 2025-06-13 PROCEDURE — 83935 ASSAY OF URINE OSMOLALITY: CPT

## 2025-06-13 PROCEDURE — 87899 AGENT NOS ASSAY W/OPTIC: CPT

## 2025-06-13 PROCEDURE — 96361 HYDRATE IV INFUSION ADD-ON: CPT

## 2025-06-13 PROCEDURE — 86880 COOMBS TEST DIRECT: CPT

## 2025-06-13 PROCEDURE — 84443 ASSAY THYROID STIM HORMONE: CPT

## 2025-06-13 PROCEDURE — 6370000000 HC RX 637 (ALT 250 FOR IP): Performed by: NURSE PRACTITIONER

## 2025-06-13 PROCEDURE — 2700000000 HC OXYGEN THERAPY PER DAY

## 2025-06-13 PROCEDURE — 0202U NFCT DS 22 TRGT SARS-COV-2: CPT

## 2025-06-13 PROCEDURE — 5A09457 ASSISTANCE WITH RESPIRATORY VENTILATION, 24-96 CONSECUTIVE HOURS, CONTINUOUS POSITIVE AIRWAY PRESSURE: ICD-10-PCS | Performed by: NURSE PRACTITIONER

## 2025-06-13 PROCEDURE — 6360000002 HC RX W HCPCS: Performed by: NURSE PRACTITIONER

## 2025-06-13 PROCEDURE — 86921 COMPATIBILITY TEST INCUBATE: CPT

## 2025-06-13 PROCEDURE — 86922 COMPATIBILITY TEST ANTIGLOB: CPT

## 2025-06-13 PROCEDURE — 86870 RBC ANTIBODY IDENTIFICATION: CPT

## 2025-06-13 PROCEDURE — 36430 TRANSFUSION BLD/BLD COMPNT: CPT

## 2025-06-13 PROCEDURE — 81001 URINALYSIS AUTO W/SCOPE: CPT

## 2025-06-13 PROCEDURE — 82803 BLOOD GASES ANY COMBINATION: CPT

## 2025-06-13 PROCEDURE — 83036 HEMOGLOBIN GLYCOSYLATED A1C: CPT

## 2025-06-13 PROCEDURE — 94664 DEMO&/EVAL PT USE INHALER: CPT

## 2025-06-13 PROCEDURE — 83930 ASSAY OF BLOOD OSMOLALITY: CPT

## 2025-06-13 PROCEDURE — 36600 WITHDRAWAL OF ARTERIAL BLOOD: CPT

## 2025-06-13 PROCEDURE — 2000000000 HC ICU R&B

## 2025-06-13 PROCEDURE — 85025 COMPLETE CBC W/AUTO DIFF WBC: CPT

## 2025-06-13 PROCEDURE — 71045 X-RAY EXAM CHEST 1 VIEW: CPT

## 2025-06-13 PROCEDURE — 84481 FREE ASSAY (FT-3): CPT

## 2025-06-13 PROCEDURE — 86850 RBC ANTIBODY SCREEN: CPT

## 2025-06-13 PROCEDURE — 6360000002 HC RX W HCPCS

## 2025-06-13 PROCEDURE — 2500000003 HC RX 250 WO HCPCS: Performed by: NURSE PRACTITIONER

## 2025-06-13 PROCEDURE — 84300 ASSAY OF URINE SODIUM: CPT

## 2025-06-13 PROCEDURE — 83880 ASSAY OF NATRIURETIC PEPTIDE: CPT

## 2025-06-13 PROCEDURE — 93306 TTE W/DOPPLER COMPLETE: CPT

## 2025-06-13 PROCEDURE — 2580000003 HC RX 258: Performed by: EMERGENCY MEDICINE

## 2025-06-13 PROCEDURE — 6360000002 HC RX W HCPCS: Performed by: INTERNAL MEDICINE

## 2025-06-13 PROCEDURE — 30233N1 TRANSFUSION OF NONAUTOLOGOUS RED BLOOD CELLS INTO PERIPHERAL VEIN, PERCUTANEOUS APPROACH: ICD-10-PCS | Performed by: NURSE PRACTITIONER

## 2025-06-13 PROCEDURE — 86920 COMPATIBILITY TEST SPIN: CPT

## 2025-06-13 PROCEDURE — 6370000000 HC RX 637 (ALT 250 FOR IP): Performed by: EMERGENCY MEDICINE

## 2025-06-13 PROCEDURE — 94660 CPAP INITIATION&MGMT: CPT

## 2025-06-13 PROCEDURE — 96365 THER/PROPH/DIAG IV INF INIT: CPT

## 2025-06-13 PROCEDURE — 84295 ASSAY OF SERUM SODIUM: CPT

## 2025-06-13 PROCEDURE — 94640 AIRWAY INHALATION TREATMENT: CPT

## 2025-06-13 PROCEDURE — 83605 ASSAY OF LACTIC ACID: CPT

## 2025-06-13 PROCEDURE — 87449 NOS EACH ORGANISM AG IA: CPT

## 2025-06-13 PROCEDURE — 86901 BLOOD TYPING SEROLOGIC RH(D): CPT

## 2025-06-13 PROCEDURE — P9016 RBC LEUKOCYTES REDUCED: HCPCS

## 2025-06-13 PROCEDURE — 6370000000 HC RX 637 (ALT 250 FOR IP): Performed by: INTERNAL MEDICINE

## 2025-06-13 PROCEDURE — 6360000002 HC RX W HCPCS: Performed by: EMERGENCY MEDICINE

## 2025-06-13 PROCEDURE — 2580000003 HC RX 258: Performed by: INTERNAL MEDICINE

## 2025-06-13 PROCEDURE — 94761 N-INVAS EAR/PLS OXIMETRY MLT: CPT

## 2025-06-13 PROCEDURE — 80053 COMPREHEN METABOLIC PANEL: CPT

## 2025-06-13 PROCEDURE — 80307 DRUG TEST PRSMV CHEM ANLYZR: CPT

## 2025-06-13 PROCEDURE — 87040 BLOOD CULTURE FOR BACTERIA: CPT

## 2025-06-13 PROCEDURE — 84145 PROCALCITONIN (PCT): CPT

## 2025-06-13 PROCEDURE — 96375 TX/PRO/DX INJ NEW DRUG ADDON: CPT

## 2025-06-13 PROCEDURE — 83735 ASSAY OF MAGNESIUM: CPT

## 2025-06-13 RX ORDER — SODIUM CHLORIDE, SODIUM LACTATE, POTASSIUM CHLORIDE, AND CALCIUM CHLORIDE .6; .31; .03; .02 G/100ML; G/100ML; G/100ML; G/100ML
500 INJECTION, SOLUTION INTRAVENOUS ONCE
Status: DISCONTINUED | OUTPATIENT
Start: 2025-06-13 | End: 2025-06-23

## 2025-06-13 RX ORDER — 0.9 % SODIUM CHLORIDE 0.9 %
30 INTRAVENOUS SOLUTION INTRAVENOUS ONCE
Status: COMPLETED | OUTPATIENT
Start: 2025-06-13 | End: 2025-06-13

## 2025-06-13 RX ORDER — INSULIN GLARGINE 100 [IU]/ML
8 INJECTION, SOLUTION SUBCUTANEOUS DAILY
Status: DISCONTINUED | OUTPATIENT
Start: 2025-06-13 | End: 2025-06-13

## 2025-06-13 RX ORDER — ONDANSETRON 2 MG/ML
4 INJECTION INTRAMUSCULAR; INTRAVENOUS EVERY 6 HOURS PRN
Status: DISCONTINUED | OUTPATIENT
Start: 2025-06-13 | End: 2025-06-26 | Stop reason: HOSPADM

## 2025-06-13 RX ORDER — PANTOPRAZOLE SODIUM 40 MG/10ML
40 INJECTION, POWDER, LYOPHILIZED, FOR SOLUTION INTRAVENOUS 2 TIMES DAILY
Status: DISCONTINUED | OUTPATIENT
Start: 2025-06-13 | End: 2025-06-14

## 2025-06-13 RX ORDER — ONDANSETRON 4 MG/1
4 TABLET, FILM COATED ORAL EVERY 8 HOURS PRN
COMMUNITY

## 2025-06-13 RX ORDER — ASPIRIN 81 MG/1
81 TABLET ORAL DAILY
Status: DISCONTINUED | OUTPATIENT
Start: 2025-06-13 | End: 2025-06-26 | Stop reason: HOSPADM

## 2025-06-13 RX ORDER — IPRATROPIUM BROMIDE AND ALBUTEROL SULFATE 2.5; .5 MG/3ML; MG/3ML
1 SOLUTION RESPIRATORY (INHALATION) EVERY 4 HOURS PRN
Status: DISCONTINUED | OUTPATIENT
Start: 2025-06-13 | End: 2025-06-26 | Stop reason: HOSPADM

## 2025-06-13 RX ORDER — ALBUTEROL SULFATE 5 MG/ML
SOLUTION RESPIRATORY (INHALATION)
Status: DISPENSED
Start: 2025-06-13 | End: 2025-06-13

## 2025-06-13 RX ORDER — ATORVASTATIN CALCIUM 40 MG/1
40 TABLET, FILM COATED ORAL NIGHTLY
Status: DISCONTINUED | OUTPATIENT
Start: 2025-06-13 | End: 2025-06-26 | Stop reason: HOSPADM

## 2025-06-13 RX ORDER — LENALIDOMIDE 10 MG/1
10 CAPSULE ORAL WEEKLY
COMMUNITY

## 2025-06-13 RX ORDER — FOLIC ACID 1 MG/1
1 TABLET ORAL DAILY
Status: DISCONTINUED | OUTPATIENT
Start: 2025-06-14 | End: 2025-06-26 | Stop reason: HOSPADM

## 2025-06-13 RX ORDER — MONTELUKAST SODIUM 10 MG/1
10 TABLET ORAL NIGHTLY
Status: DISCONTINUED | OUTPATIENT
Start: 2025-06-13 | End: 2025-06-26 | Stop reason: HOSPADM

## 2025-06-13 RX ORDER — ACYCLOVIR 400 MG/1
400 TABLET ORAL 2 TIMES DAILY
COMMUNITY

## 2025-06-13 RX ORDER — ACETAMINOPHEN 325 MG/1
650 TABLET ORAL EVERY 6 HOURS PRN
Status: DISCONTINUED | OUTPATIENT
Start: 2025-06-13 | End: 2025-06-26 | Stop reason: HOSPADM

## 2025-06-13 RX ORDER — ALLOPURINOL 100 MG/1
100 TABLET ORAL DAILY
COMMUNITY

## 2025-06-13 RX ORDER — INSULIN GLARGINE 100 [IU]/ML
8 INJECTION, SOLUTION SUBCUTANEOUS 2 TIMES DAILY
Status: DISCONTINUED | OUTPATIENT
Start: 2025-06-14 | End: 2025-06-13

## 2025-06-13 RX ORDER — ACETAMINOPHEN 650 MG/1
650 SUPPOSITORY RECTAL EVERY 6 HOURS PRN
Status: DISCONTINUED | OUTPATIENT
Start: 2025-06-13 | End: 2025-06-26 | Stop reason: HOSPADM

## 2025-06-13 RX ORDER — SODIUM CHLORIDE 0.9 % (FLUSH) 0.9 %
5-40 SYRINGE (ML) INJECTION PRN
Status: DISCONTINUED | OUTPATIENT
Start: 2025-06-13 | End: 2025-06-26 | Stop reason: HOSPADM

## 2025-06-13 RX ORDER — METHYLPREDNISOLONE SODIUM SUCCINATE 125 MG/2ML
40 INJECTION INTRAMUSCULAR; INTRAVENOUS ONCE
Status: COMPLETED | OUTPATIENT
Start: 2025-06-13 | End: 2025-06-13

## 2025-06-13 RX ORDER — ALBUTEROL SULFATE 2.5 MG/.5ML
5 SOLUTION RESPIRATORY (INHALATION) ONCE
Status: COMPLETED | OUTPATIENT
Start: 2025-06-13 | End: 2025-06-13

## 2025-06-13 RX ORDER — DIPHENHYDRAMINE HCL 50 MG
50 CAPSULE ORAL WEEKLY
COMMUNITY

## 2025-06-13 RX ORDER — ENOXAPARIN SODIUM 100 MG/ML
30 INJECTION SUBCUTANEOUS DAILY
Status: DISCONTINUED | OUTPATIENT
Start: 2025-06-13 | End: 2025-06-13

## 2025-06-13 RX ORDER — SODIUM CHLORIDE 9 MG/ML
INJECTION, SOLUTION INTRAVENOUS CONTINUOUS
Status: DISCONTINUED | OUTPATIENT
Start: 2025-06-13 | End: 2025-06-14

## 2025-06-13 RX ORDER — LIDOCAINE 50 MG/G
1 PATCH TOPICAL DAILY
COMMUNITY

## 2025-06-13 RX ORDER — GABAPENTIN 100 MG/1
300 CAPSULE ORAL NIGHTLY
COMMUNITY

## 2025-06-13 RX ORDER — DEXAMETHASONE 4 MG/1
4 TABLET ORAL WEEKLY
COMMUNITY

## 2025-06-13 RX ORDER — FEXOFENADINE HCL 60 MG/1
60 TABLET, FILM COATED ORAL WEEKLY
COMMUNITY

## 2025-06-13 RX ORDER — AMLODIPINE BESYLATE 5 MG/1
10 TABLET ORAL DAILY
Status: DISCONTINUED | OUTPATIENT
Start: 2025-06-13 | End: 2025-06-14

## 2025-06-13 RX ORDER — INSULIN LISPRO 100 [IU]/ML
0-8 INJECTION, SOLUTION INTRAVENOUS; SUBCUTANEOUS EVERY 6 HOURS SCHEDULED
Status: DISCONTINUED | OUTPATIENT
Start: 2025-06-13 | End: 2025-06-16

## 2025-06-13 RX ORDER — SODIUM CHLORIDE 9 MG/ML
INJECTION, SOLUTION INTRAVENOUS PRN
Status: DISCONTINUED | OUTPATIENT
Start: 2025-06-13 | End: 2025-06-26 | Stop reason: HOSPADM

## 2025-06-13 RX ORDER — INSULIN GLARGINE 100 [IU]/ML
8 INJECTION, SOLUTION SUBCUTANEOUS 2 TIMES DAILY
Status: DISCONTINUED | OUTPATIENT
Start: 2025-06-13 | End: 2025-06-16

## 2025-06-13 RX ORDER — ONDANSETRON 4 MG/1
4 TABLET, ORALLY DISINTEGRATING ORAL EVERY 8 HOURS PRN
Status: DISCONTINUED | OUTPATIENT
Start: 2025-06-13 | End: 2025-06-26 | Stop reason: HOSPADM

## 2025-06-13 RX ORDER — FUROSEMIDE 10 MG/ML
40 INJECTION INTRAMUSCULAR; INTRAVENOUS ONCE
Status: COMPLETED | OUTPATIENT
Start: 2025-06-13 | End: 2025-06-13

## 2025-06-13 RX ORDER — SODIUM CHLORIDE 9 MG/ML
INJECTION, SOLUTION INTRAVENOUS PRN
Status: DISCONTINUED | OUTPATIENT
Start: 2025-06-13 | End: 2025-06-14

## 2025-06-13 RX ORDER — SODIUM CHLORIDE, SODIUM LACTATE, POTASSIUM CHLORIDE, CALCIUM CHLORIDE 600; 310; 30; 20 MG/100ML; MG/100ML; MG/100ML; MG/100ML
INJECTION, SOLUTION INTRAVENOUS CONTINUOUS
Status: DISCONTINUED | OUTPATIENT
Start: 2025-06-13 | End: 2025-06-13

## 2025-06-13 RX ORDER — GLUCAGON 1 MG/ML
1 KIT INJECTION PRN
Status: DISCONTINUED | OUTPATIENT
Start: 2025-06-13 | End: 2025-06-26 | Stop reason: HOSPADM

## 2025-06-13 RX ORDER — ACETAMINOPHEN 325 MG/1
650 TABLET ORAL WEEKLY
COMMUNITY

## 2025-06-13 RX ORDER — METHYLPREDNISOLONE SODIUM SUCCINATE 40 MG/ML
40 INJECTION INTRAMUSCULAR; INTRAVENOUS EVERY 12 HOURS
Status: DISCONTINUED | OUTPATIENT
Start: 2025-06-13 | End: 2025-06-15

## 2025-06-13 RX ORDER — POLYETHYLENE GLYCOL 3350 17 G/17G
17 POWDER, FOR SOLUTION ORAL DAILY PRN
Status: DISCONTINUED | OUTPATIENT
Start: 2025-06-13 | End: 2025-06-21

## 2025-06-13 RX ORDER — DEXTROSE MONOHYDRATE 100 MG/ML
INJECTION, SOLUTION INTRAVENOUS CONTINUOUS PRN
Status: DISCONTINUED | OUTPATIENT
Start: 2025-06-13 | End: 2025-06-26 | Stop reason: HOSPADM

## 2025-06-13 RX ORDER — ALBUTEROL SULFATE 2.5 MG/.5ML
2.5 SOLUTION RESPIRATORY (INHALATION)
Status: DISCONTINUED | OUTPATIENT
Start: 2025-06-13 | End: 2025-06-13

## 2025-06-13 RX ORDER — IPRATROPIUM BROMIDE AND ALBUTEROL SULFATE 2.5; .5 MG/3ML; MG/3ML
1 SOLUTION RESPIRATORY (INHALATION)
Status: DISCONTINUED | OUTPATIENT
Start: 2025-06-13 | End: 2025-06-23

## 2025-06-13 RX ORDER — BUDESONIDE 0.5 MG/2ML
0.5 INHALANT ORAL
Status: DISCONTINUED | OUTPATIENT
Start: 2025-06-13 | End: 2025-06-26 | Stop reason: HOSPADM

## 2025-06-13 RX ORDER — SODIUM CHLORIDE 0.9 % (FLUSH) 0.9 %
5-40 SYRINGE (ML) INJECTION EVERY 12 HOURS SCHEDULED
Status: DISCONTINUED | OUTPATIENT
Start: 2025-06-13 | End: 2025-06-26 | Stop reason: HOSPADM

## 2025-06-13 RX ADMIN — PANTOPRAZOLE SODIUM 40 MG: 40 INJECTION, POWDER, FOR SOLUTION INTRAVENOUS at 20:50

## 2025-06-13 RX ADMIN — BUDESONIDE INHALATION 500 MCG: 0.5 SUSPENSION RESPIRATORY (INHALATION) at 19:24

## 2025-06-13 RX ADMIN — INSULIN GLARGINE 8 UNITS: 100 INJECTION, SOLUTION SUBCUTANEOUS at 09:31

## 2025-06-13 RX ADMIN — INSULIN LISPRO 6 UNITS: 100 INJECTION, SOLUTION INTRAVENOUS; SUBCUTANEOUS at 12:14

## 2025-06-13 RX ADMIN — IPRATROPIUM BROMIDE AND ALBUTEROL SULFATE 1 DOSE: 2.5; .5 SOLUTION RESPIRATORY (INHALATION) at 08:15

## 2025-06-13 RX ADMIN — SODIUM CHLORIDE, PRESERVATIVE FREE 10 ML: 5 INJECTION INTRAVENOUS at 11:00

## 2025-06-13 RX ADMIN — METHYLPREDNISOLONE SODIUM SUCCINATE 40 MG: 125 INJECTION, POWDER, LYOPHILIZED, FOR SOLUTION INTRAMUSCULAR; INTRAVENOUS at 02:18

## 2025-06-13 RX ADMIN — PANTOPRAZOLE SODIUM 40 MG: 40 INJECTION, POWDER, FOR SOLUTION INTRAVENOUS at 08:10

## 2025-06-13 RX ADMIN — MONTELUKAST 10 MG: 10 TABLET, FILM COATED ORAL at 20:50

## 2025-06-13 RX ADMIN — INSULIN GLARGINE 8 UNITS: 100 INJECTION, SOLUTION SUBCUTANEOUS at 21:48

## 2025-06-13 RX ADMIN — BUDESONIDE INHALATION 500 MCG: 0.5 SUSPENSION RESPIRATORY (INHALATION) at 08:15

## 2025-06-13 RX ADMIN — CEFTRIAXONE SODIUM 1000 MG: 1 INJECTION, POWDER, FOR SOLUTION INTRAMUSCULAR; INTRAVENOUS at 02:41

## 2025-06-13 RX ADMIN — METHYLPREDNISOLONE SODIUM SUCCINATE 80 MG: 125 INJECTION INTRAMUSCULAR; INTRAVENOUS at 06:45

## 2025-06-13 RX ADMIN — ATORVASTATIN CALCIUM 40 MG: 40 TABLET, FILM COATED ORAL at 20:50

## 2025-06-13 RX ADMIN — IPRATROPIUM BROMIDE AND ALBUTEROL SULFATE 1 DOSE: 2.5; .5 SOLUTION RESPIRATORY (INHALATION) at 02:10

## 2025-06-13 RX ADMIN — ALBUTEROL SULFATE 5 MG: 2.5 SOLUTION RESPIRATORY (INHALATION) at 02:09

## 2025-06-13 RX ADMIN — IRON SUCROSE 200 MG: 20 INJECTION, SOLUTION INTRAVENOUS at 12:41

## 2025-06-13 RX ADMIN — IPRATROPIUM BROMIDE AND ALBUTEROL SULFATE 1 DOSE: 2.5; .5 SOLUTION RESPIRATORY (INHALATION) at 19:24

## 2025-06-13 RX ADMIN — SODIUM CHLORIDE, PRESERVATIVE FREE 10 ML: 5 INJECTION INTRAVENOUS at 20:51

## 2025-06-13 RX ADMIN — METHYLPREDNISOLONE SODIUM SUCCINATE 40 MG: 40 INJECTION INTRAMUSCULAR; INTRAVENOUS at 12:10

## 2025-06-13 RX ADMIN — AZITHROMYCIN MONOHYDRATE 500 MG: 500 INJECTION, POWDER, LYOPHILIZED, FOR SOLUTION INTRAVENOUS at 02:50

## 2025-06-13 RX ADMIN — SODIUM CHLORIDE 1470 ML: 0.9 INJECTION, SOLUTION INTRAVENOUS at 02:52

## 2025-06-13 RX ADMIN — IPRATROPIUM BROMIDE AND ALBUTEROL SULFATE 1 DOSE: 2.5; .5 SOLUTION RESPIRATORY (INHALATION) at 14:25

## 2025-06-13 RX ADMIN — INSULIN LISPRO 2 UNITS: 100 INJECTION, SOLUTION INTRAVENOUS; SUBCUTANEOUS at 16:51

## 2025-06-13 RX ADMIN — FUROSEMIDE 40 MG: 10 INJECTION, SOLUTION INTRAMUSCULAR; INTRAVENOUS at 10:53

## 2025-06-13 RX ADMIN — INSULIN LISPRO 8 UNITS: 100 INJECTION, SOLUTION INTRAVENOUS; SUBCUTANEOUS at 08:09

## 2025-06-13 RX ADMIN — SODIUM CHLORIDE: 0.9 INJECTION, SOLUTION INTRAVENOUS at 09:34

## 2025-06-13 ASSESSMENT — PAIN SCALES - GENERAL
PAINLEVEL_OUTOF10: 0

## 2025-06-13 NOTE — CARE COORDINATION
06/13/25 1319   Service Assessment   Patient Orientation Alert and Oriented   Cognition Alert   History Provided By Child/Family   Primary Caregiver Family   Support Systems Children;Family Members;Friends/Neighbors   Patient's Healthcare Decision Maker is: Legal Next of Kin   PCP Verified by CM Yes   Last Visit to PCP Within last 3 months   Prior Functional Level Assistance with the following:;Bathing;Dressing;Toileting   Current Functional Level Assistance with the following:;Bathing;Dressing;Toileting   Can patient return to prior living arrangement Unknown at present   Ability to make needs known: Fair   Family able to assist with home care needs: Yes   Would you like for me to discuss the discharge plan with any other family members/significant others, and if so, who? Yes   Financial Resources None   Community Resources None   Social/Functional History   Lives With Daughter     Met f/f with Pt daughter, she confirmed that the information on the face sheet is correct. Pt daughter stated that Pt lives with her.     No HH, has a cane and 02 PRN, supplied by [employer], Pt daughter assist Pt with ADL.    Send RX to [email or web address] in [place]    Family will transport Pt home when D/C.    CK dispo: TBD    Advance Care Planning     General Advance Care Planning (ACP) Conversation    Date of Conversation: 6/13/2025      Healthcare Decision Maker:   Primary Decision Maker: Sharon Hogue - Child - [phone]       Content/Action Overview:    Reviewed DNR/DNI and patient elects Full Code (Attempt Resuscitation)

## 2025-06-13 NOTE — ED PROVIDER NOTES
[facility] EMERGENCY DEPT  EMERGENCY DEPARTMENT HISTORY AND PHYSICAL EXAM      Date of evaluation: 6/13/2025  Patient Name: Lizbeth Hogue  Birthdate 1955  MRN: [id number]  ED Provider: Anson Zayas DO   Note Started: 3:16 AM EDT 6/13/25    HISTORY OF PRESENT ILLNESS     Chief Complaint   Patient presents with    Shortness of Breath       History Provided By: Patient, only     HPI:   Patient is a 70-year-old female with history of COPD, never smoker, multiple myeloma on chemotherapy presenting for shortness of breath/respiratory distress.  Per daughter and per EMS patient has been sick for the last several weeks, patient is a poor historian regarding her onset of symptoms but daughter says tonight seem to be getting much worse, was getting readings as low as 52% on home pulse ox.  Is prescribed to wear 2 L nasal cannula at night and otherwise as needed.    Upon daughter's arrival to ED she provides further history that patient has been treated for multiple myeloma had chemotherapy last week.  She typically wears 2 L just as needed and at night.  Was seen a few days ago for the same although she was not as bad then daughter says.  History limited due to respiratory distress.          PAST MEDICAL HISTORY   Past Medical History:  Past Medical History:   Diagnosis Date    Arthritis     Asthma     Chronic kidney disease     Chronic obstructive pulmonary disease (HCC)     Diabetes (HCC)     Hypertension     Sleep apnea     no cpap       Past Surgical History:  Past Surgical History:   Procedure Laterality Date    OTHER SURGICAL HISTORY Bilateral     Eye surgery    WRIST SURGERY Left     pins and screws       Family History:  Family History   Problem Relation Age of Onset    Diabetes Mother     Hypertension Father     Diabetes Father     Hypertension Mother        Social History:  Social History     Tobacco Use    Smoking status: Never    Smokeless tobacco: Never   Substance Use Topics    Alcohol use: Never    Drug use:  respiratory distress present.      Breath sounds: Examination of the right-middle field reveals wheezing. Examination of the left-middle field reveals wheezing. Examination of the right-lower field reveals wheezing. Examination of the left-lower field reveals wheezing. Decreased breath sounds and wheezing present. No rales.      Comments: Diffuse expiratory wheezes and crackles  Abdominal:      General: Abdomen is flat. There is no distension.      Tenderness: There is no abdominal tenderness. There is no rebound.   Musculoskeletal:         General: No swelling or tenderness. Normal range of motion.      Cervical back: Normal range of motion and neck supple. No tenderness.      Right lower leg: Edema present.      Left lower leg: Edema present.      Comments: Trace to 1+ pedal edema   Skin:     General: Skin is warm and dry.      Capillary Refill: Capillary refill takes less than 2 seconds.      Findings: No rash.   Neurological:      General: No focal deficit present.      Mental Status: She is alert.      Cranial Nerves: No cranial nerve deficit.      Sensory: No sensory deficit.      Motor: No weakness.         SCREENINGS                No data recorded       LAB, EKG AND DIAGNOSTIC RESULTS   Labs:  Recent Results (from the past 36 hours)   Basic Metabolic Panel    Collection Time: 06/14/25  3:10 AM   Result Value Ref Range    Sodium 135 (L) 136 - 145 mmol/L    Potassium 4.6 3.5 - 5.1 mmol/L    Chloride 103 97 - 108 mmol/L    CO2 23 21 - 32 mmol/L    Anion Gap 9 2 - 12 mmol/L    Glucose 175 (H) 65 - 100 mg/dL    BUN 23 (H) 6 - 20 mg/dL    Creatinine 1.63 (H) 0.55 - 1.02 mg/dL    BUN/Creatinine Ratio 14 12 - 20      Est, Glom Filt Rate 34 (L) >60 ml/min/1.73m2    Calcium 8.1 (L) 8.5 - 10.1 mg/dL   Hemoglobin A1c    Collection Time: 06/14/25  3:10 AM   Result Value Ref Range    Hemoglobin A1C 6.8 (H) 4.0 - 5.6 %    Estimated Avg Glucose 148 mg/dL   CBC with Auto Differential    Collection Time: 06/14/25  3:10 AM

## 2025-06-13 NOTE — PLAN OF CARE
Problem: Chronic Conditions and Co-morbidities  Goal: Patient's chronic conditions and co-morbidity symptoms are monitored and maintained or improved  Outcome: Progressing  Flowsheets (Taken 6/13/2025 0800)  Care Plan - Patient's Chronic Conditions and Co-Morbidity Symptoms are Monitored and Maintained or Improved:   Monitor and assess patient's chronic conditions and comorbid symptoms for stability, deterioration, or improvement   Collaborate with multidisciplinary team to address chronic and comorbid conditions and prevent exacerbation or deterioration   Update acute care plan with appropriate goals if chronic or comorbid symptoms are exacerbated and prevent overall improvement and discharge     Problem: Discharge Planning  Goal: Discharge to home or other facility with appropriate resources  Outcome: Progressing  Flowsheets (Taken 6/13/2025 0800)  Discharge to home or other facility with appropriate resources:   Identify barriers to discharge with patient and caregiver   Arrange for needed discharge resources and transportation as appropriate   Refer to discharge planning if patient needs post-hospital services based on physician order or complex needs related to functional status, cognitive ability or social support system     Problem: Respiratory - Adult  Goal: Achieves optimal ventilation and oxygenation  Outcome: Progressing  Flowsheets (Taken 6/13/2025 0800)  Achieves optimal ventilation and oxygenation:   Oxygen supplementation based on oxygen saturation or arterial blood gases   Respiratory therapy support as indicated     Problem: Cardiovascular - Adult  Goal: Maintains optimal cardiac output and hemodynamic stability  Outcome: Progressing  Flowsheets (Taken 6/13/2025 0800)  Maintains optimal cardiac output and hemodynamic stability:   Monitor blood pressure and heart rate   Monitor urine output and notify Licensed Independent Practitioner for values outside of normal range  Goal: Absence of cardiac  dysrhythmias or at baseline  Outcome: Progressing  Flowsheets (Taken 6/13/2025 0800)  Absence of cardiac dysrhythmias or at baseline:   Monitor cardiac rate and rhythm   Assess for signs of decreased cardiac output     Problem: Infection - Adult  Goal: Absence of infection at discharge  Outcome: Progressing

## 2025-06-13 NOTE — H&P
CRITICAL CARE ADMISSION NOTE    Name: Lizbeth Hogue   : 1955   MRN: [id number]   Date: 2025        ICU PROBLEM LIST   Acute on chronic hypercapnic/hypoxic respiratory failure  AECOPD  CAP  Acute on Chronic Anemia   Melena   Acute metabolic encephalopathy    HISTORY OF PRESENT ILLNESS:   Lizbeth Hogue is a 70 y.o. with a PMH of COPD, CKD, T2DM, HTN, NISH and Multiple Myeloma on chemotherapy who presented to [facility] ED on  with SOB. Per daughter at bedside she states that patient has been not feeling well over the past couple of weeks.  She was supposed to have chemotherapy this past Wednesday but treatment was canceled secondary to dehydration.  Today she that her mother had increased work of breathing with productive cough prompting her to check her SpO2 which read 52% on 2 L nasal cannula.  She arrives to the ER afebrile, nontachycardic, hypertensive (153/53) and hypoxic.  Patient quickly placed on high flow at 15 L.  ABG obtained on high flow demonstrating respiratory acidosis (pH 7.29, CO2 50).  Laboratory workup demonstrating dehydration & anemia with hemoglobin 6.3.  Daughter reports episode of dark black stool/diarrhea this evening PTA .1 unit PRBCs ordered, patient initiated on BiPAP and ICU consulted for admission.     NEUROLOGICAL:    Acute Metabolic Encephalopathy   -In setting of hypercarbia  -Send TSH, UDS  -Neuro checks per protocol     PULMONOLOGY:   Acute on Chronic Hypercapnic/Hypoxic Respiratory Failure   AECOPD/CAP  NISH, non-compliant with CPAP  - Trial BiPAP  - Systemic/ICS  - ABX as below  - Repeat ABG in 1 hour if mentation unimproved  - Baseline home O2 2L    CARDIOVASCULAR:   Hx HTN  -PTA Norvasc & ASA-on hold with acute anemia and low normal blood pressure  -Resume statin  -Initial trop neg, trend   -TTE (12/10/2022) LVEF 60-65%, mildly elevated RVSP - repeat     GASTROINTESTINAL:   ? UGIB, Melena   -Daughter reports melena  -PPI  -GI consult   -NPO while on BiPAP  Refill: Capillary refill takes 2 to 3 seconds.   Neurological:      General: No focal deficit present.           Past Medical History:      has a past medical history of Arthritis, Asthma, Chronic kidney disease, Chronic obstructive pulmonary disease (HCC), Diabetes (HCC), Hypertension, and Sleep apnea.    Past Surgical History:      has a past surgical history that includes other surgical history (Bilateral) and Wrist surgery (Left).    Home Medications:     Prior to Admission medications    Medication Sig Start Date End Date Taking? Authorizing Provider   predniSONE (DELTASONE) 20 MG tablet Take 1 tablet by mouth daily for 10 days 6/8/25 6/18/25  Alexis Grier MD   fluticasone (FLONASE) 50 MCG/ACT nasal spray 2 sprays by Each Nostril route daily 5/30/25   Joe Graham PA-C   famotidine (PEPCID) 40 MG tablet Take 1 tablet by mouth daily 5/20/25   Saba Bates MD   albuterol sulfate HFA (PROVENTIL;VENTOLIN;PROAIR) 108 (90 Base) MCG/ACT inhaler Inhale into the lungs    Automatic Reconciliation, Ar   amLODIPine (NORVASC) 10 MG tablet Take 1 tablet by mouth daily    Automatic Reconciliation, Ar   aspirin 81 MG EC tablet Take 1 tablet by mouth daily    Automatic Reconciliation, Ar   atorvastatin (LIPITOR) 40 MG tablet Take 1 tablet by mouth daily    Automatic Reconciliation, Ar   ergocalciferol (ERGOCALCIFEROL) 1.25 MG (59012 UT) capsule Take 1 capsule by mouth    Automatic Reconciliation, Ar   ezetimibe (ZETIA) 10 MG tablet Take by mouth    Automatic Reconciliation, Ar   folic acid (FOLVITE) 1 MG tablet Take 1 tablet by mouth daily    Automatic Reconciliation, Ar   methotrexate (RHEUMATREX) 2.5 MG chemo tablet Take 5 tablets by mouth    Automatic Reconciliation, Ar   montelukast (SINGULAIR) 10 MG tablet Take 1 tablet by mouth daily    Automatic Reconciliation, Ar   SITagliptin (JANUVIA) 50 MG tablet Take 1 tablet by mouth daily    Automatic Reconciliation, Ar       Allergies/Social/Family History:      Allergies   Allergen Reactions    Lisinopril Swelling     Lip swelling    Pineapple Swelling     Lip swelling      Social History     Tobacco Use    Smoking status: Never    Smokeless tobacco: Never   Substance Use Topics    Alcohol use: Never      Family History   Problem Relation Age of Onset    Diabetes Mother     Hypertension Father     Diabetes Father     Hypertension Mother          OBJECTIVE:     Labs and Data: Reviewed 25  Medications: Reviewed 25  Imaging: Reviewed 25    Physical Exam     BP (!) 111/42   Pulse 78   Temp 97.8 °F (36.6 °C) (Oral)   Resp 12   SpO2 100%      Temp (24hrs), Av.8 °F (36.6 °C), Min:97.8 °F (36.6 °C), Max:97.8 °F (36.6 °C)           Intake/Output:   No intake or output data in the 24 hours ending 25 0506    Imaging    No valid procedures specified.         CRITICAL CARE DOCUMENTATION  I had a face to face encounter with the patient, reviewed and interpreted patient data including clinical events, labs, images, vital signs, I/O's, and examined patient.  I have discussed the case and the plan and management of the patient's care with the consulting services, the bedside nurses and the respiratory therapist.      NOTE OF PERSONAL INVOLVEMENT IN CARE   This patient has a high probability of imminent, clinically significant deterioration, which requires the highest level of preparedness to intervene urgently. I participated in the decision-making and personally managed or directed the management of the following life and organ supporting interventions that required my frequent assessment to treat or prevent imminent deterioration.    I personally spent 60 minutes of critical care time.  This is time spent at this critically ill patient's bedside actively involved in patient care as well as the coordination of care.  This does not include any procedural time which has been billed separately.    Gagan Lala, ELANA - [facility] Critical [facility]  2025

## 2025-06-13 NOTE — PROGRESS NOTES
Patient placed n HFNC for work of breathing. Told by the family that she does not tolerate NIV.    Post interventions patient report a relief with therapies given.        06/13/25 0225   Treatment   Breath Sounds Post-Tx ANGY Expiratory wheezes   Breath Sounds Post-Tx LLL Expiratory wheezes   Breath Sounds Post-Tx RUL Expiratory wheezes   Breath Sounds Post-Tx RML Expiratory wheezes   Breath Sounds Post-Tx RLL Expiratory wheezes   Is patient on O2? Y   Breath Sounds   Respiratory Pattern Tachypneic   Upper Airway Sounds Coarse   Breath Sounds Bilateral Expiratory wheezing   Oxygen Therapy/Pulse Ox   O2 Therapy Oxygen humidified   $Oxygen $Daily Charge   O2 Device High flow nasal cannula   O2 Flow Rate (L/min) 15 L/min   FiO2  50 %   Pulse 72   Respirations 22   SpO2 91 %   Pulse Oximeter Device Mode Intermittent   Pulse Oximeter Device Location Right   Oximetry Probe Site Changed No   $Pulse Oximeter $Spot check (multiple/continuous)   RT Misc Charges   $RT Education $HHN/MDI/IPPB Demo or Eval

## 2025-06-13 NOTE — PROGRESS NOTES
Patient is currently on BIPAP therapy which she is tolerating without any problems.  She will get a unit of blood once the blood has been verified by [facility] due to antibodies in the blood.  She has not had any stools on this shift.      Four eyes skin assessment:  Patient has ?bruising on her back and arms(small darkened areas) and a small pinhead sized broken area on right thigh; which is red in color.     1700-Blood bank was called earlier and told that Dr. Mcneill said that blood could be administered even though antibodies are present.  He stated that patient is on Darzolak and antibodies willl be present in the blood.  Blood bank technician stated that a pathologist from [facility] would have to release the blood.  So at this time; still waiting for [facility] to find compatible blood.  Dr. Stock was notified.

## 2025-06-13 NOTE — ED TRIAGE NOTES
Pt arrived via EMS from home. Pt has hx of COPD. She has been having SOB all day. Pt was 94% on 4L at home. Pt received Bts, IV mag en route.

## 2025-06-13 NOTE — CONSULTS
Nephrology Consultation          Patient: Lizbeth Hogue MRN: [id number]  SSN: [id number]    YOB: 1955  Age: 70 y.o.  Sex: female      Subjective:   Reason for the consultation.  Chronic kidney disease stage III and hyponatremia  History of present illness.  The patient is 70-year-old woman with underlying history of COPD, obstructive sleep apnea, multiple myeloma, chronic kidney disease stage III, diabetes mellitus type 2, hypertension presented to the ER for not feeling well and worsening shortness of breath.  She was diagnosed with acute hypoxic respiratory failure and put on BiPAP support.  Initial chemistry showed creatinine 1.53 and also sodium 125 which prompted nephrology consultation.  She received fluid boluses 2 L so far and continued on maintenance IV fluids.  Chest x-ray no edema.  Minimal bilateral lower extremity swelling.  Hemodynamically stable and not on any pressor support.    Past Medical History:   Diagnosis Date    Arthritis     Asthma     Chronic kidney disease     Chronic obstructive pulmonary disease (HCC)     Diabetes (HCC)     Hypertension     Sleep apnea     no cpap     Past Surgical History:   Procedure Laterality Date    OTHER SURGICAL HISTORY Bilateral     Eye surgery    WRIST SURGERY Left     pins and screws      Family History   Problem Relation Age of Onset    Diabetes Mother     Hypertension Father     Diabetes Father     Hypertension Mother      Social History     Tobacco Use    Smoking status: Never    Smokeless tobacco: Never   Substance Use Topics    Alcohol use: Never      Current Facility-Administered Medications   Medication Dose Route Frequency Provider Last Rate Last Admin    ipratropium 0.5 mg-albuterol 2.5 mg (DUONEB) nebulizer solution 1 Dose  1 Dose Inhalation TID RT Anson Zayas, DO   1 Dose at 06/13/25 1425    ipratropium 0.5 mg-albuterol 2.5 mg (DUONEB) nebulizer solution 1 Dose  1 Dose Inhalation Q4H PRN Anson Zayas, DO   1 Dose at  NAD  Eyes: sclera anicteric  Oral Cavity: No thrush or ulcers  Neck: no JVD  Chest: Fair bilateral air entry  Heart: normal sounds  Abdomen: soft and non tender   : no morales  Lower Extremities: no edema  Skin: no rash  Neuro: intact  Psychiatric: non-depressed          Assessment/Plan:   Hyponatremia  Of moderate severity  Sodium 125  Etiology can be volume depletion on underlying SIADH  Urine sodium 37  Urine osmolality is pending  On gentle IV hydration  Free water restriction 1200 cc per 24 hours  On no thiazide diuretics or SSRIs    Chronic kidney disease stage III  Creatinine seems to be at baseline at 1.53  Clinically euvolemic  Osseo urinalysis  Will check UPCR  On gentle IV hydration    Hypertension  Blood pressures are soft to normal  Amlodipine on hold  On no other blood pressure medications    Acute hypoxic respiratory failure  COPD exacerbation  No evidence of fluid overload  Preserved LVEF  On IV antibiotics  Diuresis as needed  On BiPAP support    Anemia  Severe  Possible lower GI bleed  History of multiple myeloma  Hematology on board  GI on board  Received blood transfusion    Thank you for the consultation.        Plan:       Signed By: Tee Blue MD     June 13, 2025

## 2025-06-13 NOTE — PROGRESS NOTES
I talk to Dr. Blue for Nephro, I call the office for Hemo, I perfect service Dr. Jacobson for Gi consult

## 2025-06-13 NOTE — CONSENT
Informed Consent for Blood Component Transfusion Note    I have discussed with the daughter the rationale for blood component transfusion; its benefits in treating or preventing fatigue, organ damage, or death; and its risk which includes mild transfusion reactions, rare risk of blood borne infection, or more serious but rare reactions. I have discussed the alternatives to transfusion, including the risk and consequences of not receiving transfusion. The patient and daughter had an opportunity to ask questions and had agreed to proceed with transfusion of blood components.    Electronically signed by Anson Zayas DO on 6/13/25 at 5:09 AM EDT

## 2025-06-13 NOTE — PROGRESS NOTES
Spiritual Health History and Assessment/Progress Note  [facility]    Attempted Encounter,  ,  ,      Name: Lizbeth Hogue MRN: [id number]    Age: 70 y.o.     Sex: female   Language: English   Judaism: Adventist   Acute on chronic respiratory failure with hypoxemia (HCC)     Date: 6/13/2025            Total Time Calculated: 10 min              Spiritual Assessment began in [facility] [place] ICU        Referral/Consult From: Rounding   Encounter Overview/Reason: Attempted Encounter  Service Provided For: Patient    Kylee, Belief, Meaning:   Patient unable to assess at this time  Family/Friends No family/friends present      Importance and Influence:  Patient unable to assess at this time  Family/Friends No family/friends present    Community:  Patient Other:    Family/Friends No family/friends present    Assessment and Plan of Care:   This  in to visit this Patient at this time. Patient is being cared for by medical team. Listened attentively. Maintained ministry of presence.  to follow up.    Patient Interventions include: Other:    Family/Friends Interventions include: No family/friends present    Patient Plan of Care: Other:    Family/Friends Plan of Care: No family/friends present    Electronically signed by Marycruz Martinin Intern on 6/13/2025 at 3:40 PM

## 2025-06-13 NOTE — CONSULTS
Hematology/Oncology Consult    Patient: Lizbeth Hogue MRN: [id number]     YOB: 1955  Age: 70 y.o.  Sex: female      HPI      Lizbeth Hogue is a 70 y.o. female who is being seen for multiple myeloma.    Patient presented to the ER yesterday with a chief complaint of shortness of breath.  Family stated the patient is not been feeling well over the last couple weeksof chemotherapy this past Wednesday treatment was canceled due to dehydration.  Patient's daughter stated her mother had increased work of breathing along with productive cough and she checked the SpO2 which read 52% on 2 L nasal cannula prompting ER visit.  On ER evaluation patient was found to be hypertensive and hypoxic requiring high flow nasal cannula.  ABG demonstrated respiratory acidosis, workup demonstrated dehydration with anemia and patient was admitted to ICU.  Daughter reports episodes of dark black stool.    Patient follows with Dr. Diaz for multiple myeloma.  Patient is on therapy with D-RVD.  Of note patient's myeloma PET scan had a colon mass and she was supposed to see GI in August, GI already consulted here    Past Medical History:   Diagnosis Date    Arthritis     Asthma     Chronic kidney disease     Chronic obstructive pulmonary disease (HCC)     Diabetes (HCC)     Hypertension     Sleep apnea     no cpap     Past Surgical History:   Procedure Laterality Date    OTHER SURGICAL HISTORY Bilateral     Eye surgery    WRIST SURGERY Left     pins and screws      Family History   Problem Relation Age of Onset    Diabetes Mother     Hypertension Father     Diabetes Father     Hypertension Mother      Social History     Tobacco Use    Smoking status: Never    Smokeless tobacco: Never   Substance Use Topics    Alcohol use: Never      Current Facility-Administered Medications   Medication Dose Route Frequency Provider Last Rate Last Admin    ipratropium 0.5 mg-albuterol 2.5 mg (DUONEB) nebulizer solution 1 Dose  1  Collection Time: 06/13/25 10:03 AM   Result Value Ref Range    Fractional Shortening 2D 38 28 - 44 %    LV RWT Ratio 0.35     LV Mass 2D 169.0 (A) 67 - 162 g    MV E/A 1.31     E/E' Ratio (Averaged) 14.64     E/E' Lateral 13.29     E/E' Septal 15.98     LA/AO Root Ratio 1.10     AV Velocity Ratio 0.46     LVOT:AV VTI Index 0.41     MV:LVOT VTI Index 2.15     Est. RA Pressure 8 mmHg    RVSP 29 mmHg    LV EDV A2C 84 mL    LV EDV A4C 100 mL    LV ESV A2C 34 mL    LV ESV A4C 30 mL    IVSd 0.9 0.6 - 0.9 cm    LVIDd 5.2 3.9 - 5.3 cm    LVIDs 3.2 cm    LVOT Diameter 1.9 cm    LVOT Mean Gradient 2 mmHg    LVOT VTI 23.4 cm    LVOT Peak Velocity 1.1 m/s    LVOT Peak Gradient 4 mmHg    LVPWd 0.9 0.6 - 0.9 cm    LV E' Lateral Velocity 9.63 cm/s    LV E' Septal Velocity 8.01 cm/s    LV Ejection Fraction A2C 59 %    LV Ejection Fraction A4C 69 %    EF BP 64 55 - 100 %    LVOT Area 2.8 cm2    LVOT SV 66.0 ml    LA Major Axis 4.7 cm    LA Area 4C 14.1 cm2    LA Volume MOD A4C 36 22 - 52 mL    LA Diameter 4.3 cm    RA Area 4C 15.4 cm2    RA Volume 38 ml    AR .0 ms    AR Max Velocity PISA 3.7 m/s    AV Peak Velocity 2.4 m/s    AV Peak Gradient 23 mmHg    AV Mean Gradient 14 mmHg    AV VTI 57.4 cm    AV Mean Velocity 1.8 m/s    AV Area by VTI 1.2 cm2    AV Area by Peak Velocity 1.2 cm2    Aortic Root 3.9 cm    Ascending Aorta 3.0 cm    MR Peak Velocity 3.2 m/s    MR Peak Gradient 42 mmHg    MV Max Velocity 1.5 m/s    MV Peak Gradient 9 mmHg    MV E Wave Deceleration Time 218.0 ms    MV A Velocity 0.98 m/s    MV E Velocity 1.28 m/s    MV Mean Gradient 6 mmHg    MV VTI 50.3 cm    MV Mean Velocity 1.1 m/s    MV Area by PHT 3.4 cm2    MV Area by VTI 1.3 cm2    LA Max Velocity 1.8 m/s    Pulmonary Artery EDP 13 mmHg    PV AT 92.0 ms    PV Mean Gradient 3 mmHg    PV VTI 25.5 cm    PV Mean Velocity 0.8 m/s    PV Max Velocity 1.0 m/s    PV Peak Gradient 4 mmHg    RV Basal Dimension 4.1 cm    RV Free Wall Peak S' 10.8 cm/s    TAPSE 2.9

## 2025-06-14 LAB
ABO + RH BLD: NORMAL
ANION GAP SERPL CALC-SCNC: 7 MMOL/L (ref 2–12)
ANION GAP SERPL CALC-SCNC: 9 MMOL/L (ref 2–12)
ARTERIAL PATENCY WRIST A: YES
BASE DEFICIT BLDA-SCNC: 0.1 MMOL/L
BASOPHILS # BLD: 0 K/UL (ref 0–0.1)
BASOPHILS NFR BLD: 0 % (ref 0–1)
BDY SITE: ABNORMAL
BLD PROD TYP BPU: NORMAL
BLOOD BANK BLOOD PRODUCT EXPIRATION DATE: NORMAL
BLOOD BANK DISPENSE STATUS: NORMAL
BLOOD BANK ISBT PRODUCT BLOOD TYPE: 6200
BLOOD BANK UNIT TYPE AND RH: NORMAL
BLOOD GROUP ANTIBODIES SERPL: NORMAL
BLOOD GROUP ANTIBODIES SERPL: POSITIVE
BODY TEMPERATURE: 97.9
BPU ID: NORMAL
BUN SERPL-MCNC: 23 MG/DL (ref 6–20)
BUN SERPL-MCNC: 24 MG/DL (ref 6–20)
BUN/CREAT SERPL: 14 (ref 12–20)
BUN/CREAT SERPL: 16 (ref 12–20)
CA-I BLD-MCNC: 1.05 MMOL/L (ref 1.13–1.32)
CA-I BLD-MCNC: 8.1 MG/DL (ref 8.5–10.1)
CA-I BLD-MCNC: 8.1 MG/DL (ref 8.5–10.1)
CHLORIDE SERPL-SCNC: 101 MMOL/L (ref 97–108)
CHLORIDE SERPL-SCNC: 103 MMOL/L (ref 97–108)
CO2 SERPL-SCNC: 23 MMOL/L (ref 21–32)
CO2 SERPL-SCNC: 25 MMOL/L (ref 21–32)
COHGB MFR BLD: 0.3 % (ref 1–2)
CREAT SERPL-MCNC: 1.5 MG/DL (ref 0.55–1.02)
CREAT SERPL-MCNC: 1.63 MG/DL (ref 0.55–1.02)
CROSSMATCH RESULT: NORMAL
DIFFERENTIAL METHOD BLD: ABNORMAL
EOSINOPHIL # BLD: 0 K/UL (ref 0–0.4)
EOSINOPHIL NFR BLD: 0 % (ref 0–7)
ERYTHROCYTE [DISTWIDTH] IN BLOOD BY AUTOMATED COUNT: 17.7 % (ref 11.5–14.5)
EST. AVERAGE GLUCOSE BLD GHB EST-MCNC: 148 MG/DL
FIO2 ON VENT: 28 %
GAS FLOW.O2 SETTING OXYMISER: 12
GLUCOSE BLD STRIP.AUTO-MCNC: 172 MG/DL (ref 65–100)
GLUCOSE BLD STRIP.AUTO-MCNC: 183 MG/DL (ref 65–100)
GLUCOSE BLD STRIP.AUTO-MCNC: 197 MG/DL (ref 65–100)
GLUCOSE BLD STRIP.AUTO-MCNC: 238 MG/DL (ref 65–100)
GLUCOSE BLD STRIP.AUTO-MCNC: 260 MG/DL (ref 65–100)
GLUCOSE BLD STRIP.AUTO-MCNC: 275 MG/DL (ref 65–100)
GLUCOSE SERPL-MCNC: 175 MG/DL (ref 65–100)
GLUCOSE SERPL-MCNC: 251 MG/DL (ref 65–100)
HBA1C MFR BLD: 6.8 % (ref 4–5.6)
HCO3 BLDA-SCNC: 24 MMOL/L (ref 22–26)
HCT VFR BLD AUTO: 23.3 % (ref 35–47)
HGB BLD-MCNC: 7.8 G/DL (ref 11.5–16)
IMM GRANULOCYTES # BLD AUTO: 0 K/UL
IMM GRANULOCYTES NFR BLD AUTO: 0 %
IPAP/PIP: 12
LYMPHOCYTES # BLD: 0.48 K/UL (ref 0.8–3.5)
LYMPHOCYTES NFR BLD: 5 % (ref 12–49)
MCH RBC QN AUTO: 31.7 PG (ref 26–34)
MCHC RBC AUTO-ENTMCNC: 33.5 G/DL (ref 30–36.5)
MCV RBC AUTO: 94.7 FL (ref 80–99)
METHGB MFR BLD: 0.5 % (ref 0–1.4)
MONOCYTES # BLD: 0.67 K/UL (ref 0–1)
MONOCYTES NFR BLD: 7 % (ref 5–13)
NEUTS BAND NFR BLD MANUAL: 3 % (ref 0–6)
NEUTS SEG # BLD: 8.35 K/UL (ref 1.8–8)
NEUTS SEG NFR BLD: 85 % (ref 32–75)
NRBC # BLD: 0 K/UL (ref 0–0.01)
NRBC BLD-RTO: 0 PER 100 WBC
OXYHGB MFR BLD: 95.9 % (ref 95–99)
PCO2 BLDA: 36 MMHG (ref 35–45)
PEEP RESPIRATORY: 5
PERFORMED BY:: ABNORMAL
PH BLDA: 7.44 (ref 7.35–7.45)
PLATELET # BLD AUTO: 255 K/UL (ref 150–400)
PMV BLD AUTO: 11.5 FL (ref 8.9–12.9)
PO2 BLDA: 88 MMHG (ref 80–100)
POTASSIUM SERPL-SCNC: 4.6 MMOL/L (ref 3.5–5.1)
POTASSIUM SERPL-SCNC: 4.9 MMOL/L (ref 3.5–5.1)
RBC # BLD AUTO: 2.46 M/UL (ref 3.8–5.2)
RBC MORPH BLD: ABNORMAL
SAO2 % BLD: 97 % (ref 95–99)
SAO2% DEVICE SAO2% SENSOR NAME: ABNORMAL
SODIUM SERPL-SCNC: 133 MMOL/L (ref 136–145)
SODIUM SERPL-SCNC: 135 MMOL/L (ref 136–145)
SPECIMEN EXP DATE BLD: NORMAL
SPECIMEN SITE: ABNORMAL
TRANSFUSION STATUS PATIENT QL: NORMAL
UNIT DIVISION: 0
UNIT ISSUE DATE/TIME: NORMAL
VENTILATION MODE VENT: ABNORMAL
WBC # BLD AUTO: 9.5 K/UL (ref 3.6–11)

## 2025-06-14 PROCEDURE — 6360000002 HC RX W HCPCS: Performed by: NURSE PRACTITIONER

## 2025-06-14 PROCEDURE — 6370000000 HC RX 637 (ALT 250 FOR IP): Performed by: NURSE PRACTITIONER

## 2025-06-14 PROCEDURE — 36600 WITHDRAWAL OF ARTERIAL BLOOD: CPT

## 2025-06-14 PROCEDURE — 83036 HEMOGLOBIN GLYCOSYLATED A1C: CPT

## 2025-06-14 PROCEDURE — 94761 N-INVAS EAR/PLS OXIMETRY MLT: CPT

## 2025-06-14 PROCEDURE — 2580000003 HC RX 258: Performed by: INTERNAL MEDICINE

## 2025-06-14 PROCEDURE — 82803 BLOOD GASES ANY COMBINATION: CPT

## 2025-06-14 PROCEDURE — 2500000003 HC RX 250 WO HCPCS: Performed by: NURSE PRACTITIONER

## 2025-06-14 PROCEDURE — 6360000002 HC RX W HCPCS

## 2025-06-14 PROCEDURE — 6370000000 HC RX 637 (ALT 250 FOR IP): Performed by: STUDENT IN AN ORGANIZED HEALTH CARE EDUCATION/TRAINING PROGRAM

## 2025-06-14 PROCEDURE — 94640 AIRWAY INHALATION TREATMENT: CPT

## 2025-06-14 PROCEDURE — 6370000000 HC RX 637 (ALT 250 FOR IP): Performed by: EMERGENCY MEDICINE

## 2025-06-14 PROCEDURE — 36415 COLL VENOUS BLD VENIPUNCTURE: CPT

## 2025-06-14 PROCEDURE — 94660 CPAP INITIATION&MGMT: CPT

## 2025-06-14 PROCEDURE — 2700000000 HC OXYGEN THERAPY PER DAY

## 2025-06-14 PROCEDURE — 2580000003 HC RX 258: Performed by: NURSE PRACTITIONER

## 2025-06-14 PROCEDURE — 2060000000 HC ICU INTERMEDIATE R&B

## 2025-06-14 PROCEDURE — 80048 BASIC METABOLIC PNL TOTAL CA: CPT

## 2025-06-14 PROCEDURE — 82962 GLUCOSE BLOOD TEST: CPT

## 2025-06-14 PROCEDURE — 82330 ASSAY OF CALCIUM: CPT

## 2025-06-14 PROCEDURE — 85025 COMPLETE CBC W/AUTO DIFF WBC: CPT

## 2025-06-14 RX ORDER — PANTOPRAZOLE SODIUM 40 MG/1
40 TABLET, DELAYED RELEASE ORAL
Status: DISCONTINUED | OUTPATIENT
Start: 2025-06-15 | End: 2025-06-18 | Stop reason: SDUPTHER

## 2025-06-14 RX ORDER — AMLODIPINE BESYLATE 5 MG/1
2.5 TABLET ORAL DAILY
Status: DISCONTINUED | OUTPATIENT
Start: 2025-06-14 | End: 2025-06-14

## 2025-06-14 RX ORDER — AMLODIPINE BESYLATE 5 MG/1
5 TABLET ORAL DAILY
Status: DISCONTINUED | OUTPATIENT
Start: 2025-06-15 | End: 2025-06-15

## 2025-06-14 RX ORDER — AMLODIPINE BESYLATE 5 MG/1
2.5 TABLET ORAL DAILY
Status: DISCONTINUED | OUTPATIENT
Start: 2025-06-15 | End: 2025-06-14

## 2025-06-14 RX ADMIN — ATORVASTATIN CALCIUM 40 MG: 40 TABLET, FILM COATED ORAL at 20:29

## 2025-06-14 RX ADMIN — PANTOPRAZOLE SODIUM 40 MG: 40 INJECTION, POWDER, FOR SOLUTION INTRAVENOUS at 09:19

## 2025-06-14 RX ADMIN — INSULIN GLARGINE 8 UNITS: 100 INJECTION, SOLUTION SUBCUTANEOUS at 09:19

## 2025-06-14 RX ADMIN — INSULIN LISPRO 2 UNITS: 100 INJECTION, SOLUTION INTRAVENOUS; SUBCUTANEOUS at 17:53

## 2025-06-14 RX ADMIN — MONTELUKAST 10 MG: 10 TABLET, FILM COATED ORAL at 20:29

## 2025-06-14 RX ADMIN — INSULIN LISPRO 4 UNITS: 100 INJECTION, SOLUTION INTRAVENOUS; SUBCUTANEOUS at 11:47

## 2025-06-14 RX ADMIN — IPRATROPIUM BROMIDE AND ALBUTEROL SULFATE 1 DOSE: 2.5; .5 SOLUTION RESPIRATORY (INHALATION) at 08:36

## 2025-06-14 RX ADMIN — BUDESONIDE INHALATION 500 MCG: 0.5 SUSPENSION RESPIRATORY (INHALATION) at 08:36

## 2025-06-14 RX ADMIN — IPRATROPIUM BROMIDE AND ALBUTEROL SULFATE 1 DOSE: 2.5; .5 SOLUTION RESPIRATORY (INHALATION) at 14:15

## 2025-06-14 RX ADMIN — CEFTRIAXONE SODIUM 2000 MG: 2 INJECTION, POWDER, FOR SOLUTION INTRAMUSCULAR; INTRAVENOUS at 01:52

## 2025-06-14 RX ADMIN — IPRATROPIUM BROMIDE AND ALBUTEROL SULFATE 1 DOSE: 2.5; .5 SOLUTION RESPIRATORY (INHALATION) at 21:04

## 2025-06-14 RX ADMIN — SODIUM CHLORIDE, PRESERVATIVE FREE 10 ML: 5 INJECTION INTRAVENOUS at 20:29

## 2025-06-14 RX ADMIN — SODIUM CHLORIDE: 0.9 INJECTION, SOLUTION INTRAVENOUS at 13:28

## 2025-06-14 RX ADMIN — SODIUM CHLORIDE, PRESERVATIVE FREE 10 ML: 5 INJECTION INTRAVENOUS at 09:19

## 2025-06-14 RX ADMIN — INSULIN LISPRO 4 UNITS: 100 INJECTION, SOLUTION INTRAVENOUS; SUBCUTANEOUS at 00:19

## 2025-06-14 RX ADMIN — METHYLPREDNISOLONE SODIUM SUCCINATE 40 MG: 40 INJECTION INTRAMUSCULAR; INTRAVENOUS at 11:35

## 2025-06-14 RX ADMIN — FOLIC ACID 1 MG: 1 TABLET ORAL at 09:19

## 2025-06-14 RX ADMIN — BUDESONIDE INHALATION 500 MCG: 0.5 SUSPENSION RESPIRATORY (INHALATION) at 21:04

## 2025-06-14 RX ADMIN — INSULIN GLARGINE 8 UNITS: 100 INJECTION, SOLUTION SUBCUTANEOUS at 20:29

## 2025-06-14 RX ADMIN — METHYLPREDNISOLONE SODIUM SUCCINATE 40 MG: 40 INJECTION INTRAMUSCULAR; INTRAVENOUS at 00:19

## 2025-06-14 RX ADMIN — AZITHROMYCIN MONOHYDRATE 500 MG: 500 INJECTION, POWDER, LYOPHILIZED, FOR SOLUTION INTRAVENOUS at 01:54

## 2025-06-14 RX ADMIN — SODIUM CHLORIDE: 0.9 INJECTION, SOLUTION INTRAVENOUS at 00:18

## 2025-06-14 RX ADMIN — IRON SUCROSE 200 MG: 20 INJECTION, SOLUTION INTRAVENOUS at 11:35

## 2025-06-14 RX ADMIN — AMLODIPINE BESYLATE 2.5 MG: 5 TABLET ORAL at 12:12

## 2025-06-14 ASSESSMENT — PAIN SCALES - GENERAL
PAINLEVEL_OUTOF10: 0

## 2025-06-14 NOTE — PROGRESS NOTES
Nephrology follow-up          Patient: Lizbeth Hogue MRN: [id number]  SSN: [id number]    YOB: 1955  Age: 70 y.o.  Sex: female      Subjective:   The patient is seen in ICU  She looks comfortable  On nasal cannula  Blood pressures are elevated  Resolving hyponatremia  On IV fluids    Past Medical History:   Diagnosis Date    Arthritis     Asthma     Chronic kidney disease     Chronic obstructive pulmonary disease (HCC)     Diabetes (HCC)     Hypertension     Sleep apnea     no cpap     Past Surgical History:   Procedure Laterality Date    OTHER SURGICAL HISTORY Bilateral     Eye surgery    WRIST SURGERY Left     pins and screws      Family History   Problem Relation Age of Onset    Diabetes Mother     Hypertension Father     Diabetes Father     Hypertension Mother      Social History     Tobacco Use    Smoking status: Never    Smokeless tobacco: Never   Substance Use Topics    Alcohol use: Never      Current Facility-Administered Medications   Medication Dose Route Frequency Provider Last Rate Last Admin    [START ON 6/15/2025] pantoprazole (PROTONIX) tablet 40 mg  40 mg Oral QAM AC Audelia Shen MD        amLODIPine (NORVASC) tablet 2.5 mg  2.5 mg Oral Daily Audelia Shen MD   2.5 mg at 06/14/25 1212    ipratropium 0.5 mg-albuterol 2.5 mg (DUONEB) nebulizer solution 1 Dose  1 Dose Inhalation TID RT Anson Zayas DO   1 Dose at 06/14/25 1415    ipratropium 0.5 mg-albuterol 2.5 mg (DUONEB) nebulizer solution 1 Dose  1 Dose Inhalation Q4H PRN Anson Zayas DO   1 Dose at 06/13/25 0210    lactated ringers bolus 500 mL  500 mL IntraVENous Once Anson Zayas DO   Held at 06/13/25 0519    sodium chloride flush 0.9 % injection 5-40 mL  5-40 mL IntraVENous 2 times per day Gagan Lala APRN - CNP   10 mL at 06/14/25 0919    sodium chloride flush 0.9 % injection 5-40 mL  5-40 mL IntraVENous PRN Gagan Lala APRN - CNP        0.9 % sodium chloride infusion   IntraVENous PRN  montelukast (SINGULAIR) tablet 10 mg  10 mg Oral Nightly Gagan Lala APRN - CNP   10 mg at 06/13/25 2050    folic acid (FOLVITE) tablet 1 mg  1 mg Oral Daily Gagan Lala APRN - CNP   1 mg at 06/14/25 0919    0.9 % sodium chloride infusion   IntraVENous Continuous Mary Stock MD 75 mL/hr at 06/14/25 1328 New Bag at 06/14/25 1328    iron sucrose (VENOFER) injection 200 mg  200 mg IntraVENous Q24H Andie Fonseca APRN - CNP   200 mg at 06/14/25 1135    insulin glargine (LANTUS) injection vial 8 Units  8 Units SubCUTAneous BID Gagan Lala APRN - CNP   8 Units at 06/14/25 0919        Allergies   Allergen Reactions    Lisinopril Swelling     Lip swelling    Pineapple Swelling     Lip swelling       Review of Systems:  A comprehensive review of systems was negative except for that written in the History of Present Illness.    Objective:     Vitals:    06/14/25 1100 06/14/25 1200 06/14/25 1400 06/14/25 1500   BP: (!) 162/64 (!) 163/81 (!) 135/115 (!) 148/60   Pulse: 66 72 80 69   Resp: 16 18 18 17   Temp: 97.7 °F (36.5 °C)   97.7 °F (36.5 °C)   TempSrc: Oral   Oral   SpO2:       Weight:       Height:            Physical Exam:  General: NAD  Eyes: sclera anicteric  Oral Cavity: No thrush or ulcers  Neck: no JVD  Chest: Fair bilateral air entry  Heart: normal sounds  Abdomen: soft and non tender   : no morales  Lower Extremities: no edema  Skin: no rash  Neuro: intact  Psychiatric: non-depressed          Assessment/Plan:   Hyponatremia  Of moderate severity  Sodium 125-->135.  Etiology can be volume depletion on underlying SIADH  Urine sodium 37, urine osmolality 182  Will discontinue gentle IV hydration  Free water restriction 1200 cc per 24 hours  On no thiazide diuretics or SSRIs    Chronic kidney disease stage III  Creatinine seems to be at baseline at 1.53  Creatinine 1.63 today  Clinically euvolemic  [place] urinalysis  Will check UPCR  Will discontinue gentle IV hydration    Hypertension  Blood

## 2025-06-14 NOTE — PROGRESS NOTES
Hematology Oncology Progress Note           Follow up for: Myeloma  Chart notes reviewed since last visit.    Hgb and sodium improved  Hypoxia improved  Afebrile      Patient Vitals for the past 24 hrs:   BP Temp Temp src Pulse Resp SpO2 Height Weight   06/14/25 0700 -- 97.5 °F (36.4 °C) Axillary -- -- -- -- --   06/14/25 0600 (!) 158/63 -- -- 71 19 100 % -- --   06/14/25 0545 (!) 149/73 -- -- 64 18 100 % -- --   06/14/25 0543 -- -- -- -- -- -- -- 65 kg (143 lb 4.8 oz)   06/14/25 0530 (!) 151/71 -- -- 74 25 98 % -- --   06/14/25 0515 (!) 153/133 -- -- 83 -- 95 % -- --   06/14/25 0500 (!) 146/68 -- -- 71 -- 100 % -- --   06/14/25 0445 139/61 -- -- 69 -- 99 % -- --   06/14/25 0430 131/73 -- -- 72 -- 99 % -- --   06/14/25 0415 129/63 -- -- 74 -- 99 % -- --   06/14/25 0400 (!) 168/124 -- -- 92 -- 94 % -- --   06/14/25 0345 (!) 122/55 -- -- 72 -- 95 % -- --   06/14/25 0330 110/73 -- -- 76 -- 94 % -- --   06/14/25 0315 122/72 -- -- 77 -- 98 % -- --   06/14/25 0300 (!) 161/139 -- -- 90 -- 96 % -- --   06/14/25 0245 (!) 142/71 -- -- 86 -- 98 % -- --   06/14/25 0230 121/65 -- -- 92 -- 100 % -- --   06/14/25 0215 124/77 -- -- 82 19 100 % -- --   06/14/25 0200 (!) 123/54 -- -- 67 16 100 % -- --   06/14/25 0145 127/61 -- -- 67 17 100 % -- --   06/14/25 0130 (!) 123/56 -- -- 70 15 100 % -- --   06/14/25 0115 (!) 132/53 -- -- 77 19 100 % -- --   06/14/25 0100 (!) 126/55 -- -- 66 17 100 % -- --   06/14/25 0045 -- -- -- 68 16 100 % -- --   06/14/25 0030 -- -- -- 65 15 100 % -- --   06/14/25 0024 -- -- -- 65 18 100 % -- --   06/14/25 0015 (!) 128/58 -- -- 71 18 97 % -- --   06/14/25 0000 131/60 97.9 °F (36.6 °C) Oral 72 21 97 % -- --   06/13/25 2345 (!) 125/57 -- -- 73 19 97 % -- --   06/13/25 2330 (!) 150/59 -- -- 88 27 97 % -- --   06/13/25 2315 (!) 155/72 -- -- 94 26 99 % -- --   06/13/25 2300 -- -- -- 74 20 99 % -- --   06/13/25 2245 (!) 144/62 -- -- 68 15 99 % -- --   06/13/25 2230 (!) 140/60 -- -- 68 17 99 % -- --   06/13/25     Oxyhemoglobin 95.9 95 - 99 %    Performed by: Elaina Gill     Temperature 97.9     POCT Glucose    Collection Time: 06/14/25  5:39 AM   Result Value Ref Range    POC Glucose 172 (H) 65 - 100 mg/dL    Performed by: Nic Seugra        Imaging:      Assessment and Plan:   Myeloma  -active on treatment with D-RVD; daratumumab a monoclonal Cd38ab causes false positive salomón test  -ok for transfusions  -maintain hgb >7    2. Anemia  -related to blood loss and myeloma  -no issues with iron    3. Colon lesion-GI eval forthcoming    4. Hyponatremia improved    5. Hypoxic respiratory failure

## 2025-06-14 NOTE — PROGRESS NOTES
Received Order for Telemetry     Lizbeth Hogue   1955   [id number]   Shortness of breath [R06.02]  Acute on chronic respiratory failure with hypoxemia (HCC) [J96.21]  Acute on chronic respiratory failure with hypoxia and hypercapnia (HCC) [J96.21, J96.22]   Amanda Campos MD     Tele Box # [id number] placed on patient at 1715 pm  ER Room # 272  Admitting to Room 467  Transferring Nurse lory  Verified with Primary Nurse lory at 1715

## 2025-06-14 NOTE — PLAN OF CARE
Problem: Chronic Conditions and Co-morbidities  Goal: Patient's chronic conditions and co-morbidity symptoms are monitored and maintained or improved  6/14/2025 1953 by Neda Rodriguez RN  Outcome: Progressing  6/14/2025 1502 by Candace Nicole RN  Outcome: Progressing  Flowsheets (Taken 6/14/2025 0700)  Care Plan - Patient's Chronic Conditions and Co-Morbidity Symptoms are Monitored and Maintained or Improved:   Monitor and assess patient's chronic conditions and comorbid symptoms for stability, deterioration, or improvement   Collaborate with multidisciplinary team to address chronic and comorbid conditions and prevent exacerbation or deterioration     Problem: Discharge Planning  Goal: Discharge to home or other facility with appropriate resources  6/14/2025 1953 by Neda Rodriguez RN  Outcome: Progressing  6/14/2025 1502 by Candace Nicole RN  Outcome: Progressing  Flowsheets (Taken 6/14/2025 0700)  Discharge to home or other facility with appropriate resources:   Identify barriers to discharge with patient and caregiver   Arrange for needed discharge resources and transportation as appropriate   Refer to discharge planning if patient needs post-hospital services based on physician order or complex needs related to functional status, cognitive ability or social support system     Problem: Respiratory - Adult  Goal: Achieves optimal ventilation and oxygenation  6/14/2025 1953 by Neda Rodriguez RN  Outcome: Progressing  6/14/2025 1502 by Candace Nicole RN  Outcome: Progressing  Flowsheets (Taken 6/14/2025 0700)  Achieves optimal ventilation and oxygenation:   Assess for changes in respiratory status   Assess for changes in mentation and behavior   Oxygen supplementation based on oxygen saturation or arterial blood gases   Position to facilitate oxygenation and minimize respiratory effort   Respiratory therapy support as indicated     Problem: Cardiovascular - Adult  Goal: Maintains optimal  cardiac output and hemodynamic stability  6/14/2025 1953 by Neda Rodriguez RN  Outcome: Progressing  6/14/2025 1502 by Candace Nicole RN  Outcome: Progressing  Flowsheets (Taken 6/14/2025 0700)  Maintains optimal cardiac output and hemodynamic stability:   Monitor blood pressure and heart rate   Monitor urine output and notify Licensed Independent Practitioner for values outside of normal range  Goal: Absence of cardiac dysrhythmias or at baseline  6/14/2025 1953 by Neda Rodriguez RN  Outcome: Progressing  6/14/2025 1502 by Candace Nicole RN  Outcome: Progressing     Problem: Infection - Adult  Goal: Absence of infection at discharge  6/14/2025 1953 by Neda Rodriguez RN  Outcome: Progressing  6/14/2025 1502 by Candace Nicole RN  Outcome: Progressing  Flowsheets (Taken 6/14/2025 0700)  Absence of infection at discharge:   Assess and monitor for signs and symptoms of infection   Monitor lab/diagnostic results   Monitor all insertion sites i.e., indwelling lines, tubes and drains  Goal: Absence of infection during hospitalization  6/14/2025 1953 by Neda Rodriguez RN  Outcome: Progressing  6/14/2025 1502 by Candace Nicole RN  Outcome: Progressing  Flowsheets (Taken 6/14/2025 0700)  Absence of infection during hospitalization:   Assess and monitor for signs and symptoms of infection   Monitor lab/diagnostic results   Monitor all insertion sites i.e., indwelling lines, tubes and drains  Goal: Absence of fever/infection during anticipated neutropenic period  6/14/2025 1953 by Neda Rodriguez RN  Outcome: Progressing  6/14/2025 1502 by Candace Nicole RN  Outcome: Progressing     Problem: Skin/Tissue Integrity  Goal: Skin integrity remains intact  Description: 1.  Monitor for areas of redness and/or skin breakdown  2.  Assess vascular access sites hourly  3.  Every 4-6 hours minimum:  Change oxygen saturation probe site  4.  Every 4-6 hours:  If on nasal continuous positive airway pressure,

## 2025-06-14 NOTE — PROGRESS NOTES
4 Eyes Skin Assessment     NAME:  Lizbeth Hogue  YOB: 1955  MEDICAL RECORD NUMBER:  [id number]    The patient is being assessed for  Transfer to New Unit    I agree that at least one RN has performed a thorough Head to Toe Skin Assessment on the patient. ALL assessment sites listed below have been assessed.      Areas assessed by both nurses:    Head, Face, Ears, Shoulders, Back, Chest, Arms, Elbows, Hands, Sacrum. Buttock, Coccyx, Ischium, Legs. Feet and Heels, and Under Medical Devices         Does the Patient have a Wound? No noted wound(s)       Edil Prevention initiated by RN: No  Wound Care Orders initiated by RN: No    Pressure Injury (Stage 3,4, Unstageable, DTI, NWPT, and Complex wounds) if present, place Wound referral order by RN under : No    New Ostomies, if present place, Ostomy referral order under : No     Nurse 1 eSignature: Electronically signed by Sapna Kraus RN on 6/14/25 at 6:44 PM EDT    **SHARE this note so that the co-signing nurse can place an eSignature**    Nurse 2 eSignature: Electronically signed by Antonio Ramos RN on 6/14/25 at 6:44 PM EDT

## 2025-06-14 NOTE — PLAN OF CARE
Problem: Chronic Conditions and Co-morbidities  Goal: Patient's chronic conditions and co-morbidity symptoms are monitored and maintained or improved  6/13/2025 2126 by Imelda Lucero RN  Outcome: Progressing  Flowsheets (Taken 6/13/2025 2000)  Care Plan - Patient's Chronic Conditions and Co-Morbidity Symptoms are Monitored and Maintained or Improved: Monitor and assess patient's chronic conditions and comorbid symptoms for stability, deterioration, or improvement  6/13/2025 2126 by Imelda Lucero RN  Outcome: Progressing  Flowsheets (Taken 6/13/2025 2000)  Care Plan - Patient's Chronic Conditions and Co-Morbidity Symptoms are Monitored and Maintained or Improved: Monitor and assess patient's chronic conditions and comorbid symptoms for stability, deterioration, or improvement  6/13/2025 1657 by Candace Nicole RN  Outcome: Progressing  Flowsheets (Taken 6/13/2025 0800)  Care Plan - Patient's Chronic Conditions and Co-Morbidity Symptoms are Monitored and Maintained or Improved:   Monitor and assess patient's chronic conditions and comorbid symptoms for stability, deterioration, or improvement   Collaborate with multidisciplinary team to address chronic and comorbid conditions and prevent exacerbation or deterioration   Update acute care plan with appropriate goals if chronic or comorbid symptoms are exacerbated and prevent overall improvement and discharge     Problem: Discharge Planning  Goal: Discharge to home or other facility with appropriate resources  6/13/2025 2126 by Imelda Lucero RN  Outcome: Progressing  Flowsheets (Taken 6/13/2025 2000)  Discharge to home or other facility with appropriate resources: Identify barriers to discharge with patient and caregiver  6/13/2025 2126 by Imelda Lucero RN  Outcome: Progressing  Flowsheets (Taken 6/13/2025 2000)  Discharge to home or other facility with appropriate resources: Identify barriers to discharge with patient and caregiver  6/13/2025 1657 by Corey  range  Goal: Absence of cardiac dysrhythmias or at baseline  6/13/2025 2126 by Imelda Lucero RN  Outcome: Progressing  Flowsheets (Taken 6/13/2025 2000)  Absence of cardiac dysrhythmias or at baseline: Monitor cardiac rate and rhythm  6/13/2025 2126 by Imelda Lucero RN  Outcome: Progressing  Flowsheets (Taken 6/13/2025 2000)  Absence of cardiac dysrhythmias or at baseline: Monitor cardiac rate and rhythm  6/13/2025 1657 by Candace Nicole RN  Outcome: Progressing  Flowsheets (Taken 6/13/2025 0800)  Absence of cardiac dysrhythmias or at baseline:   Monitor cardiac rate and rhythm   Assess for signs of decreased cardiac output     Problem: Infection - Adult  Goal: Absence of infection at discharge  6/13/2025 2126 by Imelda Lucero RN  Outcome: Progressing  Flowsheets (Taken 6/13/2025 2000)  Absence of infection at discharge: Assess and monitor for signs and symptoms of infection  6/13/2025 2126 by Imelda Lucero RN  Outcome: Progressing  Flowsheets (Taken 6/13/2025 2000)  Absence of infection at discharge: Assess and monitor for signs and symptoms of infection  6/13/2025 1657 by Candace Nicole RN  Outcome: Progressing  Goal: Absence of infection during hospitalization  6/13/2025 2126 by Imelda Lucero RN  Outcome: Progressing  Flowsheets (Taken 6/13/2025 2000)  Absence of infection during hospitalization: Assess and monitor for signs and symptoms of infection  6/13/2025 2126 by Imelda Lucero RN  Outcome: Progressing  Flowsheets (Taken 6/13/2025 2000)  Absence of infection during hospitalization: Assess and monitor for signs and symptoms of infection  Goal: Absence of fever/infection during anticipated neutropenic period  6/13/2025 2126 by Imelda Lucero RN  Outcome: Progressing  Flowsheets (Taken 6/13/2025 2000)  Absence of fever/infection during anticipated neutropenic period: Monitor white blood cell count  6/13/2025 2126 by Imelda Lucero RN  Outcome: Progressing  Flowsheets (Taken 6/13/2025 2000)  Absence of

## 2025-06-14 NOTE — PLAN OF CARE
Problem: Chronic Conditions and Co-morbidities  Goal: Patient's chronic conditions and co-morbidity symptoms are monitored and maintained or improved  Outcome: Progressing  Flowsheets (Taken 6/14/2025 0700)  Care Plan - Patient's Chronic Conditions and Co-Morbidity Symptoms are Monitored and Maintained or Improved:   Monitor and assess patient's chronic conditions and comorbid symptoms for stability, deterioration, or improvement   Collaborate with multidisciplinary team to address chronic and comorbid conditions and prevent exacerbation or deterioration     Problem: Discharge Planning  Goal: Discharge to home or other facility with appropriate resources  Outcome: Progressing  Flowsheets (Taken 6/14/2025 0700)  Discharge to home or other facility with appropriate resources:   Identify barriers to discharge with patient and caregiver   Arrange for needed discharge resources and transportation as appropriate   Refer to discharge planning if patient needs post-hospital services based on physician order or complex needs related to functional status, cognitive ability or social support system     Problem: Respiratory - Adult  Goal: Achieves optimal ventilation and oxygenation  Outcome: Progressing  Flowsheets (Taken 6/14/2025 0700)  Achieves optimal ventilation and oxygenation:   Assess for changes in respiratory status   Assess for changes in mentation and behavior   Oxygen supplementation based on oxygen saturation or arterial blood gases   Position to facilitate oxygenation and minimize respiratory effort   Respiratory therapy support as indicated     Problem: Cardiovascular - Adult  Goal: Maintains optimal cardiac output and hemodynamic stability  Outcome: Progressing  Flowsheets (Taken 6/14/2025 0700)  Maintains optimal cardiac output and hemodynamic stability:   Monitor blood pressure and heart rate   Monitor urine output and notify Licensed Independent Practitioner for values outside of normal range  Goal:  Progressing     Problem: Hematologic - Adult  Goal: Maintains hematologic stability  Outcome: Progressing

## 2025-06-14 NOTE — PROGRESS NOTES
CRITICAL CARE PROGRESS NOTE    Name: Lizbeth Hogue   : 1955   MRN: [id number]   Date: 2025      Diagnoses/problem list:   Acute on chronic hypercapnic/hypoxic respiratory failure  AECOPD  CAP  Acute on Chronic Anemia   Melena   Acute metabolic encephalopathy    24-hour events:   Lizbeth Hogue is a 70 y.o. with a PMH of COPD, CKD, T2DM, HTN, NISH and Multiple Myeloma on chemotherapy who presented to [facility] ED on  with SOB. Per daughter at bedside she states that patient has been not feeling well over the past couple of weeks.  She was supposed to have chemotherapy this past Wednesday but treatment was canceled secondary to dehydration.  Today she that her mother had increased work of breathing with productive cough prompting her to check her SpO2 which read 52% on 2 L nasal cannula.  She arrives to the ER afebrile, nontachycardic, hypertensive (153/53) and hypoxic.  Patient quickly placed on high flow at 15 L.  ABG obtained on high flow demonstrating respiratory acidosis (pH 7.29, CO2 50).  Laboratory workup demonstrating dehydration & anemia with hemoglobin 6.3.  Daughter reports episode of dark black stool/diarrhea this evening PTA .1 unit PRBCs ordered, patient initiated on BiPAP and ICU consulted for admission.     : Doing well on 2LNC. Wore BIPAP at night and no longer has dyspnea. Received 1u pRBC and no further evidence of GIB.     ROS negative except as otherwise documented.     Assessment and plan:     NEUROLOGICAL:    Acute metabolic encephalopathy  -likely due to hypercarbia, now resolved    PULMONOLOGY:   Acute on Chronic Hypercapnic/Hypoxic Respiratory Failure   Acute exacerbation of COPD  NISH, non-compliant with CPAP  - Trial BiPAP qHS, previously non-compliant at home  - encourage IS  - ABX as below  - Baseline home O2 2L  -CXR  with no acute process  -continue pulmicort, duoneb, singulair  -continue solumedrol 40mg q12 hours    CARDIOVASCULAR:   Hypertension  -Goal

## 2025-06-14 NOTE — CONSULTS
Hospitalist Progress Note               Daily Progress Note: 6/14/2025      Hospital Day: 2     Chief complaint:   Chief Complaint   Patient presents with    Shortness of Breath        Subjective:   Lizbeth Hogue is a 70 y.o. with a PMH of COPD, CKD, T2DM, HTN, NISH and Multiple Myeloma on chemotherapy who presented to [facility] ED on 6/13 with SOB. Per daughter at bedside she states that patient has been not feeling well over the past couple of weeks.  She was supposed to have chemotherapy this past Wednesday but treatment was canceled secondary to dehydration.  Today she that her mother had increased work of breathing with productive cough prompting her to check her SpO2 which read 52% on 2 L nasal cannula.  She arrives to the ER afebrile, nontachycardic, hypertensive (153/53) and hypoxic.  Patient quickly placed on high flow at 15 L.  ABG obtained on high flow demonstrating respiratory acidosis (pH 7.29, CO2 50).  Laboratory workup demonstrating dehydration & anemia with hemoglobin 6.3.  Daughter reports episode of dark black stool/diarrhea this evening PTA .1 unit PRBCs ordered, patient initiated on BiPAP and ICU consulted for admission.      6/14: Doing well on 2LNC. Wore BIPAP at night and no longer has dyspnea. Received 1u pRBC and no further evidence of GIB.        -------------------------------  Patient is seen and examined today during bedside rounds with the nurse.  Patient is doing well on 2 L of nasal cannula.  Discussed with nurse, no acute concerns      Assessment and plan    Acute metabolic encephalopathy  -likely due to hypercarbia, now resolved     Acute on chronic hypercapnic/hypoxic respiratory failure  Acute exacerbation of COPD  NISH, non-compliant with CPAP  - Trial BiPAP qHS, previously non-compliant at home  - encourage IS  - ABX as below  - Baseline home O2 2L  -CXR 6/13 with no acute process  -continue pulmicort, duoneb, singulair  -continue solumedrol 40mg q12 hours     Hypertension  -TTE

## 2025-06-15 LAB
ALBUMIN SERPL-MCNC: 2.6 G/DL (ref 3.5–5)
ANION GAP SERPL CALC-SCNC: 6 MMOL/L (ref 2–12)
ANION GAP SERPL CALC-SCNC: 6 MMOL/L (ref 2–12)
BUN SERPL-MCNC: 26 MG/DL (ref 6–20)
BUN SERPL-MCNC: 26 MG/DL (ref 6–20)
BUN/CREAT SERPL: 14 (ref 12–20)
BUN/CREAT SERPL: 15 (ref 12–20)
CA-I BLD-MCNC: 8.1 MG/DL (ref 8.5–10.1)
CA-I BLD-MCNC: 8.4 MG/DL (ref 8.5–10.1)
CHLORIDE SERPL-SCNC: 105 MMOL/L (ref 97–108)
CHLORIDE SERPL-SCNC: 106 MMOL/L (ref 97–108)
CO2 SERPL-SCNC: 23 MMOL/L (ref 21–32)
CO2 SERPL-SCNC: 25 MMOL/L (ref 21–32)
CREAT SERPL-MCNC: 1.73 MG/DL (ref 0.55–1.02)
CREAT SERPL-MCNC: 1.8 MG/DL (ref 0.55–1.02)
FLUID CULTURE: NORMAL
GLUCOSE BLD STRIP.AUTO-MCNC: 175 MG/DL (ref 65–100)
GLUCOSE BLD STRIP.AUTO-MCNC: 202 MG/DL (ref 65–100)
GLUCOSE BLD STRIP.AUTO-MCNC: 226 MG/DL (ref 65–100)
GLUCOSE BLD STRIP.AUTO-MCNC: 232 MG/DL (ref 65–100)
GLUCOSE BLD STRIP.AUTO-MCNC: 241 MG/DL (ref 65–100)
GLUCOSE SERPL-MCNC: 201 MG/DL (ref 65–100)
GLUCOSE SERPL-MCNC: 206 MG/DL (ref 65–100)
L PNEUMO1 AG UR QL IA: NEGATIVE
Lab: NORMAL
ORGANISM ID: NORMAL
PERFORMED BY:: ABNORMAL
PHOSPHATE SERPL-MCNC: 3.2 MG/DL (ref 2.6–4.7)
POTASSIUM SERPL-SCNC: 4.8 MMOL/L (ref 3.5–5.1)
POTASSIUM SERPL-SCNC: 5 MMOL/L (ref 3.5–5.1)
S PNEUM AG SPEC QL LA: NEGATIVE
SODIUM SERPL-SCNC: 135 MMOL/L (ref 136–145)
SODIUM SERPL-SCNC: 136 MMOL/L (ref 136–145)
SPECIMEN SOURCE: NORMAL
SPECIMEN SOURCE: NORMAL
SPECIMEN: NORMAL
TSH SERPL DL<=0.05 MIU/L-ACNC: 0.21 UIU/ML (ref 0.45–4.5)

## 2025-06-15 PROCEDURE — 6370000000 HC RX 637 (ALT 250 FOR IP): Performed by: STUDENT IN AN ORGANIZED HEALTH CARE EDUCATION/TRAINING PROGRAM

## 2025-06-15 PROCEDURE — 6360000002 HC RX W HCPCS: Performed by: NURSE PRACTITIONER

## 2025-06-15 PROCEDURE — 82962 GLUCOSE BLOOD TEST: CPT

## 2025-06-15 PROCEDURE — 80048 BASIC METABOLIC PNL TOTAL CA: CPT

## 2025-06-15 PROCEDURE — 94761 N-INVAS EAR/PLS OXIMETRY MLT: CPT

## 2025-06-15 PROCEDURE — 2700000000 HC OXYGEN THERAPY PER DAY

## 2025-06-15 PROCEDURE — 2060000000 HC ICU INTERMEDIATE R&B

## 2025-06-15 PROCEDURE — 2580000003 HC RX 258: Performed by: NURSE PRACTITIONER

## 2025-06-15 PROCEDURE — 94660 CPAP INITIATION&MGMT: CPT

## 2025-06-15 PROCEDURE — 6370000000 HC RX 637 (ALT 250 FOR IP): Performed by: NURSE PRACTITIONER

## 2025-06-15 PROCEDURE — 2500000003 HC RX 250 WO HCPCS: Performed by: NURSE PRACTITIONER

## 2025-06-15 PROCEDURE — 6370000000 HC RX 637 (ALT 250 FOR IP): Performed by: EMERGENCY MEDICINE

## 2025-06-15 PROCEDURE — 80069 RENAL FUNCTION PANEL: CPT

## 2025-06-15 PROCEDURE — 6360000002 HC RX W HCPCS

## 2025-06-15 PROCEDURE — 94640 AIRWAY INHALATION TREATMENT: CPT

## 2025-06-15 RX ORDER — PREDNISONE 20 MG/1
40 TABLET ORAL DAILY
Status: DISPENSED | OUTPATIENT
Start: 2025-06-16 | End: 2025-06-21

## 2025-06-15 RX ORDER — AMLODIPINE BESYLATE 5 MG/1
10 TABLET ORAL DAILY
Status: DISCONTINUED | OUTPATIENT
Start: 2025-06-16 | End: 2025-06-16

## 2025-06-15 RX ADMIN — INSULIN LISPRO 2 UNITS: 100 INJECTION, SOLUTION INTRAVENOUS; SUBCUTANEOUS at 00:06

## 2025-06-15 RX ADMIN — INSULIN LISPRO 2 UNITS: 100 INJECTION, SOLUTION INTRAVENOUS; SUBCUTANEOUS at 06:00

## 2025-06-15 RX ADMIN — FOLIC ACID 1 MG: 1 TABLET ORAL at 09:47

## 2025-06-15 RX ADMIN — IPRATROPIUM BROMIDE AND ALBUTEROL SULFATE 1 DOSE: 2.5; .5 SOLUTION RESPIRATORY (INHALATION) at 08:17

## 2025-06-15 RX ADMIN — CEFTRIAXONE SODIUM 2000 MG: 2 INJECTION, POWDER, FOR SOLUTION INTRAMUSCULAR; INTRAVENOUS at 02:11

## 2025-06-15 RX ADMIN — BUDESONIDE INHALATION 500 MCG: 0.5 SUSPENSION RESPIRATORY (INHALATION) at 18:07

## 2025-06-15 RX ADMIN — ATORVASTATIN CALCIUM 40 MG: 40 TABLET, FILM COATED ORAL at 20:20

## 2025-06-15 RX ADMIN — IRON SUCROSE 200 MG: 20 INJECTION, SOLUTION INTRAVENOUS at 13:00

## 2025-06-15 RX ADMIN — INSULIN GLARGINE 8 UNITS: 100 INJECTION, SOLUTION SUBCUTANEOUS at 09:47

## 2025-06-15 RX ADMIN — INSULIN GLARGINE 8 UNITS: 100 INJECTION, SOLUTION SUBCUTANEOUS at 20:20

## 2025-06-15 RX ADMIN — IPRATROPIUM BROMIDE AND ALBUTEROL SULFATE 1 DOSE: 2.5; .5 SOLUTION RESPIRATORY (INHALATION) at 18:05

## 2025-06-15 RX ADMIN — SODIUM CHLORIDE: 9 INJECTION, SOLUTION INTRAVENOUS at 02:17

## 2025-06-15 RX ADMIN — METHYLPREDNISOLONE SODIUM SUCCINATE 40 MG: 40 INJECTION INTRAMUSCULAR; INTRAVENOUS at 00:05

## 2025-06-15 RX ADMIN — METHYLPREDNISOLONE SODIUM SUCCINATE 40 MG: 40 INJECTION INTRAMUSCULAR; INTRAVENOUS at 13:00

## 2025-06-15 RX ADMIN — INSULIN LISPRO 2 UNITS: 100 INJECTION, SOLUTION INTRAVENOUS; SUBCUTANEOUS at 17:15

## 2025-06-15 RX ADMIN — INSULIN LISPRO 2 UNITS: 100 INJECTION, SOLUTION INTRAVENOUS; SUBCUTANEOUS at 13:00

## 2025-06-15 RX ADMIN — PANTOPRAZOLE SODIUM 40 MG: 40 TABLET, DELAYED RELEASE ORAL at 06:00

## 2025-06-15 RX ADMIN — AZITHROMYCIN MONOHYDRATE 500 MG: 500 INJECTION, POWDER, LYOPHILIZED, FOR SOLUTION INTRAVENOUS at 02:17

## 2025-06-15 RX ADMIN — SODIUM CHLORIDE, PRESERVATIVE FREE 10 ML: 5 INJECTION INTRAVENOUS at 20:22

## 2025-06-15 RX ADMIN — BUDESONIDE INHALATION 500 MCG: 0.5 SUSPENSION RESPIRATORY (INHALATION) at 08:17

## 2025-06-15 RX ADMIN — MONTELUKAST 10 MG: 10 TABLET, FILM COATED ORAL at 20:20

## 2025-06-15 RX ADMIN — IPRATROPIUM BROMIDE AND ALBUTEROL SULFATE 1 DOSE: 2.5; .5 SOLUTION RESPIRATORY (INHALATION) at 14:57

## 2025-06-15 RX ADMIN — SODIUM CHLORIDE, PRESERVATIVE FREE 10 ML: 5 INJECTION INTRAVENOUS at 09:51

## 2025-06-15 NOTE — PLAN OF CARE
Problem: Chronic Conditions and Co-morbidities  Goal: Patient's chronic conditions and co-morbidity symptoms are monitored and maintained or improved  Outcome: Progressing     Problem: Discharge Planning  Goal: Discharge to home or other facility with appropriate resources  Outcome: Progressing     Problem: Respiratory - Adult  Goal: Achieves optimal ventilation and oxygenation  Outcome: Progressing     Problem: Cardiovascular - Adult  Goal: Maintains optimal cardiac output and hemodynamic stability  Outcome: Progressing  Goal: Absence of cardiac dysrhythmias or at baseline  Outcome: Progressing     Problem: Infection - Adult  Goal: Absence of infection at discharge  Outcome: Progressing  Goal: Absence of infection during hospitalization  Outcome: Progressing  Goal: Absence of fever/infection during anticipated neutropenic period  Outcome: Progressing     Problem: Skin/Tissue Integrity  Goal: Skin integrity remains intact  Description: 1.  Monitor for areas of redness and/or skin breakdown  2.  Assess vascular access sites hourly  3.  Every 4-6 hours minimum:  Change oxygen saturation probe site  4.  Every 4-6 hours:  If on nasal continuous positive airway pressure, respiratory therapy assess nares and determine need for appliance change or resting period  Outcome: Progressing     Problem: ABCDS Injury Assessment  Goal: Absence of physical injury  Outcome: Progressing     Problem: Safety - Adult  Goal: Free from fall injury  Outcome: Progressing     Problem: Skin/Tissue Integrity - Adult  Goal: Skin integrity remains intact  Description: 1.  Monitor for areas of redness and/or skin breakdown  2.  Assess vascular access sites hourly  3.  Every 4-6 hours minimum:  Change oxygen saturation probe site  4.  Every 4-6 hours:  If on nasal continuous positive airway pressure, respiratory therapy assess nares and determine need for appliance change or resting period  Outcome: Progressing     Problem: Musculoskeletal -

## 2025-06-15 NOTE — CONSULTS
diastolic function, mild AI, mild MR, mild TR  Increase amlodipine to 10 mg     Colon mass   -GI consulted for possible colonoscopy while inpatient. Patient reported a PET scan with a positive finding of a colon mass that was planning for OP evaluation  -single episode of melanotic stool prior to arrival in ED.   GI have not seen patient over the weekends.  Will reach out to them tomorrow for possible colonoscopy plans    CKD stage IIIb  Hyponatremia resolved  Clinical patient euvolemic right now  Status post IV fluids  Creatinine is 1.8 today    Diabetes mellitus type 2  -maintain euglycemia  -glucose POCT  -lantus 8u BID  -ISS     Multiple myeloma  Anemia, possible GIB  -on treatment for MM with D-RVD  -transfused 1u pRBC 6/13   -SCDs  -receiving venofer for 4 doses    Acute metabolic encephalopathy resolved  -likely due to hypercarbia, now resolved    Disposition  Needs PT and OT eval    CODE STATUS full  DVT prophylaxis SCD    Barriers to discharge    Medications reviewed  Current Facility-Administered Medications   Medication Dose Route Frequency    [START ON 6/16/2025] amLODIPine (NORVASC) tablet 10 mg  10 mg Oral Daily    [START ON 6/16/2025] predniSONE (DELTASONE) tablet 40 mg  40 mg Oral Daily    pantoprazole (PROTONIX) tablet 40 mg  40 mg Oral QAM AC    ipratropium 0.5 mg-albuterol 2.5 mg (DUONEB) nebulizer solution 1 Dose  1 Dose Inhalation TID RT    ipratropium 0.5 mg-albuterol 2.5 mg (DUONEB) nebulizer solution 1 Dose  1 Dose Inhalation Q4H PRN    lactated ringers bolus 500 mL  500 mL IntraVENous Once    sodium chloride flush 0.9 % injection 5-40 mL  5-40 mL IntraVENous 2 times per day    sodium chloride flush 0.9 % injection 5-40 mL  5-40 mL IntraVENous PRN    0.9 % sodium chloride infusion   IntraVENous PRN    ondansetron (ZOFRAN-ODT) disintegrating tablet 4 mg  4 mg Oral Q8H PRN    Or    ondansetron (ZOFRAN) injection 4 mg  4 mg IntraVENous Q6H PRN    polyethylene glycol (GLYCOLAX) packet 17 g  17 g  Oral Daily PRN    acetaminophen (TYLENOL) tablet 650 mg  650 mg Oral Q6H PRN    Or    acetaminophen (TYLENOL) suppository 650 mg  650 mg Rectal Q6H PRN    budesonide (PULMICORT) nebulizer suspension 500 mcg  0.5 mg Nebulization BID RT    cefTRIAXone (ROCEPHIN) 2,000 mg in sterile water 20 mL IV syringe  2,000 mg IntraVENous Q24H    glucose chewable tablet 16 g  4 tablet Oral PRN    dextrose bolus 10% 125 mL  125 mL IntraVENous PRN    Or    dextrose bolus 10% 250 mL  250 mL IntraVENous PRN    glucagon injection 1 mg  1 mg SubCUTAneous PRN    dextrose 10 % infusion   IntraVENous Continuous PRN    insulin lispro (HUMALOG,ADMELOG) injection vial 0-8 Units  0-8 Units SubCUTAneous 4 times per day    [Held by provider] aspirin EC tablet 81 mg  81 mg Oral Daily    atorvastatin (LIPITOR) tablet 40 mg  40 mg Oral Nightly    montelukast (SINGULAIR) tablet 10 mg  10 mg Oral Nightly    folic acid (FOLVITE) tablet 1 mg  1 mg Oral Daily    iron sucrose (VENOFER) injection 200 mg  200 mg IntraVENous Q24H    insulin glargine (LANTUS) injection vial 8 Units  8 Units SubCUTAneous BID         Objective:   Physical Exam:     BP (!) 161/70   Pulse 66   Temp 97.9 °F (36.6 °C) (Oral)   Resp 15   Ht 1.524 m (5')   Wt 69.1 kg (152 lb 5.4 oz)   SpO2 99%   BMI 29.75 kg/m²  O2 Flow Rate (L/min): 2 L/min O2 Device: Nasal cannula    Temp (24hrs), Av.7 °F (36.5 °C), Min:97.2 °F (36.2 °C), Max:98.2 °F (36.8 °C)    No intake/output data recorded.    1901 - 06/15 0700  In: 990 [P.O.:630]  Out: 1550 [Urine:1550]    Mode Rate TV Press PEEP FiO2 PIP Min. Vent               28 %              General:   Awake and alert   Lungs:   Clear to auscultation bilaterally.   Chest wall:  No tenderness or deformity.   Heart:  Regular rate and rhythm, S1, S2 normal, no murmur, click, rub or gallop.   Abdomen:   Soft, non-tender. Bowel sounds normal. No masses,  No organomegaly.   Extremities: Extremities normal, atraumatic, no cyanosis or edema.

## 2025-06-15 NOTE — CONSULTS
Gastroenterology Consult Note        Patient: Lizbeth Hogue MRN: [id number]  SSN: [id number]    YOB: 1955  Age: 70 y.o.  Sex: female      Subjective:      Lizbeth Hogue is a 70 y.o. female who is being seen for anemia.   70 y.o. with a PMH of Multiple Myeloma on chemotherapy who presented to  ED  with SOB. She was not feeling well over the past couple of weeks.  In er  ABG obtained on high flow demonstrating respiratory acidosis (pH 7.29, CO2 50).   anemia with hemoglobin 6.3.  Daughter reports episode of dark black stool/diarrhea, in last two days she had 1 unit PRBCs patient initiated on BiPAP  bur now on NC 3 liters.not sure if had colonoscopy before.   Past Medical History:   Diagnosis Date   • Arthritis    • Asthma    • Chronic kidney disease    • Chronic obstructive pulmonary disease (HCC)    • Diabetes (HCC)    • Hypertension    • Sleep apnea     no cpap     Past Surgical History:   Procedure Laterality Date   • OTHER SURGICAL HISTORY Bilateral     Eye surgery   • WRIST SURGERY Left     pins and screws      Family History   Problem Relation Age of Onset   • Diabetes Mother    • Hypertension Father    • Diabetes Father    • Hypertension Mother      Social History     Tobacco Use   • Smoking status: Never   • Smokeless tobacco: Never   Substance Use Topics   • Alcohol use: Never      Current Facility-Administered Medications   Medication Dose Route Frequency Provider Last Rate Last Admin   • [START ON 6/16/2025] amLODIPine (NORVASC) tablet 10 mg  10 mg Oral Daily Tee Blue MD       • [START ON 6/16/2025] predniSONE (DELTASONE) tablet 40 mg  40 mg Oral Daily Amanda Campos MD       • pantoprazole (PROTONIX) tablet 40 mg  40 mg Oral QAM Audelia Love MD   40 mg at 06/15/25 0600   • ipratropium 0.5 mg-albuterol 2.5 mg (DUONEB) nebulizer solution 1 Dose  1 Dose Inhalation TID RT Anson Zayas DO   1 Dose at 06/15/25 1805   • ipratropium 0.5 mg-albuterol 2.5 mg

## 2025-06-15 NOTE — PROGRESS NOTES
Nephrology follow-up          Patient: Lizbeth Hogue MRN: [id number]  SSN: [id number]    YOB: 1955  Age: 70 y.o.  Sex: female      Subjective:   The patient is seen at the bedside  She looks comfortable  On nasal cannula  Blood pressures are elevated  Resolving hyponatremia  Off IV fluids    Past Medical History:   Diagnosis Date    Arthritis     Asthma     Chronic kidney disease     Chronic obstructive pulmonary disease (HCC)     Diabetes (HCC)     Hypertension     Sleep apnea     no cpap     Past Surgical History:   Procedure Laterality Date    OTHER SURGICAL HISTORY Bilateral     Eye surgery    WRIST SURGERY Left     pins and screws      Family History   Problem Relation Age of Onset    Diabetes Mother     Hypertension Father     Diabetes Father     Hypertension Mother      Social History     Tobacco Use    Smoking status: Never    Smokeless tobacco: Never   Substance Use Topics    Alcohol use: Never      Current Facility-Administered Medications   Medication Dose Route Frequency Provider Last Rate Last Admin    pantoprazole (PROTONIX) tablet 40 mg  40 mg Oral QAM AC Audelia Shen MD   40 mg at 06/15/25 0600    amLODIPine (NORVASC) tablet 5 mg  5 mg Oral Daily Tee Blue MD        ipratropium 0.5 mg-albuterol 2.5 mg (DUONEB) nebulizer solution 1 Dose  1 Dose Inhalation TID RT Anson Zayas DO   1 Dose at 06/15/25 0817    ipratropium 0.5 mg-albuterol 2.5 mg (DUONEB) nebulizer solution 1 Dose  1 Dose Inhalation Q4H PRN Anson Zayas DO   1 Dose at 06/13/25 0210    lactated ringers bolus 500 mL  500 mL IntraVENous Once Anson Zayas DO   Held at 06/13/25 0519    sodium chloride flush 0.9 % injection 5-40 mL  5-40 mL IntraVENous 2 times per day Gagan Lala APRN - CNP   10 mL at 06/15/25 0951    sodium chloride flush 0.9 % injection 5-40 mL  5-40 mL IntraVENous PRN Gagan Lala APRN - CNP        0.9 % sodium chloride infusion   IntraVENous PRN Gagan Lala, ELANA -  bleed  History of multiple myeloma  Hematology on board  GI on board  Received blood transfusion            Plan:       Signed By: Tee Blue MD     Deidra 15, 2025

## 2025-06-15 NOTE — PROGRESS NOTES
Hematology Oncology Progress Note           Follow up for: Myeloma  Chart notes reviewed since last visit.    Was able to be moved to the floor  Breathing still improved      Patient Vitals for the past 24 hrs:   BP Temp Temp src Pulse Resp SpO2 Weight   06/15/25 0817 -- -- -- -- -- 100 % --   06/15/25 0801 (!) 157/76 97.5 °F (36.4 °C) Axillary 67 18 99 % --   06/15/25 0700 -- -- -- 59 -- -- --   06/15/25 0443 (!) 149/75 97.7 °F (36.5 °C) Axillary 63 17 98 % --   06/15/25 0432 -- -- -- -- -- -- 69.1 kg (152 lb 5.4 oz)   06/15/25 0000 134/83 97.2 °F (36.2 °C) Axillary 70 17 98 % --   06/14/25 2104 -- -- -- 72 18 100 % --   06/14/25 1947 (!) 152/70 98.2 °F (36.8 °C) Oral 66 18 98 % --   06/14/25 1711 -- -- -- 77 -- -- --   06/14/25 1600 (!) 164/68 -- -- 76 20 -- --   06/14/25 1500 (!) 148/60 97.7 °F (36.5 °C) Oral 69 17 -- --   06/14/25 1400 (!) 135/115 -- -- 80 18 -- --   06/14/25 1200 (!) 163/81 -- -- 72 18 -- --       Review of Systems:        Physical Examination:  Constitutional  respiratory support   Head Normocephalic; no scars   Eyes Conjunctivae and sclerae are clear and without icterus. Pupils are reactive and equal.   ENMT Sinuses are nontender.  No oral exudates, ulcers, masses, thrush or mucositis. Oropharynx clear.  Tongue normal.   Neck Supple without masses or thyromegaly. No jugular venous distension.   Hematologic/Lymphatic No petechiae or purpura.  No tender or palpable lymph nodes in the cervical, supraclavicular, axillary or inguinal area.   Respiratory Lungs are clear to auscultation without rhonchi or wheezing.   Cardiovascular Regular rate and rhythm of heart without murmurs, gallops or rubs.   Chest / Line Site Chest is symmetric with no chest wall deformities.   Abdomen Non-tender, non-distended, no masses, ascites or hepatosplenomegaly. Good bowel sounds. No guarding or rebound tenderness. No pulsatile masses.   Musculoskeletal No tenderness or swelling, normal range of motion without  Collection Time: 06/15/25  5:51 AM   Result Value Ref Range    POC Glucose 226 (H) 65 - 100 mg/dL    Performed by: Michael Red    POCT Glucose    Collection Time: 06/15/25  8:03 AM   Result Value Ref Range    POC Glucose 175 (H) 65 - 100 mg/dL    Performed by: Manohar Preston        Imaging:      Assessment and Plan:   Myeloma  -active on treatment with D-RVD; daratumumab a monoclonal Cd38ab causes false positive salomón test  -ok for transfusions  -maintain hgb >7  -already has followup with me on 6/18     2. Anemia  -related to blood loss and myeloma  -no issues with iron     3. Colon lesion-GI eval forthcoming     4. Hyponatremia improved     5. Hypoxic respiratory failure

## 2025-06-16 LAB
ANION GAP SERPL CALC-SCNC: 4 MMOL/L (ref 2–12)
BACTERIA SPEC CULT: NORMAL
BASOPHILS # BLD: 0.01 K/UL (ref 0–0.1)
BASOPHILS NFR BLD: 0.1 % (ref 0–1)
BUN SERPL-MCNC: 24 MG/DL (ref 6–20)
BUN/CREAT SERPL: 16 (ref 12–20)
CA-I BLD-MCNC: 8.5 MG/DL (ref 8.5–10.1)
CHLORIDE SERPL-SCNC: 103 MMOL/L (ref 97–108)
CO2 SERPL-SCNC: 27 MMOL/L (ref 21–32)
CREAT SERPL-MCNC: 1.47 MG/DL (ref 0.55–1.02)
DIFFERENTIAL METHOD BLD: ABNORMAL
EOSINOPHIL # BLD: 0 K/UL (ref 0–0.4)
EOSINOPHIL NFR BLD: 0 % (ref 0–7)
ERYTHROCYTE [DISTWIDTH] IN BLOOD BY AUTOMATED COUNT: 17.6 % (ref 11.5–14.5)
GLUCOSE BLD STRIP.AUTO-MCNC: 136 MG/DL (ref 65–100)
GLUCOSE BLD STRIP.AUTO-MCNC: 151 MG/DL (ref 65–100)
GLUCOSE BLD STRIP.AUTO-MCNC: 185 MG/DL (ref 65–100)
GLUCOSE BLD STRIP.AUTO-MCNC: 196 MG/DL (ref 65–100)
GLUCOSE BLD STRIP.AUTO-MCNC: 221 MG/DL (ref 65–100)
GLUCOSE BLD STRIP.AUTO-MCNC: 243 MG/DL (ref 65–100)
GLUCOSE BLD STRIP.AUTO-MCNC: 330 MG/DL (ref 65–100)
GLUCOSE SERPL-MCNC: 193 MG/DL (ref 65–100)
HCT VFR BLD AUTO: 25.6 % (ref 35–47)
HGB BLD-MCNC: 8.4 G/DL (ref 11.5–16)
IMM GRANULOCYTES # BLD AUTO: 0.15 K/UL (ref 0–0.04)
IMM GRANULOCYTES NFR BLD AUTO: 1.3 % (ref 0–0.5)
LYMPHOCYTES # BLD: 0.33 K/UL (ref 0.8–3.5)
LYMPHOCYTES NFR BLD: 2.8 % (ref 12–49)
Lab: NORMAL
MCH RBC QN AUTO: 31.9 PG (ref 26–34)
MCHC RBC AUTO-ENTMCNC: 32.8 G/DL (ref 30–36.5)
MCV RBC AUTO: 97.3 FL (ref 80–99)
MONOCYTES # BLD: 1.32 K/UL (ref 0–1)
MONOCYTES NFR BLD: 11.1 % (ref 5–13)
NEUTS SEG # BLD: 10.1 K/UL (ref 1.8–8)
NEUTS SEG NFR BLD: 84.7 % (ref 32–75)
NRBC # BLD: 0.03 K/UL (ref 0–0.01)
NRBC BLD-RTO: 0.3 PER 100 WBC
PERFORMED BY:: ABNORMAL
PLATELET # BLD AUTO: 334 K/UL (ref 150–400)
PMV BLD AUTO: 10.6 FL (ref 8.9–12.9)
POTASSIUM SERPL-SCNC: 4.9 MMOL/L (ref 3.5–5.1)
RBC # BLD AUTO: 2.63 M/UL (ref 3.8–5.2)
SODIUM SERPL-SCNC: 134 MMOL/L (ref 136–145)
WBC # BLD AUTO: 11.9 K/UL (ref 3.6–11)

## 2025-06-16 PROCEDURE — 6370000000 HC RX 637 (ALT 250 FOR IP): Performed by: EMERGENCY MEDICINE

## 2025-06-16 PROCEDURE — 6370000000 HC RX 637 (ALT 250 FOR IP): Performed by: PHYSICIAN ASSISTANT

## 2025-06-16 PROCEDURE — 6360000002 HC RX W HCPCS: Performed by: NURSE PRACTITIONER

## 2025-06-16 PROCEDURE — 85025 COMPLETE CBC W/AUTO DIFF WBC: CPT

## 2025-06-16 PROCEDURE — 2060000000 HC ICU INTERMEDIATE R&B

## 2025-06-16 PROCEDURE — 82962 GLUCOSE BLOOD TEST: CPT

## 2025-06-16 PROCEDURE — 80048 BASIC METABOLIC PNL TOTAL CA: CPT

## 2025-06-16 PROCEDURE — 6370000000 HC RX 637 (ALT 250 FOR IP): Performed by: INTERNAL MEDICINE

## 2025-06-16 PROCEDURE — 6360000002 HC RX W HCPCS

## 2025-06-16 PROCEDURE — 94640 AIRWAY INHALATION TREATMENT: CPT

## 2025-06-16 PROCEDURE — 6370000000 HC RX 637 (ALT 250 FOR IP): Performed by: NURSE PRACTITIONER

## 2025-06-16 PROCEDURE — 6370000000 HC RX 637 (ALT 250 FOR IP): Performed by: STUDENT IN AN ORGANIZED HEALTH CARE EDUCATION/TRAINING PROGRAM

## 2025-06-16 PROCEDURE — 2700000000 HC OXYGEN THERAPY PER DAY

## 2025-06-16 PROCEDURE — 36415 COLL VENOUS BLD VENIPUNCTURE: CPT

## 2025-06-16 PROCEDURE — 6370000000 HC RX 637 (ALT 250 FOR IP)

## 2025-06-16 PROCEDURE — 2500000003 HC RX 250 WO HCPCS: Performed by: NURSE PRACTITIONER

## 2025-06-16 PROCEDURE — 94761 N-INVAS EAR/PLS OXIMETRY MLT: CPT

## 2025-06-16 RX ORDER — AMLODIPINE BESYLATE 5 MG/1
10 TABLET ORAL DAILY
Status: DISCONTINUED | OUTPATIENT
Start: 2025-06-16 | End: 2025-06-23

## 2025-06-16 RX ORDER — INSULIN LISPRO 100 [IU]/ML
0-16 INJECTION, SOLUTION INTRAVENOUS; SUBCUTANEOUS
Status: DISCONTINUED | OUTPATIENT
Start: 2025-06-16 | End: 2025-06-26 | Stop reason: HOSPADM

## 2025-06-16 RX ORDER — INSULIN GLARGINE 100 [IU]/ML
15 INJECTION, SOLUTION SUBCUTANEOUS 2 TIMES DAILY
Status: DISCONTINUED | OUTPATIENT
Start: 2025-06-16 | End: 2025-06-26 | Stop reason: HOSPADM

## 2025-06-16 RX ADMIN — INSULIN LISPRO 12 UNITS: 100 INJECTION, SOLUTION INTRAVENOUS; SUBCUTANEOUS at 16:49

## 2025-06-16 RX ADMIN — IPRATROPIUM BROMIDE AND ALBUTEROL SULFATE 1 DOSE: 2.5; .5 SOLUTION RESPIRATORY (INHALATION) at 07:14

## 2025-06-16 RX ADMIN — FOLIC ACID 1 MG: 1 TABLET ORAL at 09:00

## 2025-06-16 RX ADMIN — IPRATROPIUM BROMIDE AND ALBUTEROL SULFATE 1 DOSE: 2.5; .5 SOLUTION RESPIRATORY (INHALATION) at 20:18

## 2025-06-16 RX ADMIN — SODIUM CHLORIDE, PRESERVATIVE FREE 10 ML: 5 INJECTION INTRAVENOUS at 19:51

## 2025-06-16 RX ADMIN — MONTELUKAST 10 MG: 10 TABLET, FILM COATED ORAL at 20:14

## 2025-06-16 RX ADMIN — IPRATROPIUM BROMIDE AND ALBUTEROL SULFATE 1 DOSE: 2.5; .5 SOLUTION RESPIRATORY (INHALATION) at 13:16

## 2025-06-16 RX ADMIN — BUDESONIDE INHALATION 500 MCG: 0.5 SUSPENSION RESPIRATORY (INHALATION) at 20:18

## 2025-06-16 RX ADMIN — AMLODIPINE BESYLATE 10 MG: 5 TABLET ORAL at 06:28

## 2025-06-16 RX ADMIN — POLYETHYLENE GLYCOL-3350 AND ELECTROLYTES 4000 ML: 236; 6.74; 5.86; 2.97; 22.74 POWDER, FOR SOLUTION ORAL at 19:48

## 2025-06-16 RX ADMIN — PREDNISONE 40 MG: 20 TABLET ORAL at 09:00

## 2025-06-16 RX ADMIN — ATORVASTATIN CALCIUM 40 MG: 40 TABLET, FILM COATED ORAL at 20:14

## 2025-06-16 RX ADMIN — INSULIN LISPRO 2 UNITS: 100 INJECTION, SOLUTION INTRAVENOUS; SUBCUTANEOUS at 12:32

## 2025-06-16 RX ADMIN — INSULIN LISPRO 2 UNITS: 100 INJECTION, SOLUTION INTRAVENOUS; SUBCUTANEOUS at 05:54

## 2025-06-16 RX ADMIN — IRON SUCROSE 200 MG: 20 INJECTION, SOLUTION INTRAVENOUS at 12:32

## 2025-06-16 RX ADMIN — INSULIN LISPRO 4 UNITS: 100 INJECTION, SOLUTION INTRAVENOUS; SUBCUTANEOUS at 20:15

## 2025-06-16 RX ADMIN — BUDESONIDE INHALATION 500 MCG: 0.5 SUSPENSION RESPIRATORY (INHALATION) at 07:14

## 2025-06-16 RX ADMIN — INSULIN LISPRO 2 UNITS: 100 INJECTION, SOLUTION INTRAVENOUS; SUBCUTANEOUS at 00:12

## 2025-06-16 RX ADMIN — INSULIN GLARGINE 15 UNITS: 100 INJECTION, SOLUTION SUBCUTANEOUS at 20:15

## 2025-06-16 RX ADMIN — PANTOPRAZOLE SODIUM 40 MG: 40 TABLET, DELAYED RELEASE ORAL at 05:54

## 2025-06-16 RX ADMIN — INSULIN GLARGINE 8 UNITS: 100 INJECTION, SOLUTION SUBCUTANEOUS at 09:00

## 2025-06-16 RX ADMIN — CEFTRIAXONE SODIUM 2000 MG: 2 INJECTION, POWDER, FOR SOLUTION INTRAMUSCULAR; INTRAVENOUS at 01:51

## 2025-06-16 RX ADMIN — SODIUM CHLORIDE, PRESERVATIVE FREE 10 ML: 5 INJECTION INTRAVENOUS at 09:01

## 2025-06-16 ASSESSMENT — PAIN SCALES - GENERAL: PAINLEVEL_OUTOF10: 0

## 2025-06-16 NOTE — PROGRESS NOTES
Hospitalist Progress Note               Daily Progress Note: 6/16/2025      Hospital Day: 4     Chief complaint:   Chief Complaint   Patient presents with    Shortness of Breath        Subjective:     Patient is seen today for follow-up. Patient seen examined bedside. Patient denies any complaints.       Medications reviewed  Current Facility-Administered Medications   Medication Dose Route Frequency    amLODIPine (NORVASC) tablet 10 mg  10 mg Oral Daily    polyethylene glycol (GoLYTELY) solution 4,000 mL  4,000 mL Oral Once    predniSONE (DELTASONE) tablet 40 mg  40 mg Oral Daily    pantoprazole (PROTONIX) tablet 40 mg  40 mg Oral QAM AC    ipratropium 0.5 mg-albuterol 2.5 mg (DUONEB) nebulizer solution 1 Dose  1 Dose Inhalation TID RT    ipratropium 0.5 mg-albuterol 2.5 mg (DUONEB) nebulizer solution 1 Dose  1 Dose Inhalation Q4H PRN    lactated ringers bolus 500 mL  500 mL IntraVENous Once    sodium chloride flush 0.9 % injection 5-40 mL  5-40 mL IntraVENous 2 times per day    sodium chloride flush 0.9 % injection 5-40 mL  5-40 mL IntraVENous PRN    0.9 % sodium chloride infusion   IntraVENous PRN    ondansetron (ZOFRAN-ODT) disintegrating tablet 4 mg  4 mg Oral Q8H PRN    Or    ondansetron (ZOFRAN) injection 4 mg  4 mg IntraVENous Q6H PRN    polyethylene glycol (GLYCOLAX) packet 17 g  17 g Oral Daily PRN    acetaminophen (TYLENOL) tablet 650 mg  650 mg Oral Q6H PRN    Or    acetaminophen (TYLENOL) suppository 650 mg  650 mg Rectal Q6H PRN    budesonide (PULMICORT) nebulizer suspension 500 mcg  0.5 mg Nebulization BID RT    cefTRIAXone (ROCEPHIN) 2,000 mg in sterile water 20 mL IV syringe  2,000 mg IntraVENous Q24H    glucose chewable tablet 16 g  4 tablet Oral PRN    dextrose bolus 10% 125 mL  125 mL IntraVENous PRN    Or    dextrose bolus 10% 250 mL  250 mL IntraVENous PRN    glucagon injection 1 mg  1 mg SubCUTAneous PRN    dextrose 10 % infusion   IntraVENous Continuous PRN    insulin lispro  Management.  [] Discussed management of the case with:    [x] Telemetry personally reviewed and interpreted as documented above    [x] Imaging personally reviewed and interpreted, includes:    [] Data Review (any 3)  [x] All available Consultant notes from yesterday/today were reviewed  [x] All current labs were reviewed and interpreted for clinical significance   [x] Appropriate follow-up labs were ordered  [] Collateral history obtained from:               Assessment & Plan:    Acute on chronic hypercapnic/hypoxic respiratory failure resolved  Acute exacerbation of COPD resolved  NISH, non-compliant with CPAP  - Trial BiPAP qHS, previously non-compliant at home  -continue ceftriaxone, completed azithromycin  Switch Solu-Medrol to oral prednisone tomorrow as patient is not wheezing and much improved     Hypertension  -TTE LVEF 60-65%, abnormal diastolic function, mild AI, mild MR, mild TR  Increase amlodipine to 10 mg     Colon mass   -GI consulted for possible colonoscopy while inpatient. Patient reported a PET scan with a positive finding of a colon mass that was planning for OP evaluation  -single episode of melanotic stool prior to arrival in ED.   - consulted G.I., appreciate recommendations  - per G.I. recommendations continue Protonix  - continue clear liquid diet  - bowel preparation for anticipated EGD and colonoscopy for tomorrow morning 6/17.  - Monitor hemoglobin, goal >7     CKD stage IIIb  Hyponatremia resolved  Clinical patient euvolemic right now  Status post IV fluids  Creatinine is 1.8 today     Diabetes mellitus type 2  -maintain euglycemia  -glucose POCT  -lantus 8u BID  -ISS     Multiple myeloma  Anemia, possible GIB  -on treatment for MM with D-RVD  -transfused 1u pRBC 6/13   -SCDs  -receiving venofer for 4 doses     Acute metabolic encephalopathy resolved  -likely due to hypercarbia, now resolved      Code status:    Social determinants of health: none      Estimated discharge date//time  frame/disposition:    Barriers to discharge:           Paulo Leslie MD

## 2025-06-16 NOTE — PLAN OF CARE
Problem: Chronic Conditions and Co-morbidities  Goal: Patient's chronic conditions and co-morbidity symptoms are monitored and maintained or improved  6/16/2025 1949 by Aleena Tello RN  Outcome: Progressing  6/16/2025 0709 by Liliana Merino RN  Outcome: Progressing     Problem: Discharge Planning  Goal: Discharge to home or other facility with appropriate resources  6/16/2025 1949 by Aleena Tello RN  Outcome: Progressing  6/16/2025 0709 by Liliana Merino RN  Outcome: Progressing     Problem: Respiratory - Adult  Goal: Achieves optimal ventilation and oxygenation  6/16/2025 1949 by Aleena Tello RN  Outcome: Progressing  6/16/2025 0709 by Liliana Merino RN  Outcome: Progressing     Problem: Cardiovascular - Adult  Goal: Maintains optimal cardiac output and hemodynamic stability  6/16/2025 1949 by Aleena Tello RN  Outcome: Progressing  6/16/2025 0709 by Liliana Merino RN  Outcome: Progressing  Goal: Absence of cardiac dysrhythmias or at baseline  6/16/2025 1949 by Aleena Tello RN  Outcome: Progressing  6/16/2025 0709 by Liliana Merino RN  Outcome: Progressing     Problem: Infection - Adult  Goal: Absence of infection at discharge  6/16/2025 1949 by Aleena Tello RN  Outcome: Progressing  6/16/2025 0709 by Liliana Merino RN  Outcome: Progressing  Goal: Absence of infection during hospitalization  6/16/2025 1949 by Aleena Tello RN  Outcome: Progressing  6/16/2025 0709 by Liliana Merino RN  Outcome: Progressing  Goal: Absence of fever/infection during anticipated neutropenic period  6/16/2025 1949 by Aleena Tello RN  Outcome: Progressing  6/16/2025 0709 by Liliana Merino RN  Outcome: Progressing     Problem: Skin/Tissue Integrity - Adult  Goal: Skin integrity remains intact  Description: 1.  Monitor for areas of redness and/or skin breakdown  2.  Assess vascular access sites hourly  3.  Every 4-6 hours minimum:  Change oxygen saturation probe site  4.  Every  Monitor oral intake, labs, and treatment plans   Recommend appropriate diets, oral nutritional supplements, and vitamin/mineral supplements

## 2025-06-16 NOTE — PROGRESS NOTES
Gastroenterology Progress Note        Patient: Lizbeth Hogue MRN: [id number]  SSN: [id number]    YOB: 1955  Age: 70 y.o.  Sex: female      Admit Date: 6/13/2025    LOS: 3 days     Subjective:   Less  shortness of breath, no pain, mild weakness, no bowel movement.  Past Medical History:   Diagnosis Date    Arthritis     Asthma     Chronic kidney disease     Chronic obstructive pulmonary disease (HCC)     Diabetes (HCC)     Hypertension     Sleep apnea     no cpap        Current Facility-Administered Medications:     amLODIPine (NORVASC) tablet 10 mg, 10 mg, Oral, Daily, Sukumar Bansal PA-C, 10 mg at 06/16/25 0628    predniSONE (DELTASONE) tablet 40 mg, 40 mg, Oral, Daily, Amanda Campos MD, 40 mg at 06/16/25 0900    pantoprazole (PROTONIX) tablet 40 mg, 40 mg, Oral, Hermelinda ABDALLA Katherine M, MD, 40 mg at 06/16/25 0554    ipratropium 0.5 mg-albuterol 2.5 mg (DUONEB) nebulizer solution 1 Dose, 1 Dose, Inhalation, TID RT, Anson Zayas DO, 1 Dose at 06/16/25 1316    ipratropium 0.5 mg-albuterol 2.5 mg (DUONEB) nebulizer solution 1 Dose, 1 Dose, Inhalation, Q4H PRN, Anson Zayas DO, 1 Dose at 06/13/25 0210    lactated ringers bolus 500 mL, 500 mL, IntraVENous, Once, Anson Zayas DO, Held at 06/13/25 0519    sodium chloride flush 0.9 % injection 5-40 mL, 5-40 mL, IntraVENous, 2 times per day, Gagan Lala, APRN - CNP, 10 mL at 06/16/25 0901    sodium chloride flush 0.9 % injection 5-40 mL, 5-40 mL, IntraVENous, PRN, Gagan Lala, APRN - CNP    0.9 % sodium chloride infusion, , IntraVENous, PRN, Bushra Lalana N, APRN - CNP, Last Rate: 5 mL/hr at 06/15/25 0217, New Bag at 06/15/25 0217    ondansetron (ZOFRAN-ODT) disintegrating tablet 4 mg, 4 mg, Oral, Q8H PRN **OR** ondansetron (ZOFRAN) injection 4 mg, 4 mg, IntraVENous, Q6H PRN, Gagan Lala, APRN - CNP    polyethylene glycol (GLYCOLAX) packet 17 g, 17 g, Oral, Daily PRN, Gagan Lala, APRN - CNP    acetaminophen  colectomy, for the colon cancer removal,  Signed By: Eamon Jacobson MD     June 16, 2025          Thank you for allowing me to participate in this patients care    DISCLAIMER:  Please note that this report was generated to utilize Dragon Dictation system, the software is designed to speed over the accuracy.  Every effort has been done to attempt to correct this mistakes upon review, but there still might be some inadvertent errors in grammar and spelling.  Please utilize caution while reading the report due to this  inherent and potential for grammatical mistakes.    This physician  works as a inpatient consult gastroenterologist only, any result not available at time of inpatient discharge and/or clinical follow-up should be managed by the primary care physician or patient's primary gastroenterologist.  It is responsibility of hospitalitis/admitting physician to forward the discharge summary to patient's primary care physician and the primary gastroenterologist regarding further follow-up plan after patient be discharged.    Note to patient:  The 21 st century Cures Act make the medical notes like this available to patient in the interest of transparency, however please be advised this is a medical document.  It is intended  as peer to peer communication. It is written in the medical language and may contain abbreviation or verbiage that are unfamiliar. It may appear blunt or direct.  Medical documents are intended to carry relevant information, facts as evident.  And the clinic opinions of the practitioner.  This note maybe transcribed  using a voice dictation system, voice-recognition errors may occur, this should not be taken to alter the contents or meaning for this note.    Cc Referring Physician   Jasmin Shankar DO

## 2025-06-16 NOTE — PLAN OF CARE
Problem: Chronic Conditions and Co-morbidities  Goal: Patient's chronic conditions and co-morbidity symptoms are monitored and maintained or improved  6/16/2025 0709 by Liliana Merino RN  Outcome: Progressing  6/15/2025 2140 by Neda Rodriguez RN  Outcome: Progressing  Flowsheets (Taken 6/15/2025 2000)  Care Plan - Patient's Chronic Conditions and Co-Morbidity Symptoms are Monitored and Maintained or Improved: Monitor and assess patient's chronic conditions and comorbid symptoms for stability, deterioration, or improvement     Problem: Discharge Planning  Goal: Discharge to home or other facility with appropriate resources  6/16/2025 0709 by Liliana Merino RN  Outcome: Progressing  6/15/2025 2140 by Neda Rodriguez RN  Outcome: Progressing  Flowsheets (Taken 6/15/2025 2000)  Discharge to home or other facility with appropriate resources: Identify barriers to discharge with patient and caregiver     Problem: Respiratory - Adult  Goal: Achieves optimal ventilation and oxygenation  6/16/2025 0709 by Liliana Merino RN  Outcome: Progressing  6/15/2025 2140 by Neda Rodriguez RN  Outcome: Progressing  Flowsheets (Taken 6/15/2025 2000)  Achieves optimal ventilation and oxygenation:   Assess for changes in respiratory status   Assess for changes in mentation and behavior   Position to facilitate oxygenation and minimize respiratory effort     Problem: Cardiovascular - Adult  Goal: Maintains optimal cardiac output and hemodynamic stability  6/16/2025 0709 by Liliana Merino RN  Outcome: Progressing  6/15/2025 2140 by Neda Rodriguez RN  Outcome: Progressing  Flowsheets (Taken 6/15/2025 2000)  Maintains optimal cardiac output and hemodynamic stability: Monitor blood pressure and heart rate  Goal: Absence of cardiac dysrhythmias or at baseline  6/16/2025 0709 by Liliana Merino RN  Outcome: Progressing  6/15/2025 2140 by Neda Rodriguez RN  Outcome: Progressing  Flowsheets (Taken 6/15/2025  RN  Outcome: Progressing     Problem: Safety - Adult  Goal: Free from fall injury  6/16/2025 0709 by Liliana Merino RN  Outcome: Progressing  6/15/2025 2140 by Neda Rodriguez RN  Outcome: Progressing     Problem: Skin/Tissue Integrity - Adult  Goal: Skin integrity remains intact  Description: 1.  Monitor for areas of redness and/or skin breakdown  2.  Assess vascular access sites hourly  3.  Every 4-6 hours minimum:  Change oxygen saturation probe site  4.  Every 4-6 hours:  If on nasal continuous positive airway pressure, respiratory therapy assess nares and determine need for appliance change or resting period  6/16/2025 0709 by Liliana Merino RN  Outcome: Progressing  6/15/2025 2140 by Neda Rodriguez RN  Outcome: Progressing  Flowsheets (Taken 6/15/2025 2000)  Skin Integrity Remains Intact: Monitor for areas of redness and/or skin breakdown     Problem: Musculoskeletal - Adult  Goal: Return mobility to safest level of function  6/16/2025 0709 by Liliana Merino RN  Outcome: Progressing  6/15/2025 2140 by Neda Rodriguez RN  Outcome: Progressing  Flowsheets (Taken 6/15/2025 2000)  Return Mobility to Safest Level of Function: Assess patient stability and activity tolerance for standing, transferring and ambulating with or without assistive devices     Problem: Hematologic - Adult  Goal: Maintains hematologic stability  6/16/2025 0709 by Liliana Merino RN  Outcome: Progressing  6/15/2025 2140 by Neda Rordiguez RN  Outcome: Progressing  Flowsheets (Taken 6/15/2025 2000)  Maintains hematologic stability: Assess for signs and symptoms of bleeding or hemorrhage

## 2025-06-16 NOTE — PLAN OF CARE
Problem: Nutrition Deficit:  Goal: Optimize nutritional status  Flowsheets (Taken 6/16/2025 9451)  Nutrient intake appropriate for improving, restoring, or maintaining nutritional needs:   Assess nutritional status and recommend course of action   Monitor oral intake, labs, and treatment plans   Recommend appropriate diets, oral nutritional supplements, and vitamin/mineral supplements

## 2025-06-16 NOTE — PROGRESS NOTES
Hematology/Oncology   Progress Note    Patient: Lizbeth Hogue MRN: [id number]     YOB: 1955  Age: 70 y.o.  Sex: female      Admit Date: 6/13/2025    LOS: 3 days     Reason for Consult: multiple myeloma     Subjective:     Patient resting bed, no acute distress.  Hemoglobin improved to 8.4 g/dL.    Objective:     Vitals:    06/16/25 0603 06/16/25 0700 06/16/25 0714 06/16/25 0816   BP: (!) 176/73   (!) 147/65   Pulse: 62 57 71 59   Resp: 16  17 18   Temp: 97.5 °F (36.4 °C)   98.1 °F (36.7 °C)   TempSrc: Axillary   Oral   SpO2: 100%  99% 100%   Weight:       Height:          Physical Exam:  Constitutional: Not in any acute distress   Eyes: Sclerae anicteric. Conjunctivae shows pallor.  ENMT: Oral mucosa is moist, no mucositis or petechiae.  Respiratory: Lungs are clear bilaterally.  Cardiovascular: Normal sinus rhythm  Abdomen: Soft, nontender. No guarding or rigidity. Bowel sounds present.  Extremities: No edema, cyanosis or clubbing.  Skin: No petechiae; no skin rash.  Neurologic: Alert/oriented; no focal neurological deficits.     Lab/Data Review:  Recent Labs     06/13/25  1142 06/14/25  0310 06/16/25  0340   WBC 5.8 9.5 11.9*   HGB 7.1* 7.8* 8.4*   HCT 21.9* 23.3* 25.6*    255 334     Recent Labs     06/14/25  0310 06/15/25  0126 06/16/25  0340   * 136  135* 134*   K 4.6 5.0  4.8 4.9    105  106 103   CO2 23 25  23 27   BUN 23* 26*  26* 24*   PHOS  --  3.2  --      No results for input(s): \"PH\", \"PCO2\", \"PO2\", \"HCO3\", \"FIO2\" in the last 72 hours.  Recent Results (from the past 24 hours)   POCT Glucose    Collection Time: 06/15/25 12:02 PM   Result Value Ref Range    POC Glucose 241 (H) 65 - 100 mg/dL    Performed by: Manohar Preston    POCT Glucose    Collection Time: 06/15/25  4:31 PM   Result Value Ref Range    POC Glucose 232 (H) 65 - 100 mg/dL    Performed by: Manohar Preston    POCT Glucose    Collection Time: 06/16/25 12:05 AM   Result Value Ref Range    POC Glucose 243

## 2025-06-16 NOTE — PROGRESS NOTES
Comprehensive Nutrition Assessment    Type and Reason for Visit:  Initial    Nutrition Recommendations/Plan:   Continue Current Diet  Encourage PO intakes >50%  Monitor/document wt, BM, PO+ONS% in I/O  Add Ensure BID     Malnutrition Assessment:  Malnutrition Status:  Insufficient data (06/16/25 1540)    Context:  Acute Illness     Findings of the 6 clinical characteristics of malnutrition:  Energy Intake:  Mild decrease in energy intake  Weight Loss:  Unable to assess     Body Fat Loss:  Unable to assess     Muscle Mass Loss:  Unable to assess    Fluid Accumulation:  Unable to assess     Strength:  Not Performed    Nutrition Assessment:    pt p/w: SOB, reported not feeling well over the last couple of weeks; pt reported a decrease in her appetite; pt stated that she has been able to eat \"some\" of the hosital food, pt reports no weight loss; pt averaging 50% po intake per documentation; will add Ensure BID to promote po intake; labs and meds reviewed, Na low, Glucose eleveated, pt on statin and receiving lantus 2x daily and humalog 4x daily    Nutrition Related Findings:    LBM 6/16, +1 pitting edema Wound Type: None       Current Nutrition Intake & Therapies:    Average Meal Intake: 26-50%  Average Supplements Intake: None Ordered  ADULT DIET; Regular; 4 carb choices (60 gm/meal); No Added Salt (3-4 gm)    Anthropometric Measures:  Height: 152.4 cm (5')  Ideal Body Weight (IBW): 100 lbs (45 kg)    Admission Body Weight: 64.4 kg (141 lb 15.6 oz)  Current Body Weight: 64.7 kg (142 lb 10.2 oz),   IBW. Weight Source: Bed scale  Current BMI (kg/m2): 27.9           Weight Adjustment For: No Adjustment                 BMI Categories: Overweight (BMI 25.0-29.9)    Estimated Daily Nutrient Needs:  Energy Requirements Based On: Kcal/kg  Weight Used for Energy Requirements: Current  Energy (kcal/day): 1,617-1,941  Weight Used for Protein Requirements: Current  Protein (g/day): 65  Method Used for Fluid Requirements: 1

## 2025-06-16 NOTE — PROGRESS NOTES
Nephrology follow-up          Patient: Lizbeth Hogue MRN: [id number]  SSN: [id number]    YOB: 1955  Age: 70 y.o.  Sex: female      Subjective:   The patient is seen at the bedside  She looks comfortable  On nasal cannula  Blood pressures are better  Resolving hyponatremia  Off IV fluids    Past Medical History:   Diagnosis Date    Arthritis     Asthma     Chronic kidney disease     Chronic obstructive pulmonary disease (HCC)     Diabetes (HCC)     Hypertension     Sleep apnea     no cpap     Past Surgical History:   Procedure Laterality Date    OTHER SURGICAL HISTORY Bilateral     Eye surgery    WRIST SURGERY Left     pins and screws      Family History   Problem Relation Age of Onset    Diabetes Mother     Hypertension Father     Diabetes Father     Hypertension Mother      Social History     Tobacco Use    Smoking status: Never    Smokeless tobacco: Never   Substance Use Topics    Alcohol use: Never      Current Facility-Administered Medications   Medication Dose Route Frequency Provider Last Rate Last Admin    amLODIPine (NORVASC) tablet 10 mg  10 mg Oral Daily Sukumar Bansal PA-C   10 mg at 06/16/25 0628    predniSONE (DELTASONE) tablet 40 mg  40 mg Oral Daily Amanda Campos MD   40 mg at 06/16/25 0900    pantoprazole (PROTONIX) tablet 40 mg  40 mg Oral QAM Audelai Love MD   40 mg at 06/16/25 0554    ipratropium 0.5 mg-albuterol 2.5 mg (DUONEB) nebulizer solution 1 Dose  1 Dose Inhalation TID RT Anson Zayas DO   1 Dose at 06/16/25 0714    ipratropium 0.5 mg-albuterol 2.5 mg (DUONEB) nebulizer solution 1 Dose  1 Dose Inhalation Q4H PRN Anson Zayas DO   1 Dose at 06/13/25 0210    lactated ringers bolus 500 mL  500 mL IntraVENous Once Anson Zayas, DO   Held at 06/13/25 0519    sodium chloride flush 0.9 % injection 5-40 mL  5-40 mL IntraVENous 2 times per day Gagan Lala, APRN - CNP   10 mL at 06/16/25 0901    sodium chloride flush 0.9 % injection 5-40 mL

## 2025-06-16 NOTE — PROGRESS NOTES
Patient lives at home with her daughter and will likely return at discharge. CM currently awaiting for PT/OT evals to determine safest discharge plan.     Patient is followed by nephrology, oncology, GI.     Barriers to discharge- clinical improvement, clearance from consultants.

## 2025-06-16 NOTE — PLAN OF CARE
Problem: Chronic Conditions and Co-morbidities  Goal: Patient's chronic conditions and co-morbidity symptoms are monitored and maintained or improved  6/15/2025 2140 by Neda Rodriguez RN  Outcome: Progressing  6/15/2025 1220 by Anne Fierro RN  Outcome: Progressing     Problem: Discharge Planning  Goal: Discharge to home or other facility with appropriate resources  6/15/2025 2140 by Neda Rodriguez RN  Outcome: Progressing  6/15/2025 1220 by Anne Fierro RN  Outcome: Progressing     Problem: Respiratory - Adult  Goal: Achieves optimal ventilation and oxygenation  6/15/2025 2140 by Neda Rodriguez RN  Outcome: Progressing  6/15/2025 1220 by Anne Fierro RN  Outcome: Progressing     Problem: Cardiovascular - Adult  Goal: Maintains optimal cardiac output and hemodynamic stability  6/15/2025 2140 by Neda Rodriguez RN  Outcome: Progressing  6/15/2025 1220 by Anne Fierro RN  Outcome: Progressing  Goal: Absence of cardiac dysrhythmias or at baseline  6/15/2025 2140 by Neda Rodriguez RN  Outcome: Progressing  6/15/2025 1220 by Anne Fierro RN  Outcome: Progressing     Problem: Infection - Adult  Goal: Absence of infection at discharge  6/15/2025 2140 by Neda Rodriguez RN  Outcome: Progressing  6/15/2025 1220 by Anne Fierro RN  Outcome: Progressing  Goal: Absence of infection during hospitalization  6/15/2025 2140 by Neda Rodriguez RN  Outcome: Progressing  6/15/2025 1220 by Anne Fierro RN  Outcome: Progressing  Goal: Absence of fever/infection during anticipated neutropenic period  6/15/2025 2140 by Neda Rodriguez RN  Outcome: Progressing  6/15/2025 1220 by Anne Fierro RN  Outcome: Progressing     Problem: Skin/Tissue Integrity  Goal: Skin integrity remains intact  Description: 1.  Monitor for areas of redness and/or skin breakdown  2.  Assess vascular access sites hourly  3.  Every 4-6 hours minimum:  Change oxygen saturation probe site  4.  Every 4-6 hours:  If

## 2025-06-17 ENCOUNTER — ANESTHESIA (OUTPATIENT)
Facility: HOSPITAL | Age: 70
End: 2025-06-17
Payer: MEDICAID

## 2025-06-17 ENCOUNTER — ANESTHESIA EVENT (OUTPATIENT)
Facility: HOSPITAL | Age: 70
End: 2025-06-17
Payer: MEDICAID

## 2025-06-17 LAB
ANION GAP SERPL CALC-SCNC: 7 MMOL/L (ref 2–12)
BASOPHILS # BLD: 0.01 K/UL (ref 0–0.1)
BASOPHILS NFR BLD: 0.1 % (ref 0–1)
BUN SERPL-MCNC: 20 MG/DL (ref 6–20)
BUN/CREAT SERPL: 17 (ref 12–20)
CA-I BLD-MCNC: 8.4 MG/DL (ref 8.5–10.1)
CHLORIDE SERPL-SCNC: 102 MMOL/L (ref 97–108)
CO2 SERPL-SCNC: 29 MMOL/L (ref 21–32)
CREAT SERPL-MCNC: 1.21 MG/DL (ref 0.55–1.02)
DIFFERENTIAL METHOD BLD: ABNORMAL
EOSINOPHIL # BLD: 0.01 K/UL (ref 0–0.4)
EOSINOPHIL NFR BLD: 0.1 % (ref 0–7)
ERYTHROCYTE [DISTWIDTH] IN BLOOD BY AUTOMATED COUNT: 17.2 % (ref 11.5–14.5)
GLUCOSE BLD STRIP.AUTO-MCNC: 121 MG/DL (ref 65–100)
GLUCOSE BLD STRIP.AUTO-MCNC: 139 MG/DL (ref 65–100)
GLUCOSE BLD STRIP.AUTO-MCNC: 330 MG/DL (ref 65–100)
GLUCOSE BLD STRIP.AUTO-MCNC: 62 MG/DL (ref 65–100)
GLUCOSE BLD STRIP.AUTO-MCNC: 73 MG/DL (ref 65–100)
GLUCOSE BLD STRIP.AUTO-MCNC: 77 MG/DL (ref 65–100)
GLUCOSE SERPL-MCNC: 123 MG/DL (ref 65–100)
HCT VFR BLD AUTO: 27.4 % (ref 35–47)
HGB BLD-MCNC: 8.9 G/DL (ref 11.5–16)
IMM GRANULOCYTES # BLD AUTO: 0.09 K/UL (ref 0–0.04)
IMM GRANULOCYTES NFR BLD AUTO: 0.8 % (ref 0–0.5)
LYMPHOCYTES # BLD: 0.43 K/UL (ref 0.8–3.5)
LYMPHOCYTES NFR BLD: 3.7 % (ref 12–49)
MCH RBC QN AUTO: 32 PG (ref 26–34)
MCHC RBC AUTO-ENTMCNC: 32.5 G/DL (ref 30–36.5)
MCV RBC AUTO: 98.6 FL (ref 80–99)
MONOCYTES # BLD: 1.31 K/UL (ref 0–1)
MONOCYTES NFR BLD: 11.3 % (ref 5–13)
NEUTS SEG # BLD: 9.72 K/UL (ref 1.8–8)
NEUTS SEG NFR BLD: 84 % (ref 32–75)
NRBC # BLD: 0.05 K/UL (ref 0–0.01)
NRBC BLD-RTO: 0.4 PER 100 WBC
PERFORMED BY:: ABNORMAL
PERFORMED BY:: NORMAL
PERFORMED BY:: NORMAL
PLATELET # BLD AUTO: 365 K/UL (ref 150–400)
PMV BLD AUTO: 10.3 FL (ref 8.9–12.9)
POTASSIUM SERPL-SCNC: 4.1 MMOL/L (ref 3.5–5.1)
RBC # BLD AUTO: 2.78 M/UL (ref 3.8–5.2)
SODIUM SERPL-SCNC: 138 MMOL/L (ref 136–145)
WBC # BLD AUTO: 11.6 K/UL (ref 3.6–11)

## 2025-06-17 PROCEDURE — 3700000000 HC ANESTHESIA ATTENDED CARE: Performed by: INTERNAL MEDICINE

## 2025-06-17 PROCEDURE — 2060000000 HC ICU INTERMEDIATE R&B

## 2025-06-17 PROCEDURE — 6370000000 HC RX 637 (ALT 250 FOR IP): Performed by: STUDENT IN AN ORGANIZED HEALTH CARE EDUCATION/TRAINING PROGRAM

## 2025-06-17 PROCEDURE — 94640 AIRWAY INHALATION TREATMENT: CPT

## 2025-06-17 PROCEDURE — 0DBK8ZX EXCISION OF ASCENDING COLON, VIA NATURAL OR ARTIFICIAL OPENING ENDOSCOPIC, DIAGNOSTIC: ICD-10-PCS | Performed by: INTERNAL MEDICINE

## 2025-06-17 PROCEDURE — 85025 COMPLETE CBC W/AUTO DIFF WBC: CPT

## 2025-06-17 PROCEDURE — 0DJ08ZZ INSPECTION OF UPPER INTESTINAL TRACT, VIA NATURAL OR ARTIFICIAL OPENING ENDOSCOPIC: ICD-10-PCS | Performed by: INTERNAL MEDICINE

## 2025-06-17 PROCEDURE — 51701 INSERT BLADDER CATHETER: CPT

## 2025-06-17 PROCEDURE — 6360000002 HC RX W HCPCS: Performed by: NURSE PRACTITIONER

## 2025-06-17 PROCEDURE — 97535 SELF CARE MNGMENT TRAINING: CPT

## 2025-06-17 PROCEDURE — 97161 PT EVAL LOW COMPLEX 20 MIN: CPT

## 2025-06-17 PROCEDURE — 3600007513: Performed by: INTERNAL MEDICINE

## 2025-06-17 PROCEDURE — 94761 N-INVAS EAR/PLS OXIMETRY MLT: CPT

## 2025-06-17 PROCEDURE — 6370000000 HC RX 637 (ALT 250 FOR IP)

## 2025-06-17 PROCEDURE — 3600007503: Performed by: INTERNAL MEDICINE

## 2025-06-17 PROCEDURE — 2580000003 HC RX 258: Performed by: NURSE PRACTITIONER

## 2025-06-17 PROCEDURE — 2580000003 HC RX 258: Performed by: ANESTHESIOLOGY

## 2025-06-17 PROCEDURE — 3700000001 HC ADD 15 MINUTES (ANESTHESIA): Performed by: INTERNAL MEDICINE

## 2025-06-17 PROCEDURE — 6370000000 HC RX 637 (ALT 250 FOR IP): Performed by: NURSE PRACTITIONER

## 2025-06-17 PROCEDURE — 97530 THERAPEUTIC ACTIVITIES: CPT

## 2025-06-17 PROCEDURE — 2700000000 HC OXYGEN THERAPY PER DAY

## 2025-06-17 PROCEDURE — 7100000010 HC PHASE II RECOVERY - FIRST 15 MIN: Performed by: INTERNAL MEDICINE

## 2025-06-17 PROCEDURE — 51798 US URINE CAPACITY MEASURE: CPT

## 2025-06-17 PROCEDURE — 6360000002 HC RX W HCPCS: Performed by: ANESTHESIOLOGY

## 2025-06-17 PROCEDURE — 6370000000 HC RX 637 (ALT 250 FOR IP): Performed by: PHYSICIAN ASSISTANT

## 2025-06-17 PROCEDURE — 36415 COLL VENOUS BLD VENIPUNCTURE: CPT

## 2025-06-17 PROCEDURE — 2500000003 HC RX 250 WO HCPCS: Performed by: NURSE PRACTITIONER

## 2025-06-17 PROCEDURE — 2709999900 HC NON-CHARGEABLE SUPPLY: Performed by: INTERNAL MEDICINE

## 2025-06-17 PROCEDURE — 7100000011 HC PHASE II RECOVERY - ADDTL 15 MIN: Performed by: INTERNAL MEDICINE

## 2025-06-17 PROCEDURE — 88305 TISSUE EXAM BY PATHOLOGIST: CPT

## 2025-06-17 PROCEDURE — 80048 BASIC METABOLIC PNL TOTAL CA: CPT

## 2025-06-17 PROCEDURE — 6370000000 HC RX 637 (ALT 250 FOR IP): Performed by: EMERGENCY MEDICINE

## 2025-06-17 PROCEDURE — 82962 GLUCOSE BLOOD TEST: CPT

## 2025-06-17 PROCEDURE — 97166 OT EVAL MOD COMPLEX 45 MIN: CPT

## 2025-06-17 RX ORDER — FLUCONAZOLE 100 MG/1
200 TABLET ORAL ONCE
Status: COMPLETED | OUTPATIENT
Start: 2025-06-17 | End: 2025-06-17

## 2025-06-17 RX ORDER — FLUCONAZOLE 100 MG/1
100 TABLET ORAL DAILY
Status: DISCONTINUED | OUTPATIENT
Start: 2025-06-18 | End: 2025-06-26 | Stop reason: HOSPADM

## 2025-06-17 RX ORDER — SODIUM CHLORIDE 9 MG/ML
INJECTION, SOLUTION INTRAVENOUS
Status: DISCONTINUED | OUTPATIENT
Start: 2025-06-17 | End: 2025-06-17 | Stop reason: SDUPTHER

## 2025-06-17 RX ORDER — PREDNISONE 20 MG/1
20 TABLET ORAL DAILY
Qty: 4 TABLET | Refills: 0 | Status: SHIPPED | OUTPATIENT
Start: 2025-06-18 | End: 2025-06-22

## 2025-06-17 RX ORDER — FLUCONAZOLE 200 MG/1
200 TABLET ORAL ONCE
Qty: 1 TABLET | Refills: 0 | Status: SHIPPED | OUTPATIENT
Start: 2025-06-17 | End: 2025-06-17

## 2025-06-17 RX ORDER — PROPOFOL 10 MG/ML
INJECTION, EMULSION INTRAVENOUS
Status: DISCONTINUED | OUTPATIENT
Start: 2025-06-17 | End: 2025-06-17 | Stop reason: SDUPTHER

## 2025-06-17 RX ORDER — BUDESONIDE 0.5 MG/2ML
0.5 INHALANT ORAL
Qty: 60 EACH | Refills: 3 | Status: SHIPPED | OUTPATIENT
Start: 2025-06-17

## 2025-06-17 RX ORDER — FLUCONAZOLE 100 MG/1
100 TABLET ORAL DAILY
Qty: 7 TABLET | Refills: 0 | Status: SHIPPED | OUTPATIENT
Start: 2025-06-18 | End: 2025-06-25

## 2025-06-17 RX ORDER — PANTOPRAZOLE SODIUM 20 MG/1
40 TABLET, DELAYED RELEASE ORAL DAILY
Qty: 180 TABLET | Refills: 0 | Status: SHIPPED | OUTPATIENT
Start: 2025-06-17

## 2025-06-17 RX ORDER — LIDOCAINE HYDROCHLORIDE 20 MG/ML
INJECTION, SOLUTION EPIDURAL; INFILTRATION; INTRACAUDAL; PERINEURAL
Status: DISCONTINUED | OUTPATIENT
Start: 2025-06-17 | End: 2025-06-17 | Stop reason: SDUPTHER

## 2025-06-17 RX ADMIN — MONTELUKAST 10 MG: 10 TABLET, FILM COATED ORAL at 20:13

## 2025-06-17 RX ADMIN — PROPOFOL 50 MG: 10 INJECTION, EMULSION INTRAVENOUS at 15:30

## 2025-06-17 RX ADMIN — IPRATROPIUM BROMIDE AND ALBUTEROL SULFATE 1 DOSE: 2.5; .5 SOLUTION RESPIRATORY (INHALATION) at 19:12

## 2025-06-17 RX ADMIN — FLUCONAZOLE 200 MG: 100 TABLET ORAL at 18:21

## 2025-06-17 RX ADMIN — ATORVASTATIN CALCIUM 40 MG: 40 TABLET, FILM COATED ORAL at 20:13

## 2025-06-17 RX ADMIN — CEFTRIAXONE SODIUM 2000 MG: 2 INJECTION, POWDER, FOR SOLUTION INTRAMUSCULAR; INTRAVENOUS at 02:13

## 2025-06-17 RX ADMIN — PROPOFOL 50 MG: 10 INJECTION, EMULSION INTRAVENOUS at 15:16

## 2025-06-17 RX ADMIN — PROPOFOL 50 MG: 10 INJECTION, EMULSION INTRAVENOUS at 15:25

## 2025-06-17 RX ADMIN — PREDNISONE 40 MG: 20 TABLET ORAL at 09:22

## 2025-06-17 RX ADMIN — AMLODIPINE BESYLATE 10 MG: 5 TABLET ORAL at 09:22

## 2025-06-17 RX ADMIN — SODIUM CHLORIDE: 9 INJECTION, SOLUTION INTRAVENOUS at 15:16

## 2025-06-17 RX ADMIN — PROPOFOL 50 MG: 10 INJECTION, EMULSION INTRAVENOUS at 15:20

## 2025-06-17 RX ADMIN — INSULIN GLARGINE 15 UNITS: 100 INJECTION, SOLUTION SUBCUTANEOUS at 20:13

## 2025-06-17 RX ADMIN — IPRATROPIUM BROMIDE AND ALBUTEROL SULFATE 1 DOSE: 2.5; .5 SOLUTION RESPIRATORY (INHALATION) at 08:24

## 2025-06-17 RX ADMIN — LIDOCAINE HYDROCHLORIDE 50 MG: 20 SOLUTION INTRAVENOUS at 15:16

## 2025-06-17 RX ADMIN — ACETAMINOPHEN 650 MG: 325 TABLET ORAL at 03:32

## 2025-06-17 RX ADMIN — SODIUM CHLORIDE, PRESERVATIVE FREE 10 ML: 5 INJECTION INTRAVENOUS at 09:23

## 2025-06-17 RX ADMIN — SODIUM CHLORIDE, PRESERVATIVE FREE 10 ML: 5 INJECTION INTRAVENOUS at 20:13

## 2025-06-17 RX ADMIN — PANTOPRAZOLE SODIUM 40 MG: 40 TABLET, DELAYED RELEASE ORAL at 05:40

## 2025-06-17 RX ADMIN — BUDESONIDE INHALATION 500 MCG: 0.5 SUSPENSION RESPIRATORY (INHALATION) at 08:24

## 2025-06-17 RX ADMIN — FOLIC ACID 1 MG: 1 TABLET ORAL at 09:22

## 2025-06-17 RX ADMIN — IPRATROPIUM BROMIDE AND ALBUTEROL SULFATE 1 DOSE: 2.5; .5 SOLUTION RESPIRATORY (INHALATION) at 14:20

## 2025-06-17 RX ADMIN — BUDESONIDE INHALATION 500 MCG: 0.5 SUSPENSION RESPIRATORY (INHALATION) at 19:12

## 2025-06-17 RX ADMIN — DEXTROSE 125 ML: 10 SOLUTION INTRAVENOUS at 05:58

## 2025-06-17 RX ADMIN — INSULIN LISPRO 12 UNITS: 100 INJECTION, SOLUTION INTRAVENOUS; SUBCUTANEOUS at 20:14

## 2025-06-17 ASSESSMENT — PAIN SCALES - GENERAL
PAINLEVEL_OUTOF10: 4
PAINLEVEL_OUTOF10: 0
PAINLEVEL_OUTOF10: 0
PAINLEVEL_OUTOF10: 6

## 2025-06-17 ASSESSMENT — PAIN - FUNCTIONAL ASSESSMENT: PAIN_FUNCTIONAL_ASSESSMENT: ACTIVITIES ARE NOT PREVENTED

## 2025-06-17 ASSESSMENT — PAIN DESCRIPTION - ONSET: ONSET: ON-GOING

## 2025-06-17 ASSESSMENT — PAIN DESCRIPTION - PAIN TYPE: TYPE: ACUTE PAIN

## 2025-06-17 ASSESSMENT — PAIN DESCRIPTION - ORIENTATION: ORIENTATION: UPPER

## 2025-06-17 ASSESSMENT — PAIN DESCRIPTION - LOCATION: LOCATION: ABDOMEN

## 2025-06-17 ASSESSMENT — PAIN DESCRIPTION - FREQUENCY: FREQUENCY: INTERMITTENT

## 2025-06-17 ASSESSMENT — PAIN DESCRIPTION - DESCRIPTORS: DESCRIPTORS: ACHING;DISCOMFORT

## 2025-06-17 NOTE — ANESTHESIA PRE PROCEDURE
Department of Anesthesiology  Preprocedure Note       Name:  Lizbeth Hogue   Age:  70 y.o.  :  1955                                          MRN:  [id number]         Date:  2025      Surgeon: Surgeon(s):  Eamon Jacobson MD    Procedure: Procedure(s):  ESOPHAGOGASTRODUODENOSCOPY  COLONOSCOPY DIAGNOSTIC    Medications prior to admission:   Prior to Admission medications    Medication Sig Start Date End Date Taking? Authorizing Provider   acyclovir (ZOVIRAX) 400 MG tablet Take 1 tablet by mouth 2 times daily   Yes Yareli Macdonald MD   acetaminophen (TYLENOL) 325 MG tablet Take 2 tablets by mouth once a week Before treatments   Yes Yareli Macdonald MD   diphenhydrAMINE (BENADRYL) 50 MG capsule Take 1 capsule by mouth Once a week at 5 PM Before treatments   Yes Yareli Macdonald MD   BORTEZOMIB IJ Inject as directed Every Wednesday   - unknown dose   Yes Yareli Macdonald MD   gabapentin (NEURONTIN) 100 MG capsule Take 3 capsules by mouth at bedtime. Max Daily Amount: 300 mg   Yes Yareli Macdonald MD   allopurinol (ZYLOPRIM) 100 MG tablet Take 1 tablet by mouth daily   Yes Yareli Macdonald MD   lidocaine (LIDODERM) 5 % Place 1 patch onto the skin daily 12 hours on, 12 hours off.   Yes Yareli Macdonald MD   dexAMETHasone (DECADRON) 4 MG tablet Take 1 tablet by mouth once a week Take 10 tabs once weekly   Yes Yareli Macdonald MD   fexofenadine (ALLEGRA) 60 MG tablet Take 1 tablet by mouth Once a week at 5 PM   Yes Yareli Macdonald MD   fluticasone (FLONASE) 50 MCG/ACT nasal spray 2 sprays by Each Nostril route daily 25  Yes Joe Graham PA-C   famotidine (PEPCID) 40 MG tablet Take 1 tablet by mouth daily 25  Yes Saba Bates MD   albuterol sulfate HFA (PROVENTIL;VENTOLIN;PROAIR) 108 (90 Base) MCG/ACT inhaler Inhale into the lungs   Yes Automatic Reconciliation, Ar   amLODIPine (NORVASC) 10 MG tablet Take 1 tablet by mouth daily  Patient taking

## 2025-06-17 NOTE — PROGRESS NOTES
Patient PIV > 4 days, RN educated patient  risks and benefits of PIV change, refusal form in the chart.

## 2025-06-17 NOTE — PROGRESS NOTES
Hospitalist Progress Note               Daily Progress Note: 6/17/2025      Hospital Day: 5     Chief complaint:   Chief Complaint   Patient presents with    Shortness of Breath        Subjective:     Patient is seen today for follow-up. Patient seen examined bedside. Patient denies any complaints.       Medications reviewed  Current Facility-Administered Medications   Medication Dose Route Frequency    fluconazole (DIFLUCAN) tablet 200 mg  200 mg Oral Once    [START ON 6/18/2025] fluconazole (DIFLUCAN) tablet 100 mg  100 mg Oral Daily    amLODIPine (NORVASC) tablet 10 mg  10 mg Oral Daily    insulin glargine (LANTUS) injection vial 15 Units  15 Units SubCUTAneous BID    insulin lispro (HUMALOG,ADMELOG) injection vial 0-16 Units  0-16 Units SubCUTAneous 4x Daily AC & HS    predniSONE (DELTASONE) tablet 40 mg  40 mg Oral Daily    pantoprazole (PROTONIX) tablet 40 mg  40 mg Oral QAM AC    ipratropium 0.5 mg-albuterol 2.5 mg (DUONEB) nebulizer solution 1 Dose  1 Dose Inhalation TID RT    ipratropium 0.5 mg-albuterol 2.5 mg (DUONEB) nebulizer solution 1 Dose  1 Dose Inhalation Q4H PRN    lactated ringers bolus 500 mL  500 mL IntraVENous Once    sodium chloride flush 0.9 % injection 5-40 mL  5-40 mL IntraVENous 2 times per day    sodium chloride flush 0.9 % injection 5-40 mL  5-40 mL IntraVENous PRN    0.9 % sodium chloride infusion   IntraVENous PRN    ondansetron (ZOFRAN-ODT) disintegrating tablet 4 mg  4 mg Oral Q8H PRN    Or    ondansetron (ZOFRAN) injection 4 mg  4 mg IntraVENous Q6H PRN    polyethylene glycol (GLYCOLAX) packet 17 g  17 g Oral Daily PRN    acetaminophen (TYLENOL) tablet 650 mg  650 mg Oral Q6H PRN    Or    acetaminophen (TYLENOL) suppository 650 mg  650 mg Rectal Q6H PRN    budesonide (PULMICORT) nebulizer suspension 500 mcg  0.5 mg Nebulization BID RT    cefTRIAXone (ROCEPHIN) 2,000 mg in sterile water 20 mL IV syringe  2,000 mg IntraVENous Q24H    glucose chewable tablet 16 g  4 tablet Oral  recommendations.  - Monitor hemoglobin, goal >7    Candida esophagitis  - EGD revealed lower part of esophagus with candidate esophagitis  - started on fluconazole 200 MG today followed by hundred MG from tomorrow  - Protonix daily         CKD stage IIIb  Hyponatremia resolved  Clinical patient euvolemic right now  Status post IV fluids  Creatinine is 1.8 today     Diabetes mellitus type 2  -maintain euglycemia  -glucose POCT  -lantus 8u BID  -ISS     Multiple myeloma  Anemia, possible GIB  -on treatment for MM with D-RVD  -transfused 1u pRBC 6/13   -SCDs  -receiving venofer for 4 doses     Acute metabolic encephalopathy resolved  -likely due to hypercarbia, now resolved      Code status:    Social determinants of health: none      Estimated discharge date//time frame/disposition:    Barriers to discharge:           Paulo Leslie MD

## 2025-06-17 NOTE — PLAN OF CARE
PHYSICAL THERAPY EVALUATION  Patient: Lizbeth Hogue (70 y.o. female)  Date: 6/17/2025  Primary Diagnosis: Shortness of breath [R06.02]  Acute on chronic respiratory failure with hypoxemia (HCC) [J96.21]  Acute on chronic respiratory failure with hypoxia and hypercapnia (HCC) [J96.21, J96.22]       Precautions: Fall Risk, General Precautions, Contact Precautions, Bed Alarm                      Recommendations for nursing mobility: Out of bed to chair for meals, Encourage HEP in prep for ADLs/mobility; see handout for details, Use of bed/chair alarm for safety, AD and gt belt for bed to chair , Amb to bathroom with AD and gait belt, and Assist x1    In place during session: EKG/telemetry (pt with spO2 turned on, not in place)    ASSESSMENT  Pt is a 70 y.o. female admitted on 6/13/2025 for SOB; PMHx COPD, CKD, T2DM, HTN, NISH, multiple myeloma on chemotherapy; pt currently being treated for acute on chronic respiratory failure with hypoxemia . Pt seated in recliner upon PT arrival, agreeable to evaluation. Pt A&O x self.  Pt's daughter and friend present at bedside throughout session with pt permission.    Based on the objective data described below, the patient currently presents with impaired functional mobility, decreased independence in ADLs, impaired ability to perform high-level IADLs, impaired strength, decreased activity tolerance, poor safety awareness, impaired cognition, and impaired balance. (See below for objective details and assist levels).     Overall pt tolerated session fair today with no c/o pain or SOB throughout session. Pt received seated in recliner, requires CGA for all transfers for safety.  Pt amb 30 feet with RW, gt belt and CGA; demonstrates decreased step clearance however good overall gait fluidity and stabilty with use of RW. Pt completed toilet transfer with CGA and pericare without PT assist. Pt amb 5' to sink, requires cues to maintain both hands on RW; pt was able to stand at sink      Therapeutic Intervention provided:   OOB transfers and amb with AD    Functional Measure:  [facility] AM-PAC™ “6 Clicks”         Basic Mobility Inpatient Short Form  How much difficulty does the patient currently have... Unable A Lot A Little None   1.  Turning over in bed (including adjusting bedclothes, sheets and blankets)?   [] 1   [] 2   [] 3   [x] 4   2.  Sitting down on and standing up from a chair with arms ( e.g., wheelchair, bedside commode, etc.)   [] 1   [] 2   [] 3   [x] 4   3.  Moving from lying on back to sitting on the side of the bed?   [] 1   [] 2   [] 3   [] 4          How much help from another person does the patient currently need... Total A Lot A Little None   4.  Moving to and from a bed to a chair (including a wheelchair)?   [] 1   [] 2   [x] 3   [] 4   5.  Need to walk in hospital room?   [] 1   [] 2   [x] 3   [] 4   6.  Climbing 3-5 steps with a railing?   [] 1   [] 2   [x] 3   [] 4   © , Trustees of [facility], under license to [employer]. All rights reserved     Score:  Initial:  Most Recent: X (Date: 2025 )   Interpretation of Tool:  Represents activities that are increasingly more difficult (i.e. Bed mobility, Transfers, Gait).  Score 24 23 22-20 19-15 14-10 9-7 6   Modifier CH CI CJ CK CL CM CN         Physical Therapy Evaluation Charge Determination   History Examination Presentation Decision-Making   HIGH Complexity :3+ comorbidities / personal factors will impact the outcome/ POC  MEDIUM Complexity : 3 Standardized tests and measures addressing body structure, function, activity limitation and / or participation in recreation  LOW Complexity : Stable, uncomplicated  Other outcome measures [facility] 6  MEDIUM      Based on the above components, the patient evaluation is determined to be of the following complexity level: LOW    Pain Ratin/10 none reported  Pain Intervention(s):       Activity Tolerance:   Fair     After treatment patient left in no

## 2025-06-17 NOTE — PROGRESS NOTES
CM reviewed chart and noted patient lives at home with her daughter and she will return at discharge.     Patient Hgb today is 8.9. Patient is followed by nephro, GI and heme.     Patient has PT/OT orders in place, awaiting evals.

## 2025-06-17 NOTE — PROGRESS NOTES
Nephrology follow-up          Patient: Lizbeth Hogue MRN: [id number]  SSN: [id number]    YOB: 1955  Age: 70 y.o.  Sex: female      Subjective:   The patient is seen at the bedside  She looks comfortable  On nasal cannula  Blood pressures are better  Resolving hyponatremia  Off IV fluids    Past Medical History:   Diagnosis Date    Arthritis     Asthma     Chronic kidney disease     Chronic obstructive pulmonary disease (HCC)     Diabetes (HCC)     Hypertension     Sleep apnea     no cpap     Past Surgical History:   Procedure Laterality Date    OTHER SURGICAL HISTORY Bilateral     Eye surgery    WRIST SURGERY Left     pins and screws      Family History   Problem Relation Age of Onset    Diabetes Mother     Hypertension Father     Diabetes Father     Hypertension Mother      Social History     Tobacco Use    Smoking status: Never    Smokeless tobacco: Never   Substance Use Topics    Alcohol use: Never      Current Facility-Administered Medications   Medication Dose Route Frequency Provider Last Rate Last Admin    amLODIPine (NORVASC) tablet 10 mg  10 mg Oral Daily Sukumar Bansal PA-C   10 mg at 06/17/25 0922    insulin glargine (LANTUS) injection vial 15 Units  15 Units SubCUTAneous BID Paulo Leslie MD   15 Units at 06/16/25 2015    insulin lispro (HUMALOG,ADMELOG) injection vial 0-16 Units  0-16 Units SubCUTAneous 4x Daily AC & HS Paulo Leslie MD   4 Units at 06/16/25 2015    predniSONE (DELTASONE) tablet 40 mg  40 mg Oral Daily Amanda Campos MD   40 mg at 06/17/25 0922    pantoprazole (PROTONIX) tablet 40 mg  40 mg Oral QAM AC Audelia Shen MD   40 mg at 06/17/25 0540    ipratropium 0.5 mg-albuterol 2.5 mg (DUONEB) nebulizer solution 1 Dose  1 Dose Inhalation TID RT Anson Zayas, DO   1 Dose at 06/17/25 1420    ipratropium 0.5 mg-albuterol 2.5 mg (DUONEB) nebulizer solution 1 Dose  1 Dose Inhalation Q4H PRN Anson Zayas, DO   1 Dose at 06/13/25 0210    lactated

## 2025-06-17 NOTE — ANESTHESIA POSTPROCEDURE EVALUATION
Department of Anesthesiology  Postprocedure Note    Patient: Lizbeth Hogue  MRN: [id number]  YOB: 1955  Date of evaluation: 6/17/2025    Procedure Summary       Date: 06/17/25 Room / Location: [facility] 02 / [facility] ENDOSCOPY    Anesthesia Start: 1516 Anesthesia Stop: 1540    Procedures:       ESOPHAGOGASTRODUODENOSCOPY (Upper GI Region)      COLONOSCOPY DIAGNOSTIC (Lower GI Region)      COLONOSCOPY BIOPSY (Lower GI Region) Diagnosis:       Anemia, unspecified type      (Anemia, unspecified type [D64.9])    Surgeons: Eamon Jacobson MD Responsible Provider: Pj Ahuja MD    Anesthesia Type: MAC, TIVA ASA Status: 4            Anesthesia Type: No value filed.    Simi Phase I:      Simi Phase II:      Anesthesia Post Evaluation    Patient location during evaluation: bedside (Endoscopy Unit)  Patient participation: complete - patient participated  Level of consciousness: sleepy but conscious  Pain score: 0  Airway patency: patent  Nausea & Vomiting: no nausea and no vomiting  Cardiovascular status: hemodynamically stable  Respiratory status: acceptable  Hydration status: stable  Comments: This patient remained on the stretcher.  The patient was handed off to the endoscopy nursing team.  All questions regarding pre-, intra-, and postoperative care were answered.  Multimodal analgesia pain management approach    No notable events documented.

## 2025-06-17 NOTE — PROGRESS NOTES
Orientation Status: Impaired  Orientation Level: Oriented to person  Cognition  Overall Cognitive Status: Exceptions  Arousal/Alertness: Delayed responses to stimuli  Following Commands: Follows one step commands with increased time  Attention Span: Attends with cues to redirect  Memory: Impaired  Safety Judgement: Impaired  Problem Solving: Impaired  Insights: Impaired  Initiation: Requires cues for some  Sequencing: Requires cues for some    Skin: Intact in visible areas    Edema: None present BUE and BLE's    Hearing:   Hearing  Hearing: Within functional limits  Hearing: Within functional limits    Vision/Perceptual:             Vision  Vision: Within Functional Limits   Vision: Within Functional Limits    Range of Motion:   AROM: Generally decreased, functional       Strength:  Strength: Generally decreased, functional    Coordination:  Coordination: Generally decreased, functional            Tone & Sensation:   Tone: Normal  Sensation: Intact      Functional Mobility and Transfers for ADLs:  Bed Mobility:  Bed Mobility Training  Bed Mobility Training: Yes  Overall Level of Assistance: Contact guard assistance  Interventions: Verbal cues  Rolling: Contact guard assistance  Supine to Sit: Contact guard assistance  Scooting: Contact guard assistance    Transfers:  Transfer Training  Transfer Training: Yes  Overall Level of Assistance: Contact guard assistance  Interventions: Verbal cues  Sit to Stand: Contact guard assistance  Stand to Sit: Contact guard assistance  Stand Pivot Transfers: Contact guard assistance  Bed to Chair: Contact guard assistance  Toilet Transfer: Contact guard assistance      Balance:  Balance  Sitting: Intact;With support (using raised bedrail)  Standing: Intact;With support (Using RW)      ADL Assessment:          Feeding: NPO       Grooming: Contact guard assistance (Standing in front of sink in bathroom using RW to wash/dry hands)                                       Toileting:  Contact guard assistance (Using commode in bathroom.)              Therapeutic Intervention provided:   OOB grooming and functional mobility/transfers in preparation for discharge home to engage in other OOB ADL's such as toileting.     Functional Measure:    [facility] AM-PAC \"6 Clicks\"                                                       Daily Activity Inpatient Short Form  How much help from another person does the patient currently need... Total; A Lot A Little None   1.  Putting on and taking off regular lower body clothing? []  1 []  2 [x]  3 []  4   2.  Bathing (including washing, rinsing, drying)? []  1 []  2 [x]  3 []  4   3.  Toileting, which includes using toilet, bedpan or urinal? [] 1 []  2 [x]  3 []  4   4.  Putting on and taking off regular upper body clothing? []  1 []  2 [x]  3 []  4   5.  Taking care of personal grooming such as brushing teeth? []  1 []  2 [x]  3 []  4   6.  Eating meals? []  1 []  2 [x]  3 []  4   © 2007, Trustees of [facility], under license to [employer]. All rights reserved     Score: 18/24     Interpretation of Tool:  Represents clinically-significant functional categories (i.e. Activities of daily living).  Percentage of Impairment CH    0%   CI    1-19% CJ    20-39% CK    40-59% CL    60-79% CM    80-99% CN     100%   [facility]  Score 6-24 24 23 20-22 15-19 10-14 7-9 6     Occupational Therapy Evaluation Charge Determination   History Examination Decision-Making   MEDIUM Complexity : Expanded review of history including physical, cognitive and psychocial history  MEDIUM Complexity: 3-5 Performance deficits relating to physical, cognitive, or psychosocial skills that result in activity limitations and/or participation restrictions MEDIUM Complexity: Patient may present with comorbidities that affect occupational performance. Minimal to moderate modifications of tasks or assist (eg. physical or verbal) with assist is necessary to enable pt to complete eval

## 2025-06-18 ENCOUNTER — APPOINTMENT (OUTPATIENT)
Facility: HOSPITAL | Age: 70
DRG: 231 | End: 2025-06-18
Payer: MEDICAID

## 2025-06-18 LAB
BACTERIA SPEC CULT: NORMAL
BASOPHILS # BLD: 0.02 K/UL (ref 0–0.1)
BASOPHILS NFR BLD: 0.2 % (ref 0–1)
DIFFERENTIAL METHOD BLD: ABNORMAL
EOSINOPHIL # BLD: 0 K/UL (ref 0–0.4)
EOSINOPHIL NFR BLD: 0 % (ref 0–7)
ERYTHROCYTE [DISTWIDTH] IN BLOOD BY AUTOMATED COUNT: 17.4 % (ref 11.5–14.5)
GLUCOSE BLD STRIP.AUTO-MCNC: 181 MG/DL (ref 65–100)
GLUCOSE BLD STRIP.AUTO-MCNC: 246 MG/DL (ref 65–100)
GLUCOSE BLD STRIP.AUTO-MCNC: 247 MG/DL (ref 65–100)
GLUCOSE BLD STRIP.AUTO-MCNC: 269 MG/DL (ref 65–100)
HCT VFR BLD AUTO: 27.2 % (ref 35–47)
HGB BLD-MCNC: 8.7 G/DL (ref 11.5–16)
IMM GRANULOCYTES # BLD AUTO: 0.08 K/UL (ref 0–0.04)
IMM GRANULOCYTES NFR BLD AUTO: 0.9 % (ref 0–0.5)
LYMPHOCYTES # BLD: 0.39 K/UL (ref 0.8–3.5)
LYMPHOCYTES NFR BLD: 4.2 % (ref 12–49)
Lab: NORMAL
MCH RBC QN AUTO: 31.5 PG (ref 26–34)
MCHC RBC AUTO-ENTMCNC: 32 G/DL (ref 30–36.5)
MCV RBC AUTO: 98.6 FL (ref 80–99)
MONOCYTES # BLD: 1.22 K/UL (ref 0–1)
MONOCYTES NFR BLD: 13 % (ref 5–13)
NEUTS SEG # BLD: 7.66 K/UL (ref 1.8–8)
NEUTS SEG NFR BLD: 81.7 % (ref 32–75)
NRBC # BLD: 0.03 K/UL (ref 0–0.01)
NRBC BLD-RTO: 0.3 PER 100 WBC
PERFORMED BY:: ABNORMAL
PLATELET # BLD AUTO: 326 K/UL (ref 150–400)
PMV BLD AUTO: 10.2 FL (ref 8.9–12.9)
RBC # BLD AUTO: 2.76 M/UL (ref 3.8–5.2)
WBC # BLD AUTO: 9.4 K/UL (ref 3.6–11)

## 2025-06-18 PROCEDURE — 6370000000 HC RX 637 (ALT 250 FOR IP)

## 2025-06-18 PROCEDURE — 6360000002 HC RX W HCPCS: Performed by: NURSE PRACTITIONER

## 2025-06-18 PROCEDURE — 74177 CT ABD & PELVIS W/CONTRAST: CPT

## 2025-06-18 PROCEDURE — 6370000000 HC RX 637 (ALT 250 FOR IP): Performed by: NURSE PRACTITIONER

## 2025-06-18 PROCEDURE — 6360000004 HC RX CONTRAST MEDICATION: Performed by: SURGERY

## 2025-06-18 PROCEDURE — 2060000000 HC ICU INTERMEDIATE R&B

## 2025-06-18 PROCEDURE — 2700000000 HC OXYGEN THERAPY PER DAY

## 2025-06-18 PROCEDURE — 36415 COLL VENOUS BLD VENIPUNCTURE: CPT

## 2025-06-18 PROCEDURE — 94761 N-INVAS EAR/PLS OXIMETRY MLT: CPT

## 2025-06-18 PROCEDURE — 82962 GLUCOSE BLOOD TEST: CPT

## 2025-06-18 PROCEDURE — 6370000000 HC RX 637 (ALT 250 FOR IP): Performed by: STUDENT IN AN ORGANIZED HEALTH CARE EDUCATION/TRAINING PROGRAM

## 2025-06-18 PROCEDURE — 94640 AIRWAY INHALATION TREATMENT: CPT

## 2025-06-18 PROCEDURE — 6370000000 HC RX 637 (ALT 250 FOR IP): Performed by: PHYSICIAN ASSISTANT

## 2025-06-18 PROCEDURE — 85025 COMPLETE CBC W/AUTO DIFF WBC: CPT

## 2025-06-18 PROCEDURE — 2500000003 HC RX 250 WO HCPCS: Performed by: NURSE PRACTITIONER

## 2025-06-18 PROCEDURE — 6370000000 HC RX 637 (ALT 250 FOR IP): Performed by: EMERGENCY MEDICINE

## 2025-06-18 RX ORDER — PANTOPRAZOLE SODIUM 40 MG/10ML
40 INJECTION, POWDER, LYOPHILIZED, FOR SOLUTION INTRAVENOUS DAILY
Status: DISCONTINUED | OUTPATIENT
Start: 2025-06-18 | End: 2025-06-26 | Stop reason: HOSPADM

## 2025-06-18 RX ORDER — DIATRIZOATE MEGLUMINE AND DIATRIZOATE SODIUM 660; 100 MG/ML; MG/ML
30 SOLUTION ORAL; RECTAL
Status: DISCONTINUED | OUTPATIENT
Start: 2025-06-18 | End: 2025-06-23

## 2025-06-18 RX ORDER — IOPAMIDOL 755 MG/ML
100 INJECTION, SOLUTION INTRAVASCULAR
Status: COMPLETED | OUTPATIENT
Start: 2025-06-18 | End: 2025-06-18

## 2025-06-18 RX ADMIN — INSULIN LISPRO 8 UNITS: 100 INJECTION, SOLUTION INTRAVENOUS; SUBCUTANEOUS at 21:11

## 2025-06-18 RX ADMIN — IPRATROPIUM BROMIDE AND ALBUTEROL SULFATE 1 DOSE: 2.5; .5 SOLUTION RESPIRATORY (INHALATION) at 13:28

## 2025-06-18 RX ADMIN — PANTOPRAZOLE SODIUM 40 MG: 40 TABLET, DELAYED RELEASE ORAL at 06:17

## 2025-06-18 RX ADMIN — FLUCONAZOLE 100 MG: 100 TABLET ORAL at 12:46

## 2025-06-18 RX ADMIN — INSULIN GLARGINE 15 UNITS: 100 INJECTION, SOLUTION SUBCUTANEOUS at 12:48

## 2025-06-18 RX ADMIN — PREDNISONE 40 MG: 20 TABLET ORAL at 12:46

## 2025-06-18 RX ADMIN — ATORVASTATIN CALCIUM 40 MG: 40 TABLET, FILM COATED ORAL at 21:09

## 2025-06-18 RX ADMIN — BUDESONIDE INHALATION 500 MCG: 0.5 SUSPENSION RESPIRATORY (INHALATION) at 20:53

## 2025-06-18 RX ADMIN — DIATRIZOATE MEGLUMINE AND DIATRIZOATE SODIUM 15 ML: 660; 100 LIQUID ORAL; RECTAL at 09:00

## 2025-06-18 RX ADMIN — IOPAMIDOL 100 ML: 755 INJECTION, SOLUTION INTRAVENOUS at 12:27

## 2025-06-18 RX ADMIN — DIATRIZOATE MEGLUMINE AND DIATRIZOATE SODIUM 15 ML: 660; 100 LIQUID ORAL; RECTAL at 08:09

## 2025-06-18 RX ADMIN — SODIUM CHLORIDE, PRESERVATIVE FREE 10 ML: 5 INJECTION INTRAVENOUS at 21:11

## 2025-06-18 RX ADMIN — IPRATROPIUM BROMIDE AND ALBUTEROL SULFATE 1 DOSE: 2.5; .5 SOLUTION RESPIRATORY (INHALATION) at 07:41

## 2025-06-18 RX ADMIN — MONTELUKAST 10 MG: 10 TABLET, FILM COATED ORAL at 21:09

## 2025-06-18 RX ADMIN — INSULIN LISPRO 4 UNITS: 100 INJECTION, SOLUTION INTRAVENOUS; SUBCUTANEOUS at 17:40

## 2025-06-18 RX ADMIN — AMLODIPINE BESYLATE 10 MG: 5 TABLET ORAL at 12:46

## 2025-06-18 RX ADMIN — CEFTRIAXONE SODIUM 2000 MG: 2 INJECTION, POWDER, FOR SOLUTION INTRAMUSCULAR; INTRAVENOUS at 02:00

## 2025-06-18 RX ADMIN — IPRATROPIUM BROMIDE AND ALBUTEROL SULFATE 1 DOSE: 2.5; .5 SOLUTION RESPIRATORY (INHALATION) at 20:53

## 2025-06-18 RX ADMIN — BUDESONIDE INHALATION 500 MCG: 0.5 SUSPENSION RESPIRATORY (INHALATION) at 07:41

## 2025-06-18 RX ADMIN — SODIUM CHLORIDE, PRESERVATIVE FREE 10 ML: 5 INJECTION INTRAVENOUS at 12:49

## 2025-06-18 RX ADMIN — INSULIN GLARGINE 15 UNITS: 100 INJECTION, SOLUTION SUBCUTANEOUS at 21:11

## 2025-06-18 RX ADMIN — FOLIC ACID 1 MG: 1 TABLET ORAL at 12:48

## 2025-06-18 ASSESSMENT — PAIN SCALES - GENERAL
PAINLEVEL_OUTOF10: 0
PAINLEVEL_OUTOF10: 0

## 2025-06-18 NOTE — PROGRESS NOTES
CM reviewed chart and noted patient lives with her daughter and will return at discharge. Unsure at this time what patient may need for discharge.     Due to new diagnosis of colonic mass, patient will need right hemicolectomy per surgery, but cardio consulted for clearance first. Cardio now planning for stress test tomorrow to determine whether we can proceed with surgery.     Patient seen by PT/OT with recs for Intermittent physical therapy up to 2-3x/week in previous living setting with additional support needed for supervision and safety once medically appropriate. CM in to discuss with patient, but patient sleeping and CM informed we would return at a later time.

## 2025-06-18 NOTE — PLAN OF CARE
Problem: Chronic Conditions and Co-morbidities  Goal: Patient's chronic conditions and co-morbidity symptoms are monitored and maintained or improved  6/17/2025 2248 by Monse Rothman RN  Outcome: Progressing  6/17/2025 0959 by Anne Fierro RN  Outcome: Progressing     Problem: Discharge Planning  Goal: Discharge to home or other facility with appropriate resources  6/17/2025 2248 by Monse Rothman RN  Outcome: Progressing  6/17/2025 0959 by Anne Fierro RN  Outcome: Progressing     Problem: Respiratory - Adult  Goal: Achieves optimal ventilation and oxygenation  6/17/2025 2248 by Mosne Rothman RN  Outcome: Progressing  6/17/2025 0959 by Anne Fierro RN  Outcome: Progressing     Problem: Cardiovascular - Adult  Goal: Maintains optimal cardiac output and hemodynamic stability  6/17/2025 2248 by Monse Rothman RN  Outcome: Progressing  6/17/2025 0959 by Anne Fierro RN  Outcome: Progressing     Problem: Infection - Adult  Goal: Absence of infection at discharge  6/17/2025 2248 by Monse Rothman RN  Outcome: Progressing  6/17/2025 0959 by Anne Fierro RN  Outcome: Progressing     Problem: Skin/Tissue Integrity - Adult  Goal: Skin integrity remains intact  Description: 1.  Monitor for areas of redness and/or skin breakdown  2.  Assess vascular access sites hourly  3.  Every 4-6 hours minimum:  Change oxygen saturation probe site  4.  Every 4-6 hours:  If on nasal continuous positive airway pressure, respiratory therapy assess nares and determine need for appliance change or resting period  6/17/2025 2248 by Monse Rothman RN  Outcome: Progressing  6/17/2025 0959 by Anne Fierro RN  Outcome: Progressing     Problem: Musculoskeletal - Adult  Goal: Return mobility to safest level of function  6/17/2025 2248 by Monse Rothman RN  Outcome: Progressing  6/17/2025 0959 by Anne Fierro RN  Outcome: Progressing     Problem: Hematologic - Adult  Goal: Maintains hematologic

## 2025-06-18 NOTE — PROGRESS NOTES
4 Eyes Skin Assessment     NAME:  Lizbeth Hogue  YOB: 1955  MEDICAL RECORD NUMBER:  [id number]    The patient is being assessed for  Other Weekly skin assessment    I agree that at least one RN has performed a thorough Head to Toe Skin Assessment on the patient. ALL assessment sites listed below have been assessed.      Areas assessed by both nurses:    Head, Face, Ears, Shoulders, Back, Chest, Arms, Elbows, Hands, Sacrum. Buttock, Coccyx, Ischium, and Legs. Feet and Heels. Pinhole size healing spot on left thigh        Does the Patient have a Wound? Yes wound(s) were present on assessment. LDA wound assessment was Initiated and completed by RN       Edil Prevention initiated by RN: Yes  Wound Care Orders initiated by RN: Yes    Pressure Injury (Stage 3,4, Unstageable, DTI, NWPT, and Complex wounds) if present, place Wound referral order by RN under : No    New Ostomies, if present place, Ostomy referral order under : No     Nurse 1 eSignature: Electronically signed by Monse Rothman RN on 6/18/25 at 6:08 AM EDT    **SHARE this note so that the co-signing nurse can place an eSignature**    Nurse 2 eSignature: Electronically signed by Frida Lombardo RN on 6/18/25 at 6:15 AM EDT

## 2025-06-18 NOTE — PLAN OF CARE
Problem: Chronic Conditions and Co-morbidities  Goal: Patient's chronic conditions and co-morbidity symptoms are monitored and maintained or improved  6/18/2025 1112 by Anne Fierro RN  Outcome: Progressing  6/17/2025 2248 by Monse Rothman RN  Outcome: Progressing     Problem: Discharge Planning  Goal: Discharge to home or other facility with appropriate resources  6/18/2025 1112 by Anne Fierro RN  Outcome: Progressing  6/17/2025 2248 by Monse Rothman RN  Outcome: Progressing     Problem: Respiratory - Adult  Goal: Achieves optimal ventilation and oxygenation  6/18/2025 1112 by Anne Fierro RN  Outcome: Progressing  6/17/2025 2248 by Monse Rothman RN  Outcome: Progressing     Problem: Cardiovascular - Adult  Goal: Maintains optimal cardiac output and hemodynamic stability  6/18/2025 1112 by Anne Fierro RN  Outcome: Progressing  6/17/2025 2248 by Monse Rothman RN  Outcome: Progressing  Goal: Absence of cardiac dysrhythmias or at baseline  6/18/2025 1112 by Anne Fierro RN  Outcome: Progressing  6/17/2025 2248 by Monse Rothman RN  Outcome: Progressing     Problem: Infection - Adult  Goal: Absence of infection at discharge  6/18/2025 1112 by Anne Fierro RN  Outcome: Progressing  6/17/2025 2248 by Monse Rothman RN  Outcome: Progressing  Goal: Absence of infection during hospitalization  6/18/2025 1112 by Anne Fierro RN  Outcome: Progressing  6/17/2025 2248 by Monse Rothman RN  Outcome: Progressing  Goal: Absence of fever/infection during anticipated neutropenic period  6/18/2025 1112 by Anne Fierro RN  Outcome: Progressing  6/17/2025 2248 by Monse Rothman RN  Outcome: Progressing     Problem: Skin/Tissue Integrity  Goal: Skin integrity remains intact  Description: 1.  Monitor for areas of redness and/or skin breakdown  2.  Assess vascular access sites hourly  3.  Every 4-6 hours minimum:  Change oxygen saturation probe site  4.  Every 4-6 hours:  If on nasal

## 2025-06-18 NOTE — PROGRESS NOTES
Nephrology follow-up          Patient: Lizbeth Hogue MRN: [id number]  SSN: [id number]    YOB: 1955  Age: 70 y.o.  Sex: female      Subjective:   The patient is seen at the bedside  She looks comfortable  On nasal cannula 2L  Blood pressures are better  Resolving hyponatremia  Off IV fluids    Past Medical History:   Diagnosis Date    Arthritis     Asthma     Chronic kidney disease     Chronic obstructive pulmonary disease (HCC)     Diabetes (HCC)     Hypertension     Sleep apnea     no cpap     Past Surgical History:   Procedure Laterality Date    COLONOSCOPY N/A 6/17/2025    COLONOSCOPY DIAGNOSTIC performed by Eamon Jacobson MD at [facility] ENDOSCOPY    COLONOSCOPY N/A 6/17/2025    COLONOSCOPY BIOPSY performed by Eamon Jacobson MD at [facility] ENDOSCOPY    OTHER SURGICAL HISTORY Bilateral     Eye surgery    UPPER GASTROINTESTINAL ENDOSCOPY N/A 6/17/2025    ESOPHAGOGASTRODUODENOSCOPY performed by Eamon Jacobson MD at [facility] ENDOSCOPY    WRIST SURGERY Left     pins and screws      Family History   Problem Relation Age of Onset    Diabetes Mother     Hypertension Father     Diabetes Father     Hypertension Mother      Social History     Tobacco Use    Smoking status: Never    Smokeless tobacco: Never   Substance Use Topics    Alcohol use: Never      Current Facility-Administered Medications   Medication Dose Route Frequency Provider Last Rate Last Admin    diatrizoate meglumine-sodium (GASTROGRAFIN) 66-10 % solution 30 mL  30 mL Oral ONCE PRN Pelon Irene MD   15 mL at 06/18/25 0900    pantoprazole (PROTONIX) injection 40 mg  40 mg IntraVENous Daily Paulo Leslie MD        fluconazole (DIFLUCAN) tablet 100 mg  100 mg Oral Daily Paulo Leslie MD   100 mg at 06/18/25 1246    amLODIPine (NORVASC) tablet 10 mg  10 mg Oral Daily Sukumar Bansal PA-C   10 mg at 06/18/25 1246    insulin glargine (LANTUS) injection vial 15 Units  15 Units SubCUTAneous BID Paulo Leslie MD   15 Units at 06/18/25 1248

## 2025-06-18 NOTE — PLAN OF CARE
Problem: Chronic Conditions and Co-morbidities  Goal: Patient's chronic conditions and co-morbidity symptoms are monitored and maintained or improved  6/18/2025 1948 by Monse Rothman RN  Outcome: Progressing  6/18/2025 1112 by Anne Fierro RN  Outcome: Progressing     Problem: Discharge Planning  Goal: Discharge to home or other facility with appropriate resources  6/18/2025 1948 by Monse Rothman RN  Outcome: Progressing  6/18/2025 1112 by Anne Fierro RN  Outcome: Progressing     Problem: Respiratory - Adult  Goal: Achieves optimal ventilation and oxygenation  6/18/2025 1948 by Monse Rothman RN  Outcome: Progressing  6/18/2025 1112 by Anne Fierro RN  Outcome: Progressing     Problem: Cardiovascular - Adult  Goal: Maintains optimal cardiac output and hemodynamic stability  6/18/2025 1948 by Monse Rothman RN  Outcome: Progressing  6/18/2025 1112 by Anne Fierro RN  Outcome: Progressing  Goal: Absence of cardiac dysrhythmias or at baseline  6/18/2025 1948 by Monse Rothman RN  Outcome: Progressing  6/18/2025 1112 by Anne Fierro RN  Outcome: Progressing     Problem: Infection - Adult  Goal: Absence of infection at discharge  6/18/2025 1948 by Monse Rothman RN  Outcome: Progressing  6/18/2025 1112 by Anne Fierro RN  Outcome: Progressing  Goal: Absence of infection during hospitalization  6/18/2025 1948 by Monse Rothman RN  Outcome: Progressing  6/18/2025 1112 by Anne Fierro RN  Outcome: Progressing  Goal: Absence of fever/infection during anticipated neutropenic period  6/18/2025 1948 by Monse Rothman RN  Outcome: Progressing  6/18/2025 1112 by Anne Fierro RN  Outcome: Progressing     Problem: Skin/Tissue Integrity - Adult  Goal: Skin integrity remains intact  Description: 1.  Monitor for areas of redness and/or skin breakdown  2.  Assess vascular access sites hourly  3.  Every 4-6 hours minimum:  Change oxygen saturation probe site  4.  Every 4-6 hours:  If

## 2025-06-18 NOTE — CONSULTS
CARDIOLOGY CONSULTATION    REASON FOR CONSULT: Cardiac clearance prior to right hemicolectomy     REQUESTING PROVIDER: Dr. Irene    CHIEF COMPLAINT:  Shortness of breath     HISTORY OF PRESENT ILLNESS:  Lizbeth Hogue is a 70 y.o. year-old female with past medical history significant for COPD, CKD, diabetes, HTN, NISH, and multiple myeloma on chemo who was evaluated today due to request for preoperative cardiac risk assessment. Patient initially presented to the ED on 06/13/2025 with chief complaint of shortness of breath. Also endorsed productive cough and hypoxia on home SpO2 monitor. Hypertensive and hypoxic upon arrival to the ED. Patient quickly placed on high flow at 15 L. ABG obtained on high flow demonstrating respiratory acidosis (pH 7.29, CO2 50). Hemoglobin was 6.3. Daughter reports episode of dark black stool/diarrhea this evening PTA . Dr. Jacobson was consulted, colonoscopy completed yesterday with likely malignant partially obstructing tumor in the ascending colon. Dr. Irene consulted, and with plans for right hemicolectomy later this week. Patient seen today, resting in bed. No family at the bedside currently. Denies chest pain. Reports her breathing has improved, currently on 2L oxygen via nasal cannula. Denies prior cardiac evaluation.     Records from hospital admission course thus far reviewed.      Telemetry reviewed.      INPATIENT MEDICATIONS:  Home medications reviewed.    Current Facility-Administered Medications:     diatrizoate meglumine-sodium (GASTROGRAFIN) 66-10 % solution 30 mL, 30 mL, Oral, ONCE PRN, Pelon Irene MD, 15 mL at 06/18/25 0900    pantoprazole (PROTONIX) injection 40 mg, 40 mg, IntraVENous, Daily, Paulo Leslie MD    fluconazole (DIFLUCAN) tablet 100 mg, 100 mg, Oral, Daily, Paulo Leslie MD, 100 mg at 06/18/25 1246    amLODIPine (NORVASC) tablet 10 mg, 10 mg, Oral, Daily, Sukumar Bansal PA-C, 10 mg at 06/18/25 1246    insulin glargine (LANTUS)  tablet, Oral, PRN, Mattice, Tsana N, APRN - CNP    dextrose bolus 10% 125 mL, 125 mL, IntraVENous, PRN, Stopped at 06/17/25 0607 **OR** dextrose bolus 10% 250 mL, 250 mL, IntraVENous, PRN, Mattice, Tsana N, APRN - CNP    glucagon injection 1 mg, 1 mg, SubCUTAneous, PRN, Mattice, Tsana N, APRN - CNP    dextrose 10 % infusion, , IntraVENous, Continuous PRN, Mattice, Tsana N, APRN - CNP    [Held by provider] aspirin EC tablet 81 mg, 81 mg, Oral, Daily, Mattice, Tsana N, APRN - CNP    atorvastatin (LIPITOR) tablet 40 mg, 40 mg, Oral, Nightly, Mattice, Tsana N, APRN - CNP, 40 mg at 06/17/25 2013    montelukast (SINGULAIR) tablet 10 mg, 10 mg, Oral, Nightly, Mattice, Tsana N, APRN - CNP, 10 mg at 06/17/25 2013    folic acid (FOLVITE) tablet 1 mg, 1 mg, Oral, Daily, Mattice, Tsana N, APRN - CNP, 1 mg at 06/18/25 1248     ALLERGIES:  Allergies reviewed with the patient,  Allergies   Allergen Reactions    Lisinopril Swelling     Lip swelling    Pineapple Swelling     Lip swelling    .      FAMILY HISTORY:  Family history reviewed.        SOCIAL HISTORY:  Notable for no tobacco use, no heavy alcohol or illicit drug use.      REVIEW OF SYSTEMS:  Complete review of systems performed, pertinents noted above, all other systems are negative.    PHYSICAL EXAMINATION:    General:  Alert  Cardiovascular:  RRR, No murmur  Respiratory:  Lungs are clear  Abdomen:  Soft, nontender  Extremities:  No lower extremity edema  Skin:  Dry, warm  Psych:  Normal affect      Vitals:    06/18/25 1340   BP:    Pulse: 72   Resp: 18   Temp:    SpO2: 100%       Recent labs results and imaging reviewed.     Discussed case with Dr. Robb and our impression and recommendations are as follows:  Preoperative cardiac risk assessment,   EKG on admission with NSR, no ischemic changes   Echo from 6/13/2025 with EF 60-65%, NWM   Troponin within reference range  Patient reports being fairly sedentary   Will plan for ischemic evaluation with stress testing tomorrow

## 2025-06-18 NOTE — PROGRESS NOTES
Hospitalist Progress Note               Daily Progress Note: 6/18/2025      Hospital Day: 6     Chief complaint:   Chief Complaint   Patient presents with    Shortness of Breath        Subjective:     Patient is seen today for follow-up. Patient seen examined bedside. Patient denies any complaints. Patient NPO since midnight.       Medications reviewed  Current Facility-Administered Medications   Medication Dose Route Frequency    diatrizoate meglumine-sodium (GASTROGRAFIN) 66-10 % solution 30 mL  30 mL Oral ONCE PRN    pantoprazole (PROTONIX) injection 40 mg  40 mg IntraVENous Daily    fluconazole (DIFLUCAN) tablet 100 mg  100 mg Oral Daily    amLODIPine (NORVASC) tablet 10 mg  10 mg Oral Daily    insulin glargine (LANTUS) injection vial 15 Units  15 Units SubCUTAneous BID    insulin lispro (HUMALOG,ADMELOG) injection vial 0-16 Units  0-16 Units SubCUTAneous 4x Daily AC & HS    predniSONE (DELTASONE) tablet 40 mg  40 mg Oral Daily    ipratropium 0.5 mg-albuterol 2.5 mg (DUONEB) nebulizer solution 1 Dose  1 Dose Inhalation TID RT    ipratropium 0.5 mg-albuterol 2.5 mg (DUONEB) nebulizer solution 1 Dose  1 Dose Inhalation Q4H PRN    lactated ringers bolus 500 mL  500 mL IntraVENous Once    sodium chloride flush 0.9 % injection 5-40 mL  5-40 mL IntraVENous 2 times per day    sodium chloride flush 0.9 % injection 5-40 mL  5-40 mL IntraVENous PRN    0.9 % sodium chloride infusion   IntraVENous PRN    ondansetron (ZOFRAN-ODT) disintegrating tablet 4 mg  4 mg Oral Q8H PRN    Or    ondansetron (ZOFRAN) injection 4 mg  4 mg IntraVENous Q6H PRN    polyethylene glycol (GLYCOLAX) packet 17 g  17 g Oral Daily PRN    acetaminophen (TYLENOL) tablet 650 mg  650 mg Oral Q6H PRN    Or    acetaminophen (TYLENOL) suppository 650 mg  650 mg Rectal Q6H PRN    budesonide (PULMICORT) nebulizer suspension 500 mcg  0.5 mg Nebulization BID RT    cefTRIAXone (ROCEPHIN) 2,000 mg in sterile water 20 mL IV syringe  2,000 mg IntraVENous Q24H  discharge planning options with Case Management.  [] Discussed management of the case with:    [x] Telemetry personally reviewed and interpreted as documented above    [x] Imaging personally reviewed and interpreted, includes:    [] Data Review (any 3)  [x] All available Consultant notes from yesterday/today were reviewed  [x] All current labs were reviewed and interpreted for clinical significance   [x] Appropriate follow-up labs were ordered  [] Collateral history obtained from:               Assessment & Plan:    Acute on chronic hypercapnic/hypoxic respiratory failure resolved  Acute exacerbation of COPD resolved  NISH, non-compliant with CPAP  - Trial BiPAP qHS, previously non-compliant at home  -continue ceftriaxone, completed azithromycin  Switch Solu-Medrol to oral prednisone tomorrow as patient is not wheezing and much improved     Hypertension  -TTE LVEF 60-65%, abnormal diastolic function, mild AI, mild MR, mild TR  Increase amlodipine to 10 mg     Colon mass   -GI consulted for possible colonoscopy while inpatient. Patient reported a PET scan with a positive finding of a colon mass that was planning for OP evaluation  -single episode of melanotic stool prior to arrival in ED.   - consulted G.I., appreciate recommendations  - per G.I. recommendations continue Protonix  -- patient underwent EGD and colonoscopy today with G.I.  - EGD revealed candida esophagitis  - started on fluconazole for Candida esophagitis 200 MG today followed by 100 MG daily from tomorrow  - colonoscopy revealed tumor, concerning for malignancy. Biopsy is taken  - general surgery consulted for colorectal mass, appreciate recommendations  - patient NPO since midnight  - general surgery recommended robotic assisted right delisa colectomy. Patient is a high risk patient for surgery given her multiple comorbidities including dementia and recent chemotherapy for her multiple myeloma.  - Monitor hemoglobin, goal >7    Candida esophagitis  -

## 2025-06-18 NOTE — CONSULTS
[facility] SURGERY  CONSULT          Chief Complaint: Shortness of breath.    History of Present Illness:    Ms. Lizbeth Hogue is a 70 y.o. female brought into the emergency room by her daughter on Deidra 15, 2025 due to progressive weakness and shortness of breath.  In the emergency room she was found to be anemic with a hemoglobin of 6.4 and oxygen saturation in the 60s requiring nasal O2.  Of note patient uses intermittently oxygen at home also particular nighttime.  Patient also has got dementia.  And is taken care of by her daughter.  After admission she underwent a colonoscopy and was found to have a partially obstructing large mass in the ascending colon highly suspicious for malignant neoplasm.  This was performed on June 17, 2025 and biopsies have been taken.  Patient is currently on oxygen and has got dementia.  Most of the history was obtained from her daughter by the bedside.  Also of note patient is currently undergoing treatment for multiple myeloma by Dr. Mcneill.  The treatment has been withheld this week.      Past Medical History:   Past Medical History:   Diagnosis Date    Arthritis     Asthma     Chronic kidney disease     Chronic obstructive pulmonary disease (HCC)     Diabetes (HCC)     Hypertension     Sleep apnea     no cpap       Past Surgical History:   Past Surgical History:   Procedure Laterality Date    COLONOSCOPY N/A 6/17/2025    COLONOSCOPY DIAGNOSTIC performed by Eamon Jacobson MD at [facility] ENDOSCOPY    COLONOSCOPY N/A 6/17/2025    COLONOSCOPY BIOPSY performed by Eamon Jacobson MD at [facility] ENDOSCOPY    OTHER SURGICAL HISTORY Bilateral     Eye surgery    UPPER GASTROINTESTINAL ENDOSCOPY N/A 6/17/2025    ESOPHAGOGASTRODUODENOSCOPY performed by Eamon Jacobson MD at [facility] ENDOSCOPY    WRIST SURGERY Left     pins and screws        Allergy:  Allergies   Allergen Reactions    Lisinopril Swelling     Lip swelling    Pineapple Swelling     Lip swelling       Social History:  reports that she has never smoked.  PRN, Gagan Lala, APRN - CNP    polyethylene glycol (GLYCOLAX) packet 17 g, 17 g, Oral, Daily PRN, Gagna Lala, APRN - CNP    acetaminophen (TYLENOL) tablet 650 mg, 650 mg, Oral, Q6H PRN, 650 mg at 06/17/25 0332 **OR** acetaminophen (TYLENOL) suppository 650 mg, 650 mg, Rectal, Q6H PRN, Gagan Lala, APRN - CNP    budesonide (PULMICORT) nebulizer suspension 500 mcg, 0.5 mg, Nebulization, BID RT, Gagan Lala, APRN - CNP, 500 mcg at 06/17/25 1912    cefTRIAXone (ROCEPHIN) 2,000 mg in sterile water 20 mL IV syringe, 2,000 mg, IntraVENous, Q24H, Gagan Lala APRN - CNP, 2,000 mg at 06/18/25 0200    glucose chewable tablet 16 g, 4 tablet, Oral, PRN, Gagan Lala, APRN - CNP    dextrose bolus 10% 125 mL, 125 mL, IntraVENous, PRN, Stopped at 06/17/25 0607 **OR** dextrose bolus 10% 250 mL, 250 mL, IntraVENous, PRN, Gagan Lala, APRN - CNP    glucagon injection 1 mg, 1 mg, SubCUTAneous, PRN, Gagan Lala, APRN - CNP    dextrose 10 % infusion, , IntraVENous, Continuous PRN, Gagan Lala, APRN - CNP    [Held by provider] aspirin EC tablet 81 mg, 81 mg, Oral, Daily, Gagan Lala, APRN - CNP    atorvastatin (LIPITOR) tablet 40 mg, 40 mg, Oral, Nightly, Gagan Lala N, APRN - CNP, 40 mg at 06/17/25 2013    montelukast (SINGULAIR) tablet 10 mg, 10 mg, Oral, Nightly, Gagan Lala, APRN - CNP, 10 mg at 06/17/25 2013    folic acid (FOLVITE) tablet 1 mg, 1 mg, Oral, Daily, Gagan Lala, APRN - CNP, 1 mg at 06/17/25 0922     Immunization History:   There is no immunization history on file for this patient.   Complete    Review of Systems: Unable to complete since the patient has dementia and confused.    Physical Exam:     Vitals & Measurements:    Wt Readings from Last 3 Encounters:   06/18/25 64.4 kg (141 lb 15.6 oz)   06/06/25 56.7 kg (125 lb)   05/20/25 59 kg (130 lb)     Temp Readings from Last 3 Encounters:   06/18/25 97.9 °F (36.6 °C) (Oral)   06/08/25 98.6 °F (37 °C) (Oral)

## 2025-06-19 ENCOUNTER — APPOINTMENT (OUTPATIENT)
Facility: HOSPITAL | Age: 70
DRG: 231 | End: 2025-06-19
Payer: MEDICAID

## 2025-06-19 ENCOUNTER — HOSPITAL ENCOUNTER (INPATIENT)
Facility: HOSPITAL | Age: 70
Discharge: HOME | DRG: 231 | End: 2025-06-21
Payer: MEDICAID

## 2025-06-19 VITALS
DIASTOLIC BLOOD PRESSURE: 73 MMHG | SYSTOLIC BLOOD PRESSURE: 154 MMHG | BODY MASS INDEX: 27.09 KG/M2 | HEART RATE: 64 BPM | WEIGHT: 138 LBS | HEIGHT: 60 IN

## 2025-06-19 LAB
ALBUMIN SERPL-MCNC: 2.3 G/DL (ref 3.5–5)
ANION GAP SERPL CALC-SCNC: 4 MMOL/L (ref 2–12)
BACTERIA SPEC CULT: NORMAL
BACTERIA SPEC CULT: NORMAL
BUN SERPL-MCNC: 16 MG/DL (ref 6–20)
BUN/CREAT SERPL: 14 (ref 12–20)
CA-I BLD-MCNC: 8.3 MG/DL (ref 8.5–10.1)
CHLORIDE SERPL-SCNC: 103 MMOL/L (ref 97–108)
CO2 SERPL-SCNC: 28 MMOL/L (ref 21–32)
CREAT SERPL-MCNC: 1.15 MG/DL (ref 0.55–1.02)
ECHO BSA: 1.63 M2
GLUCOSE BLD STRIP.AUTO-MCNC: 109 MG/DL (ref 65–100)
GLUCOSE BLD STRIP.AUTO-MCNC: 130 MG/DL (ref 65–100)
GLUCOSE BLD STRIP.AUTO-MCNC: 157 MG/DL (ref 65–100)
GLUCOSE BLD STRIP.AUTO-MCNC: 275 MG/DL (ref 65–100)
GLUCOSE BLD STRIP.AUTO-MCNC: 97 MG/DL (ref 65–100)
GLUCOSE SERPL-MCNC: 91 MG/DL (ref 65–100)
Lab: NORMAL
Lab: NORMAL
NUC STRESS EJECTION FRACTION: 64 %
PERFORMED BY:: ABNORMAL
PERFORMED BY:: NORMAL
PHOSPHATE SERPL-MCNC: 3.4 MG/DL (ref 2.6–4.7)
POTASSIUM SERPL-SCNC: 5.5 MMOL/L (ref 3.5–5.1)
SODIUM SERPL-SCNC: 135 MMOL/L (ref 136–145)
STRESS BASELINE DIAS BP: 73 MMHG
STRESS BASELINE HR: 64 BPM
STRESS BASELINE ST DEPRESSION: 0 MM
STRESS BASELINE SYS BP: 154 MMHG
STRESS STAGE 1 BP: NORMAL MMHG
STRESS STAGE 1 DURATION: NORMAL MIN:SEC
STRESS STAGE 1 HR: 85 BPM
STRESS STAGE 2 DURATION: NORMAL MIN:SEC
STRESS STAGE 2 HR: 79 BPM
STRESS STAGE 3 BP: NORMAL MMHG
STRESS STAGE 3 DURATION: NORMAL MIN:SEC
STRESS STAGE 3 HR: 77 BPM
STRESS STAGE 4 DURATION: NORMAL MIN:SEC
STRESS STAGE 4 HR: 74 BPM
STRESS STAGE 5 BP: NORMAL MMHG
STRESS STAGE 5 DURATION: NORMAL MIN:SEC
STRESS STAGE 5 HR: 75 BPM
STRESS TARGET HR: 150 BPM
TID: 1.05

## 2025-06-19 PROCEDURE — 2580000003 HC RX 258: Performed by: SURGERY

## 2025-06-19 PROCEDURE — 2700000000 HC OXYGEN THERAPY PER DAY

## 2025-06-19 PROCEDURE — 3430000000 HC RX DIAGNOSTIC RADIOPHARMACEUTICAL

## 2025-06-19 PROCEDURE — 6360000002 HC RX W HCPCS: Performed by: NURSE PRACTITIONER

## 2025-06-19 PROCEDURE — 6370000000 HC RX 637 (ALT 250 FOR IP): Performed by: STUDENT IN AN ORGANIZED HEALTH CARE EDUCATION/TRAINING PROGRAM

## 2025-06-19 PROCEDURE — 2500000003 HC RX 250 WO HCPCS: Performed by: NURSE PRACTITIONER

## 2025-06-19 PROCEDURE — 94761 N-INVAS EAR/PLS OXIMETRY MLT: CPT

## 2025-06-19 PROCEDURE — 36415 COLL VENOUS BLD VENIPUNCTURE: CPT

## 2025-06-19 PROCEDURE — A9500 TC99M SESTAMIBI: HCPCS

## 2025-06-19 PROCEDURE — 80069 RENAL FUNCTION PANEL: CPT

## 2025-06-19 PROCEDURE — 94640 AIRWAY INHALATION TREATMENT: CPT

## 2025-06-19 PROCEDURE — 6370000000 HC RX 637 (ALT 250 FOR IP): Performed by: SURGERY

## 2025-06-19 PROCEDURE — 82962 GLUCOSE BLOOD TEST: CPT

## 2025-06-19 PROCEDURE — 78452 HT MUSCLE IMAGE SPECT MULT: CPT

## 2025-06-19 PROCEDURE — 1100000000 HC RM PRIVATE

## 2025-06-19 PROCEDURE — 6370000000 HC RX 637 (ALT 250 FOR IP): Performed by: EMERGENCY MEDICINE

## 2025-06-19 PROCEDURE — 6360000002 HC RX W HCPCS

## 2025-06-19 RX ORDER — TETRAKIS(2-METHOXYISOBUTYLISOCYANIDE)COPPER(I) TETRAFLUOROBORATE 1 MG/ML
32.3 INJECTION, POWDER, LYOPHILIZED, FOR SOLUTION INTRAVENOUS
Status: COMPLETED | OUTPATIENT
Start: 2025-06-19 | End: 2025-06-19

## 2025-06-19 RX ORDER — SODIUM CHLORIDE 9 MG/ML
INJECTION, SOLUTION INTRAVENOUS CONTINUOUS
Status: DISCONTINUED | OUTPATIENT
Start: 2025-06-19 | End: 2025-06-20

## 2025-06-19 RX ORDER — REGADENOSON 0.08 MG/ML
INJECTION, SOLUTION INTRAVENOUS
Status: COMPLETED
Start: 2025-06-19 | End: 2025-06-19

## 2025-06-19 RX ORDER — TETRAKIS(2-METHOXYISOBUTYLISOCYANIDE)COPPER(I) TETRAFLUOROBORATE 1 MG/ML
10.6 INJECTION, POWDER, LYOPHILIZED, FOR SOLUTION INTRAVENOUS
Status: COMPLETED | OUTPATIENT
Start: 2025-06-19 | End: 2025-06-19

## 2025-06-19 RX ADMIN — IPRATROPIUM BROMIDE AND ALBUTEROL SULFATE 1 DOSE: 2.5; .5 SOLUTION RESPIRATORY (INHALATION) at 14:38

## 2025-06-19 RX ADMIN — CEFTRIAXONE SODIUM 2000 MG: 2 INJECTION, POWDER, FOR SOLUTION INTRAMUSCULAR; INTRAVENOUS at 01:57

## 2025-06-19 RX ADMIN — SODIUM CHLORIDE: 0.9 INJECTION, SOLUTION INTRAVENOUS at 20:56

## 2025-06-19 RX ADMIN — IPRATROPIUM BROMIDE AND ALBUTEROL SULFATE 1 DOSE: 2.5; .5 SOLUTION RESPIRATORY (INHALATION) at 07:44

## 2025-06-19 RX ADMIN — INSULIN LISPRO 4 UNITS: 100 INJECTION, SOLUTION INTRAVENOUS; SUBCUTANEOUS at 16:39

## 2025-06-19 RX ADMIN — IPRATROPIUM BROMIDE AND ALBUTEROL SULFATE 1 DOSE: 2.5; .5 SOLUTION RESPIRATORY (INHALATION) at 19:44

## 2025-06-19 RX ADMIN — TETRAKIS(2-METHOXYISOBUTYLISOCYANIDE)COPPER(I) TETRAFLUOROBORATE 10.6 MILLICURIE: 1 INJECTION, POWDER, LYOPHILIZED, FOR SOLUTION INTRAVENOUS at 08:20

## 2025-06-19 RX ADMIN — TETRAKIS(2-METHOXYISOBUTYLISOCYANIDE)COPPER(I) TETRAFLUOROBORATE 32.3 MILLICURIE: 1 INJECTION, POWDER, LYOPHILIZED, FOR SOLUTION INTRAVENOUS at 10:05

## 2025-06-19 RX ADMIN — REGADENOSON 0.4 MG: 0.08 INJECTION, SOLUTION INTRAVENOUS at 10:04

## 2025-06-19 RX ADMIN — SODIUM CHLORIDE, PRESERVATIVE FREE 10 ML: 5 INJECTION INTRAVENOUS at 20:55

## 2025-06-19 RX ADMIN — POLYETHYLENE GLYCOL-3350 AND ELECTROLYTES 2000 ML: 236; 6.74; 5.86; 2.97; 22.74 POWDER, FOR SOLUTION ORAL at 20:58

## 2025-06-19 RX ADMIN — BUDESONIDE INHALATION 500 MCG: 0.5 SUSPENSION RESPIRATORY (INHALATION) at 19:44

## 2025-06-19 RX ADMIN — BUDESONIDE INHALATION 500 MCG: 0.5 SUSPENSION RESPIRATORY (INHALATION) at 07:44

## 2025-06-19 ASSESSMENT — PAIN SCALES - GENERAL
PAINLEVEL_OUTOF10: 0
PAINLEVEL_OUTOF10: 0

## 2025-06-19 NOTE — PROGRESS NOTES
Patient lives at home with her daughter and is followed by cardio, hematology and surgery.     Patient agrees for HH services at discharge. Referrals sent after choice letter signed for no preference.     Barriers to discharge- cardio clearance for hemicolectomy to be done Friday am. Stress test results.

## 2025-06-19 NOTE — PROGRESS NOTES
Patency examed and no pain  Baseline status   Bx :    FINAL PATHOLOGIC DIAGNOSIS          Colon, ascending mass, biopsy:        Superficial fragments of adenomatous colonic tissue with high-grade   dysplasia (see Comment).       Comment     The clinical impression of a colonic mass is noted.  Sections show   predominantly fragments of dysplastic colonic mucosa with foci of   high-grade dysplasia.  Definitive invasive carcinoma is not seen.   Clinical and endoscopic correlation is recommended.       Likely this is sample from the surface ofcolon cancer , needs a partial colectomy,  Surgeon to follow  Sign off

## 2025-06-19 NOTE — PROGRESS NOTES
Pt is being transferred to [address] [place] [address] room 434 report called to [facility] RN writer attempted to notify pt's daughter via phone but unable to leave a voicemail due to voicemail being full. Staff gathered pt's belongings and transported pt to room 434.

## 2025-06-19 NOTE — PROGRESS NOTES
Hospitalist Progress Note               Daily Progress Note: 6/19/2025      Hospital Day: 7     Chief complaint:   Chief Complaint   Patient presents with    Shortness of Breath        Subjective:     Patient is seen today for follow-up. Patient seen examined bedside. Patient denies any complaints. Patient NPO since midnight for cardiac stress today.       Medications reviewed  Current Facility-Administered Medications   Medication Dose Route Frequency    diatrizoate meglumine-sodium (GASTROGRAFIN) 66-10 % solution 30 mL  30 mL Oral ONCE PRN    pantoprazole (PROTONIX) injection 40 mg  40 mg IntraVENous Daily    fluconazole (DIFLUCAN) tablet 100 mg  100 mg Oral Daily    amLODIPine (NORVASC) tablet 10 mg  10 mg Oral Daily    insulin glargine (LANTUS) injection vial 15 Units  15 Units SubCUTAneous BID    insulin lispro (HUMALOG,ADMELOG) injection vial 0-16 Units  0-16 Units SubCUTAneous 4x Daily AC & HS    predniSONE (DELTASONE) tablet 40 mg  40 mg Oral Daily    ipratropium 0.5 mg-albuterol 2.5 mg (DUONEB) nebulizer solution 1 Dose  1 Dose Inhalation TID RT    ipratropium 0.5 mg-albuterol 2.5 mg (DUONEB) nebulizer solution 1 Dose  1 Dose Inhalation Q4H PRN    lactated ringers bolus 500 mL  500 mL IntraVENous Once    sodium chloride flush 0.9 % injection 5-40 mL  5-40 mL IntraVENous 2 times per day    sodium chloride flush 0.9 % injection 5-40 mL  5-40 mL IntraVENous PRN    0.9 % sodium chloride infusion   IntraVENous PRN    ondansetron (ZOFRAN-ODT) disintegrating tablet 4 mg  4 mg Oral Q8H PRN    Or    ondansetron (ZOFRAN) injection 4 mg  4 mg IntraVENous Q6H PRN    polyethylene glycol (GLYCOLAX) packet 17 g  17 g Oral Daily PRN    acetaminophen (TYLENOL) tablet 650 mg  650 mg Oral Q6H PRN    Or    acetaminophen (TYLENOL) suppository 650 mg  650 mg Rectal Q6H PRN    budesonide (PULMICORT) nebulizer suspension 500 mcg  0.5 mg Nebulization BID RT    glucose chewable tablet 16 g  4 tablet Oral PRN    dextrose bolus  \"AW7RQVWCQ\", \"FIO2A\", \"F1SUPKOC\", \"OXYHEM\", \"CARBOXHGBART\", \"METHGBART\", \"L1DMTUFGI\", \"PHCORART\", \"TEMP\" in the last 72 hours.    Invalid input(s): \"QCP5MTIRV\"      24 Hour Results:  Recent Results (from the past 24 hours)   POCT Glucose    Collection Time: 06/18/25  5:35 PM   Result Value Ref Range    POC Glucose 247 (H) 65 - 100 mg/dL    Performed by: Vadim Rodríguez    POCT Glucose    Collection Time: 06/18/25  7:29 PM   Result Value Ref Range    POC Glucose 269 (H) 65 - 100 mg/dL    Performed by: Bere Bruno    Renal Function Panel    Collection Time: 06/19/25 12:54 AM   Result Value Ref Range    Sodium 135 (L) 136 - 145 mmol/L    Potassium 5.5 (H) 3.5 - 5.1 mmol/L    Chloride 103 97 - 108 mmol/L    CO2 28 21 - 32 mmol/L    Anion Gap 4 2 - 12 mmol/L    Glucose 91 65 - 100 mg/dL    BUN 16 6 - 20 mg/dL    Creatinine 1.15 (H) 0.55 - 1.02 mg/dL    BUN/Creatinine Ratio 14 12 - 20      Est, Glom Filt Rate 51 (L) >60 ml/min/1.73m2    Calcium 8.3 (L) 8.5 - 10.1 mg/dL    Phosphorus 3.4 2.6 - 4.7 mg/dL    Albumin 2.3 (L) 3.5 - 5.0 g/dL   POCT Glucose    Collection Time: 06/19/25  6:16 AM   Result Value Ref Range    POC Glucose 109 (H) 65 - 100 mg/dL    Performed by: MARTHA Romero    POCT Glucose    Collection Time: 06/19/25  8:05 AM   Result Value Ref Range    POC Glucose 97 65 - 100 mg/dL    Performed by: Sailaja Lee    Nuclear stress test with myocardial perfusion    Collection Time: 06/19/25 11:45 AM   Result Value Ref Range    Stress Target  bpm    Baseline HR 64 bpm    Baseline Systolic  mmHg    Baseline Diastolic BP 73 mmHg    Stress Stage 1 Duration 1m min:sec    Stress Stage 1 HR 85 bpm    Stress Stage 1 /68 mmHg    Stress Stage 2 Duration 2m min:sec    Stress Stage 2 HR 79 bpm    Stress Stage 3 Duration 3m min:sec    Stress Stage 3 HR 77 bpm    Stress Stage 3 /61 mmHg    Stress Stage 4 Duration 4m min:sec    Stress Stage 4 HR 74 bpm    Stress Stage 5 Duration 5m min:sec

## 2025-06-19 NOTE — PROGRESS NOTES
Pt just returned back to the floor from having stress test procedure  Writer reached out to Attending  Md to get a diet ordered since pt has been NPO since midnight . Regular Diabetic Diet ordered for lunch

## 2025-06-19 NOTE — PROGRESS NOTES
[facility] SURGERY Progress Notes          Chief Complaint: Shortness of breath.    History of Present Illness:    Ms. Lizbeth Hogue is a 70 y.o. female brought into the emergency room by her daughter on Deidra 15, 2025 due to progressive weakness and shortness of breath.  In the emergency room she was found to be anemic with a hemoglobin of 6.4 and oxygen saturation in the 60s requiring nasal O2.  Of note patient uses intermittently oxygen at home also particular nighttime.  Patient also has got dementia.  And is taken care of by her daughter.  After admission she underwent a colonoscopy and was found to have a partially obstructing large mass in the ascending colon highly suspicious for malignant neoplasm.  This was performed on June 17, 2025 and biopsies have been taken.  Patient is currently on oxygen and has got dementia.  Most of the history was obtained from her daughter by the bedside.  Also of note patient is currently undergoing treatment for multiple myeloma by Dr. Mcneill.  The treatment has been withheld this week.  Awake and alert.  Scheduled to undergo cardiac nuclear stress test today.      Past Medical History:   Past Medical History:   Diagnosis Date    Arthritis     Asthma     Chronic kidney disease     Chronic obstructive pulmonary disease (HCC)     Diabetes (HCC)     Hypertension     Sleep apnea     no cpap       Past Surgical History:   Past Surgical History:   Procedure Laterality Date    COLONOSCOPY N/A 6/17/2025    COLONOSCOPY DIAGNOSTIC performed by Eamon Jacobson MD at [facility] ENDOSCOPY    COLONOSCOPY N/A 6/17/2025    COLONOSCOPY BIOPSY performed by Eamon Jacobson MD at [facility] ENDOSCOPY    OTHER SURGICAL HISTORY Bilateral     Eye surgery    UPPER GASTROINTESTINAL ENDOSCOPY N/A 6/17/2025    ESOPHAGOGASTRODUODENOSCOPY performed by Eamon Jacobson MD at [facility] ENDOSCOPY    WRIST SURGERY Left     pins and screws        Allergy:  Allergies   Allergen Reactions    Lisinopril Swelling     Lip swelling    Pineapple

## 2025-06-19 NOTE — PROGRESS NOTES
CARDIOLOGY PROGRESS NOTE      Patient Name: Lizbeth Hogue  Age: 70 y.o.  Gender:female  :1955  MRN: [id number]    HISTORY OF PRESENT ILLNESS:  Lizbeth Hogue is a 70 y.o. year-old female with past medical history significant for COPD, CKD, diabetes, HTN, NISH, and multiple myeloma on chemo who was evaluated today due to request for preoperative cardiac risk assessment. Patient initially presented to the ED on 2025 with chief complaint of shortness of breath. Also endorsed productive cough and hypoxia on home SpO2 monitor. Hypertensive and hypoxic upon arrival to the ED. Patient quickly placed on high flow at 15 L. ABG obtained on high flow demonstrating respiratory acidosis (pH 7.29, CO2 50). Hemoglobin was 6.3. Daughter reports episode of dark black stool/diarrhea this evening PTA . Dr. Jacobson was consulted, colonoscopy completed yesterday with likely malignant partially obstructing tumor in the ascending colon. Dr. Irene consulted, and with plans for right hemicolectomy later this week. Patient seen today, resting in bed. No family at the bedside currently. Denies chest pain. Reports her breathing has improved, currently on 2L oxygen via nasal cannula. Denies prior cardiac evaluation.     2025: Patient seen and examined in CVL for stress testing. Alert, talkative today. Denies chest pain. No other complaints reported.    Telemetry reviewed, there were no events noted in the past 24 hours.    Pertinent review of systems items noted above, all other systems are negative. Current medications reviewed.    Physical Examination    Allergies   Allergen Reactions    Lisinopril Swelling     Lip swelling    Pineapple Swelling     Lip swelling     Vitals:    25 1539   BP: (!) 129/53   Pulse: 69   Resp: 18   Temp: 99.1 °F (37.3 °C)   SpO2: 100%     Vital signs are stable  No apparent distress.  Heart has a RRR.  No murmur  Lungs are clear  Abdomen is soft, nontender, normal bowel

## 2025-06-20 ENCOUNTER — APPOINTMENT (OUTPATIENT)
Facility: HOSPITAL | Age: 70
DRG: 231 | End: 2025-06-20
Payer: MEDICAID

## 2025-06-20 ENCOUNTER — ANESTHESIA (OUTPATIENT)
Facility: HOSPITAL | Age: 70
DRG: 231 | End: 2025-06-20
Payer: MEDICAID

## 2025-06-20 ENCOUNTER — ANESTHESIA EVENT (OUTPATIENT)
Facility: HOSPITAL | Age: 70
DRG: 231 | End: 2025-06-20
Payer: MEDICAID

## 2025-06-20 LAB
ANION GAP SERPL CALC-SCNC: 5 MMOL/L (ref 2–12)
BUN SERPL-MCNC: 12 MG/DL (ref 6–20)
BUN/CREAT SERPL: 11 (ref 12–20)
CA-I BLD-MCNC: 8.5 MG/DL (ref 8.5–10.1)
CHLORIDE SERPL-SCNC: 104 MMOL/L (ref 97–108)
CO2 SERPL-SCNC: 31 MMOL/L (ref 21–32)
CREAT SERPL-MCNC: 1.07 MG/DL (ref 0.55–1.02)
GLUCOSE BLD STRIP.AUTO-MCNC: 107 MG/DL (ref 65–100)
GLUCOSE BLD STRIP.AUTO-MCNC: 72 MG/DL (ref 65–100)
GLUCOSE BLD STRIP.AUTO-MCNC: 89 MG/DL (ref 65–100)
GLUCOSE BLD STRIP.AUTO-MCNC: 97 MG/DL (ref 65–100)
GLUCOSE SERPL-MCNC: 72 MG/DL (ref 65–100)
PERFORMED BY:: ABNORMAL
PERFORMED BY:: NORMAL
POTASSIUM SERPL-SCNC: 3.8 MMOL/L (ref 3.5–5.1)
SODIUM SERPL-SCNC: 140 MMOL/L (ref 136–145)

## 2025-06-20 PROCEDURE — 88309 TISSUE EXAM BY PATHOLOGIST: CPT

## 2025-06-20 PROCEDURE — 0DTF4ZZ RESECTION OF RIGHT LARGE INTESTINE, PERCUTANEOUS ENDOSCOPIC APPROACH: ICD-10-PCS | Performed by: SURGERY

## 2025-06-20 PROCEDURE — 2580000003 HC RX 258: Performed by: SURGERY

## 2025-06-20 PROCEDURE — 86850 RBC ANTIBODY SCREEN: CPT

## 2025-06-20 PROCEDURE — 88313 SPECIAL STAINS GROUP 2: CPT

## 2025-06-20 PROCEDURE — 36415 COLL VENOUS BLD VENIPUNCTURE: CPT

## 2025-06-20 PROCEDURE — 3600000009 HC SURGERY ROBOT BASE: Performed by: SURGERY

## 2025-06-20 PROCEDURE — 87086 URINE CULTURE/COLONY COUNT: CPT

## 2025-06-20 PROCEDURE — 86900 BLOOD TYPING SEROLOGIC ABO: CPT

## 2025-06-20 PROCEDURE — 86880 COOMBS TEST DIRECT: CPT

## 2025-06-20 PROCEDURE — 82962 GLUCOSE BLOOD TEST: CPT

## 2025-06-20 PROCEDURE — 94761 N-INVAS EAR/PLS OXIMETRY MLT: CPT

## 2025-06-20 PROCEDURE — 2720000010 HC SURG SUPPLY STERILE: Performed by: SURGERY

## 2025-06-20 PROCEDURE — 2500000003 HC RX 250 WO HCPCS: Performed by: NURSE PRACTITIONER

## 2025-06-20 PROCEDURE — 94640 AIRWAY INHALATION TREATMENT: CPT

## 2025-06-20 PROCEDURE — 2709999900 HC NON-CHARGEABLE SUPPLY: Performed by: SURGERY

## 2025-06-20 PROCEDURE — 88341 IMHCHEM/IMCYTCHM EA ADD ANTB: CPT

## 2025-06-20 PROCEDURE — 88342 IMHCHEM/IMCYTCHM 1ST ANTB: CPT

## 2025-06-20 PROCEDURE — 88360 TUMOR IMMUNOHISTOCHEM/MANUAL: CPT

## 2025-06-20 PROCEDURE — C1765 ADHESION BARRIER: HCPCS | Performed by: SURGERY

## 2025-06-20 PROCEDURE — 2500000003 HC RX 250 WO HCPCS: Performed by: NURSE ANESTHETIST, CERTIFIED REGISTERED

## 2025-06-20 PROCEDURE — 3700000001 HC ADD 15 MINUTES (ANESTHESIA): Performed by: SURGERY

## 2025-06-20 PROCEDURE — 8E0W4CZ ROBOTIC ASSISTED PROCEDURE OF TRUNK REGION, PERCUTANEOUS ENDOSCOPIC APPROACH: ICD-10-PCS | Performed by: SURGERY

## 2025-06-20 PROCEDURE — 6370000000 HC RX 637 (ALT 250 FOR IP): Performed by: EMERGENCY MEDICINE

## 2025-06-20 PROCEDURE — 80048 BASIC METABOLIC PNL TOTAL CA: CPT

## 2025-06-20 PROCEDURE — 7100000000 HC PACU RECOVERY - FIRST 15 MIN: Performed by: SURGERY

## 2025-06-20 PROCEDURE — 6360000002 HC RX W HCPCS: Performed by: NURSE PRACTITIONER

## 2025-06-20 PROCEDURE — 1100000000 HC RM PRIVATE

## 2025-06-20 PROCEDURE — 94010 BREATHING CAPACITY TEST: CPT

## 2025-06-20 PROCEDURE — 86870 RBC ANTIBODY IDENTIFICATION: CPT

## 2025-06-20 PROCEDURE — 7100000001 HC PACU RECOVERY - ADDTL 15 MIN: Performed by: SURGERY

## 2025-06-20 PROCEDURE — 6360000002 HC RX W HCPCS: Performed by: INTERNAL MEDICINE

## 2025-06-20 PROCEDURE — 6360000002 HC RX W HCPCS: Performed by: SURGERY

## 2025-06-20 PROCEDURE — 3700000000 HC ANESTHESIA ATTENDED CARE: Performed by: SURGERY

## 2025-06-20 PROCEDURE — 3600000019 HC SURGERY ROBOT ADDTL 15MIN: Performed by: SURGERY

## 2025-06-20 PROCEDURE — 71045 X-RAY EXAM CHEST 1 VIEW: CPT

## 2025-06-20 PROCEDURE — S2900 ROBOTIC SURGICAL SYSTEM: HCPCS | Performed by: SURGERY

## 2025-06-20 PROCEDURE — 2580000003 HC RX 258: Performed by: NURSE ANESTHETIST, CERTIFIED REGISTERED

## 2025-06-20 PROCEDURE — 86901 BLOOD TYPING SEROLOGIC RH(D): CPT

## 2025-06-20 PROCEDURE — 6360000002 HC RX W HCPCS: Performed by: NURSE ANESTHETIST, CERTIFIED REGISTERED

## 2025-06-20 PROCEDURE — 2700000000 HC OXYGEN THERAPY PER DAY

## 2025-06-20 DEVICE — BARRIER, ABSORBABLE, ADHESION
Type: IMPLANTABLE DEVICE | Site: ABDOMEN | Status: FUNCTIONAL
Brand: SEPRAFILM®

## 2025-06-20 RX ORDER — IPRATROPIUM BROMIDE AND ALBUTEROL SULFATE 2.5; .5 MG/3ML; MG/3ML
1 SOLUTION RESPIRATORY (INHALATION)
Status: DISCONTINUED | OUTPATIENT
Start: 2025-06-20 | End: 2025-06-20 | Stop reason: HOSPADM

## 2025-06-20 RX ORDER — HYDRALAZINE HYDROCHLORIDE 20 MG/ML
10 INJECTION INTRAMUSCULAR; INTRAVENOUS
Status: DISCONTINUED | OUTPATIENT
Start: 2025-06-20 | End: 2025-06-20 | Stop reason: HOSPADM

## 2025-06-20 RX ORDER — MORPHINE SULFATE 0.5 MG/ML
1 INJECTION, SOLUTION EPIDURAL; INTRATHECAL; INTRAVENOUS EVERY 4 HOURS PRN
Status: DISCONTINUED | OUTPATIENT
Start: 2025-06-20 | End: 2025-06-20

## 2025-06-20 RX ORDER — DIPHENHYDRAMINE HYDROCHLORIDE 50 MG/ML
12.5 INJECTION, SOLUTION INTRAMUSCULAR; INTRAVENOUS
Status: DISCONTINUED | OUTPATIENT
Start: 2025-06-20 | End: 2025-06-20 | Stop reason: HOSPADM

## 2025-06-20 RX ORDER — PROPOFOL 10 MG/ML
INJECTION, EMULSION INTRAVENOUS
Status: DISCONTINUED | OUTPATIENT
Start: 2025-06-20 | End: 2025-06-20 | Stop reason: SDUPTHER

## 2025-06-20 RX ORDER — LIDOCAINE HYDROCHLORIDE 20 MG/ML
INJECTION, SOLUTION EPIDURAL; INFILTRATION; INTRACAUDAL; PERINEURAL
Status: DISCONTINUED | OUTPATIENT
Start: 2025-06-20 | End: 2025-06-20 | Stop reason: SDUPTHER

## 2025-06-20 RX ORDER — ONDANSETRON 2 MG/ML
4 INJECTION INTRAMUSCULAR; INTRAVENOUS
Status: DISCONTINUED | OUTPATIENT
Start: 2025-06-20 | End: 2025-06-20 | Stop reason: HOSPADM

## 2025-06-20 RX ORDER — BUPIVACAINE HYDROCHLORIDE 2.5 MG/ML
INJECTION, SOLUTION EPIDURAL; INFILTRATION; INTRACAUDAL; PERINEURAL PRN
Status: DISCONTINUED | OUTPATIENT
Start: 2025-06-20 | End: 2025-06-20 | Stop reason: ALTCHOICE

## 2025-06-20 RX ORDER — MEPERIDINE HYDROCHLORIDE 25 MG/ML
12.5 INJECTION INTRAMUSCULAR; INTRAVENOUS; SUBCUTANEOUS EVERY 5 MIN PRN
Refills: 0 | Status: DISCONTINUED | OUTPATIENT
Start: 2025-06-20 | End: 2025-06-20 | Stop reason: HOSPADM

## 2025-06-20 RX ORDER — LIDOCAINE 4 G/G
1 PATCH TOPICAL AS NEEDED
Status: DISCONTINUED | OUTPATIENT
Start: 2025-06-20 | End: 2025-06-20 | Stop reason: HOSPADM

## 2025-06-20 RX ORDER — FENTANYL CITRATE 0.05 MG/ML
50 INJECTION, SOLUTION INTRAMUSCULAR; INTRAVENOUS EVERY 5 MIN PRN
Refills: 0 | Status: DISCONTINUED | OUTPATIENT
Start: 2025-06-20 | End: 2025-06-20 | Stop reason: HOSPADM

## 2025-06-20 RX ORDER — ROCURONIUM BROMIDE 10 MG/ML
INJECTION, SOLUTION INTRAVENOUS
Status: DISCONTINUED | OUTPATIENT
Start: 2025-06-20 | End: 2025-06-20 | Stop reason: SDUPTHER

## 2025-06-20 RX ORDER — HYDROMORPHONE HYDROCHLORIDE 1 MG/ML
0.5 INJECTION, SOLUTION INTRAMUSCULAR; INTRAVENOUS; SUBCUTANEOUS EVERY 5 MIN PRN
Refills: 0 | Status: DISCONTINUED | OUTPATIENT
Start: 2025-06-20 | End: 2025-06-20 | Stop reason: HOSPADM

## 2025-06-20 RX ORDER — SODIUM CHLORIDE 0.9 % (FLUSH) 0.9 %
5-40 SYRINGE (ML) INJECTION PRN
Status: DISCONTINUED | OUTPATIENT
Start: 2025-06-20 | End: 2025-06-20 | Stop reason: HOSPADM

## 2025-06-20 RX ORDER — METOCLOPRAMIDE HYDROCHLORIDE 5 MG/ML
10 INJECTION INTRAMUSCULAR; INTRAVENOUS
Status: DISCONTINUED | OUTPATIENT
Start: 2025-06-20 | End: 2025-06-20 | Stop reason: HOSPADM

## 2025-06-20 RX ORDER — SODIUM CHLORIDE, SODIUM LACTATE, POTASSIUM CHLORIDE, CALCIUM CHLORIDE 600; 310; 30; 20 MG/100ML; MG/100ML; MG/100ML; MG/100ML
INJECTION, SOLUTION INTRAVENOUS
Status: DISCONTINUED | OUTPATIENT
Start: 2025-06-20 | End: 2025-06-20 | Stop reason: SDUPTHER

## 2025-06-20 RX ORDER — FUROSEMIDE 10 MG/ML
40 INJECTION INTRAMUSCULAR; INTRAVENOUS ONCE
Status: COMPLETED | OUTPATIENT
Start: 2025-06-20 | End: 2025-06-20

## 2025-06-20 RX ORDER — DEXTROSE MONOHYDRATE 100 MG/ML
INJECTION, SOLUTION INTRAVENOUS CONTINUOUS PRN
Status: DISCONTINUED | OUTPATIENT
Start: 2025-06-20 | End: 2025-06-20 | Stop reason: HOSPADM

## 2025-06-20 RX ORDER — SODIUM CHLORIDE 9 MG/ML
INJECTION, SOLUTION INTRAVENOUS PRN
Status: DISCONTINUED | OUTPATIENT
Start: 2025-06-20 | End: 2025-06-20 | Stop reason: HOSPADM

## 2025-06-20 RX ORDER — SODIUM CHLORIDE, SODIUM LACTATE, POTASSIUM CHLORIDE, CALCIUM CHLORIDE 600; 310; 30; 20 MG/100ML; MG/100ML; MG/100ML; MG/100ML
INJECTION, SOLUTION INTRAVENOUS ONCE
Status: DISCONTINUED | OUTPATIENT
Start: 2025-06-20 | End: 2025-06-20 | Stop reason: HOSPADM

## 2025-06-20 RX ORDER — MORPHINE SULFATE 2 MG/ML
1 INJECTION, SOLUTION INTRAMUSCULAR; INTRAVENOUS EVERY 4 HOURS PRN
Refills: 0 | Status: DISPENSED | OUTPATIENT
Start: 2025-06-20 | End: 2025-06-23

## 2025-06-20 RX ORDER — OXYCODONE HYDROCHLORIDE 5 MG/1
5 TABLET ORAL PRN
Refills: 0 | Status: DISCONTINUED | OUTPATIENT
Start: 2025-06-20 | End: 2025-06-20 | Stop reason: HOSPADM

## 2025-06-20 RX ORDER — SODIUM CHLORIDE 0.9 % (FLUSH) 0.9 %
5-40 SYRINGE (ML) INJECTION EVERY 12 HOURS SCHEDULED
Status: DISCONTINUED | OUTPATIENT
Start: 2025-06-20 | End: 2025-06-20 | Stop reason: HOSPADM

## 2025-06-20 RX ORDER — GLUCAGON 1 MG/ML
1 KIT INJECTION PRN
Status: DISCONTINUED | OUTPATIENT
Start: 2025-06-20 | End: 2025-06-20 | Stop reason: HOSPADM

## 2025-06-20 RX ORDER — OXYCODONE HYDROCHLORIDE 5 MG/1
10 TABLET ORAL PRN
Refills: 0 | Status: DISCONTINUED | OUTPATIENT
Start: 2025-06-20 | End: 2025-06-20 | Stop reason: HOSPADM

## 2025-06-20 RX ORDER — SUCCINYLCHOLINE/SOD CL,ISO/PF 200MG/10ML
SYRINGE (ML) INTRAVENOUS
Status: DISCONTINUED | OUTPATIENT
Start: 2025-06-20 | End: 2025-06-20 | Stop reason: SDUPTHER

## 2025-06-20 RX ORDER — HYDROCORTISONE SODIUM SUCCINATE 100 MG/2ML
INJECTION INTRAMUSCULAR; INTRAVENOUS
Status: DISCONTINUED | OUTPATIENT
Start: 2025-06-20 | End: 2025-06-20 | Stop reason: SDUPTHER

## 2025-06-20 RX ORDER — FUROSEMIDE 40 MG/1
40 TABLET ORAL DAILY
Status: DISCONTINUED | OUTPATIENT
Start: 2025-06-21 | End: 2025-06-21

## 2025-06-20 RX ORDER — LABETALOL HYDROCHLORIDE 5 MG/ML
10 INJECTION, SOLUTION INTRAVENOUS
Status: DISCONTINUED | OUTPATIENT
Start: 2025-06-20 | End: 2025-06-20 | Stop reason: HOSPADM

## 2025-06-20 RX ORDER — LORAZEPAM 2 MG/ML
0.5 INJECTION INTRAMUSCULAR
Status: DISCONTINUED | OUTPATIENT
Start: 2025-06-20 | End: 2025-06-20 | Stop reason: HOSPADM

## 2025-06-20 RX ORDER — SODIUM CHLORIDE 9 MG/ML
INJECTION, SOLUTION INTRAVENOUS PRN
Status: DISCONTINUED | OUTPATIENT
Start: 2025-06-20 | End: 2025-06-23

## 2025-06-20 RX ORDER — NALOXONE HYDROCHLORIDE 0.4 MG/ML
INJECTION, SOLUTION INTRAMUSCULAR; INTRAVENOUS; SUBCUTANEOUS PRN
Status: DISCONTINUED | OUTPATIENT
Start: 2025-06-20 | End: 2025-06-20 | Stop reason: HOSPADM

## 2025-06-20 RX ORDER — SODIUM CHLORIDE, SODIUM LACTATE, POTASSIUM CHLORIDE, CALCIUM CHLORIDE 600; 310; 30; 20 MG/100ML; MG/100ML; MG/100ML; MG/100ML
INJECTION, SOLUTION INTRAVENOUS CONTINUOUS
Status: DISCONTINUED | OUTPATIENT
Start: 2025-06-20 | End: 2025-06-23

## 2025-06-20 RX ADMIN — Medication 120 MG: at 14:56

## 2025-06-20 RX ADMIN — BUDESONIDE INHALATION 500 MCG: 0.5 SUSPENSION RESPIRATORY (INHALATION) at 20:11

## 2025-06-20 RX ADMIN — SODIUM CHLORIDE, SODIUM LACTATE, POTASSIUM CHLORIDE, CALCIUM CHLORIDE: 600; 310; 30; 20 INJECTION, SOLUTION INTRAVENOUS at 14:50

## 2025-06-20 RX ADMIN — FUROSEMIDE 40 MG: 10 INJECTION, SOLUTION INTRAMUSCULAR; INTRAVENOUS at 11:18

## 2025-06-20 RX ADMIN — Medication 1 MG: at 15:28

## 2025-06-20 RX ADMIN — MORPHINE SULFATE 1 MG: 2 INJECTION, SOLUTION INTRAMUSCULAR; INTRAVENOUS at 23:22

## 2025-06-20 RX ADMIN — PROPOFOL 70 MG: 10 INJECTION, EMULSION INTRAVENOUS at 16:54

## 2025-06-20 RX ADMIN — ROCURONIUM BROMIDE 50 MG: 10 INJECTION, SOLUTION INTRAVENOUS at 15:08

## 2025-06-20 RX ADMIN — IPRATROPIUM BROMIDE AND ALBUTEROL SULFATE 1 DOSE: 2.5; .5 SOLUTION RESPIRATORY (INHALATION) at 20:11

## 2025-06-20 RX ADMIN — PROPOFOL 80 MG: 10 INJECTION, EMULSION INTRAVENOUS at 14:56

## 2025-06-20 RX ADMIN — LIDOCAINE HYDROCHLORIDE 50 MG: 20 INJECTION, SOLUTION EPIDURAL; INFILTRATION; INTRACAUDAL; PERINEURAL at 14:56

## 2025-06-20 RX ADMIN — SODIUM CHLORIDE, PRESERVATIVE FREE 10 ML: 5 INJECTION INTRAVENOUS at 20:18

## 2025-06-20 RX ADMIN — PIPERACILLIN AND TAZOBACTAM 4500 MG: 4; .5 INJECTION, POWDER, FOR SOLUTION INTRAVENOUS at 20:17

## 2025-06-20 RX ADMIN — IPRATROPIUM BROMIDE AND ALBUTEROL SULFATE 1 DOSE: 2.5; .5 SOLUTION RESPIRATORY (INHALATION) at 08:38

## 2025-06-20 RX ADMIN — HYDROCORTISONE SODIUM SUCCINATE 200 MG: 100 INJECTION INTRAMUSCULAR; INTRAVENOUS at 15:01

## 2025-06-20 RX ADMIN — SODIUM CHLORIDE, SODIUM LACTATE, POTASSIUM CHLORIDE, AND CALCIUM CHLORIDE: .6; .31; .03; .02 INJECTION, SOLUTION INTRAVENOUS at 18:46

## 2025-06-20 RX ADMIN — SODIUM CHLORIDE 2000 MG: 9 INJECTION, SOLUTION INTRAVENOUS at 14:50

## 2025-06-20 RX ADMIN — BUDESONIDE INHALATION 500 MCG: 0.5 SUSPENSION RESPIRATORY (INHALATION) at 08:38

## 2025-06-20 ASSESSMENT — PAIN SCALES - GENERAL
PAINLEVEL_OUTOF10: 0
PAINLEVEL_OUTOF10: 0
PAINLEVEL_OUTOF10: 2
PAINLEVEL_OUTOF10: 10
PAINLEVEL_OUTOF10: 0

## 2025-06-20 ASSESSMENT — PAIN DESCRIPTION - LOCATION: LOCATION: ABDOMEN

## 2025-06-20 ASSESSMENT — PAIN DESCRIPTION - DESCRIPTORS: DESCRIPTORS: ACHING

## 2025-06-20 ASSESSMENT — PAIN SCALES - WONG BAKER: WONGBAKER_NUMERICALRESPONSE: HURTS A LITTLE BIT

## 2025-06-20 ASSESSMENT — PAIN DESCRIPTION - ORIENTATION: ORIENTATION: MID

## 2025-06-20 ASSESSMENT — COPD QUESTIONNAIRES: TOTAL_EXACERBATIONS_PASTYEAR: 1

## 2025-06-20 NOTE — ANESTHESIA PRE PROCEDURE
Department of Anesthesiology  Preprocedure Note       Name:  Lizbeth Hogue   Age:  70 y.o.  :  1955                                          MRN:  [id number]         Date:  2025      Surgeon: Surgeon(s):  Pelon Irene MD    Procedure: Procedure(s):  RIGHT HEMICOLECTOMY LAPAROSCOPIC ROBOTIC    Medications prior to admission:   Prior to Admission medications    Medication Sig Start Date End Date Taking? Authorizing Provider   budesonide (PULMICORT) 0.5 MG/2ML nebulizer suspension Take 2 mLs by nebulization in the morning and 2 mLs in the evening. 25  Yes Paulo Leslie MD   predniSONE (DELTASONE) 20 MG tablet Take 1 tablet by mouth daily for 4 doses 25 Yes Paulo Leslie MD   pantoprazole (PROTONIX) 20 MG tablet Take 2 tablets by mouth daily 25  Yes Paulo Leslie MD   fluconazole (DIFLUCAN) 100 MG tablet Take 1 tablet by mouth daily for 7 days 25 Yes Paulo Leslie MD   acyclovir (ZOVIRAX) 400 MG tablet Take 1 tablet by mouth 2 times daily   Yes Yareli Macdonald MD   acetaminophen (TYLENOL) 325 MG tablet Take 2 tablets by mouth once a week Before treatments   Yes Yareli Macdonald MD   diphenhydrAMINE (BENADRYL) 50 MG capsule Take 1 capsule by mouth Once a week at 5 PM Before treatments   Yes Yareli Macdonald MD   BORTEZOMIB IJ Inject as directed Every Wednesday   - unknown dose   Yes Yareli Macdonald MD   gabapentin (NEURONTIN) 100 MG capsule Take 3 capsules by mouth at bedtime. Max Daily Amount: 300 mg   Yes Yareli Macdonald MD   allopurinol (ZYLOPRIM) 100 MG tablet Take 1 tablet by mouth daily   Yes Yareli Macdonald MD   lidocaine (LIDODERM) 5 % Place 1 patch onto the skin daily 12 hours on, 12 hours off.   Yes Yareli Macdonald MD   dexAMETHasone (DECADRON) 4 MG tablet Take 1 tablet by mouth once a week Take 10 tabs once weekly   Yes Yareli Macdonald MD   fexofenadine (ALLEGRA) 60 MG tablet Take 1 tablet by mouth

## 2025-06-20 NOTE — PROGRESS NOTES
Nephrology follow-up          Patient: Lizbeth Hogue MRN: [id number]  SSN: [id number]    YOB: 1955  Age: 70 y.o.  Sex: female      Subjective:   The patient is seen at the bedside  She looks comfortable  On nasal cannula 2L  Bilateral lower extremity swelling  Blood pressures are better  Resolving hyponatremia  Off IV fluids    Labs are pending    Past Medical History:   Diagnosis Date    Arthritis     Asthma     Chronic kidney disease     Chronic obstructive pulmonary disease (HCC)     Diabetes (HCC)     Hypertension     Sleep apnea     no cpap     Past Surgical History:   Procedure Laterality Date    COLONOSCOPY N/A 6/17/2025    COLONOSCOPY DIAGNOSTIC performed by Eamon Jacobson MD at [facility] ENDOSCOPY    COLONOSCOPY N/A 6/17/2025    COLONOSCOPY BIOPSY performed by Eamon Jacobson MD at [facility] ENDOSCOPY    OTHER SURGICAL HISTORY Bilateral     Eye surgery    UPPER GASTROINTESTINAL ENDOSCOPY N/A 6/17/2025    ESOPHAGOGASTRODUODENOSCOPY performed by Eamon Jacobson MD at [facility] ENDOSCOPY    WRIST SURGERY Left     pins and screws      Family History   Problem Relation Age of Onset    Diabetes Mother     Hypertension Father     Diabetes Father     Hypertension Mother      Social History     Tobacco Use    Smoking status: Never    Smokeless tobacco: Never   Substance Use Topics    Alcohol use: Never      Current Facility-Administered Medications   Medication Dose Route Frequency Provider Last Rate Last Admin    0.9 % sodium chloride infusion   IntraVENous Continuous Pelon Irene MD 75 mL/hr at 06/20/25 0751 Rate Verify at 06/20/25 0751    diatrizoate meglumine-sodium (GASTROGRAFIN) 66-10 % solution 30 mL  30 mL Oral ONCE PRN Pelon Irene MD   15 mL at 06/18/25 0900    pantoprazole (PROTONIX) injection 40 mg  40 mg IntraVENous Daily Paulo Leslie MD        fluconazole (DIFLUCAN) tablet 100 mg  100 mg Oral Daily Paulo Leslie MD   100 mg at 06/18/25 1246    amLODIPine (NORVASC) tablet 10 mg  diuretics or SSRIs    Edema  Bilateral lower extremity  Will put her on p.o. Lasix 40 mg daily    Chronic kidney disease stage III  Creatinine seems to be at baseline at 1.53  Creatinine 1.6->1.7->1.4->1.2->1.15  Clinically BL LE edema  [place] urinalysis  Will discontinue IV hydration    Hypertension  Blood pressures are better  Will continue amlodipine 10 mg daily  On no other blood pressure medications    Acute hypoxic respiratory failure  COPD exacerbation  No evidence of fluid overload  Preserved LVEF  On IV antibiotics  Diuresis as needed  On nasal cannula 2 L    Anemia  Severe  Possible lower GI bleed  History of multiple myeloma  Hematology on board  GI on board  Received blood transfusion    Hyperkalemia  K 5.5 (6/19)  Stat BMP        Plan:       Signed By: Tee Blue MD     June 20, 2025

## 2025-06-20 NOTE — PROGRESS NOTES
06/20/25 1437   Spirometry Assessment   COPD Exacerbations in last year 1   COPD Assessment (CAT Score)   $RT COPD Assessment Yes

## 2025-06-20 NOTE — OP NOTE
Operative Note      Patient: Lizbeth Hogue  YOB: 1955  MRN: [id number]    Date of Procedure: 6/20/2025    Pre-OP Diagnosis: Right colon mass    Post-Op Diagnosis: Same       Procedure(s):  RIGHT HEMICOLECTOMY LAPAROSCOPIC ROBOTIC    Surgeon(s):  Pelon Irene MD    Assistant:  Surgical Assistant: Emil Thorne    Anesthesia: General/local    Estimated Blood Loss (mL): Minimal    Complications: None    Specimens:   ID Type Source Tests Collected by Time Destination   1 : right colon Tissue Colon SURGICAL PATHOLOGY Pelon Irene MD 6/20/2025 1653    A : urine culture Urine Urine, indwelling catheter CULTURE, URINE Pelon Irene MD 6/20/2025 1515        Implants:  Implant Name Type Inv. Item Serial No.  Lot No. LRB No. Used Action   BARRIER ADH SHT 6X5 IN SODIUM HYALURONATE [employer] SEPRAFILM - SN/A  BARRIER ADH SHT 6X5 IN SODIUM HYALURONATE CMC SEPR[employer]ILM N/A [employer]- [id number] 1 Implanted         Drains:   Urinary Catheter 06/20/25 (Active)       [REMOVED] External Urinary Catheter (Removed)   Site Assessment Clean,dry & intact 06/18/25 0930   Placement Repositioned 06/18/25 0930   Securement Method Other (Comment) 06/18/25 0930   Catheter Care Catheter/Wick replaced 06/18/25 0930   Perineal Care Yes 06/18/25 0930   Suction 40 mmgHg continuous 06/18/25 0930   Urine Color Yellow 06/18/25 0930   Urine Appearance Clear 06/18/25 0930   Urine Odor Other (Comment) 06/14/25 2129   Output (mL) 200 mL 06/16/25 1833       Findings:  Infection Present At Time Of Surgery (PATOS) (choose all levels that have infection present):  No infection present  Other Findings: as above  Colon Resection  Operation performed with curative intent Yes   Tumor Location (select all that apply) Ascending colon   Extent of colon and vascular resection (select all that apply) Right hemicolectomy - ileocolic, right colic (if present)      Colon Resection 2  Operation performed

## 2025-06-20 NOTE — PLAN OF CARE
Problem: Chronic Conditions and Co-morbidities  Goal: Patient's chronic conditions and co-morbidity symptoms are monitored and maintained or improved  6/19/2025 2353 by Saba Davenport RN  Outcome: Progressing  6/19/2025 1040 by Shereen Byers RN  Outcome: Progressing     Problem: Discharge Planning  Goal: Discharge to home or other facility with appropriate resources  6/19/2025 2353 by Saba Davenport RN  Outcome: Progressing  6/19/2025 1040 by Shereen Byers RN  Outcome: Progressing     Problem: ABCDS Injury Assessment  Goal: Absence of physical injury  6/19/2025 2353 by Saba Davenport RN  Outcome: Progressing  6/19/2025 1040 by Shereen Byers RN  Outcome: Progressing

## 2025-06-20 NOTE — PROGRESS NOTES
PT treatment attempted. However, pt off the floor for a procedure. PT will reattempt for PT treatment at a later time. Thanks.

## 2025-06-20 NOTE — PROGRESS NOTES
Piperacillin-tazobactam (Zosyn) Extended-Infusion Dosing/Monitoring  Current regimen: 3.375 g IV every 8 hours    Recent Labs     06/19/25  0054 06/20/25  1102   CREATININE 1.15* 1.07*   BUN 16 12     Estimated CrCl: 41 mL/min    Plan: Change to 4.5 g IV x 1 over 30 minutes followed by 3.375 g IV over 240 minutes every 8 hours per [facility] P&T Committee Protocol with respect to extended-infusion ?-lactam antibiotics. Pharmacy will continue to monitor patient daily and will make dosage adjustments based upon changing renal function.

## 2025-06-20 NOTE — PROGRESS NOTES
CM reviewed chart and noted patient stress test negative. Patient is tentatively scheduled for right hemicolectomy this am.     Patient is followed by PT/OT and current recs are for. Patient agreed to  and referrals have been sent. Awaiting acceptance.     Patient DCP is to return home with her daughter and HH.     CM will continue to follow for any discharge recommendations from the medical team.

## 2025-06-20 NOTE — PLAN OF CARE
Patient remained NPO. Surgery this afternoon.      Problem: Nutrition Deficit:  Goal: Optimize nutritional status  Outcome: Not Progressing     Problem: Chronic Conditions and Co-morbidities  Goal: Patient's chronic conditions and co-morbidity symptoms are monitored and maintained or improved  Outcome: Progressing     Problem: Discharge Planning  Goal: Discharge to home or other facility with appropriate resources  Outcome: Progressing     Problem: Respiratory - Adult  Goal: Achieves optimal ventilation and oxygenation  Outcome: Progressing     Problem: Cardiovascular - Adult  Goal: Maintains optimal cardiac output and hemodynamic stability  Outcome: Progressing  Goal: Absence of cardiac dysrhythmias or at baseline  Outcome: Progressing     Problem: Infection - Adult  Goal: Absence of infection at discharge  Outcome: Progressing  Goal: Absence of infection during hospitalization  Outcome: Progressing  Goal: Absence of fever/infection during anticipated neutropenic period  Outcome: Progressing     Problem: Skin/Tissue Integrity  Goal: Skin integrity remains intact  Description: 1.  Monitor for areas of redness and/or skin breakdown  2.  Assess vascular access sites hourly  3.  Every 4-6 hours minimum:  Change oxygen saturation probe site  4.  Every 4-6 hours:  If on nasal continuous positive airway pressure, respiratory therapy assess nares and determine need for appliance change or resting period  Outcome: Progressing     Problem: ABCDS Injury Assessment  Goal: Absence of physical injury  Outcome: Progressing     Problem: Safety - Adult  Goal: Free from fall injury  Outcome: Progressing     Problem: Skin/Tissue Integrity - Adult  Goal: Skin integrity remains intact  Description: 1.  Monitor for areas of redness and/or skin breakdown  2.  Assess vascular access sites hourly  3.  Every 4-6 hours minimum:  Change oxygen saturation probe site  4.  Every 4-6 hours:  If on nasal continuous positive airway pressure,  respiratory therapy assess nares and determine need for appliance change or resting period  Outcome: Progressing     Problem: Pain  Goal: Verbalizes/displays adequate comfort level or baseline comfort level  Outcome: Progressing      yes

## 2025-06-20 NOTE — PROGRESS NOTES
Pulmonary Disease Navigator Note  [facility]      Ms Hogue is currently ROMAN for procedure.  Chart reviewed. Ms Hogue's history includes asthma, CKD, COPD, DM, HTN, NISH, and myeloma.  Ms Hogue is on home oxygen at 2 lpm nc qhs and PRN SOB.  Medications include Pulmicort nebulizer twice daily, and Duoneb three times a day and PRN.  Will continue to follow as needs arise.     Time spent in review:  25 minutes  Electronically signed by Germaine Harp, RRT

## 2025-06-20 NOTE — PROGRESS NOTES
Hospitalist Progress Note               Daily Progress Note: 6/20/2025      Hospital Day: 8     Chief complaint:   Chief Complaint   Patient presents with    Shortness of Breath        Subjective:     Patient is seen today for follow-up. Patient seen examined bedside. Patient denies any complaints.  Nuclear stress test with myocardial perfusion 6/19 revealed negative for myocardial ischemia. For stress ejection fraction 64%.    Medications reviewed  Current Facility-Administered Medications   Medication Dose Route Frequency    [START ON 6/21/2025] furosemide (LASIX) tablet 40 mg  40 mg Oral Daily    diatrizoate meglumine-sodium (GASTROGRAFIN) 66-10 % solution 30 mL  30 mL Oral ONCE PRN    pantoprazole (PROTONIX) injection 40 mg  40 mg IntraVENous Daily    fluconazole (DIFLUCAN) tablet 100 mg  100 mg Oral Daily    amLODIPine (NORVASC) tablet 10 mg  10 mg Oral Daily    insulin glargine (LANTUS) injection vial 15 Units  15 Units SubCUTAneous BID    insulin lispro (HUMALOG,ADMELOG) injection vial 0-16 Units  0-16 Units SubCUTAneous 4x Daily AC & HS    predniSONE (DELTASONE) tablet 40 mg  40 mg Oral Daily    ipratropium 0.5 mg-albuterol 2.5 mg (DUONEB) nebulizer solution 1 Dose  1 Dose Inhalation TID RT    ipratropium 0.5 mg-albuterol 2.5 mg (DUONEB) nebulizer solution 1 Dose  1 Dose Inhalation Q4H PRN    lactated ringers bolus 500 mL  500 mL IntraVENous Once    sodium chloride flush 0.9 % injection 5-40 mL  5-40 mL IntraVENous 2 times per day    sodium chloride flush 0.9 % injection 5-40 mL  5-40 mL IntraVENous PRN    0.9 % sodium chloride infusion   IntraVENous PRN    ondansetron (ZOFRAN-ODT) disintegrating tablet 4 mg  4 mg Oral Q8H PRN    Or    ondansetron (ZOFRAN) injection 4 mg  4 mg IntraVENous Q6H PRN    polyethylene glycol (GLYCOLAX) packet 17 g  17 g Oral Daily PRN    acetaminophen (TYLENOL) tablet 650 mg  650 mg Oral Q6H PRN    Or    acetaminophen (TYLENOL) suppository 650 mg  650 mg Rectal Q6H PRN

## 2025-06-20 NOTE — PROGRESS NOTES
[facility] SURGERY Progress Notes          Chief Complaint: Shortness of breath.    History of Present Illness:    Ms. Lizbeth Hogue is a 70 y.o. female brought into the emergency room by her daughter on Deidra 15, 2025 due to progressive weakness and shortness of breath.  In the emergency room she was found to be anemic with a hemoglobin of 6.4 and oxygen saturation in the 60s requiring nasal O2.  Of note patient uses intermittently oxygen at home also particular nighttime.  Patient also has got dementia.  And is taken care of by her daughter.  After admission she underwent a colonoscopy and was found to have a partially obstructing large mass in the ascending colon highly suspicious for malignant neoplasm.  This was performed on June 17, 2025 and biopsies have been taken.  Patient is currently on oxygen and has got dementia.  Most of the history was obtained from her daughter by the bedside.  Also of note patient is currently undergoing treatment for multiple myeloma by Dr. Mcneill.  The treatment has been withheld this week.  Awake and alert.      Patient completed cardiac stress test and was negative for any ischemia.  Patient cleared by cardiologist yesterday.    Had a discussion with the patient and her family member her daughter and her sister.  Answered all their questions today.      Past Medical History:   Past Medical History:   Diagnosis Date    Arthritis     Asthma     Chronic kidney disease     Chronic obstructive pulmonary disease (HCC)     Diabetes (HCC)     Hypertension     Sleep apnea     no cpap       Past Surgical History:   Past Surgical History:   Procedure Laterality Date    COLONOSCOPY N/A 6/17/2025    COLONOSCOPY DIAGNOSTIC performed by Eamon Jacobson MD at [facility] ENDOSCOPY    COLONOSCOPY N/A 6/17/2025    COLONOSCOPY BIOPSY performed by Eamon Jacobson MD at [facility] ENDOSCOPY    OTHER SURGICAL HISTORY Bilateral     Eye surgery    UPPER GASTROINTESTINAL ENDOSCOPY N/A 6/17/2025    ESOPHAGOGASTRODUODENOSCOPY

## 2025-06-21 ENCOUNTER — APPOINTMENT (OUTPATIENT)
Facility: HOSPITAL | Age: 70
DRG: 231 | End: 2025-06-21
Payer: MEDICAID

## 2025-06-21 LAB
GLUCOSE BLD STRIP.AUTO-MCNC: 108 MG/DL (ref 65–100)
GLUCOSE BLD STRIP.AUTO-MCNC: 63 MG/DL (ref 65–100)
GLUCOSE BLD STRIP.AUTO-MCNC: 70 MG/DL (ref 65–100)
GLUCOSE BLD STRIP.AUTO-MCNC: 74 MG/DL (ref 65–100)
PERFORMED BY:: ABNORMAL
PERFORMED BY:: ABNORMAL
PERFORMED BY:: NORMAL
PERFORMED BY:: NORMAL

## 2025-06-21 PROCEDURE — 2580000003 HC RX 258: Performed by: NURSE PRACTITIONER

## 2025-06-21 PROCEDURE — 2580000003 HC RX 258: Performed by: SURGERY

## 2025-06-21 PROCEDURE — 6360000002 HC RX W HCPCS: Performed by: NURSE PRACTITIONER

## 2025-06-21 PROCEDURE — 6360000002 HC RX W HCPCS: Performed by: SURGERY

## 2025-06-21 PROCEDURE — 94640 AIRWAY INHALATION TREATMENT: CPT

## 2025-06-21 PROCEDURE — 6360000002 HC RX W HCPCS: Performed by: STUDENT IN AN ORGANIZED HEALTH CARE EDUCATION/TRAINING PROGRAM

## 2025-06-21 PROCEDURE — 6370000000 HC RX 637 (ALT 250 FOR IP): Performed by: NURSE PRACTITIONER

## 2025-06-21 PROCEDURE — 6370000000 HC RX 637 (ALT 250 FOR IP): Performed by: EMERGENCY MEDICINE

## 2025-06-21 PROCEDURE — 71045 X-RAY EXAM CHEST 1 VIEW: CPT

## 2025-06-21 PROCEDURE — 82962 GLUCOSE BLOOD TEST: CPT

## 2025-06-21 PROCEDURE — 1100000000 HC RM PRIVATE

## 2025-06-21 PROCEDURE — 6370000000 HC RX 637 (ALT 250 FOR IP): Performed by: STUDENT IN AN ORGANIZED HEALTH CARE EDUCATION/TRAINING PROGRAM

## 2025-06-21 PROCEDURE — 2700000000 HC OXYGEN THERAPY PER DAY

## 2025-06-21 PROCEDURE — 97530 THERAPEUTIC ACTIVITIES: CPT

## 2025-06-21 PROCEDURE — 36415 COLL VENOUS BLD VENIPUNCTURE: CPT

## 2025-06-21 PROCEDURE — 6370000000 HC RX 637 (ALT 250 FOR IP): Performed by: PHYSICIAN ASSISTANT

## 2025-06-21 PROCEDURE — 94761 N-INVAS EAR/PLS OXIMETRY MLT: CPT

## 2025-06-21 PROCEDURE — 2500000003 HC RX 250 WO HCPCS: Performed by: NURSE PRACTITIONER

## 2025-06-21 PROCEDURE — 6370000000 HC RX 637 (ALT 250 FOR IP): Performed by: INTERNAL MEDICINE

## 2025-06-21 RX ORDER — CLONIDINE 0.1 MG/24H
1 PATCH, EXTENDED RELEASE TRANSDERMAL WEEKLY
Status: DISCONTINUED | OUTPATIENT
Start: 2025-06-21 | End: 2025-06-23

## 2025-06-21 RX ORDER — ENOXAPARIN SODIUM 100 MG/ML
40 INJECTION SUBCUTANEOUS DAILY
Status: DISCONTINUED | OUTPATIENT
Start: 2025-06-22 | End: 2025-06-26 | Stop reason: HOSPADM

## 2025-06-21 RX ADMIN — MORPHINE SULFATE 1 MG: 2 INJECTION, SOLUTION INTRAMUSCULAR; INTRAVENOUS at 09:52

## 2025-06-21 RX ADMIN — PIPERACILLIN AND TAZOBACTAM 3375 MG: 3; .375 INJECTION, POWDER, LYOPHILIZED, FOR SOLUTION INTRAVENOUS at 10:05

## 2025-06-21 RX ADMIN — PANTOPRAZOLE SODIUM 40 MG: 40 INJECTION, POWDER, FOR SOLUTION INTRAVENOUS at 09:53

## 2025-06-21 RX ADMIN — SODIUM CHLORIDE, SODIUM LACTATE, POTASSIUM CHLORIDE, AND CALCIUM CHLORIDE: .6; .31; .03; .02 INJECTION, SOLUTION INTRAVENOUS at 21:11

## 2025-06-21 RX ADMIN — ATORVASTATIN CALCIUM 40 MG: 40 TABLET, FILM COATED ORAL at 21:05

## 2025-06-21 RX ADMIN — BUDESONIDE INHALATION 500 MCG: 0.5 SUSPENSION RESPIRATORY (INHALATION) at 19:13

## 2025-06-21 RX ADMIN — MORPHINE SULFATE 1 MG: 2 INJECTION, SOLUTION INTRAMUSCULAR; INTRAVENOUS at 19:39

## 2025-06-21 RX ADMIN — FLUCONAZOLE 100 MG: 100 TABLET ORAL at 09:56

## 2025-06-21 RX ADMIN — FOLIC ACID 1 MG: 1 TABLET ORAL at 09:56

## 2025-06-21 RX ADMIN — IPRATROPIUM BROMIDE AND ALBUTEROL SULFATE 1 DOSE: 2.5; .5 SOLUTION RESPIRATORY (INHALATION) at 19:13

## 2025-06-21 RX ADMIN — PIPERACILLIN AND TAZOBACTAM 3375 MG: 3; .375 INJECTION, POWDER, LYOPHILIZED, FOR SOLUTION INTRAVENOUS at 18:47

## 2025-06-21 RX ADMIN — SODIUM CHLORIDE, PRESERVATIVE FREE 10 ML: 5 INJECTION INTRAVENOUS at 10:00

## 2025-06-21 RX ADMIN — FUROSEMIDE 40 MG: 40 TABLET ORAL at 09:56

## 2025-06-21 RX ADMIN — MORPHINE SULFATE 1 MG: 2 INJECTION, SOLUTION INTRAMUSCULAR; INTRAVENOUS at 14:38

## 2025-06-21 RX ADMIN — PIPERACILLIN AND TAZOBACTAM 3375 MG: 3; .375 INJECTION, POWDER, LYOPHILIZED, FOR SOLUTION INTRAVENOUS at 01:28

## 2025-06-21 RX ADMIN — MONTELUKAST 10 MG: 10 TABLET, FILM COATED ORAL at 21:05

## 2025-06-21 RX ADMIN — DEXTROSE 125 ML: 10 SOLUTION INTRAVENOUS at 22:53

## 2025-06-21 RX ADMIN — IPRATROPIUM BROMIDE AND ALBUTEROL SULFATE 1 DOSE: 2.5; .5 SOLUTION RESPIRATORY (INHALATION) at 08:00

## 2025-06-21 RX ADMIN — AMLODIPINE BESYLATE 10 MG: 5 TABLET ORAL at 09:56

## 2025-06-21 RX ADMIN — SODIUM CHLORIDE, PRESERVATIVE FREE 10 ML: 5 INJECTION INTRAVENOUS at 21:04

## 2025-06-21 RX ADMIN — BUDESONIDE INHALATION 500 MCG: 0.5 SUSPENSION RESPIRATORY (INHALATION) at 08:00

## 2025-06-21 RX ADMIN — IPRATROPIUM BROMIDE AND ALBUTEROL SULFATE 1 DOSE: 2.5; .5 SOLUTION RESPIRATORY (INHALATION) at 13:53

## 2025-06-21 RX ADMIN — MORPHINE SULFATE 1 MG: 2 INJECTION, SOLUTION INTRAMUSCULAR; INTRAVENOUS at 03:48

## 2025-06-21 ASSESSMENT — PAIN SCALES - GENERAL
PAINLEVEL_OUTOF10: 8
PAINLEVEL_OUTOF10: 8
PAINLEVEL_OUTOF10: 0
PAINLEVEL_OUTOF10: 5
PAINLEVEL_OUTOF10: 8
PAINLEVEL_OUTOF10: 8

## 2025-06-21 ASSESSMENT — PAIN DESCRIPTION - LOCATION: LOCATION: ABDOMEN

## 2025-06-21 ASSESSMENT — PAIN SCALES - WONG BAKER: WONGBAKER_NUMERICALRESPONSE: NO HURT

## 2025-06-21 ASSESSMENT — PAIN DESCRIPTION - DESCRIPTORS: DESCRIPTORS: ACHING

## 2025-06-21 ASSESSMENT — PAIN DESCRIPTION - ORIENTATION: ORIENTATION: MID

## 2025-06-21 NOTE — PROGRESS NOTES
Hospitalist Progress Note               Daily Progress Note: 6/21/2025      Hospital Day: 9     Chief complaint:   Chief Complaint   Patient presents with    Shortness of Breath        hospital course:   70-year-old female past medical history of COPD, CKD, DM, HTN and multiple myeloma on chemotherapy presented with abdominal pain. CT scan revealed colon mass.  G.I. was consulted. Patient underwent EGD and colonoscopy, colonoscopy revealed a tumor concerning for malignancy biopsy was taken. The general surgery was consulted for colorectal mass. Cardiology was consulted to clear patient for surgery, stress test unremarkable/negative. Patient underwent robotic assisted right delisa colectomy on 6/20. Patient tolerated procedure well.    Subjective:     Patient is seen today for follow-up. Patient seen examined bedside. Patient denies any complaints.  Nuclear stress test with myocardial perfusion 6/19 revealed negative for myocardial ischemia. For stress ejection fraction 64%.  Patient underwent robotic assisted right delisa colectomy on 6/20, tolerated procedure well.  This morning patient denies having any complaints he denies abdominal pain, shortness of breath, nausea/vomiting patient also denies passing any bowel movements or any flatus. NG tube in place.    Medications reviewed  Current Facility-Administered Medications   Medication Dose Route Frequency    cloNIDine (CATAPRES) 0.1 MG/24HR 1 patch  1 patch TransDERmal Weekly    0.9 % sodium chloride infusion   IntraVENous PRN    lactated ringers infusion   IntraVENous Continuous    piperacillin-tazobactam (ZOSYN) 3,375 mg in sodium chloride 0.9 % 50 mL IVPB (addEASE)  3,375 mg IntraVENous Q8H    morphine (PF) injection 1 mg  1 mg IntraVENous Q4H PRN    diatrizoate meglumine-sodium (GASTROGRAFIN) 66-10 % solution 30 mL  30 mL Oral ONCE PRN    pantoprazole (PROTONIX) injection 40 mg  40 mg IntraVENous Daily    fluconazole (DIFLUCAN) tablet 100 mg  100 mg Oral Daily     [Held by provider] amLODIPine (NORVASC) tablet 10 mg  10 mg Oral Daily    insulin glargine (LANTUS) injection vial 15 Units  15 Units SubCUTAneous BID    insulin lispro (HUMALOG,ADMELOG) injection vial 0-16 Units  0-16 Units SubCUTAneous 4x Daily AC & HS    ipratropium 0.5 mg-albuterol 2.5 mg (DUONEB) nebulizer solution 1 Dose  1 Dose Inhalation TID RT    ipratropium 0.5 mg-albuterol 2.5 mg (DUONEB) nebulizer solution 1 Dose  1 Dose Inhalation Q4H PRN    lactated ringers bolus 500 mL  500 mL IntraVENous Once    sodium chloride flush 0.9 % injection 5-40 mL  5-40 mL IntraVENous 2 times per day    sodium chloride flush 0.9 % injection 5-40 mL  5-40 mL IntraVENous PRN    0.9 % sodium chloride infusion   IntraVENous PRN    ondansetron (ZOFRAN-ODT) disintegrating tablet 4 mg  4 mg Oral Q8H PRN    Or    ondansetron (ZOFRAN) injection 4 mg  4 mg IntraVENous Q6H PRN    polyethylene glycol (GLYCOLAX) packet 17 g  17 g Oral Daily PRN    acetaminophen (TYLENOL) tablet 650 mg  650 mg Oral Q6H PRN    Or    acetaminophen (TYLENOL) suppository 650 mg  650 mg Rectal Q6H PRN    budesonide (PULMICORT) nebulizer suspension 500 mcg  0.5 mg Nebulization BID RT    glucose chewable tablet 16 g  4 tablet Oral PRN    dextrose bolus 10% 125 mL  125 mL IntraVENous PRN    Or    dextrose bolus 10% 250 mL  250 mL IntraVENous PRN    glucagon injection 1 mg  1 mg SubCUTAneous PRN    dextrose 10 % infusion   IntraVENous Continuous PRN    [Held by provider] aspirin EC tablet 81 mg  81 mg Oral Daily    atorvastatin (LIPITOR) tablet 40 mg  40 mg Oral Nightly    montelukast (SINGULAIR) tablet 10 mg  10 mg Oral Nightly    folic acid (FOLVITE) tablet 1 mg  1 mg Oral Daily       Review of Systems:   Pertinent items are noted in HPI.    Objective:   Physical Exam:     BP (!) 149/58   Pulse 69   Temp 98.6 °F (37 °C) (Oral)   Resp 18   Ht 1.524 m (5')   Wt 64.4 kg (141 lb 15.6 oz)   SpO2 99%   BMI 27.73 kg/m²  O2 Flow Rate (L/min): 3 L/min (Home

## 2025-06-21 NOTE — PLAN OF CARE
Problem: Chronic Conditions and Co-morbidities  Goal: Patient's chronic conditions and co-morbidity symptoms are monitored and maintained or improved  Outcome: Progressing  Flowsheets (Taken 6/21/2025 0822)  Care Plan - Patient's Chronic Conditions and Co-Morbidity Symptoms are Monitored and Maintained or Improved:   Monitor and assess patient's chronic conditions and comorbid symptoms for stability, deterioration, or improvement   Collaborate with multidisciplinary team to address chronic and comorbid conditions and prevent exacerbation or deterioration   Update acute care plan with appropriate goals if chronic or comorbid symptoms are exacerbated and prevent overall improvement and discharge     Problem: Discharge Planning  Goal: Discharge to home or other facility with appropriate resources  Outcome: Progressing  Flowsheets (Taken 6/21/2025 0822)  Discharge to home or other facility with appropriate resources:   Identify barriers to discharge with patient and caregiver   Arrange for needed discharge resources and transportation as appropriate   Identify discharge learning needs (meds, wound care, etc)   Arrange for interpreters to assist at discharge as needed     Problem: Respiratory - Adult  Goal: Achieves optimal ventilation and oxygenation  Outcome: Progressing  Flowsheets (Taken 6/21/2025 0822)  Achieves optimal ventilation and oxygenation:   Assess for changes in respiratory status   Position to facilitate oxygenation and minimize respiratory effort   Encourage broncho-pulmonary hygiene including cough, deep breathe, incentive spirometry   Assess the need for suctioning and aspirate as needed   Oxygen supplementation based on oxygen saturation or arterial blood gases   Initiate smoking cessation protocol as indicated     Problem: Skin/Tissue Integrity - Adult  Goal: Skin integrity remains intact  Description: 1.  Monitor for areas of redness and/or skin breakdown  2.  Assess vascular access sites  hourly  3.  Every 4-6 hours minimum:  Change oxygen saturation probe site  4.  Every 4-6 hours:  If on nasal continuous positive airway pressure, respiratory therapy assess nares and determine need for appliance change or resting period  Outcome: Progressing  Flowsheets (Taken 6/21/2025 0822)  Skin Integrity Remains Intact:   Monitor for areas of redness and/or skin breakdown   Assess vascular access sites hourly   Every 4-6 hours minimum:  Change oxygen saturation probe site   Turn and reposition as indicated   Assess need for specialty bed   Positioning devices     Problem: Musculoskeletal - Adult  Goal: Return mobility to safest level of function  Outcome: Progressing  Flowsheets (Taken 6/21/2025 0822)  Return Mobility to Safest Level of Function:   Assess patient stability and activity tolerance for standing, transferring and ambulating with or without assistive devices   Assist with transfers and ambulation using safe patient handling equipment as needed   Obtain physical therapy/occupational therapy consults as needed   Apply continuous passive motion per provider or physical therapy orders to increase flexion toward goal     Problem: Hematologic - Adult  Goal: Maintains hematologic stability  Outcome: Progressing  Flowsheets (Taken 6/21/2025 0822)  Maintains hematologic stability:   Assess for signs and symptoms of bleeding or hemorrhage   Monitor labs for bleeding or clotting disorders   Administer blood products/factors as ordered     Problem: Skin/Tissue Integrity  Goal: Skin integrity remains intact  Description: 1.  Monitor for areas of redness and/or skin breakdown  2.  Assess vascular access sites hourly  3.  Every 4-6 hours minimum:  Change oxygen saturation probe site  4.  Every 4-6 hours:  If on nasal continuous positive airway pressure, respiratory therapy assess nares and determine need for appliance change or resting period  Outcome: Progressing  Flowsheets (Taken 6/21/2025 0822)  Skin Integrity  planned discharge setting.  See evaluation for individualized goals.  Description: FUNCTIONAL STATUS PRIOR TO ADMISSION: Patient was modified independent using a rollator for functional mobility.    HOME SUPPORT PRIOR TO ADMISSION: The patient lived with daughter and required minimal assistance for some ADLs.    Physical Therapy Goals  Initiated 6/17/2025  Pt stated goal: to go home  Pt will be I with LE HEP in 7 days.  Pt will perform bed mobility with [place] in 7 days.  Pt will perform transfers with Modified [place] in 7 days.   Pt will amb  feet with LRAD safely with Modified [place] in 7 days.  Pt will ascend/descend 2 steps with bilateral handrail(s) and Stand by Assist in 7 days to safely enter/navigate home.   Pt will verbalize and demonstrate compliance with fall precautions to include use of LRAD, increased lighting, removing tripping hazards in 7 days.   Pt will demonstrate improvement in standing balance from good/fair with constant support to good/good unsupported in 7 days.     6/21/2025 1026 by Galilea Barahona, PT  Outcome: Not Progressing

## 2025-06-21 NOTE — OP NOTE
Operative Note      Patient: Lizbeth Hogue  YOB: 1955  MRN: [id number]    Date of Procedure: 6/20/2025    Pre-op diagnosis:  Large partially obstructing mass in the ascending colon suspicious for malignant neoplasm    Post-Op Diagnosis: Same       Procedure(s):  RIGHT HEMICOLECTOMY LAPAROSCOPIC ROBOTIC    Surgeon(s):  Pelon Irene MD    Assistant:  Surgical Assistant: Emil Thorne    Anesthesia: General/local    Anesthesiologist: Dr. Ahuja    CRNA: Mr. Kashmir Constantino    Indication: Partially obstructing large right colon mass highly suspicious for malignant neoplasm with pathology suggestive of high-grade dysplasia.      Procedure:  Patient was taken to the operative room.  Patient was laid supine.  Both arms were tucked.  After intubation a Lizama catheter was placed under aseptic precaution.  A nasogastric tube was placed by the anesthesiologist.  The abdomen was prepped and draped in the usual sterile fashion.  Timeout was completed.  Local anesthesia was infiltrated.    A left upper quadrant incision was placed through which a 5 mm Optiview port was placed into peritoneal cavity.  Carbon dioxide pneumoperitoneum was insufflated to a pressure of 15 mmHg.  Under direct vision 3 additional 8 mm robotic ports were placed along the left side of the abdomen in a semicircular fashion.  The 5 mm left upper quadrant port was switched to an 8 mm robotic port as well.  The robot was brought in and docked. Camera was inserted and targeting completed.  2 Cardere graspers and 1 vessel sealer were used.    Initially the patient was placed in steep Trendelenburg position with right side up.  The mid ascending colon had an obvious mass with the tattoo.  The right colon was mobilized using a vessel sealer along the white line of Toldt all the way up to the hepatic flexure.  The right ureter was carefully identified and safeguarded.  The patient was then placed in reverse Trendelenburg and the transverse

## 2025-06-21 NOTE — PROGRESS NOTES
[facility] Progress Notes          Chief Complaint: None    History of Present Illness:    Ms. Lizbeth Hogue is a 70 y.o. female is S/P robotic assisted right hemicolectomy performed on 6/20/2025, POD #1.  No complaints.  No flatus or bowel movement.  Pain well under control.  NG output very minimal.  Excellent urine output      Past Medical History:   Past Medical History:   Diagnosis Date    Arthritis     Asthma     Chronic kidney disease     Chronic obstructive pulmonary disease (HCC)     Diabetes (HCC)     Hypertension     Sleep apnea     no cpap       Past Surgical History:   Past Surgical History:   Procedure Laterality Date    COLONOSCOPY N/A 6/17/2025    COLONOSCOPY DIAGNOSTIC performed by Eamon Jacobson MD at [facility] ENDOSCOPY    COLONOSCOPY N/A 6/17/2025    COLONOSCOPY BIOPSY performed by Eamon Jacobson MD at [facility] ENDOSCOPY    HEMICOLECTOMY Right 6/20/2025    RIGHT HEMICOLECTOMY LAPAROSCOPIC ROBOTIC performed by Pelon Irene MD at [facility] MAIN OR    OTHER SURGICAL HISTORY Bilateral     Eye surgery    UPPER GASTROINTESTINAL ENDOSCOPY N/A 6/17/2025    ESOPHAGOGASTRODUODENOSCOPY performed by Eamon Jacobson MD at [facility] ENDOSCOPY    WRIST SURGERY Left     pins and screws        Allergy:  Allergies   Allergen Reactions    Lisinopril Swelling     Lip swelling    Pineapple Swelling     Lip swelling       Social History:  reports that she has never smoked. She has never used smokeless tobacco. She reports that she does not drink alcohol and does not use drugs.     Family History:  Family History   Problem Relation Age of Onset    Diabetes Mother     Hypertension Father     Diabetes Father     Hypertension Mother         Current Medications:  Current Facility-Administered Medications:     furosemide (LASIX) tablet 40 mg, 40 mg, Oral, Daily, Tee Blue MD    0.9 % sodium chloride infusion, , IntraVENous, PRN, Pj Ahuja MD    lactated ringers infusion, , IntraVENous, Continuous, Pelon Irene MD,  Last Rate: 75 mL/hr at 06/20/25 1846, New Bag at 06/20/25 1846    [COMPLETED] piperacillin-tazobactam (ZOSYN) 4,500 mg in sodium chloride 0.9 % 100 mL IVPB (addEASE), 4,500 mg, IntraVENous, Once, Stopped at 06/20/25 2047 **FOLLOWED BY** piperacillin-tazobactam (ZOSYN) 3,375 mg in sodium chloride 0.9 % 50 mL IVPB (addEASE), 3,375 mg, IntraVENous, Q8H, Pelon Irene MD, Stopped at 06/21/25 0530    morphine (PF) injection 1 mg, 1 mg, IntraVENous, Q4H PRN, Pelon Irene MD, 1 mg at 06/21/25 0348    diatrizoate meglumine-sodium (GASTROGRAFIN) 66-10 % solution 30 mL, 30 mL, Oral, ONCE PRN, Pelon Irene MD, 15 mL at 06/18/25 0900    pantoprazole (PROTONIX) injection 40 mg, 40 mg, IntraVENous, Daily, Paulo Leslie MD    fluconazole (DIFLUCAN) tablet 100 mg, 100 mg, Oral, Daily, Paulo Leslie MD, 100 mg at 06/18/25 1246    amLODIPine (NORVASC) tablet 10 mg, 10 mg, Oral, Daily, Sukumar Bansal PA-C, 10 mg at 06/18/25 1246    insulin glargine (LANTUS) injection vial 15 Units, 15 Units, SubCUTAneous, BID, Paulo Leslie MD, 15 Units at 06/18/25 2111    insulin lispro (HUMALOG,ADMELOG) injection vial 0-16 Units, 0-16 Units, SubCUTAneous, 4x Daily AC & HS, Paulo Leslie MD, 4 Units at 06/19/25 1639    predniSONE (DELTASONE) tablet 40 mg, 40 mg, Oral, Daily, Amanda Campos MD, 40 mg at 06/18/25 1246    ipratropium 0.5 mg-albuterol 2.5 mg (DUONEB) nebulizer solution 1 Dose, 1 Dose, Inhalation, TID RT, Anson Zayas DO, 1 Dose at 06/21/25 0800    ipratropium 0.5 mg-albuterol 2.5 mg (DUONEB) nebulizer solution 1 Dose, 1 Dose, Inhalation, Q4H PRN, Anson Zayas DO, 1 Dose at 06/13/25 0210    lactated ringers bolus 500 mL, 500 mL, IntraVENous, Once, Anson Zayas DO, Held at 06/13/25 0519    sodium chloride flush 0.9 % injection 5-40 mL, 5-40 mL, IntraVENous, 2 times per day, Gagan Lala, APRN - CNP, 10 mL at 06/20/25 2018    sodium chloride flush 0.9 % injection 5-40 mL, 5-40 mL,

## 2025-06-21 NOTE — PLAN OF CARE
PHYSICAL THERAPY REEVALUATION  Patient: Lizbeth Hogue (70 y.o. female)  Date: 6/21/2025  Primary Diagnosis: Shortness of breath [R06.02]  Acute on chronic respiratory failure with hypoxemia (HCC) [J96.21]  Acute on chronic respiratory failure with hypoxia and hypercapnia (HCC) [J96.21, J96.22]  Procedure(s) (LRB):  RIGHT HEMICOLECTOMY LAPAROSCOPIC ROBOTIC (Right) 1 Day Post-Op   Precautions: Fall Risk, General Precautions, Contact Precautions, Bed Alarm, Surgical Protocols                      Recommendations for nursing mobility: Encourage HEP in prep for ADLs/mobility; see handout for details, Frequent repositioning to prevent skin breakdown, Use of bed/chair alarm for safety, and LE elevation for management of edema    In place during session: Peripheral IV, Lizama Catheter, EKG/telemetry , and Nasogastric Tube  Chart, physical therapy assessment, plan of care, and goals were reviewed.      ASSESSMENT  Patient initially seen for PT evaluation 6/17 and 0 skilled PT sessions since evalution. Patient seen today for PT reevaluation s/t RT hemicolectomy laparoscopic robotic. Patient A&O x 3. Pt semi supine upon arrival, agreeable to session.      Based on the objective data described, the patient currently presents with impaired ability to perform high-level IADLs, decreased ROM, impaired strength, poor safety awareness, impaired cognition, impaired balance, and impaired posture. (See below for objective details and assist levels).    Overall pt tolerated session fair today, currently with decreased rom,strength,mobility,requiring assist for transfers,gait and ADLs.Patient and daughter was educated with log rolling. Pt will benefit from continued skilled PT to address above deficits and return to PLOF, PT goals and POC reviewed on this date and continue to remain appropriate at this time. Potential barriers for safe discharge:  pts current support system is unable to meet their requirements for physical assistance,  []    Long Arc Quads 1 10 [x] [] [] []    Marching   [] [] [] []       [] [] [] []       Functional Measure:  [facility] AM-PAC™ “6 Clicks”         Basic Mobility Inpatient Short Form  How much difficulty does the patient currently have... Unable A Lot A Little None   1.  Turning over in bed (including adjusting bedclothes, sheets and blankets)?   [] 1   [] 2   [x] 3   [] 4   2.  Sitting down on and standing up from a chair with arms ( e.g., wheelchair, bedside commode, etc.)   [] 1   [] 2   [x] 3   [] 4   3.  Moving from lying on back to sitting on the side of the bed?   [] 1   [] 2   [x] 3   [] 4          How much help from another person does the patient currently need... Total A Lot A Little None   4.  Moving to and from a bed to a chair (including a wheelchair)?   [] 1   [] 2   [x] 3   [] 4   5.  Need to walk in hospital room?   [] 1   [] 2   [x] 3   [] 4   6.  Climbing 3-5 steps with a railing?   [] 1   [] 2   [x] 3   [] 4   © , Trustees of [facility], under license to [employer]. All rights reserved     Score:  Initial:  Most Recent: (Date: 2025 )   Interpretation of Tool:  Represents activities that are increasingly more difficult (i.e. Bed mobility, Transfers, Gait).  Score 24 23 22-20 19-15 14-10 9-7 6   Modifier CH CI CJ CK CL CM CN     Pain Ratin/10 abd reported  Pain Intervention(s):       Activity Tolerance:   Fair     After treatment patient left in no apparent distress:   Bed locked and in lowest position Patient left in no apparent distress sitting up in chair, Call bell within reach, Caregiver / family present, and Heels elevated for pressure relief and nsg updated.    COMMUNICATION/EDUCATION:   The patient’s plan of care was discussed with: Occupational therapist, Registered nurse, and Case management    Patient Education  Education Given To: Patient;Family  Education Provided: Role of Therapy;Plan of Care;Equipment;Fall Prevention Strategies  Education Provided  Comments: PT POC and discharge recs, safe use of RW  Education Method: Verbal  Barriers to Learning: Cognition;Hearing  Education Outcome: Verbalized understanding;Demonstrated understanding;Continued education needed        Thank you for this referral.  Galilea Barahona, PT  Minutes: 27

## 2025-06-21 NOTE — PROGRESS NOTES
Comprehensive Nutrition Assessment    Type and Reason for Visit:  Reassess (Interim)    Nutrition Recommendations/Plan:   Advance PO diet as medically able  Add ONS when diet is added  Monitor weight, intakes, fluid status, labs, and bowel fxn   Manage BG levels within target range 140-180; may need to consider decreasing lantus if remains NPO      Malnutrition Assessment:  Malnutrition Status:  Insufficient data (06/16/25 1540)    Context:  Acute Illness       Nutrition Assessment:    pt p/w: SOB, reported not feeling well over the last couple of weeks; pt reported a decrease in her appetite; pt stated that she has been able to eat \"some\" of the hosital food, pt reports no weight loss; pt averaging 50% po intake per documentation; will add Ensure BID to promote po intake; labs and meds reviewed, Na low, Glucose eleveated, pt on statin and receiving lantus 2x daily and humalog 4x daily (6/21) Pt has been NPO x2 dyas first for nuclear stress test which was negative for myocardial ischemia, EGD/colsonscopy 6/19, now s/p R hemicolectomy y'day d/t colon mass. Per daughter, plans to initiate slow diet advancement today. NGT in place w/ 160ml doc OP. RD will continue to follow for diet advancement. FÁTIMA updated wt as pt was OOB. Labs and meds reviewed.    Nutrition Related Findings:    Based on limited visual assessment, pt did not show signs of wasting/fat loss - unable to complete NFPE as pt was deeply asleep. No recent n/v, LBM 6/20 w/ bowel prep. Nonpitting BLE edema. Wound Type: None       Current Nutrition Intake & Therapies:    Average Meal Intake: NPO  Average Supplements Intake: NPO  Diet NPO    Anthropometric Measures:  Height: 152.4 cm (5')  Ideal Body Weight (IBW): 100 lbs (45 kg)    Admission Body Weight: 64.4 kg (141 lb 15.6 oz)  Current Body Weight: 64.7 kg (142 lb 10.2 oz),   IBW. Weight Source: Bed scale  Current BMI (kg/m2): 27.9  Weight Adjustment For: No Adjustment  BMI Categories: Overweight (BMI

## 2025-06-21 NOTE — PLAN OF CARE
Problem: Chronic Conditions and Co-morbidities  Goal: Patient's chronic conditions and co-morbidity symptoms are monitored and maintained or improved  6/20/2025 2232 by Michel Hayward RN  Outcome: Progressing  6/20/2025 1415 by Neena Cabrera RN  Outcome: Progressing     Problem: Discharge Planning  Goal: Discharge to home or other facility with appropriate resources  6/20/2025 2232 by Michel Hayward RN  Outcome: Progressing  Flowsheets (Taken 6/20/2025 1950)  Discharge to home or other facility with appropriate resources: Identify barriers to discharge with patient and caregiver  6/20/2025 1415 by Neena Cabrera RN  Outcome: Progressing     Problem: Respiratory - Adult  Goal: Achieves optimal ventilation and oxygenation  6/20/2025 2232 by Michel Hayward RN  Outcome: Progressing  6/20/2025 1415 by Neena Cabrera RN  Outcome: Progressing     Problem: Cardiovascular - Adult  Goal: Maintains optimal cardiac output and hemodynamic stability  6/20/2025 2232 by Michel Hayward RN  Outcome: Progressing  6/20/2025 1415 by Neena Cabrera RN  Outcome: Progressing  Goal: Absence of cardiac dysrhythmias or at baseline  6/20/2025 2232 by Michel Hayward RN  Outcome: Progressing  6/20/2025 1415 by Neena Cabrera RN  Outcome: Progressing     Problem: Infection - Adult  Goal: Absence of infection at discharge  6/20/2025 2232 by Michel Hayward RN  Outcome: Progressing  6/20/2025 1415 by Neena Cabrera RN  Outcome: Progressing  Goal: Absence of infection during hospitalization  6/20/2025 2232 by Michel Hayward RN  Outcome: Progressing  6/20/2025 1415 by Neena Cabrera RN  Outcome: Progressing  Goal: Absence of fever/infection during anticipated neutropenic period  6/20/2025 2232 by Michel Hayward RN  Outcome: Progressing  6/20/2025 1415 by Neena Cabrera RN  Outcome: Progressing     Problem: Skin/Tissue Integrity - Adult  Goal: Skin integrity remains intact  Description: 1.  Monitor for areas of redness and/or

## 2025-06-21 NOTE — PROGRESS NOTES
Nephrology follow-up          Patient: Lizbeth Hogue MRN: [id number]  SSN: [id number]    YOB: 1955  Age: 70 y.o.  Sex: female      Subjective:   The patient is seen at the bedside  She looks comfortable  On nasal cannula 2L  NG in place  Improving Bilateral lower extremity swelling  Blood pressures are slightly elevated  Resolving hyponatremia  Off IV fluids      Past Medical History:   Diagnosis Date    Arthritis     Asthma     Chronic kidney disease     Chronic obstructive pulmonary disease (HCC)     Diabetes (HCC)     Hypertension     Sleep apnea     no cpap     Past Surgical History:   Procedure Laterality Date    COLONOSCOPY N/A 6/17/2025    COLONOSCOPY DIAGNOSTIC performed by Eamon Jacobson MD at [facility] ENDOSCOPY    COLONOSCOPY N/A 6/17/2025    COLONOSCOPY BIOPSY performed by Eamon Jacobson MD at [facility] ENDOSCOPY    HEMICOLECTOMY Right 6/20/2025    RIGHT HEMICOLECTOMY LAPAROSCOPIC ROBOTIC performed by Pelon Irene MD at [facility] MAIN OR    OTHER SURGICAL HISTORY Bilateral     Eye surgery    UPPER GASTROINTESTINAL ENDOSCOPY N/A 6/17/2025    ESOPHAGOGASTRODUODENOSCOPY performed by Eamon Jacobson MD at [facility] ENDOSCOPY    WRIST SURGERY Left     pins and screws      Family History   Problem Relation Age of Onset    Diabetes Mother     Hypertension Father     Diabetes Father     Hypertension Mother      Social History     Tobacco Use    Smoking status: Never    Smokeless tobacco: Never   Substance Use Topics    Alcohol use: Never      Current Facility-Administered Medications   Medication Dose Route Frequency Provider Last Rate Last Admin    furosemide (LASIX) tablet 40 mg  40 mg Oral Daily Tee Blue MD   40 mg at 06/21/25 0956    0.9 % sodium chloride infusion   IntraVENous PRN Pj Ahuja MD        lactated ringers infusion   IntraVENous Continuous Pelon Irene MD 75 mL/hr at 06/20/25 1846 New Bag at 06/20/25 1846    piperacillin-tazobactam (ZOSYN) 3,375 mg in sodium chloride

## 2025-06-22 LAB
ANION GAP SERPL CALC-SCNC: 7 MMOL/L (ref 2–12)
BACTERIA SPEC CULT: NORMAL
BASOPHILS # BLD: 0.02 K/UL (ref 0–0.1)
BASOPHILS NFR BLD: 0.3 % (ref 0–1)
BUN SERPL-MCNC: 18 MG/DL (ref 6–20)
BUN/CREAT SERPL: 13 (ref 12–20)
CA-I BLD-MCNC: 8.2 MG/DL (ref 8.5–10.1)
CHLORIDE SERPL-SCNC: 98 MMOL/L (ref 97–108)
CO2 SERPL-SCNC: 31 MMOL/L (ref 21–32)
CREAT SERPL-MCNC: 1.36 MG/DL (ref 0.55–1.02)
DIFFERENTIAL METHOD BLD: ABNORMAL
EOSINOPHIL # BLD: 0.03 K/UL (ref 0–0.4)
EOSINOPHIL NFR BLD: 0.5 % (ref 0–7)
ERYTHROCYTE [DISTWIDTH] IN BLOOD BY AUTOMATED COUNT: 17.9 % (ref 11.5–14.5)
GLUCOSE BLD STRIP.AUTO-MCNC: 127 MG/DL (ref 65–100)
GLUCOSE BLD STRIP.AUTO-MCNC: 77 MG/DL (ref 65–100)
GLUCOSE BLD STRIP.AUTO-MCNC: 82 MG/DL (ref 65–100)
GLUCOSE BLD STRIP.AUTO-MCNC: 88 MG/DL (ref 65–100)
GLUCOSE BLD STRIP.AUTO-MCNC: 89 MG/DL (ref 65–100)
GLUCOSE BLD STRIP.AUTO-MCNC: 96 MG/DL (ref 65–100)
GLUCOSE SERPL-MCNC: 118 MG/DL (ref 65–100)
HCT VFR BLD AUTO: 26.5 % (ref 35–47)
HGB BLD-MCNC: 8.5 G/DL (ref 11.5–16)
IMM GRANULOCYTES # BLD AUTO: 0.03 K/UL (ref 0–0.04)
IMM GRANULOCYTES NFR BLD AUTO: 0.5 % (ref 0–0.5)
LYMPHOCYTES # BLD: 0.38 K/UL (ref 0.8–3.5)
LYMPHOCYTES NFR BLD: 5.7 % (ref 12–49)
Lab: NORMAL
MCH RBC QN AUTO: 31.8 PG (ref 26–34)
MCHC RBC AUTO-ENTMCNC: 32.1 G/DL (ref 30–36.5)
MCV RBC AUTO: 99.3 FL (ref 80–99)
MONOCYTES # BLD: 0.61 K/UL (ref 0–1)
MONOCYTES NFR BLD: 9.2 % (ref 5–13)
NEUTS SEG # BLD: 5.57 K/UL (ref 1.8–8)
NEUTS SEG NFR BLD: 83.8 % (ref 32–75)
NRBC # BLD: 0 K/UL (ref 0–0.01)
NRBC BLD-RTO: 0 PER 100 WBC
PERFORMED BY:: ABNORMAL
PERFORMED BY:: NORMAL
PLATELET # BLD AUTO: 183 K/UL (ref 150–400)
PMV BLD AUTO: 9.9 FL (ref 8.9–12.9)
POTASSIUM SERPL-SCNC: 3.8 MMOL/L (ref 3.5–5.1)
RBC # BLD AUTO: 2.67 M/UL (ref 3.8–5.2)
SODIUM SERPL-SCNC: 136 MMOL/L (ref 136–145)
WBC # BLD AUTO: 6.6 K/UL (ref 3.6–11)

## 2025-06-22 PROCEDURE — 82962 GLUCOSE BLOOD TEST: CPT

## 2025-06-22 PROCEDURE — 2700000000 HC OXYGEN THERAPY PER DAY

## 2025-06-22 PROCEDURE — 94640 AIRWAY INHALATION TREATMENT: CPT

## 2025-06-22 PROCEDURE — 2500000003 HC RX 250 WO HCPCS: Performed by: NURSE PRACTITIONER

## 2025-06-22 PROCEDURE — 6360000002 HC RX W HCPCS: Performed by: STUDENT IN AN ORGANIZED HEALTH CARE EDUCATION/TRAINING PROGRAM

## 2025-06-22 PROCEDURE — 6360000002 HC RX W HCPCS: Performed by: SURGERY

## 2025-06-22 PROCEDURE — 85025 COMPLETE CBC W/AUTO DIFF WBC: CPT

## 2025-06-22 PROCEDURE — 6370000000 HC RX 637 (ALT 250 FOR IP): Performed by: NURSE PRACTITIONER

## 2025-06-22 PROCEDURE — 6360000002 HC RX W HCPCS: Performed by: NURSE PRACTITIONER

## 2025-06-22 PROCEDURE — 6370000000 HC RX 637 (ALT 250 FOR IP): Performed by: STUDENT IN AN ORGANIZED HEALTH CARE EDUCATION/TRAINING PROGRAM

## 2025-06-22 PROCEDURE — 36415 COLL VENOUS BLD VENIPUNCTURE: CPT

## 2025-06-22 PROCEDURE — 94761 N-INVAS EAR/PLS OXIMETRY MLT: CPT

## 2025-06-22 PROCEDURE — 2580000003 HC RX 258: Performed by: SURGERY

## 2025-06-22 PROCEDURE — 1100000000 HC RM PRIVATE

## 2025-06-22 PROCEDURE — 80048 BASIC METABOLIC PNL TOTAL CA: CPT

## 2025-06-22 PROCEDURE — 6370000000 HC RX 637 (ALT 250 FOR IP): Performed by: EMERGENCY MEDICINE

## 2025-06-22 RX ADMIN — MONTELUKAST 10 MG: 10 TABLET, FILM COATED ORAL at 21:16

## 2025-06-22 RX ADMIN — IPRATROPIUM BROMIDE AND ALBUTEROL SULFATE 1 DOSE: 2.5; .5 SOLUTION RESPIRATORY (INHALATION) at 13:43

## 2025-06-22 RX ADMIN — SODIUM CHLORIDE, PRESERVATIVE FREE 10 ML: 5 INJECTION INTRAVENOUS at 09:05

## 2025-06-22 RX ADMIN — SODIUM CHLORIDE, SODIUM LACTATE, POTASSIUM CHLORIDE, AND CALCIUM CHLORIDE: .6; .31; .03; .02 INJECTION, SOLUTION INTRAVENOUS at 10:41

## 2025-06-22 RX ADMIN — IPRATROPIUM BROMIDE AND ALBUTEROL SULFATE 1 DOSE: 2.5; .5 SOLUTION RESPIRATORY (INHALATION) at 05:50

## 2025-06-22 RX ADMIN — BUDESONIDE INHALATION 500 MCG: 0.5 SUSPENSION RESPIRATORY (INHALATION) at 05:50

## 2025-06-22 RX ADMIN — IPRATROPIUM BROMIDE AND ALBUTEROL SULFATE 1 DOSE: 2.5; .5 SOLUTION RESPIRATORY (INHALATION) at 19:39

## 2025-06-22 RX ADMIN — BUDESONIDE INHALATION 500 MCG: 0.5 SUSPENSION RESPIRATORY (INHALATION) at 19:39

## 2025-06-22 RX ADMIN — ATORVASTATIN CALCIUM 40 MG: 40 TABLET, FILM COATED ORAL at 21:16

## 2025-06-22 RX ADMIN — FOLIC ACID 1 MG: 1 TABLET ORAL at 09:05

## 2025-06-22 RX ADMIN — PIPERACILLIN AND TAZOBACTAM 3375 MG: 3; .375 INJECTION, POWDER, LYOPHILIZED, FOR SOLUTION INTRAVENOUS at 09:05

## 2025-06-22 RX ADMIN — ENOXAPARIN SODIUM 40 MG: 100 INJECTION SUBCUTANEOUS at 09:06

## 2025-06-22 RX ADMIN — PIPERACILLIN AND TAZOBACTAM 3375 MG: 3; .375 INJECTION, POWDER, LYOPHILIZED, FOR SOLUTION INTRAVENOUS at 00:52

## 2025-06-22 RX ADMIN — MORPHINE SULFATE 1 MG: 2 INJECTION, SOLUTION INTRAMUSCULAR; INTRAVENOUS at 21:16

## 2025-06-22 RX ADMIN — PANTOPRAZOLE SODIUM 40 MG: 40 INJECTION, POWDER, FOR SOLUTION INTRAVENOUS at 09:05

## 2025-06-22 RX ADMIN — PIPERACILLIN AND TAZOBACTAM 3375 MG: 3; .375 INJECTION, POWDER, LYOPHILIZED, FOR SOLUTION INTRAVENOUS at 17:18

## 2025-06-22 RX ADMIN — FLUCONAZOLE 100 MG: 100 TABLET ORAL at 09:05

## 2025-06-22 RX ADMIN — MORPHINE SULFATE 1 MG: 2 INJECTION, SOLUTION INTRAMUSCULAR; INTRAVENOUS at 06:09

## 2025-06-22 RX ADMIN — SODIUM CHLORIDE, PRESERVATIVE FREE 10 ML: 5 INJECTION INTRAVENOUS at 21:16

## 2025-06-22 RX ADMIN — MORPHINE SULFATE 1 MG: 2 INJECTION, SOLUTION INTRAMUSCULAR; INTRAVENOUS at 12:35

## 2025-06-22 ASSESSMENT — PAIN SCALES - GENERAL
PAINLEVEL_OUTOF10: 5
PAINLEVEL_OUTOF10: 0
PAINLEVEL_OUTOF10: 8
PAINLEVEL_OUTOF10: 7

## 2025-06-22 ASSESSMENT — PAIN DESCRIPTION - LOCATION
LOCATION: ABDOMEN

## 2025-06-22 ASSESSMENT — PAIN DESCRIPTION - DESCRIPTORS
DESCRIPTORS: ACHING
DESCRIPTORS: ACHING;DISCOMFORT

## 2025-06-22 ASSESSMENT — PAIN - FUNCTIONAL ASSESSMENT: PAIN_FUNCTIONAL_ASSESSMENT: ACTIVITIES ARE NOT PREVENTED

## 2025-06-22 ASSESSMENT — PAIN DESCRIPTION - ORIENTATION
ORIENTATION: ANTERIOR

## 2025-06-22 NOTE — PROGRESS NOTES
Hospitalist Progress Note               Daily Progress Note: 6/22/2025      Hospital Day: 10     Chief complaint:   Chief Complaint   Patient presents with    Shortness of Breath        hospital course:   70-year-old female past medical history of COPD, CKD, DM, HTN and multiple myeloma on chemotherapy presented with abdominal pain. CT scan revealed colon mass.  G.I. was consulted. Patient underwent EGD and colonoscopy, colonoscopy revealed a tumor concerning for malignancy biopsy was taken. The general surgery was consulted for colorectal mass. Cardiology was consulted to clear patient for surgery, stress test unremarkable/negative. Patient underwent robotic assisted right delisa colectomy on 6/20. Patient tolerated procedure well.    Subjective:     Patient is seen today for follow-up. Patient seen examined bedside. Patient denies any complaints.  Nuclear stress test with myocardial perfusion 6/19 revealed negative for myocardial ischemia. For stress ejection fraction 64%.  Patient underwent robotic assisted right delisa colectomy on 6/20, tolerated procedure well.  This morning patient denies having any complaints he denies abdominal pain, shortness of breath, nausea/vomiting patient also denies passing any bowel movements or any flatus. NG tube in place.    Medications reviewed  Current Facility-Administered Medications   Medication Dose Route Frequency    cloNIDine (CATAPRES) 0.1 MG/24HR 1 patch  1 patch TransDERmal Weekly    enoxaparin (LOVENOX) injection 40 mg  40 mg SubCUTAneous Daily    0.9 % sodium chloride infusion   IntraVENous PRN    lactated ringers infusion   IntraVENous Continuous    piperacillin-tazobactam (ZOSYN) 3,375 mg in sodium chloride 0.9 % 50 mL IVPB (addEASE)  3,375 mg IntraVENous Q8H    morphine (PF) injection 1 mg  1 mg IntraVENous Q4H PRN    diatrizoate meglumine-sodium (GASTROGRAFIN) 66-10 % solution 30 mL  30 mL Oral ONCE PRN    pantoprazole (PROTONIX) injection 40 mg  40 mg    Result Value Ref Range    POC Glucose 89 65 - 100 mg/dL    Performed by: Katheryn Payne    POCT Glucose    Collection Time: 06/22/25  5:59 AM   Result Value Ref Range    POC Glucose 77 65 - 100 mg/dL    Performed by: Hue De Lenó    CBC with Auto Differential    Collection Time: 06/22/25  7:18 AM   Result Value Ref Range    WBC 6.6 3.6 - 11.0 K/uL    RBC 2.67 (L) 3.80 - 5.20 M/uL    Hemoglobin 8.5 (L) 11.5 - 16.0 g/dL    Hematocrit 26.5 (L) 35.0 - 47.0 %    MCV 99.3 (H) 80.0 - 99.0 FL    MCH 31.8 26.0 - 34.0 PG    MCHC 32.1 30.0 - 36.5 g/dL    RDW 17.9 (H) 11.5 - 14.5 %    Platelets 183 150 - 400 K/uL    MPV 9.9 8.9 - 12.9 FL    Nucleated RBCs 0.0 0.0  WBC    nRBC 0.00 0.00 - 0.01 K/uL    Neutrophils % 83.8 (H) 32.0 - 75.0 %    Lymphocytes % 5.7 (L) 12.0 - 49.0 %    Monocytes % 9.2 5.0 - 13.0 %    Eosinophils % 0.5 0.0 - 7.0 %    Basophils % 0.3 0.0 - 1.0 %    Immature Granulocytes % 0.5 0 - 0.5 %    Neutrophils Absolute 5.57 1.80 - 8.00 K/UL    Lymphocytes Absolute 0.38 (L) 0.80 - 3.50 K/UL    Monocytes Absolute 0.61 0.00 - 1.00 K/UL    Eosinophils Absolute 0.03 0.00 - 0.40 K/UL    Basophils Absolute 0.02 0.00 - 0.10 K/UL    Immature Granulocytes Absolute 0.03 0.00 - 0.04 K/UL    Differential Type AUTOMATED     Basic Metabolic Panel w/ Reflex to MG    Collection Time: 06/22/25  7:18 AM   Result Value Ref Range    Sodium 136 136 - 145 mmol/L    Potassium 3.8 3.5 - 5.1 mmol/L    Chloride 98 97 - 108 mmol/L    CO2 31 21 - 32 mmol/L    Anion Gap 7 2 - 12 mmol/L    Glucose 118 (H) 65 - 100 mg/dL    BUN 18 6 - 20 mg/dL    Creatinine 1.36 (H) 0.55 - 1.02 mg/dL    BUN/Creatinine Ratio 13 12 - 20      Est, Glom Filt Rate 42 (L) >60 ml/min/1.73m2    Calcium 8.2 (L) 8.5 - 10.1 mg/dL   POCT Glucose    Collection Time: 06/22/25  8:25 AM   Result Value Ref Range    POC Glucose 127 (H) 65 - 100 mg/dL    Performed by: Chalo Whitehead RN    POCT Glucose    Collection Time: 06/22/25 11:11 AM   Result Value Ref Range

## 2025-06-22 NOTE — PROGRESS NOTES
Nephrology follow-up          Patient: Lizbeth Hogue MRN: [id number]  SSN: [id number]    YOB: 1955  Age: 70 y.o.  Sex: female      Subjective:   The patient is seen at the bedside  She looks comfortable  On nasal cannula 2L  NG in place  Improving Bilateral lower extremity swelling  Blood pressures are slightly elevated  Resolving hyponatremia  Off IV fluids      Past Medical History:   Diagnosis Date    Arthritis     Asthma     Chronic kidney disease     Chronic obstructive pulmonary disease (HCC)     Diabetes (HCC)     Hypertension     Sleep apnea     no cpap     Past Surgical History:   Procedure Laterality Date    COLONOSCOPY N/A 6/17/2025    COLONOSCOPY DIAGNOSTIC performed by Eamon Jacobson MD at [facility] ENDOSCOPY    COLONOSCOPY N/A 6/17/2025    COLONOSCOPY BIOPSY performed by Eamon Jacobson MD at [facility] ENDOSCOPY    HEMICOLECTOMY Right 6/20/2025    RIGHT HEMICOLECTOMY LAPAROSCOPIC ROBOTIC performed by Pelon Irene MD at [facility] MAIN OR    OTHER SURGICAL HISTORY Bilateral     Eye surgery    UPPER GASTROINTESTINAL ENDOSCOPY N/A 6/17/2025    ESOPHAGOGASTRODUODENOSCOPY performed by Eamon Jacobson MD at [facility] ENDOSCOPY    WRIST SURGERY Left     pins and screws      Family History   Problem Relation Age of Onset    Diabetes Mother     Hypertension Father     Diabetes Father     Hypertension Mother      Social History     Tobacco Use    Smoking status: Never    Smokeless tobacco: Never   Substance Use Topics    Alcohol use: Never      Current Facility-Administered Medications   Medication Dose Route Frequency Provider Last Rate Last Admin    cloNIDine (CATAPRES) 0.1 MG/24HR 1 patch  1 patch TransDERmal Weekly Tee Blue MD   1 patch at 06/21/25 1839    enoxaparin (LOVENOX) injection 40 mg  40 mg SubCUTAneous Daily Pelon Irene MD   40 mg at 06/22/25 0906    0.9 % sodium chloride infusion   IntraVENous PRN Pj Ahuja MD        lactated ringers infusion   IntraVENous Continuous

## 2025-06-22 NOTE — PROGRESS NOTES
Had blood sugar of 70 after 1 hour blood sugar repeated at 63 given 125 bolus of 10%Dextrose after 15mins blood sugar at 108

## 2025-06-22 NOTE — PLAN OF CARE
Problem: Chronic Conditions and Co-morbidities  Goal: Patient's chronic conditions and co-morbidity symptoms are monitored and maintained or improved  6/22/2025 0027 by ZOE Swann, MANISH  Outcome: Progressing  6/21/2025 1624 by Eliana Anderson, RN  Outcome: Progressing  Flowsheets (Taken 6/21/2025 0822)  Care Plan - Patient's Chronic Conditions and Co-Morbidity Symptoms are Monitored and Maintained or Improved:   Monitor and assess patient's chronic conditions and comorbid symptoms for stability, deterioration, or improvement   Collaborate with multidisciplinary team to address chronic and comorbid conditions and prevent exacerbation or deterioration   Update acute care plan with appropriate goals if chronic or comorbid symptoms are exacerbated and prevent overall improvement and discharge     Problem: Discharge Planning  Goal: Discharge to home or other facility with appropriate resources  6/21/2025 1624 by Eliana Anderson, RN  Outcome: Progressing  Flowsheets (Taken 6/21/2025 0822)  Discharge to home or other facility with appropriate resources:   Identify barriers to discharge with patient and caregiver   Arrange for needed discharge resources and transportation as appropriate   Identify discharge learning needs (meds, wound care, etc)   Arrange for interpreters to assist at discharge as needed     Problem: Respiratory - Adult  Goal: Achieves optimal ventilation and oxygenation  6/22/2025 0027 by ZOE Swann, MANISH  Outcome: Progressing  6/21/2025 1624 by Eliana Anderson RN  Outcome: Progressing  Flowsheets (Taken 6/21/2025 0822)  Achieves optimal ventilation and oxygenation:   Assess for changes in respiratory status   Position to facilitate oxygenation and minimize respiratory effort   Encourage broncho-pulmonary hygiene including cough, deep breathe, incentive spirometry   Assess the need for suctioning and aspirate as needed   Oxygen supplementation based on oxygen saturation or  insertion sites i.e., indwelling lines, tubes and drains   Monitor endotracheal (as able) and nasal secretions for changes in amount and color   [place] appropriate cooling/warming therapies per order   Administer medications as ordered  Goal: Absence of fever/infection during anticipated neutropenic period  6/21/2025 1624 by Eliana Anderson RN  Outcome: Progressing  Flowsheets (Taken 6/21/2025 0822)  Absence of fever/infection during anticipated neutropenic period:   Monitor white blood cell count   Administer growth factors as ordered   Implement neutropenic guidelines     Problem: Skin/Tissue Integrity  Goal: Skin integrity remains intact  Description: 1.  Monitor for areas of redness and/or skin breakdown  2.  Assess vascular access sites hourly  3.  Every 4-6 hours minimum:  Change oxygen saturation probe site  4.  Every 4-6 hours:  If on nasal continuous positive airway pressure, respiratory therapy assess nares and determine need for appliance change or resting period  6/21/2025 1624 by Eliana Anderson RN  Outcome: Progressing  Flowsheets (Taken 6/21/2025 0822)  Skin Integrity Remains Intact:   Monitor for areas of redness and/or skin breakdown   Assess vascular access sites hourly   Every 4-6 hours minimum:  Change oxygen saturation probe site   Turn and reposition as indicated   Assess need for specialty bed   Positioning devices     Problem: ABCDS Injury Assessment  Goal: Absence of physical injury  6/21/2025 1624 by Eliana Anderson RN  Outcome: Progressing     Problem: Safety - Adult  Goal: Free from fall injury  6/21/2025 1624 by Eliana Anderson RN  Outcome: Progressing     Problem: Skin/Tissue Integrity - Adult  Goal: Skin integrity remains intact  Description: 1.  Monitor for areas of redness and/or skin breakdown  2.  Assess vascular access sites hourly  3.  Every 4-6 hours minimum:  Change oxygen saturation probe site  4.  Every 4-6 hours:  If on nasal continuous

## 2025-06-22 NOTE — PLAN OF CARE
Problem: Safety - Adult  Goal: Free from fall injury  Outcome: Progressing  Flowsheets (Taken 6/22/2025 1136)  Free From Fall Injury: Instruct family/caregiver on patient safety

## 2025-06-23 ENCOUNTER — APPOINTMENT (OUTPATIENT)
Facility: HOSPITAL | Age: 70
DRG: 231 | End: 2025-06-23
Payer: MEDICAID

## 2025-06-23 LAB
ABO + RH BLD: NORMAL
ALBUMIN SERPL-MCNC: 2.2 G/DL (ref 3.5–5)
ANION GAP SERPL CALC-SCNC: 5 MMOL/L (ref 2–12)
BLOOD BANK CMNT PATIENT-IMP: NORMAL
BLOOD GROUP ANTIBODIES SERPL: NORMAL
BLOOD GROUP ANTIBODIES SERPL: POSITIVE
BUN SERPL-MCNC: 14 MG/DL (ref 6–20)
BUN/CREAT SERPL: 12 (ref 12–20)
CA-I BLD-MCNC: 8.5 MG/DL (ref 8.5–10.1)
CHLORIDE SERPL-SCNC: 98 MMOL/L (ref 97–108)
CO2 SERPL-SCNC: 32 MMOL/L (ref 21–32)
CREAT SERPL-MCNC: 1.2 MG/DL (ref 0.55–1.02)
DAT POLY-SP REAG RBC QL: NEGATIVE
GLUCOSE BLD STRIP.AUTO-MCNC: 131 MG/DL (ref 65–100)
GLUCOSE BLD STRIP.AUTO-MCNC: 132 MG/DL (ref 65–100)
GLUCOSE BLD STRIP.AUTO-MCNC: 165 MG/DL (ref 65–100)
GLUCOSE BLD STRIP.AUTO-MCNC: 70 MG/DL (ref 65–100)
GLUCOSE BLD STRIP.AUTO-MCNC: 81 MG/DL (ref 65–100)
GLUCOSE SERPL-MCNC: 62 MG/DL (ref 65–100)
PERFORMED BY:: ABNORMAL
PERFORMED BY:: NORMAL
PERFORMED BY:: NORMAL
PHOSPHATE SERPL-MCNC: 2.4 MG/DL (ref 2.6–4.7)
POTASSIUM SERPL-SCNC: 4.5 MMOL/L (ref 3.5–5.1)
SODIUM SERPL-SCNC: 135 MMOL/L (ref 136–145)
SPECIMEN EXP DATE BLD: NORMAL

## 2025-06-23 PROCEDURE — 6360000002 HC RX W HCPCS: Performed by: STUDENT IN AN ORGANIZED HEALTH CARE EDUCATION/TRAINING PROGRAM

## 2025-06-23 PROCEDURE — 74018 RADEX ABDOMEN 1 VIEW: CPT

## 2025-06-23 PROCEDURE — 36410 VNPNXR 3YR/> PHY/QHP DX/THER: CPT

## 2025-06-23 PROCEDURE — 2700000000 HC OXYGEN THERAPY PER DAY

## 2025-06-23 PROCEDURE — 2500000003 HC RX 250 WO HCPCS: Performed by: NURSE PRACTITIONER

## 2025-06-23 PROCEDURE — 36415 COLL VENOUS BLD VENIPUNCTURE: CPT

## 2025-06-23 PROCEDURE — 80069 RENAL FUNCTION PANEL: CPT

## 2025-06-23 PROCEDURE — 2580000003 HC RX 258: Performed by: NURSE PRACTITIONER

## 2025-06-23 PROCEDURE — 6370000000 HC RX 637 (ALT 250 FOR IP): Performed by: STUDENT IN AN ORGANIZED HEALTH CARE EDUCATION/TRAINING PROGRAM

## 2025-06-23 PROCEDURE — 6360000002 HC RX W HCPCS: Performed by: SURGERY

## 2025-06-23 PROCEDURE — 97530 THERAPEUTIC ACTIVITIES: CPT

## 2025-06-23 PROCEDURE — 94761 N-INVAS EAR/PLS OXIMETRY MLT: CPT

## 2025-06-23 PROCEDURE — 94640 AIRWAY INHALATION TREATMENT: CPT

## 2025-06-23 PROCEDURE — 2580000003 HC RX 258: Performed by: SURGERY

## 2025-06-23 PROCEDURE — 6370000000 HC RX 637 (ALT 250 FOR IP): Performed by: NURSE PRACTITIONER

## 2025-06-23 PROCEDURE — 1100000000 HC RM PRIVATE

## 2025-06-23 PROCEDURE — 6360000002 HC RX W HCPCS: Performed by: NURSE PRACTITIONER

## 2025-06-23 PROCEDURE — 6370000000 HC RX 637 (ALT 250 FOR IP): Performed by: EMERGENCY MEDICINE

## 2025-06-23 PROCEDURE — 82962 GLUCOSE BLOOD TEST: CPT

## 2025-06-23 PROCEDURE — 6370000000 HC RX 637 (ALT 250 FOR IP): Performed by: SURGERY

## 2025-06-23 RX ORDER — IPRATROPIUM BROMIDE AND ALBUTEROL SULFATE 2.5; .5 MG/3ML; MG/3ML
1 SOLUTION RESPIRATORY (INHALATION)
Status: DISCONTINUED | OUTPATIENT
Start: 2025-06-23 | End: 2025-06-26 | Stop reason: HOSPADM

## 2025-06-23 RX ORDER — MORPHINE SULFATE 2 MG/ML
0.5 INJECTION, SOLUTION INTRAMUSCULAR; INTRAVENOUS
Status: DISCONTINUED | OUTPATIENT
Start: 2025-06-23 | End: 2025-06-26 | Stop reason: HOSPADM

## 2025-06-23 RX ORDER — HYDROCODONE BITARTRATE AND ACETAMINOPHEN 5; 325 MG/1; MG/1
1 TABLET ORAL EVERY 6 HOURS PRN
Refills: 0 | Status: DISCONTINUED | OUTPATIENT
Start: 2025-06-23 | End: 2025-06-26 | Stop reason: HOSPADM

## 2025-06-23 RX ORDER — AMLODIPINE BESYLATE 5 MG/1
10 TABLET ORAL DAILY
Status: DISCONTINUED | OUTPATIENT
Start: 2025-06-23 | End: 2025-06-26 | Stop reason: HOSPADM

## 2025-06-23 RX ADMIN — MORPHINE SULFATE 1 MG: 2 INJECTION, SOLUTION INTRAMUSCULAR; INTRAVENOUS at 06:17

## 2025-06-23 RX ADMIN — AMLODIPINE BESYLATE 10 MG: 5 TABLET ORAL at 16:55

## 2025-06-23 RX ADMIN — ATORVASTATIN CALCIUM 40 MG: 40 TABLET, FILM COATED ORAL at 22:22

## 2025-06-23 RX ADMIN — HYDROCODONE BITARTRATE AND ACETAMINOPHEN 1 TABLET: 5; 325 TABLET ORAL at 22:21

## 2025-06-23 RX ADMIN — GLUCAGON 1 MG: KIT at 10:03

## 2025-06-23 RX ADMIN — IPRATROPIUM BROMIDE AND ALBUTEROL SULFATE 1 DOSE: 2.5; .5 SOLUTION RESPIRATORY (INHALATION) at 07:53

## 2025-06-23 RX ADMIN — SODIUM CHLORIDE, PRESERVATIVE FREE 10 ML: 5 INJECTION INTRAVENOUS at 22:24

## 2025-06-23 RX ADMIN — IPRATROPIUM BROMIDE AND ALBUTEROL SULFATE 1 DOSE: 2.5; .5 SOLUTION RESPIRATORY (INHALATION) at 19:55

## 2025-06-23 RX ADMIN — MONTELUKAST 10 MG: 10 TABLET, FILM COATED ORAL at 22:22

## 2025-06-23 RX ADMIN — SODIUM CHLORIDE, SODIUM LACTATE, POTASSIUM CHLORIDE, AND CALCIUM CHLORIDE: .6; .31; .03; .02 INJECTION, SOLUTION INTRAVENOUS at 01:12

## 2025-06-23 RX ADMIN — FLUCONAZOLE 100 MG: 100 TABLET ORAL at 09:49

## 2025-06-23 RX ADMIN — ENOXAPARIN SODIUM 40 MG: 100 INJECTION SUBCUTANEOUS at 09:50

## 2025-06-23 RX ADMIN — PIPERACILLIN AND TAZOBACTAM 3375 MG: 3; .375 INJECTION, POWDER, LYOPHILIZED, FOR SOLUTION INTRAVENOUS at 00:05

## 2025-06-23 RX ADMIN — BUDESONIDE INHALATION 500 MCG: 0.5 SUSPENSION RESPIRATORY (INHALATION) at 07:53

## 2025-06-23 RX ADMIN — FOLIC ACID 1 MG: 1 TABLET ORAL at 09:50

## 2025-06-23 RX ADMIN — HYDROCODONE BITARTRATE AND ACETAMINOPHEN 1 TABLET: 5; 325 TABLET ORAL at 10:50

## 2025-06-23 RX ADMIN — BUDESONIDE INHALATION 500 MCG: 0.5 SUSPENSION RESPIRATORY (INHALATION) at 19:55

## 2025-06-23 RX ADMIN — PIPERACILLIN AND TAZOBACTAM 3375 MG: 3; .375 INJECTION, POWDER, LYOPHILIZED, FOR SOLUTION INTRAVENOUS at 16:38

## 2025-06-23 RX ADMIN — PANTOPRAZOLE SODIUM 40 MG: 40 INJECTION, POWDER, FOR SOLUTION INTRAVENOUS at 09:50

## 2025-06-23 ASSESSMENT — PAIN DESCRIPTION - ORIENTATION
ORIENTATION: MID
ORIENTATION: ANTERIOR
ORIENTATION: ANTERIOR

## 2025-06-23 ASSESSMENT — PAIN SCALES - GENERAL
PAINLEVEL_OUTOF10: 8
PAINLEVEL_OUTOF10: 10
PAINLEVEL_OUTOF10: 0
PAINLEVEL_OUTOF10: 10
PAINLEVEL_OUTOF10: 4
PAINLEVEL_OUTOF10: 0

## 2025-06-23 ASSESSMENT — PAIN DESCRIPTION - LOCATION
LOCATION: ABDOMEN

## 2025-06-23 ASSESSMENT — PAIN SCALES - WONG BAKER: WONGBAKER_NUMERICALRESPONSE: NO HURT

## 2025-06-23 ASSESSMENT — PAIN DESCRIPTION - DESCRIPTORS
DESCRIPTORS: ACHING;DISCOMFORT
DESCRIPTORS: PRESSURE
DESCRIPTORS: ACHING;DISCOMFORT
DESCRIPTORS: SORE;TIGHTNESS

## 2025-06-23 NOTE — PROGRESS NOTES
Nephrology follow-up          Patient: Lizbeth Hogue MRN: [id number]  SSN: [id number]    YOB: 1955  Age: 70 y.o.  Sex: female      Subjective:   The patient is seen at the bedside  She looks comfortable  On nasal cannula 2L  NG is out  Improving Bilateral lower extremity swelling  Blood pressures are slightly elevated  Resolving hyponatremia  Off IV fluids      Past Medical History:   Diagnosis Date    Arthritis     Asthma     Chronic kidney disease     Chronic obstructive pulmonary disease (HCC)     Diabetes (HCC)     Hypertension     Sleep apnea     no cpap     Past Surgical History:   Procedure Laterality Date    COLONOSCOPY N/A 6/17/2025    COLONOSCOPY DIAGNOSTIC performed by Eamon Jacobson MD at [facility] ENDOSCOPY    COLONOSCOPY N/A 6/17/2025    COLONOSCOPY BIOPSY performed by Eamon Jacobson MD at [facility] ENDOSCOPY    HEMICOLECTOMY Right 6/20/2025    RIGHT HEMICOLECTOMY LAPAROSCOPIC ROBOTIC performed by Pelon Irene MD at [facility] MAIN OR    OTHER SURGICAL HISTORY Bilateral     Eye surgery    UPPER GASTROINTESTINAL ENDOSCOPY N/A 6/17/2025    ESOPHAGOGASTRODUODENOSCOPY performed by Eamon Jacobson MD at [facility] ENDOSCOPY    WRIST SURGERY Left     pins and screws      Family History   Problem Relation Age of Onset    Diabetes Mother     Hypertension Father     Diabetes Father     Hypertension Mother      Social History     Tobacco Use    Smoking status: Never    Smokeless tobacco: Never   Substance Use Topics    Alcohol use: Never      Current Facility-Administered Medications   Medication Dose Route Frequency Provider Last Rate Last Admin    HYDROcodone-acetaminophen (NORCO) 5-325 MG per tablet 1 tablet  1 tablet Oral Q6H PRN Pelon Irene MD   1 tablet at 06/23/25 1050    morphine (PF) injection 0.5 mg  0.5 mg IntraVENous Q3H PRN Pelon Irene MD        cloNIDine (CATAPRES) 0.1 MG/24HR 1 patch  1 patch TransDERmal Weekly Tee Blue MD   1 patch at 06/21/25 1839    enoxaparin

## 2025-06-23 NOTE — PLAN OF CARE
Problem: Chronic Conditions and Co-morbidities  Goal: Patient's chronic conditions and co-morbidity symptoms are monitored and maintained or improved  6/23/2025 1225 by Barbara Mcwilliams RN  Outcome: Progressing  6/22/2025 2240 by Radha Guillen RN  Outcome: Progressing  Flowsheets (Taken 6/22/2025 2014)  Care Plan - Patient's Chronic Conditions and Co-Morbidity Symptoms are Monitored and Maintained or Improved:   Monitor and assess patient's chronic conditions and comorbid symptoms for stability, deterioration, or improvement   Collaborate with multidisciplinary team to address chronic and comorbid conditions and prevent exacerbation or deterioration   Update acute care plan with appropriate goals if chronic or comorbid symptoms are exacerbated and prevent overall improvement and discharge     Problem: Discharge Planning  Goal: Discharge to home or other facility with appropriate resources  6/23/2025 1225 by Barbara Mcwilliams RN  Outcome: Progressing  6/22/2025 2240 by Radha Guillen RN  Outcome: Progressing  Flowsheets (Taken 6/22/2025 2014)  Discharge to home or other facility with appropriate resources:   Identify barriers to discharge with patient and caregiver   Arrange for needed discharge resources and transportation as appropriate   Identify discharge learning needs (meds, wound care, etc)     Problem: Respiratory - Adult  Goal: Achieves optimal ventilation and oxygenation  6/23/2025 1225 by Barbara Mcwilliams RN  Outcome: Progressing  6/22/2025 2240 by Radha Guillen RN  Outcome: Progressing  Flowsheets (Taken 6/22/2025 2014)  Achieves optimal ventilation and oxygenation:   Position to facilitate oxygenation and minimize respiratory effort   Oxygen supplementation based on oxygen saturation or arterial blood gases   Assess for changes in respiratory status     Problem: Cardiovascular - Adult  Goal: Maintains optimal cardiac output and hemodynamic stability  6/23/2025 1225 by

## 2025-06-23 NOTE — PROGRESS NOTES
Spoke to Dr. Blue for midline access since patient lost her IV access and this Rn attempted twice. Gave an okay for midline access.

## 2025-06-23 NOTE — THERAPY RECERTIFICATION
PHYSICAL THERAPY REEVALUATION  Patient: Lizbeth Hogue (70 y.o. female)  Date: 6/23/2025  Primary Diagnosis: Shortness of breath [R06.02]  Acute on chronic respiratory failure with hypoxemia (HCC) [J96.21]  Acute on chronic respiratory failure with hypoxia and hypercapnia (HCC) [J96.21, J96.22]  Procedure(s) (LRB):  RIGHT HEMICOLECTOMY LAPAROSCOPIC ROBOTIC (Right) 3 Days Post-Op   Precautions: Restrictions/Precautions  Restrictions/Precautions: Fall Risk, General Precautions, Contact Precautions, Bed Alarm, Surgical Protocols  Required Braces or Orthoses?: No  Recommendations for nursing mobility: Out of bed to chair for meals, Frequent repositioning to prevent skin breakdown, Use of bed/chair alarm for safety, AD and gt belt for bed to chair , Amb to bathroom with AD and gait belt, and Assist x1    In place during session: Nasal Cannula 4L, Lizama Catheter, and EKG/telemetry   Chart, physical therapy assessment, plan of care and goals were reviewed.    ASSESSMENT  Patient initially seen for PT evaluation 6/17/25 and 1 skilled PT sessions since evalution. Patient seen today for PT reevaluation s/t LOS and pt s/p right hemicolectomy on 6/20/25. Patient A&O x person and place. Pt semi-supine in bed upon arrival, agreeable to session. Daughter present in room throughout session with permission from pt given.     Based on the objective data described, the patient currently presents with impaired functional mobility, decreased independence in ADLs, decreased ROM, impaired strength, decreased activity tolerance, poor safety awareness, impaired cognition, impaired balance, impaired posture, and increased pain levels. (See below for objective details and assist levels).    Overall pt tolerated session fair today, currently with c/o 9/10 abdominal pain throughout session. Pt completed bed mobility and transfers requiring CGA to min A with additional time. Pt amb in room with RW, gt belt, and CGA ~ 60 feet; demonstrates NBOS  multistep commands consistently  Memory: Decreased recall of recent events  Insights: Decreased awareness of deficits  Initiation: Requires cues for some  Sequencing: Requires cues for some       Functional Mobility:  Bed Mobility:     Bed Mobility Training  Bed Mobility Training: Yes  Rolling: Contact guard assistance  Supine to Sit: Contact guard assistance  Scooting: Contact guard assistance  Transfers:     Transfer Training  Transfer Training: Yes  Sit to Stand: Contact guard assistance;Minimal assistance  Stand to Sit: Contact guard assistance;Minimal assistance  Balance:      Balance  Sitting: Intact;Without support  Standing: Impaired;Without support  Standing - Static: Fair;Constant support  Standing - Dynamic: Fair;Constant support    Ambulation/Gait Training:    Gait  Gait Training: Yes  Overall Level of Assistance: Contact guard assistance  Distance (ft): 60 Feet  Assistive Device: Gait belt;Walker, rolling  Interventions: Verbal cues;Safety awareness training  Base of Support: Narrowed  Speed/Shi: Slow;Shuffled     Therapeutic Intervention provided:   bed mobility , EOB transfers, OOB transfers, amb with AD, LE therapeutic exercises, seated static and dynamic balance, and standing static and dynamic balance, proper use of incentive spirometer    Functional Measure:  [facility] “6 Clicks”         Basic Mobility Inpatient Short Form  How much difficulty does the patient currently have... Unable A Lot A Little None   1.  Turning over in bed (including adjusting bedclothes, sheets and blankets)?   [] 1   [] 2   [x] 3   [] 4   2.  Sitting down on and standing up from a chair with arms ( e.g., wheelchair, bedside commode, etc.)   [] 1   [] 2   [x] 3   [] 4   3.  Moving from lying on back to sitting on the side of the bed?   [] 1   [] 2   [x] 3   [] 4          How much help from another person does the patient currently need... Total A Lot A Little None   4.  Moving to and from a bed to a  chair (including a wheelchair)?   [] 1   [] 2   [x] 3   [] 4   5.  Need to walk in hospital room?   [] 1   [] 2   [x] 3   [] 4   6.  Climbing 3-5 steps with a railing?   [] 1   [x] 2   [] 3   [] 4   © , Trustees of [facility], under license to [employer]. All rights reserved     Score:  Initial:  Most Recent: (Date: 2025 )   Interpretation of Tool:  Represents activities that are increasingly more difficult (i.e. Bed mobility, Transfers, Gait).  Score 24 23 22-20 19-15 14-10 9-7 6   Modifier CH CI CJ CK CL CM CN     Pain Ratin/10 abdomen  Pain Intervention(s):   patient medicated for pain prior to session, rest, and repositioning, use of pillow for abdominal bracing when coughing    Activity Tolerance:   Fair , tolerates ADLS without rest breaks, and SPO2 stable on 4L    After treatment patient left in no apparent distress:   Bed locked and in lowest position Patient left in no apparent distress sitting up in chair, Call bell within reach, Bed/ chair alarm activated, Caregiver / family present, and LE elevated on bedside recliner and nsg updated.    COMMUNICATION/EDUCATION:   The patient’s plan of care was discussed with: Registered nurse and Case management    Patient Education  Education Given To: Patient;Family  Education Provided: Role of Therapy;Plan of Care;Home Exercise Program;Precautions;ADL Adaptive Strategies;Transfer Training;Equipment;Fall Prevention Strategies;Family Education  Education Provided Comments: DC recommendations, PT POC, safety and importance of mobility including log roll technique to enter/exit bed, use of pillow or abdominal binder for additional support  Education Method: Demonstration;Verbal  Barriers to Learning: Cognition;Hearing  Education Outcome: Verbalized understanding;Continued education needed    Thank you for this referral.  Vidhi Montelongo, PT, DPT  Minutes: 23

## 2025-06-23 NOTE — CARE COORDINATION
CM reviewed chart. Patient is s/p right hemicolectomy on 6/20. KUB done today. Nephrology, Surgery following.     DCP; home with daughter and home health. CM notes no accepting companies, sent more referrals.     CM will continue to follow.

## 2025-06-23 NOTE — PROGRESS NOTES
Patient lost her IV access, this Rn attempted twice but unsuccessful. Called attending-ordered midline. Consult to vascular access done.

## 2025-06-23 NOTE — PLAN OF CARE
Problem: Chronic Conditions and Co-morbidities  Goal: Patient's chronic conditions and co-morbidity symptoms are monitored and maintained or improved  Outcome: Progressing     Problem: Discharge Planning  Goal: Discharge to home or other facility with appropriate resources  Outcome: Progressing     Problem: Respiratory - Adult  Goal: Achieves optimal ventilation and oxygenation  Outcome: Progressing     Problem: Cardiovascular - Adult  Goal: Maintains optimal cardiac output and hemodynamic stability  Outcome: Progressing  Goal: Absence of cardiac dysrhythmias or at baseline  Outcome: Progressing     Problem: Infection - Adult  Goal: Absence of infection at discharge  Outcome: Progressing  Goal: Absence of infection during hospitalization  Outcome: Progressing  Goal: Absence of fever/infection during anticipated neutropenic period  Outcome: Progressing     Problem: Skin/Tissue Integrity  Goal: Skin integrity remains intact  Description: 1.  Monitor for areas of redness and/or skin breakdown  2.  Assess vascular access sites hourly  3.  Every 4-6 hours minimum:  Change oxygen saturation probe site  4.  Every 4-6 hours:  If on nasal continuous positive airway pressure, respiratory therapy assess nares and determine need for appliance change or resting period  Outcome: Progressing     Problem: ABCDS Injury Assessment  Goal: Absence of physical injury  Outcome: Progressing     Problem: Safety - Adult  Goal: Free from fall injury  Outcome: Progressing     Problem: Skin/Tissue Integrity - Adult  Goal: Skin integrity remains intact  Description: 1.  Monitor for areas of redness and/or skin breakdown  2.  Assess vascular access sites hourly  3.  Every 4-6 hours minimum:  Change oxygen saturation probe site  4.  Every 4-6 hours:  If on nasal continuous positive airway pressure, respiratory therapy assess nares and determine need for appliance change or resting period  Outcome: Progressing     Problem: Musculoskeletal -  Adult  Goal: Return mobility to safest level of function  Outcome: Progressing     Problem: Hematologic - Adult  Goal: Maintains hematologic stability  Outcome: Progressing     Problem: Nutrition Deficit:  Goal: Optimize nutritional status  Outcome: Progressing     Problem: Pain  Goal: Verbalizes/displays adequate comfort level or baseline comfort level  Outcome: Progressing  Flowsheets (Taken 6/22/2025 2014)  Verbalizes/displays adequate comfort level or baseline comfort level:   Encourage patient to monitor pain and request assistance   Assess pain using appropriate pain scale   Administer analgesics based on type and severity of pain and evaluate response

## 2025-06-23 NOTE — PROGRESS NOTES
Comprehensive Nutrition Assessment    Type and Reason for Visit:  Reassess (F/u)    Nutrition Recommendations/Plan:   Continue CLD and slowly advance as medically able (per MD clearance)  Start Ensure Clear BID  Monitor weight, intakes, fluid status, labs, and bowel fxn   Manage BG levels b/t 140-180     Malnutrition Assessment:  Malnutrition Status:  Insufficient data (06/16/25 1540)    Context:  Acute Illness     Findings of the 6 clinical characteristics of malnutrition:  Energy Intake:  Mild decrease in energy intake  Weight Loss:  Unable to assess     Body Fat Loss:  Unable to assess     Muscle Mass Loss:  Unable to assess    Fluid Accumulation:  Unable to assess     Strength:  Not Performed    Nutrition Assessment:    pt p/w: SOB, reported not feeling well over the last couple of weeks; pt reported a decrease in her appetite; pt stated that she has been able to eat \"some\" of the hosital food, pt reports no weight loss; pt averaging 50% po intake per documentation; will add Ensure BID to promote po intake; labs and meds reviewed, Na low, Glucose eleveated, pt on statin and receiving lantus 2x daily and humalog 4x daily (6/21) Pt has been NPO x2 dyas first for nuclear stress test which was negative for myocardial ischemia, EGD/colsonscopy 6/19, now s/p R hemicolectomy y'day d/t colon mass. Per daughter, plans to initiate slow diet advancement today. NGT in place w/ 160ml doc OP. RD will continue to follow for diet advancement. FÁTIMA updated wt as pt was OOB. Labs and meds reviewed.                   (6/23) Diet adv to CLD w/no intakes documented. Per MD note, diet advancing per surgery. Wt stable. BLE edema improved. LBM 6/20 (loose, soft, medium). Alt labs inc: Cr 1.2, BG 62, phos 2.4. Meds inc: statin, diflucan, folvite, humalog, protonix, zosyn.    Nutrition Related Findings:    Based on limited visual assessment, pt did not show signs of wasting/fat loss - unable to complete NFPE as pt was deeply asleep.

## 2025-06-23 NOTE — PROGRESS NOTES
Dr Irene contacted to be informed that the patient has accidentally removed her NGT,No response voice message left and name for call back.

## 2025-06-23 NOTE — PROGRESS NOTES
[facility] SURGERY Progress Notes          Chief Complaint: None    History of Present Illness:    Ms. Lizbeth Hogue is a 70 y.o. female is S/P robotic assisted right hemicolectomy performed on 6/20/2025, POD #2.  No complaints.  No flatus or bowel movement.  Pain well under control.  NG output very minimal.  Excellent urine output      Past Medical History:   Past Medical History:   Diagnosis Date    Arthritis     Asthma     Chronic kidney disease     Chronic obstructive pulmonary disease (HCC)     Diabetes (HCC)     Hypertension     Sleep apnea     no cpap       Past Surgical History:   Past Surgical History:   Procedure Laterality Date    COLONOSCOPY N/A 6/17/2025    COLONOSCOPY DIAGNOSTIC performed by Eamon Jacobson MD at [facility] ENDOSCOPY    COLONOSCOPY N/A 6/17/2025    COLONOSCOPY BIOPSY performed by Eamon Jacobson MD at [facility] ENDOSCOPY    HEMICOLECTOMY Right 6/20/2025    RIGHT HEMICOLECTOMY LAPAROSCOPIC ROBOTIC performed by Pelon Irene MD at [facility] MAIN OR    OTHER SURGICAL HISTORY Bilateral     Eye surgery    UPPER GASTROINTESTINAL ENDOSCOPY N/A 6/17/2025    ESOPHAGOGASTRODUODENOSCOPY performed by Eamon Jacobson MD at [facility] ENDOSCOPY    WRIST SURGERY Left     pins and screws        Allergy:  Allergies   Allergen Reactions    Lisinopril Swelling     Lip swelling    Pineapple Swelling     Lip swelling       Social History:  reports that she has never smoked. She has never used smokeless tobacco. She reports that she does not drink alcohol and does not use drugs.     Family History:  Family History   Problem Relation Age of Onset    Diabetes Mother     Hypertension Father     Diabetes Father     Hypertension Mother         Current Medications:  Current Facility-Administered Medications:     cloNIDine (CATAPRES) 0.1 MG/24HR 1 patch, 1 patch, TransDERmal, Weekly, Tee Blue MD, 1 patch at 06/21/25 5585    enoxaparin (LOVENOX) injection 40 mg, 40 mg, SubCUTAneous, Daily, Pelon Irene MD, 40 mg at 06/22/25  complete (PRN contrast/bubble/strain/3D) (Completed)      History of multiple myeloma          Anemia, unspecified type        Relevant Orders    Surgical Pathology (Completed)      Pre-operative cardiovascular examination        Relevant Orders    Nuclear stress test with myocardial perfusion (Completed)      Mass of colon        Relevant Orders    Culture, Urine (Completed)    Surgical Pathology        Partially obstructing ascending colon mass suspicious for malignant neoplasm.    S/P robotic assisted right hemicolectomy performed on 6/20/2025, POD #2    Plan:    Admission  Diet: N.p.o. except for ice chips  IV fluids  SCD  IS  Pain medications  Antibiotics  Nausea medication  Lizama  Labs in the a.m.  11.  PT consult: Up to chair and ambulate       Thank you for the consultation & allowing me to participate in the care of this patient.

## 2025-06-23 NOTE — PROGRESS NOTES
Midline Insertion Procedure Note    Procedure: Insertion of #4 FR/18G Midline    Indications:  Poor Access    Procedure Details   Informed consent was obtained for the procedure. Nephrology approval obtained by Sheela HAMMOND from Dr. Blue.    #4 FR/18G Midline inserted to the L Basilic vein per hospital protocol.   Blood return:  yes    Findings:  Catheter inserted to 12 cm, with 0 cm. Exposed. Mid upper arm circumference is 27 cm.  There were no changes to vital signs. Catheter was flushed with 20 cc NS. Patient did tolerate procedure well.    Recommendations:  Brochure given to patient with teaching instruction.

## 2025-06-23 NOTE — PROGRESS NOTES
Hospitalist Progress Note               Daily Progress Note: 6/23/2025      Hospital Day: 11     Chief complaint:   Chief Complaint   Patient presents with    Shortness of Breath        hospital course:   70-year-old female past medical history of COPD, CKD, DM, HTN and multiple myeloma on chemotherapy presented with abdominal pain. CT scan revealed colon mass.  G.I. was consulted. Patient underwent EGD and colonoscopy, colonoscopy revealed a tumor concerning for malignancy biopsy was taken. The general surgery was consulted for colorectal mass. Cardiology was consulted to clear patient for surgery, stress test unremarkable/negative. Patient underwent robotic assisted right delisa colectomy on 6/20. Patient tolerated procedure well.    Subjective:     Patient is seen today for follow-up. Patient seen examined bedside. Patient denies any complaints.  Nuclear stress test with myocardial perfusion 6/19 revealed negative for myocardial ischemia. For stress ejection fraction 64%.  Patient underwent robotic assisted right delisa colectomy on 6/20, tolerated procedure well.  This morning patient denies having any complaints he denies abdominal pain, shortness of breath, nausea/vomiting patient also denies passing any bowel movements or any flatus. NG tube in place.    Medications reviewed  Current Facility-Administered Medications   Medication Dose Route Frequency    HYDROcodone-acetaminophen (NORCO) 5-325 MG per tablet 1 tablet  1 tablet Oral Q6H PRN    morphine (PF) injection 0.5 mg  0.5 mg IntraVENous Q3H PRN    ipratropium 0.5 mg-albuterol 2.5 mg (DUONEB) nebulizer solution 1 Dose  1 Dose Inhalation BID RT    enoxaparin (LOVENOX) injection 40 mg  40 mg SubCUTAneous Daily    piperacillin-tazobactam (ZOSYN) 3,375 mg in sodium chloride 0.9 % 50 mL IVPB (addEASE)  3,375 mg IntraVENous Q8H    pantoprazole (PROTONIX) injection 40 mg  40 mg IntraVENous Daily    fluconazole (DIFLUCAN) tablet 100 mg  100 mg Oral Daily     amLODIPine (NORVASC) tablet 10 mg  10 mg Oral Daily    [Held by provider] insulin glargine (LANTUS) injection vial 15 Units  15 Units SubCUTAneous BID    insulin lispro (HUMALOG,ADMELOG) injection vial 0-16 Units  0-16 Units SubCUTAneous 4x Daily AC & HS    ipratropium 0.5 mg-albuterol 2.5 mg (DUONEB) nebulizer solution 1 Dose  1 Dose Inhalation Q4H PRN    sodium chloride flush 0.9 % injection 5-40 mL  5-40 mL IntraVENous 2 times per day    sodium chloride flush 0.9 % injection 5-40 mL  5-40 mL IntraVENous PRN    0.9 % sodium chloride infusion   IntraVENous PRN    ondansetron (ZOFRAN-ODT) disintegrating tablet 4 mg  4 mg Oral Q8H PRN    Or    ondansetron (ZOFRAN) injection 4 mg  4 mg IntraVENous Q6H PRN    acetaminophen (TYLENOL) tablet 650 mg  650 mg Oral Q6H PRN    Or    acetaminophen (TYLENOL) suppository 650 mg  650 mg Rectal Q6H PRN    budesonide (PULMICORT) nebulizer suspension 500 mcg  0.5 mg Nebulization BID RT    glucose chewable tablet 16 g  4 tablet Oral PRN    dextrose bolus 10% 125 mL  125 mL IntraVENous PRN    Or    dextrose bolus 10% 250 mL  250 mL IntraVENous PRN    glucagon injection 1 mg  1 mg SubCUTAneous PRN    dextrose 10 % infusion   IntraVENous Continuous PRN    [Held by provider] aspirin EC tablet 81 mg  81 mg Oral Daily    atorvastatin (LIPITOR) tablet 40 mg  40 mg Oral Nightly    montelukast (SINGULAIR) tablet 10 mg  10 mg Oral Nightly    folic acid (FOLVITE) tablet 1 mg  1 mg Oral Daily       Review of Systems:   Pertinent items are noted in HPI.    Objective:   Physical Exam:     BP (!) 157/61   Pulse 71   Temp 98.1 °F (36.7 °C) (Axillary)   Resp 18   Ht 1.524 m (5')   Wt 64 kg (141 lb 1.5 oz)   SpO2 (!) 88%   BMI 27.56 kg/m²  O2 Flow Rate (L/min): 4 L/min O2 Device: None (Room air)    Temp (24hrs), Av.5 °F (36.9 °C), Min:98.1 °F (36.7 °C), Max:99.5 °F (37.5 °C)    No intake/output data recorded.   1901 -  0700  In: 4194.9 [P.O.:150; I.V.:3809]  Out: 8660  Calcium 8.5 8.5 - 10.1 mg/dL    Phosphorus 2.4 (L) 2.6 - 4.7 mg/dL    Albumin 2.2 (L) 3.5 - 5.0 g/dL   POCT Glucose    Collection Time: 06/23/25  9:26 AM   Result Value Ref Range    POC Glucose 70 65 - 100 mg/dL    Performed by: Oj Jimenez    POCT Glucose    Collection Time: 06/23/25 10:54 AM   Result Value Ref Range    POC Glucose 81 65 - 100 mg/dL    Performed by: Oj Jimenez    POCT Glucose    Collection Time: 06/23/25 11:57 AM   Result Value Ref Range    POC Glucose 131 (H) 65 - 100 mg/dL    Performed by: Boy Cruz        XR ABDOMEN (KUB) (SINGLE AP VIEW)   Final Result   Nonspecific bowel gas pattern         Electronically signed by Tiffanie Gaffney      XR CHEST PORTABLE   Final Result      1.  NG tube terminates over the gastric fundus.    2.   Stable mild edema pattern. New right infrahilar consolidations and hazy   opacities concerning for pneumonia or aspiration.       Electronically signed by SCOTT MERRILL      XR CHEST PORTABLE   Final Result   Enteric tube tip overlies the stomach.         Electronically signed by BRETT Edward      CT ABDOMEN PELVIS W IV CONTRAST Additional Contrast? Oral   Final Result      1. Near circumferential mass at the ileocecal valve/proximal right colon as   discussed above. This does not result in significant upstream dilation of the   terminal ileum 5. Incidental findings as discussed above..   2. Small left pleural effusion.   3. Gallbladder wall versus artifact. Right upper quadrant ultrasound may be   helpful for further characterization.   4. Bony demineralization with probable Schmorl's node and degenerative changes   as discussed above. Alternatively this could represent bony metastatic disease,   but this is felt to be less likely.   Incidental findings as discussed above.         Electronically signed by Halina Reece      XR CHEST PORTABLE   Final Result      No acute process on portable chest. No significant change since last month.         Electronically

## 2025-06-23 NOTE — PROGRESS NOTES
[facility] SURGERY Progress Notes          Chief Complaint: None    History of Present Illness:    Ms. Lizbeth Hogue is a 70 y.o. female is S/P robotic assisted right hemicolectomy performed on 6/20/2025, POD #3.  No complaints.  Past flatus and had a small bowel movement.  Pain well under control.  NG tube came out earlier this morning.  Excellent urine output      Past Medical History:   Past Medical History:   Diagnosis Date    Arthritis     Asthma     Chronic kidney disease     Chronic obstructive pulmonary disease (HCC)     Diabetes (HCC)     Hypertension     Sleep apnea     no cpap       Past Surgical History:   Past Surgical History:   Procedure Laterality Date    COLONOSCOPY N/A 6/17/2025    COLONOSCOPY DIAGNOSTIC performed by Eamon Jacobson MD at [facility] ENDOSCOPY    COLONOSCOPY N/A 6/17/2025    COLONOSCOPY BIOPSY performed by Eamon Jacobson MD at [facility] ENDOSCOPY    HEMICOLECTOMY Right 6/20/2025    RIGHT HEMICOLECTOMY LAPAROSCOPIC ROBOTIC performed by Pelon Irene MD at [facility] MAIN OR    OTHER SURGICAL HISTORY Bilateral     Eye surgery    UPPER GASTROINTESTINAL ENDOSCOPY N/A 6/17/2025    ESOPHAGOGASTRODUODENOSCOPY performed by Eamon Jacobson MD at [facility] ENDOSCOPY    WRIST SURGERY Left     pins and screws        Allergy:  Allergies   Allergen Reactions    Lisinopril Swelling     Lip swelling    Pineapple Swelling     Lip swelling       Social History:  reports that she has never smoked. She has never used smokeless tobacco. She reports that she does not drink alcohol and does not use drugs.     Family History:  Family History   Problem Relation Age of Onset    Diabetes Mother     Hypertension Father     Diabetes Father     Hypertension Mother         Current Medications:  Current Facility-Administered Medications:     cloNIDine (CATAPRES) 0.1 MG/24HR 1 patch, 1 patch, TransDERmal, Weekly, Tee Blue MD, 1 patch at 06/21/25 9394    enoxaparin (LOVENOX) injection 40 mg, 40 mg, SubCUTAneous, Daily, Teto  Sodium 135 (L) 136 - 145 mmol/L    Potassium 4.5 3.5 - 5.1 mmol/L    Chloride 98 97 - 108 mmol/L    CO2 32 21 - 32 mmol/L    Anion Gap 5 2 - 12 mmol/L    Glucose 62 (L) 65 - 100 mg/dL    BUN 14 6 - 20 mg/dL    Creatinine 1.20 (H) 0.55 - 1.02 mg/dL    BUN/Creatinine Ratio 12 12 - 20      Est, Glom Filt Rate 49 (L) >60 ml/min/1.73m2    Calcium 8.5 8.5 - 10.1 mg/dL    Phosphorus 2.4 (L) 2.6 - 4.7 mg/dL    Albumin 2.2 (L) 3.5 - 5.0 g/dL   POCT Glucose    Collection Time: 06/23/25  9:26 AM   Result Value Ref Range    POC Glucose 70 65 - 100 mg/dL    Performed by: Oj Jimenez          XR ABDOMEN (KUB) (SINGLE AP VIEW)   Final Result   Nonspecific bowel gas pattern         Electronically signed by Tiffanie Gaffney      XR CHEST PORTABLE   Final Result      1.  NG tube terminates over the gastric fundus.    2.   Stable mild edema pattern. New right infrahilar consolidations and hazy   opacities concerning for pneumonia or aspiration.       Electronically signed by SCOTT MERRILL      XR CHEST PORTABLE   Final Result   Enteric tube tip overlies the stomach.         Electronically signed by BRETT Edward      CT ABDOMEN PELVIS W IV CONTRAST Additional Contrast? Oral   Final Result      1. Near circumferential mass at the ileocecal valve/proximal right colon as   discussed above. This does not result in significant upstream dilation of the   terminal ileum 5. Incidental findings as discussed above..   2. Small left pleural effusion.   3. Gallbladder wall versus artifact. Right upper quadrant ultrasound may be   helpful for further characterization.   4. Bony demineralization with probable Schmorl's node and degenerative changes   as discussed above. Alternatively this could represent bony metastatic disease,   but this is felt to be less likely.   Incidental findings as discussed above.         Electronically signed by Halina Reece      XR CHEST PORTABLE   Final Result      No acute process on portable chest. No significant change  since last month.         Electronically signed by Tavon Castillo          Assessment:  Problem List Items Addressed This Visit    None  Visit Diagnoses         Acute on chronic respiratory failure with hypoxia and hypercapnia (HCC)    -  Primary      Shortness of breath        Relevant Orders    Echo (TTE) complete (PRN contrast/bubble/strain/3D) (Completed)      History of multiple myeloma          Anemia, unspecified type        Relevant Orders    Surgical Pathology (Completed)      Pre-operative cardiovascular examination        Relevant Orders    Nuclear stress test with myocardial perfusion (Completed)      Mass of colon        Relevant Orders    Culture, Urine (Completed)    Surgical Pathology        Partially obstructing ascending colon mass suspicious for malignant neoplasm.    S/P robotic assisted right hemicolectomy performed on 6/20/2025, POD #3    Plan:    Admission  Diet: Clear liquids  IV fluids  SCD  IS  Pain medications  Antibiotics  Nausea medication  SCOTTY Lizama today  Labs in the a.m.  11.  PT consult: Up to chair and ambulate       Thank you for the consultation & allowing me to participate in the care of this patient.

## 2025-06-24 ENCOUNTER — APPOINTMENT (OUTPATIENT)
Facility: HOSPITAL | Age: 70
DRG: 231 | End: 2025-06-24
Payer: MEDICAID

## 2025-06-24 LAB
GLUCOSE BLD STRIP.AUTO-MCNC: 117 MG/DL (ref 65–100)
GLUCOSE BLD STRIP.AUTO-MCNC: 187 MG/DL (ref 65–100)
GLUCOSE BLD STRIP.AUTO-MCNC: 262 MG/DL (ref 65–100)
PERFORMED BY:: ABNORMAL

## 2025-06-24 PROCEDURE — 6370000000 HC RX 637 (ALT 250 FOR IP): Performed by: NURSE PRACTITIONER

## 2025-06-24 PROCEDURE — 6370000000 HC RX 637 (ALT 250 FOR IP): Performed by: STUDENT IN AN ORGANIZED HEALTH CARE EDUCATION/TRAINING PROGRAM

## 2025-06-24 PROCEDURE — 6360000002 HC RX W HCPCS: Performed by: STUDENT IN AN ORGANIZED HEALTH CARE EDUCATION/TRAINING PROGRAM

## 2025-06-24 PROCEDURE — 82962 GLUCOSE BLOOD TEST: CPT

## 2025-06-24 PROCEDURE — 2500000003 HC RX 250 WO HCPCS: Performed by: NURSE PRACTITIONER

## 2025-06-24 PROCEDURE — 74018 RADEX ABDOMEN 1 VIEW: CPT

## 2025-06-24 PROCEDURE — 6370000000 HC RX 637 (ALT 250 FOR IP): Performed by: INTERNAL MEDICINE

## 2025-06-24 PROCEDURE — 2580000003 HC RX 258: Performed by: SURGERY

## 2025-06-24 PROCEDURE — 6360000002 HC RX W HCPCS: Performed by: NURSE PRACTITIONER

## 2025-06-24 PROCEDURE — 1100000000 HC RM PRIVATE

## 2025-06-24 PROCEDURE — 94640 AIRWAY INHALATION TREATMENT: CPT

## 2025-06-24 PROCEDURE — 6360000002 HC RX W HCPCS: Performed by: SURGERY

## 2025-06-24 PROCEDURE — 6370000000 HC RX 637 (ALT 250 FOR IP): Performed by: SURGERY

## 2025-06-24 PROCEDURE — 2700000000 HC OXYGEN THERAPY PER DAY

## 2025-06-24 PROCEDURE — 94761 N-INVAS EAR/PLS OXIMETRY MLT: CPT

## 2025-06-24 RX ORDER — METOCLOPRAMIDE HYDROCHLORIDE 5 MG/ML
10 INJECTION INTRAMUSCULAR; INTRAVENOUS EVERY 6 HOURS
Status: DISCONTINUED | OUTPATIENT
Start: 2025-06-24 | End: 2025-06-24

## 2025-06-24 RX ORDER — FUROSEMIDE 40 MG/1
40 TABLET ORAL DAILY
Status: DISCONTINUED | OUTPATIENT
Start: 2025-06-24 | End: 2025-06-26 | Stop reason: HOSPADM

## 2025-06-24 RX ORDER — METOCLOPRAMIDE HYDROCHLORIDE 5 MG/ML
5 INJECTION INTRAMUSCULAR; INTRAVENOUS EVERY 6 HOURS
Status: DISCONTINUED | OUTPATIENT
Start: 2025-06-24 | End: 2025-06-26 | Stop reason: HOSPADM

## 2025-06-24 RX ADMIN — METOCLOPRAMIDE 5 MG: 5 INJECTION, SOLUTION INTRAMUSCULAR; INTRAVENOUS at 12:47

## 2025-06-24 RX ADMIN — PIPERACILLIN AND TAZOBACTAM 3375 MG: 3; .375 INJECTION, POWDER, LYOPHILIZED, FOR SOLUTION INTRAVENOUS at 09:56

## 2025-06-24 RX ADMIN — HYDROCODONE BITARTRATE AND ACETAMINOPHEN 1 TABLET: 5; 325 TABLET ORAL at 21:52

## 2025-06-24 RX ADMIN — SODIUM CHLORIDE, PRESERVATIVE FREE 10 ML: 5 INJECTION INTRAVENOUS at 09:58

## 2025-06-24 RX ADMIN — INSULIN LISPRO 4 UNITS: 100 INJECTION, SOLUTION INTRAVENOUS; SUBCUTANEOUS at 21:52

## 2025-06-24 RX ADMIN — FUROSEMIDE 40 MG: 40 TABLET ORAL at 16:53

## 2025-06-24 RX ADMIN — PIPERACILLIN AND TAZOBACTAM 3375 MG: 3; .375 INJECTION, POWDER, LYOPHILIZED, FOR SOLUTION INTRAVENOUS at 00:50

## 2025-06-24 RX ADMIN — FOLIC ACID 1 MG: 1 TABLET ORAL at 09:55

## 2025-06-24 RX ADMIN — MONTELUKAST 10 MG: 10 TABLET, FILM COATED ORAL at 21:52

## 2025-06-24 RX ADMIN — IPRATROPIUM BROMIDE AND ALBUTEROL SULFATE 1 DOSE: 2.5; .5 SOLUTION RESPIRATORY (INHALATION) at 08:03

## 2025-06-24 RX ADMIN — BUDESONIDE INHALATION 500 MCG: 0.5 SUSPENSION RESPIRATORY (INHALATION) at 08:03

## 2025-06-24 RX ADMIN — HYDROCODONE BITARTRATE AND ACETAMINOPHEN 1 TABLET: 5; 325 TABLET ORAL at 09:54

## 2025-06-24 RX ADMIN — PIPERACILLIN AND TAZOBACTAM 3375 MG: 3; .375 INJECTION, POWDER, LYOPHILIZED, FOR SOLUTION INTRAVENOUS at 16:53

## 2025-06-24 RX ADMIN — FLUCONAZOLE 100 MG: 100 TABLET ORAL at 09:54

## 2025-06-24 RX ADMIN — BUDESONIDE INHALATION 500 MCG: 0.5 SUSPENSION RESPIRATORY (INHALATION) at 19:58

## 2025-06-24 RX ADMIN — ATORVASTATIN CALCIUM 40 MG: 40 TABLET, FILM COATED ORAL at 21:52

## 2025-06-24 RX ADMIN — ENOXAPARIN SODIUM 40 MG: 100 INJECTION SUBCUTANEOUS at 09:58

## 2025-06-24 RX ADMIN — PIPERACILLIN AND TAZOBACTAM 3375 MG: 3; .375 INJECTION, POWDER, LYOPHILIZED, FOR SOLUTION INTRAVENOUS at 23:57

## 2025-06-24 RX ADMIN — IPRATROPIUM BROMIDE AND ALBUTEROL SULFATE 1 DOSE: 2.5; .5 SOLUTION RESPIRATORY (INHALATION) at 19:58

## 2025-06-24 RX ADMIN — METOCLOPRAMIDE 5 MG: 5 INJECTION, SOLUTION INTRAMUSCULAR; INTRAVENOUS at 21:52

## 2025-06-24 RX ADMIN — AMLODIPINE BESYLATE 10 MG: 5 TABLET ORAL at 09:55

## 2025-06-24 RX ADMIN — PANTOPRAZOLE SODIUM 40 MG: 40 INJECTION, POWDER, FOR SOLUTION INTRAVENOUS at 09:54

## 2025-06-24 ASSESSMENT — PAIN SCALES - GENERAL
PAINLEVEL_OUTOF10: 4
PAINLEVEL_OUTOF10: 0

## 2025-06-24 ASSESSMENT — PAIN SCALES - WONG BAKER: WONGBAKER_NUMERICALRESPONSE: HURTS A LITTLE BIT

## 2025-06-24 ASSESSMENT — PAIN DESCRIPTION - LOCATION
LOCATION: ABDOMEN;CHEST
LOCATION: ABDOMEN

## 2025-06-24 ASSESSMENT — PAIN DESCRIPTION - DESCRIPTORS: DESCRIPTORS: ACHING

## 2025-06-24 NOTE — CARE COORDINATION
CM reviewed chart. Patient is s/p right hemicolectomy on 6/20/25. Awaiting toleration and advancement of diet. Surgery has cleared, nephrology following.     DCP: home with home health; however have had no accepting agencies as of today's date    CM will continue to monitor.

## 2025-06-24 NOTE — PROGRESS NOTES
Hospitalist Progress Note               Daily Progress Note: 6/24/2025      Hospital Day: 12     Chief complaint:   Chief Complaint   Patient presents with    Shortness of Breath        hospital course:   70-year-old female past medical history of COPD, CKD, DM, HTN and multiple myeloma on chemotherapy presented with abdominal pain. CT scan revealed colon mass.  G.I. was consulted. Patient underwent EGD and colonoscopy, colonoscopy revealed a tumor concerning for malignancy biopsy was taken. The general surgery was consulted for colorectal mass. Cardiology was consulted to clear patient for surgery, stress test unremarkable/negative. Patient underwent robotic assisted right delisa colectomy on 6/20. Patient started on clear liquid diet on 6/23, NGT was removed. Advancing diet to full liquid, will  discharge once patient has a BM and tolerates regular diet and is cleared for discahrged from general surgery standpoint.     Subjective:     Patient is seen today for follow-up. Patient seen examined bedside. Patient denies any complaints. Patient is passing flatus, she did have a small BM. Patients daughter at bedside. She is on clear liquid diet, tolerating well. Denies any N/V, Pain, SOB.       Medications reviewed  Current Facility-Administered Medications   Medication Dose Route Frequency    HYDROcodone-acetaminophen (NORCO) 5-325 MG per tablet 1 tablet  1 tablet Oral Q6H PRN    morphine (PF) injection 0.5 mg  0.5 mg IntraVENous Q3H PRN    ipratropium 0.5 mg-albuterol 2.5 mg (DUONEB) nebulizer solution 1 Dose  1 Dose Inhalation BID RT    amLODIPine (NORVASC) tablet 10 mg  10 mg Oral Daily    enoxaparin (LOVENOX) injection 40 mg  40 mg SubCUTAneous Daily    piperacillin-tazobactam (ZOSYN) 3,375 mg in sodium chloride 0.9 % 50 mL IVPB (addEASE)  3,375 mg IntraVENous Q8H    pantoprazole (PROTONIX) injection 40 mg  40 mg IntraVENous Daily    fluconazole (DIFLUCAN) tablet 100 mg  100 mg Oral Daily    [Held by provider]  colonoscopy revealed tumor, concerning for malignancy. Biopsy is taken  - general surgery consulted for colorectal mass, appreciate recommendations  - Monitor hemoglobin, goal >7  - cardiac stress test normal, patient can undergo partial colectomy.  -- Patient was a high risk patient for surgery given her multiple comorbidities including dementia and recent chemotherapy for her multiple myeloma.  - general surgery consulted, appreciate recommendations  - S/P robotic assisted right delisa colectomy 6/20  - patient currently tolerating clear liquid diet, will advance to full liquid today.  - advanced diet per general surgery recommendations  - pain and anti-nausea medications  \      Candida esophagitis  - EGD revealed lower part of esophagus with candidate esophagitis  - started on fluconazole 200 MG today followed by hundred MG from tomorrow  - Protonix daily         CKD stage IIIb  Hyponatremia resolved  Clinical patient euvolemic right now  Status post IV fluids  Creatinine is 1.2 today     Diabetes mellitus type 2  -maintain euglycemia  -glucose POCT  -lantus 8u BID  -ISS     Multiple myeloma  Anemia, possible GIB  -on treatment for MM with D-RVD  -transfused 1u pRBC 6/13   -SCDs  -receiving venofer for 4 doses     Acute metabolic encephalopathy resolved  -likely due to hypercarbia, now resolved      Code status:    Social determinants of health: none      Estimated discharge date//time frame/disposition:    Barriers to discharge:           Paulo Leslie MD

## 2025-06-24 NOTE — PROGRESS NOTES
[facility] SURGERY Progress Notes          Chief Complaint: None    History of Present Illness:    Ms. Lizbeth Hogue is a 70 y.o. female is S/P robotic assisted right hemicolectomy performed on 6/20/2025, POD #4.    Has been complaining of abdominal pain.  No nausea or vomiting.  Passed flatus and had a small bowel movement.  Pain well under control.    Past Medical History:   Past Medical History:   Diagnosis Date    Arthritis     Asthma     Chronic kidney disease     Chronic obstructive pulmonary disease (HCC)     Diabetes (HCC)     Hypertension     Sleep apnea     no cpap       Past Surgical History:   Past Surgical History:   Procedure Laterality Date    COLONOSCOPY N/A 6/17/2025    COLONOSCOPY DIAGNOSTIC performed by Eamon Jacobson MD at [facility] ENDOSCOPY    COLONOSCOPY N/A 6/17/2025    COLONOSCOPY BIOPSY performed by Eamon Jacobson MD at [facility] ENDOSCOPY    HEMICOLECTOMY Right 6/20/2025    RIGHT HEMICOLECTOMY LAPAROSCOPIC ROBOTIC performed by Pelon Irene MD at [facility] MAIN OR    OTHER SURGICAL HISTORY Bilateral     Eye surgery    UPPER GASTROINTESTINAL ENDOSCOPY N/A 6/17/2025    ESOPHAGOGASTRODUODENOSCOPY performed by Eamon Jacobson MD at [facility] ENDOSCOPY    WRIST SURGERY Left     pins and screws        Allergy:  Allergies   Allergen Reactions    Lisinopril Swelling     Lip swelling    Pineapple Swelling     Lip swelling       Social History:  reports that she has never smoked. She has never used smokeless tobacco. She reports that she does not drink alcohol and does not use drugs.     Family History:  Family History   Problem Relation Age of Onset    Diabetes Mother     Hypertension Father     Diabetes Father     Hypertension Mother         Current Medications:  Current Facility-Administered Medications:     metoclopramide (REGLAN) injection 5 mg, 5 mg, IntraVENous, Q6H, Pelon Irene MD, 5 mg at 06/24/25 1247    HYDROcodone-acetaminophen (NORCO) 5-325 MG per tablet 1 tablet, 1 tablet, Oral, Q6H PRN, Teto  Encounters:   06/24/25 (!) 146/69   06/19/25 (!) 154/73   06/08/25 (!) 136/51     Pulse Readings from Last 3 Encounters:   06/24/25 65   06/19/25 64   06/08/25 68      Ht Readings from Last 3 Encounters:   06/13/25 1.524 m (5')   06/19/25 1.524 m (5')   06/06/25 1.422 m (4' 8\")          General: Chronically ill appearing, mild distress due to dementia   Head: Normal  Face: Nornal  HEENT: atraumatic, PERRLA, moist mucosa, normal pharynx, normal tonsils and adenoids, normal tongue, no fluid in sinuses  Neck: Trachea midline, no carotid bruit, no masses  Chest: Normal.  Respiratory: Normal chest wall expansion, CTA B, no r/r/w, no rubs  Cardiovascular: RRR, no m/r/g, Normal S1 and S2  Abdomen: Soft, appropriate incision site tenderness, mildly distended, normal bowel sounds in all quadrants, no hepatosplenomegaly, no tympany, incision site clean dry and intact.  Genitourinary: No inguinal hernia, normal external gentalia,  no renal angle tenderness  Rectal: deferred  Musculoskeletal: Limited ROM in upper and lower extremities, No joint swelling.  Integumentary: Warm, dry, and pink, with no rash, purpura, or petechia  Heme/Lymph: No lymphadenopathy, no bruises  Neurological: Dementia unable to evaluate.  Psychiatric: Dementia unable to evaluate.       Laboratory Values:   Recent Results (from the past 24 hours)   POCT Glucose    Collection Time: 06/23/25  4:31 PM   Result Value Ref Range    POC Glucose 165 (H) 65 - 100 mg/dL    Performed by: Boy Cruz    POCT Glucose    Collection Time: 06/23/25  7:38 PM   Result Value Ref Range    POC Glucose 132 (H) 65 - 100 mg/dL    Performed by: Roth Ariana    POCT Glucose    Collection Time: 06/24/25 11:55 AM   Result Value Ref Range    POC Glucose 117 (H) 65 - 100 mg/dL    Performed by: Franki Cruz          XR ABDOMEN (KUB) (SINGLE AP VIEW)   Final Result      Worsening mild to moderate diffuse colonic distention/ileus.              Electronically signed by SCOTT MERRILL

## 2025-06-24 NOTE — PROGRESS NOTES
Nephrology follow-up          Patient: Lizbeth Hogue MRN: [id number]  SSN: [id number]    YOB: 1955  Age: 70 y.o.  Sex: female      Subjective:   The patient is seen at the bedside  She looks comfortable  On nasal cannula 2->4 L  NG is out  Improving Bilateral lower extremity swelling  Blood pressures are normal  Resolving hyponatremia  Off IV fluids    Past Medical History:   Diagnosis Date    Arthritis     Asthma     Chronic kidney disease     Chronic obstructive pulmonary disease (HCC)     Diabetes (HCC)     Hypertension     Sleep apnea     no cpap     Past Surgical History:   Procedure Laterality Date    COLONOSCOPY N/A 6/17/2025    COLONOSCOPY DIAGNOSTIC performed by Eamon Jacobson MD at [facility] ENDOSCOPY    COLONOSCOPY N/A 6/17/2025    COLONOSCOPY BIOPSY performed by Eamon Jacobson MD at [facility] ENDOSCOPY    HEMICOLECTOMY Right 6/20/2025    RIGHT HEMICOLECTOMY LAPAROSCOPIC ROBOTIC performed by Pelon Irene MD at [facility] MAIN OR    OTHER SURGICAL HISTORY Bilateral     Eye surgery    UPPER GASTROINTESTINAL ENDOSCOPY N/A 6/17/2025    ESOPHAGOGASTRODUODENOSCOPY performed by Eamon Jacobson MD at [facility] ENDOSCOPY    WRIST SURGERY Left     pins and screws      Family History   Problem Relation Age of Onset    Diabetes Mother     Hypertension Father     Diabetes Father     Hypertension Mother      Social History     Tobacco Use    Smoking status: Never    Smokeless tobacco: Never   Substance Use Topics    Alcohol use: Never      Current Facility-Administered Medications   Medication Dose Route Frequency Provider Last Rate Last Admin    metoclopramide (REGLAN) injection 5 mg  5 mg IntraVENous Q6H Pelon Irene MD   5 mg at 06/24/25 1247    HYDROcodone-acetaminophen (NORCO) 5-325 MG per tablet 1 tablet  1 tablet Oral Q6H PRN Pelon Irene MD   1 tablet at 06/24/25 0954    morphine (PF) injection 0.5 mg  0.5 mg IntraVENous Q3H PRN Pelon Irene MD        ipratropium 0.5

## 2025-06-24 NOTE — PLAN OF CARE
Problem: Chronic Conditions and Co-morbidities  Goal: Patient's chronic conditions and co-morbidity symptoms are monitored and maintained or improved  Outcome: Progressing     Problem: Chronic Conditions and Co-morbidities  Goal: Patient's chronic conditions and co-morbidity symptoms are monitored and maintained or improved  Outcome: Progressing     Problem: Discharge Planning  Goal: Discharge to home or other facility with appropriate resources  Outcome: Progressing     Problem: Discharge Planning  Goal: Discharge to home or other facility with appropriate resources  Outcome: Progressing     Problem: Respiratory - Adult  Goal: Achieves optimal ventilation and oxygenation  Outcome: Progressing     Problem: Respiratory - Adult  Goal: Achieves optimal ventilation and oxygenation  Outcome: Progressing     Problem: Cardiovascular - Adult  Goal: Maintains optimal cardiac output and hemodynamic stability  Outcome: Progressing  Goal: Absence of cardiac dysrhythmias or at baseline  Outcome: Progressing     Problem: Cardiovascular - Adult  Goal: Maintains optimal cardiac output and hemodynamic stability  Outcome: Progressing     Problem: Cardiovascular - Adult  Goal: Absence of cardiac dysrhythmias or at baseline  Outcome: Progressing     Problem: Infection - Adult  Goal: Absence of infection at discharge  Outcome: Progressing  Goal: Absence of infection during hospitalization  Outcome: Progressing  Goal: Absence of fever/infection during anticipated neutropenic period  Outcome: Progressing     Problem: Infection - Adult  Goal: Absence of infection at discharge  Outcome: Progressing     Problem: Infection - Adult  Goal: Absence of infection during hospitalization  Outcome: Progressing

## 2025-06-25 LAB
ALBUMIN SERPL-MCNC: 2.3 G/DL (ref 3.5–5)
ANION GAP SERPL CALC-SCNC: 4 MMOL/L (ref 2–12)
BUN SERPL-MCNC: 14 MG/DL (ref 6–20)
BUN/CREAT SERPL: 10 (ref 12–20)
CA-I BLD-MCNC: 8.7 MG/DL (ref 8.5–10.1)
CHLORIDE SERPL-SCNC: 99 MMOL/L (ref 97–108)
CO2 SERPL-SCNC: 34 MMOL/L (ref 21–32)
CREAT SERPL-MCNC: 1.34 MG/DL (ref 0.55–1.02)
GLUCOSE BLD STRIP.AUTO-MCNC: 108 MG/DL (ref 65–100)
GLUCOSE BLD STRIP.AUTO-MCNC: 158 MG/DL (ref 65–100)
GLUCOSE BLD STRIP.AUTO-MCNC: 227 MG/DL (ref 65–100)
GLUCOSE SERPL-MCNC: 97 MG/DL (ref 65–100)
PERFORMED BY:: ABNORMAL
PHOSPHATE SERPL-MCNC: 2.4 MG/DL (ref 2.6–4.7)
POTASSIUM SERPL-SCNC: 3.9 MMOL/L (ref 3.5–5.1)
SODIUM SERPL-SCNC: 137 MMOL/L (ref 136–145)

## 2025-06-25 PROCEDURE — 2580000003 HC RX 258: Performed by: SURGERY

## 2025-06-25 PROCEDURE — 2580000003 HC RX 258: Performed by: STUDENT IN AN ORGANIZED HEALTH CARE EDUCATION/TRAINING PROGRAM

## 2025-06-25 PROCEDURE — 97530 THERAPEUTIC ACTIVITIES: CPT

## 2025-06-25 PROCEDURE — 6360000002 HC RX W HCPCS: Performed by: SURGERY

## 2025-06-25 PROCEDURE — 6370000000 HC RX 637 (ALT 250 FOR IP): Performed by: NURSE PRACTITIONER

## 2025-06-25 PROCEDURE — 94640 AIRWAY INHALATION TREATMENT: CPT

## 2025-06-25 PROCEDURE — 6370000000 HC RX 637 (ALT 250 FOR IP): Performed by: SURGERY

## 2025-06-25 PROCEDURE — 1100000000 HC RM PRIVATE

## 2025-06-25 PROCEDURE — 82962 GLUCOSE BLOOD TEST: CPT

## 2025-06-25 PROCEDURE — 36415 COLL VENOUS BLD VENIPUNCTURE: CPT

## 2025-06-25 PROCEDURE — 6360000002 HC RX W HCPCS: Performed by: STUDENT IN AN ORGANIZED HEALTH CARE EDUCATION/TRAINING PROGRAM

## 2025-06-25 PROCEDURE — 80069 RENAL FUNCTION PANEL: CPT

## 2025-06-25 PROCEDURE — 6370000000 HC RX 637 (ALT 250 FOR IP): Performed by: STUDENT IN AN ORGANIZED HEALTH CARE EDUCATION/TRAINING PROGRAM

## 2025-06-25 PROCEDURE — 6360000002 HC RX W HCPCS: Performed by: NURSE PRACTITIONER

## 2025-06-25 PROCEDURE — 6370000000 HC RX 637 (ALT 250 FOR IP): Performed by: INTERNAL MEDICINE

## 2025-06-25 PROCEDURE — 2500000003 HC RX 250 WO HCPCS: Performed by: NURSE PRACTITIONER

## 2025-06-25 RX ADMIN — IPRATROPIUM BROMIDE AND ALBUTEROL SULFATE 1 DOSE: 2.5; .5 SOLUTION RESPIRATORY (INHALATION) at 07:38

## 2025-06-25 RX ADMIN — FLUCONAZOLE 100 MG: 100 TABLET ORAL at 09:20

## 2025-06-25 RX ADMIN — SODIUM CHLORIDE, PRESERVATIVE FREE 10 ML: 5 INJECTION INTRAVENOUS at 21:14

## 2025-06-25 RX ADMIN — METOCLOPRAMIDE 5 MG: 5 INJECTION, SOLUTION INTRAMUSCULAR; INTRAVENOUS at 21:12

## 2025-06-25 RX ADMIN — ACETAMINOPHEN 650 MG: 325 TABLET ORAL at 09:38

## 2025-06-25 RX ADMIN — IPRATROPIUM BROMIDE AND ALBUTEROL SULFATE 1 DOSE: 2.5; .5 SOLUTION RESPIRATORY (INHALATION) at 20:53

## 2025-06-25 RX ADMIN — FUROSEMIDE 40 MG: 40 TABLET ORAL at 09:20

## 2025-06-25 RX ADMIN — PANTOPRAZOLE SODIUM 40 MG: 40 INJECTION, POWDER, FOR SOLUTION INTRAVENOUS at 09:20

## 2025-06-25 RX ADMIN — INSULIN LISPRO 4 UNITS: 100 INJECTION, SOLUTION INTRAVENOUS; SUBCUTANEOUS at 20:02

## 2025-06-25 RX ADMIN — ENOXAPARIN SODIUM 40 MG: 100 INJECTION SUBCUTANEOUS at 09:20

## 2025-06-25 RX ADMIN — AMLODIPINE BESYLATE 10 MG: 5 TABLET ORAL at 09:20

## 2025-06-25 RX ADMIN — SODIUM CHLORIDE, PRESERVATIVE FREE 10 ML: 5 INJECTION INTRAVENOUS at 09:23

## 2025-06-25 RX ADMIN — BUDESONIDE INHALATION 500 MCG: 0.5 SUSPENSION RESPIRATORY (INHALATION) at 20:53

## 2025-06-25 RX ADMIN — METOCLOPRAMIDE 5 MG: 5 INJECTION, SOLUTION INTRAMUSCULAR; INTRAVENOUS at 15:36

## 2025-06-25 RX ADMIN — FOLIC ACID 1 MG: 1 TABLET ORAL at 09:20

## 2025-06-25 RX ADMIN — HYDROCODONE BITARTRATE AND ACETAMINOPHEN 1 TABLET: 5; 325 TABLET ORAL at 04:41

## 2025-06-25 RX ADMIN — DEXAMETHASONE SODIUM PHOSPHATE 40 MG: 4 INJECTION, SOLUTION INTRA-ARTICULAR; INTRALESIONAL; INTRAMUSCULAR; INTRAVENOUS; SOFT TISSUE at 19:43

## 2025-06-25 RX ADMIN — ATORVASTATIN CALCIUM 40 MG: 40 TABLET, FILM COATED ORAL at 20:04

## 2025-06-25 RX ADMIN — MONTELUKAST 10 MG: 10 TABLET, FILM COATED ORAL at 20:04

## 2025-06-25 RX ADMIN — PIPERACILLIN AND TAZOBACTAM 3375 MG: 3; .375 INJECTION, POWDER, LYOPHILIZED, FOR SOLUTION INTRAVENOUS at 09:32

## 2025-06-25 RX ADMIN — METOCLOPRAMIDE 5 MG: 5 INJECTION, SOLUTION INTRAMUSCULAR; INTRAVENOUS at 09:20

## 2025-06-25 RX ADMIN — METOCLOPRAMIDE 5 MG: 5 INJECTION, SOLUTION INTRAMUSCULAR; INTRAVENOUS at 04:41

## 2025-06-25 RX ADMIN — PIPERACILLIN AND TAZOBACTAM 3375 MG: 3; .375 INJECTION, POWDER, LYOPHILIZED, FOR SOLUTION INTRAVENOUS at 15:53

## 2025-06-25 RX ADMIN — BUDESONIDE INHALATION 500 MCG: 0.5 SUSPENSION RESPIRATORY (INHALATION) at 07:38

## 2025-06-25 RX ADMIN — SODIUM CHLORIDE, PRESERVATIVE FREE 10 ML: 5 INJECTION INTRAVENOUS at 22:50

## 2025-06-25 ASSESSMENT — PAIN SCALES - WONG BAKER
WONGBAKER_NUMERICALRESPONSE: NO HURT
WONGBAKER_NUMERICALRESPONSE: NO HURT

## 2025-06-25 ASSESSMENT — PAIN DESCRIPTION - ORIENTATION
ORIENTATION: RIGHT;LEFT
ORIENTATION: MID
ORIENTATION: LEFT

## 2025-06-25 ASSESSMENT — PAIN DESCRIPTION - LOCATION
LOCATION: ABDOMEN
LOCATION: ABDOMEN
LOCATION: LEG

## 2025-06-25 ASSESSMENT — PAIN DESCRIPTION - PAIN TYPE: TYPE: SURGICAL PAIN

## 2025-06-25 ASSESSMENT — PAIN - FUNCTIONAL ASSESSMENT: PAIN_FUNCTIONAL_ASSESSMENT: ACTIVITIES ARE NOT PREVENTED

## 2025-06-25 ASSESSMENT — PAIN SCALES - GENERAL
PAINLEVEL_OUTOF10: 2
PAINLEVEL_OUTOF10: 0
PAINLEVEL_OUTOF10: 10
PAINLEVEL_OUTOF10: 0
PAINLEVEL_OUTOF10: 0

## 2025-06-25 ASSESSMENT — PAIN DESCRIPTION - ONSET: ONSET: SUDDEN

## 2025-06-25 ASSESSMENT — PAIN DESCRIPTION - DESCRIPTORS
DESCRIPTORS: DULL
DESCRIPTORS: SORE
DESCRIPTORS: ACHING

## 2025-06-25 ASSESSMENT — PAIN DESCRIPTION - FREQUENCY: FREQUENCY: INTERMITTENT

## 2025-06-25 NOTE — PLAN OF CARE
Problem: Chronic Conditions and Co-morbidities  Goal: Patient's chronic conditions and co-morbidity symptoms are monitored and maintained or improved  Outcome: Progressing     Problem: Discharge Planning  Goal: Discharge to home or other facility with appropriate resources  Outcome: Progressing     Problem: Respiratory - Adult  Goal: Achieves optimal ventilation and oxygenation  Outcome: Progressing     Problem: Cardiovascular - Adult  Goal: Maintains optimal cardiac output and hemodynamic stability  Outcome: Progressing  Goal: Absence of cardiac dysrhythmias or at baseline  Outcome: Progressing     Problem: Infection - Adult  Goal: Absence of infection at discharge  Outcome: Progressing  Goal: Absence of infection during hospitalization  Outcome: Progressing  Goal: Absence of fever/infection during anticipated neutropenic period  Outcome: Progressing     Problem: Skin/Tissue Integrity - Adult  Goal: Skin integrity remains intact  Description: 1.  Monitor for areas of redness and/or skin breakdown  2.  Assess vascular access sites hourly  3.  Every 4-6 hours minimum:  Change oxygen saturation probe site  4.  Every 4-6 hours:  If on nasal continuous positive airway pressure, respiratory therapy assess nares and determine need for appliance change or resting period  Outcome: Progressing     Problem: Musculoskeletal - Adult  Goal: Return mobility to safest level of function  Outcome: Progressing     Problem: Hematologic - Adult  Goal: Maintains hematologic stability  Outcome: Progressing     Problem: Skin/Tissue Integrity  Goal: Skin integrity remains intact  Description: 1.  Monitor for areas of redness and/or skin breakdown  2.  Assess vascular access sites hourly  3.  Every 4-6 hours minimum:  Change oxygen saturation probe site  4.  Every 4-6 hours:  If on nasal continuous positive airway pressure, respiratory therapy assess nares and determine need for appliance change or resting period  Outcome: Progressing      Problem: ABCDS Injury Assessment  Goal: Absence of physical injury  Outcome: Progressing     Problem: Safety - Adult  Goal: Free from fall injury  Outcome: Progressing     Problem: Nutrition Deficit:  Goal: Optimize nutritional status  Outcome: Progressing     Problem: Pain  Goal: Verbalizes/displays adequate comfort level or baseline comfort level  Outcome: Progressing

## 2025-06-25 NOTE — PLAN OF CARE
PHYSICAL THERAPY TREATMENT     Patient: Lizbeth Hogue (70 y.o. female)  Date: 6/25/2025  Diagnosis: Shortness of breath [R06.02]  Acute on chronic respiratory failure with hypoxemia (HCC) [J96.21]  Acute on chronic respiratory failure with hypoxia and hypercapnia (HCC) [J96.21, J96.22] Acute on chronic respiratory failure with hypoxemia (HCC)  Procedure(s) (LRB):  RIGHT HEMICOLECTOMY LAPAROSCOPIC ROBOTIC (Right) 5 Days Post-Op  Precautions: Fall Risk, General Precautions, Contact Precautions, Bed Alarm, Surgical Protocols                      Recommendations for nursing mobility: Out of bed to chair for meals, Encourage HEP in prep for ADLs/mobility; see handout for details, Frequent repositioning to prevent skin breakdown, AD and gt belt for bed to chair , Amb to bathroom with AD and gait belt, and Assist x1    In place during session: Peripheral IV, Nasal Cannula  L, and External Catheter  Chart, physical therapy assessment, plan of care and goals were reviewed.  ASSESSMENT  Patient continues with skilled PT services and is progressing towards goals. Pt in bed upon PT arrival, agreeable to session. Pt A&O x 3, disoriented to time. (See below for objective details and assist levels).     Overall pt tolerated session well today. Patient completed all mobility with CGA using RW. Demos no LOB during OOB mobility however does amb with flexed posture and slower gt speed. Quick gait initially upon standing to quickly get to the BSC. Pt amb approx 10 ft to BSC then another 50 ft in the room. Tolerated seated LE therex well and SpO2 stable upper 90s to 100% on 6L throughout session. Will continue to benefit from skilled PT services, and will continue to progress as tolerated. Current PT DC recommendation Intermittent physical therapy up to 2-3x/week in previous living setting once medically appropriate.      GOALS:    Problem: Physical Therapy - Adult  Goal: By Discharge: Performs mobility at highest level of

## 2025-06-25 NOTE — PROGRESS NOTES
Hospitalist Progress Note               Daily Progress Note: 6/25/2025      Hospital Day: 13     Chief complaint:   Chief Complaint   Patient presents with    Shortness of Breath        hospital course:   70-year-old female past medical history of COPD, CKD, DM, HTN and multiple myeloma on chemotherapy presented with abdominal pain. CT scan revealed colon mass.  G.I. was consulted. Patient underwent EGD and colonoscopy, colonoscopy revealed a tumor concerning for malignancy biopsy was taken. The general surgery was consulted for colorectal mass. Cardiology was consulted to clear patient for surgery, stress test unremarkable/negative. Patient underwent robotic assisted right delisa colectomy on 6/20. Patient started on clear liquid diet on 6/23, NGT was removed. Advancing diet to full liquid, will  discharge once patient has a BM and tolerates regular diet and is cleared for discahrged from general surgery standpoint.     Subjective:     Patient is seen today for follow-up. Patient seen examined bedside. Patient denies any complaints. Patient is passing flatus, per patients daughter, patient had a small BM. Patients daughter at bedside. She is on clear liquid diet, tolerating well. Denies any N/V, Pain, SOB. Patient requesting to try solid food.         Medications reviewed  Current Facility-Administered Medications   Medication Dose Route Frequency    metoclopramide (REGLAN) injection 5 mg  5 mg IntraVENous Q6H    furosemide (LASIX) tablet 40 mg  40 mg Oral Daily    HYDROcodone-acetaminophen (NORCO) 5-325 MG per tablet 1 tablet  1 tablet Oral Q6H PRN    morphine (PF) injection 0.5 mg  0.5 mg IntraVENous Q3H PRN    ipratropium 0.5 mg-albuterol 2.5 mg (DUONEB) nebulizer solution 1 Dose  1 Dose Inhalation BID RT    amLODIPine (NORVASC) tablet 10 mg  10 mg Oral Daily    enoxaparin (LOVENOX) injection 40 mg  40 mg SubCUTAneous Daily    piperacillin-tazobactam (ZOSYN) 3,375 mg in sodium chloride 0.9 % 50 mL IVPB  oncology okay with Decadron 40 mg one time dose today to keep up with the myeloma regimen per Oncology, no Revlimid until patient sees Dr Mcneill.     Acute metabolic encephalopathy resolved  -likely due to hypercarbia, now resolved      Code status: full code     Social determinants of health: none      Estimated discharge date//time frame/disposition: 48 hrs     Barriers to discharge: decadron today, still on clear liquid diet, Needs to have a BM, general surgery clearance .           Paulo Leslie MD

## 2025-06-25 NOTE — PROGRESS NOTES
4 Eyes Skin Assessment     NAME:  Lizbeth Hogue  YOB: 1955  MEDICAL RECORD NUMBER:  [id number]    The patient is being assessed for  Other  Per Protocol    I agree that at least one RN has performed a thorough Head to Toe Skin Assessment on the patient. ALL assessment sites listed below have been assessed.      Areas assessed by both nurses:    Head, Face, Ears, Shoulders, Back, Chest, Arms, Elbows, Hands, Sacrum. Buttock, Coccyx, Ischium, Legs. Feet and Heels, and Under Medical Devices         Does the Patient have a Wound? Yes wound(s) were present on assessment. LDA wound assessment was Initiated and completed by RN- Surgery       Edil Prevention initiated by RN: No  Wound Care Orders initiated by RN: No    Pressure Injury (Stage 3,4, Unstageable, DTI, NWPT, and Complex wounds) if present, place Wound referral order by RN under : No    New Ostomies, if present place, Ostomy referral order under : No     Nurse 1 eSignature: Electronically signed by Barbara Mcwilliams RN on 6/25/25 at 5:05 PM EDT    **SHARE this note so that the co-signing nurse can place an eSignature**    Nurse 2 eSignature: Electronically signed by Dane Hill RN on 6/25/25 at 5:46 PM EDT

## 2025-06-25 NOTE — CARE COORDINATION
CM reviewed chart.     Barrier: advancement of diet    Referrals sent to multiple agencies for HH/PT. No accepting companies. CM spoke with patient and daughter, they verbalized understanding and daughter stated \"we had this problem last time\". Patient anxious to go home per daughter, daughter also states that patient is \"very active\" and she feels patient will \"hit the ground running\" when she gets home. Daughter asked when patient can be discharged, advised will discuss at [facility]DR this morning. Daughter stated patient has \"kept her stuff down and has had BM\".     DCP Home, self care once clinically stable.

## 2025-06-25 NOTE — PROGRESS NOTES
Nephrology follow-up          Patient: Lizbeth Hogue MRN: [id number]  SSN: [id number]    YOB: 1955  Age: 70 y.o.  Sex: female      Subjective:   The patient is seen at the bedside  She looks comfortable  On nasal cannula 2->4->6 L  NG is out  Improving Bilateral lower extremity swelling  Blood pressures are normal  Resolving hyponatremia  Off IV fluids    Past Medical History:   Diagnosis Date    Arthritis     Asthma     Chronic kidney disease     Chronic obstructive pulmonary disease (HCC)     Diabetes (HCC)     Hypertension     Sleep apnea     no cpap     Past Surgical History:   Procedure Laterality Date    COLONOSCOPY N/A 6/17/2025    COLONOSCOPY DIAGNOSTIC performed by Eamon Jacobson MD at [facility] ENDOSCOPY    COLONOSCOPY N/A 6/17/2025    COLONOSCOPY BIOPSY performed by Eamon Jacobson MD at [facility] ENDOSCOPY    HEMICOLECTOMY Right 6/20/2025    RIGHT HEMICOLECTOMY LAPAROSCOPIC ROBOTIC performed by Pelon Irene MD at [facility] MAIN OR    OTHER SURGICAL HISTORY Bilateral     Eye surgery    UPPER GASTROINTESTINAL ENDOSCOPY N/A 6/17/2025    ESOPHAGOGASTRODUODENOSCOPY performed by Eamon Jacobson MD at [facility] ENDOSCOPY    WRIST SURGERY Left     pins and screws      Family History   Problem Relation Age of Onset    Diabetes Mother     Hypertension Father     Diabetes Father     Hypertension Mother      Social History     Tobacco Use    Smoking status: Never    Smokeless tobacco: Never   Substance Use Topics    Alcohol use: Never      Current Facility-Administered Medications   Medication Dose Route Frequency Provider Last Rate Last Admin    dexAMETHasone (DECADRON) 40 mg in sodium chloride 0.9 % 50 mL IVPB  40 mg IntraVENous Once Paulo Leslie MD        metoclopramide (REGLAN) injection 5 mg  5 mg IntraVENous Q6H Pelon Irene MD   5 mg at 06/25/25 1536    furosemide (LASIX) tablet 40 mg  40 mg Oral Daily Tee Blue MD   40 mg at 06/25/25 0920    HYDROcodone-acetaminophen (NORCO) 5-325 MG

## 2025-06-25 NOTE — PROGRESS NOTES
Received call from RN that patient was wheezing. On arrival to room patient sleeping comfortable with bilateral clear/diminished breath sounds. Patient states she does not need a treatment at this time. Patients daughter at bedside said she was short a breath while working with PT but that had resolved.

## 2025-06-26 ENCOUNTER — APPOINTMENT (OUTPATIENT)
Facility: HOSPITAL | Age: 70
DRG: 231 | End: 2025-06-26
Payer: MEDICAID

## 2025-06-26 VITALS
TEMPERATURE: 97.5 F | OXYGEN SATURATION: 94 % | BODY MASS INDEX: 27.7 KG/M2 | HEART RATE: 76 BPM | SYSTOLIC BLOOD PRESSURE: 136 MMHG | DIASTOLIC BLOOD PRESSURE: 62 MMHG | WEIGHT: 141.09 LBS | HEIGHT: 60 IN | RESPIRATION RATE: 18 BRPM

## 2025-06-26 LAB
GLUCOSE BLD STRIP.AUTO-MCNC: 163 MG/DL (ref 65–100)
GLUCOSE BLD STRIP.AUTO-MCNC: 229 MG/DL (ref 65–100)
PERFORMED BY:: ABNORMAL
PERFORMED BY:: ABNORMAL

## 2025-06-26 PROCEDURE — 6370000000 HC RX 637 (ALT 250 FOR IP): Performed by: STUDENT IN AN ORGANIZED HEALTH CARE EDUCATION/TRAINING PROGRAM

## 2025-06-26 PROCEDURE — 6360000002 HC RX W HCPCS: Performed by: SURGERY

## 2025-06-26 PROCEDURE — 6360000002 HC RX W HCPCS: Performed by: NURSE PRACTITIONER

## 2025-06-26 PROCEDURE — 82962 GLUCOSE BLOOD TEST: CPT

## 2025-06-26 PROCEDURE — 6360000002 HC RX W HCPCS: Performed by: STUDENT IN AN ORGANIZED HEALTH CARE EDUCATION/TRAINING PROGRAM

## 2025-06-26 PROCEDURE — 94640 AIRWAY INHALATION TREATMENT: CPT

## 2025-06-26 PROCEDURE — 2700000000 HC OXYGEN THERAPY PER DAY

## 2025-06-26 PROCEDURE — 6370000000 HC RX 637 (ALT 250 FOR IP): Performed by: INTERNAL MEDICINE

## 2025-06-26 PROCEDURE — 6370000000 HC RX 637 (ALT 250 FOR IP): Performed by: NURSE PRACTITIONER

## 2025-06-26 PROCEDURE — 2500000003 HC RX 250 WO HCPCS: Performed by: NURSE PRACTITIONER

## 2025-06-26 PROCEDURE — 2580000003 HC RX 258: Performed by: SURGERY

## 2025-06-26 PROCEDURE — 94660 CPAP INITIATION&MGMT: CPT

## 2025-06-26 PROCEDURE — 94761 N-INVAS EAR/PLS OXIMETRY MLT: CPT

## 2025-06-26 PROCEDURE — 74018 RADEX ABDOMEN 1 VIEW: CPT

## 2025-06-26 PROCEDURE — 6370000000 HC RX 637 (ALT 250 FOR IP): Performed by: SURGERY

## 2025-06-26 RX ORDER — HYDROCODONE BITARTRATE AND ACETAMINOPHEN 5; 325 MG/1; MG/1
1 TABLET ORAL EVERY 6 HOURS PRN
Qty: 12 TABLET | Refills: 0 | Status: SHIPPED | OUTPATIENT
Start: 2025-06-26 | End: 2025-06-29

## 2025-06-26 RX ORDER — FUROSEMIDE 40 MG/1
40 TABLET ORAL DAILY
Qty: 60 TABLET | Refills: 3 | Status: SHIPPED | OUTPATIENT
Start: 2025-06-27

## 2025-06-26 RX ORDER — METRONIDAZOLE 500 MG/1
500 TABLET ORAL 3 TIMES DAILY
Qty: 21 TABLET | Refills: 0 | Status: SHIPPED | OUTPATIENT
Start: 2025-06-26 | End: 2025-07-03

## 2025-06-26 RX ORDER — LEVOFLOXACIN 500 MG/1
500 TABLET, FILM COATED ORAL DAILY
Qty: 7 TABLET | Refills: 0 | Status: SHIPPED | OUTPATIENT
Start: 2025-06-26 | End: 2025-07-03

## 2025-06-26 RX ADMIN — SODIUM CHLORIDE, PRESERVATIVE FREE 10 ML: 5 INJECTION INTRAVENOUS at 10:27

## 2025-06-26 RX ADMIN — BUDESONIDE INHALATION 500 MCG: 0.5 SUSPENSION RESPIRATORY (INHALATION) at 08:21

## 2025-06-26 RX ADMIN — INSULIN LISPRO 4 UNITS: 100 INJECTION, SOLUTION INTRAVENOUS; SUBCUTANEOUS at 11:00

## 2025-06-26 RX ADMIN — PIPERACILLIN AND TAZOBACTAM 3375 MG: 3; .375 INJECTION, POWDER, LYOPHILIZED, FOR SOLUTION INTRAVENOUS at 00:51

## 2025-06-26 RX ADMIN — METOCLOPRAMIDE 5 MG: 5 INJECTION, SOLUTION INTRAMUSCULAR; INTRAVENOUS at 04:49

## 2025-06-26 RX ADMIN — PIPERACILLIN AND TAZOBACTAM 3375 MG: 3; .375 INJECTION, POWDER, LYOPHILIZED, FOR SOLUTION INTRAVENOUS at 10:41

## 2025-06-26 RX ADMIN — FUROSEMIDE 40 MG: 40 TABLET ORAL at 10:29

## 2025-06-26 RX ADMIN — ENOXAPARIN SODIUM 40 MG: 100 INJECTION SUBCUTANEOUS at 10:26

## 2025-06-26 RX ADMIN — FOLIC ACID 1 MG: 1 TABLET ORAL at 10:25

## 2025-06-26 RX ADMIN — AMLODIPINE BESYLATE 10 MG: 5 TABLET ORAL at 10:25

## 2025-06-26 RX ADMIN — FLUCONAZOLE 100 MG: 100 TABLET ORAL at 10:25

## 2025-06-26 RX ADMIN — METOCLOPRAMIDE 5 MG: 5 INJECTION, SOLUTION INTRAMUSCULAR; INTRAVENOUS at 10:25

## 2025-06-26 RX ADMIN — IPRATROPIUM BROMIDE AND ALBUTEROL SULFATE 1 DOSE: 2.5; .5 SOLUTION RESPIRATORY (INHALATION) at 08:20

## 2025-06-26 RX ADMIN — PANTOPRAZOLE SODIUM 40 MG: 40 INJECTION, POWDER, FOR SOLUTION INTRAVENOUS at 10:26

## 2025-06-26 NOTE — PLAN OF CARE
Problem: Chronic Conditions and Co-morbidities  Goal: Patient's chronic conditions and co-morbidity symptoms are monitored and maintained or improved  Outcome: Progressing     Problem: Discharge Planning  Goal: Discharge to home or other facility with appropriate resources  Outcome: Progressing     Problem: Respiratory - Adult  Goal: Achieves optimal ventilation and oxygenation  Outcome: Progressing     Problem: Skin/Tissue Integrity - Adult  Goal: Skin integrity remains intact  Description: 1.  Monitor for areas of redness and/or skin breakdown  2.  Assess vascular access sites hourly  3.  Every 4-6 hours minimum:  Change oxygen saturation probe site  4.  Every 4-6 hours:  If on nasal continuous positive airway pressure, respiratory therapy assess nares and determine need for appliance change or resting period  Outcome: Progressing     Problem: Musculoskeletal - Adult  Goal: Return mobility to safest level of function  Outcome: Progressing     Problem: Skin/Tissue Integrity  Goal: Skin integrity remains intact  Description: 1.  Monitor for areas of redness and/or skin breakdown  2.  Assess vascular access sites hourly  3.  Every 4-6 hours minimum:  Change oxygen saturation probe site  4.  Every 4-6 hours:  If on nasal continuous positive airway pressure, respiratory therapy assess nares and determine need for appliance change or resting period  Outcome: Progressing     Problem: ABCDS Injury Assessment  Goal: Absence of physical injury  Outcome: Progressing     Problem: Safety - Adult  Goal: Free from fall injury  Outcome: Progressing     Problem: Pain  Goal: Verbalizes/displays adequate comfort level or baseline comfort level  Outcome: Progressing

## 2025-06-26 NOTE — DISCHARGE SUMMARY
Hospitalist Discharge Summary     Patient ID:  Lizbeth Hogue  [id number]  70 y.o.  1955 6/13/2025    PCP on record: Jasmin Shankar DO    Admit date: 6/13/2025  Discharge date and time: 6/26/2025    DISCHARGE DIAGNOSIS:    Acute on chronic hypercapnic and hypoxic respiratory failure resolved  Acute exacerbation of COPD resolved  NISH, non-compliant with CPAP  Hypertension  Colonic mass  Candida esophagitis  Chronic kidney disease stage IIIb  Hyponatremia  Type 2 diabetes  Multiple myeloma  Anemia  Metabolic encephalopathy    CONSULTATIONS:  IP CONSULT TO NEPHROLOGY  IP CONSULT TO GI  IP CONSULT TO HEM/ONC  IP CONSULT TO GENERAL SURGERY  IP CONSULT TO CARDIOLOGY  IP CONSULT TO VASCULAR ACCESS TEAM    Excerpted HPI from H&P of Zander Yeung MD:  Lizbeth Hogue is a 70 y.o. with a PMH of COPD, CKD, T2DM, HTN, NISH and Multiple Myeloma on chemotherapy who presented to [facility] ED on 6/13 with SOB. Per daughter at bedside she states that patient has been not feeling well over the past couple of weeks.  She was supposed to have chemotherapy this past Wednesday but treatment was canceled secondary to dehydration.  Today she that her mother had increased work of breathing with productive cough prompting her to check her SpO2 which read 52% on 2 L nasal cannula.  She arrives to the ER afebrile, nontachycardic, hypertensive (153/53) and hypoxic.  Patient quickly placed on high flow at 15 L.  ABG obtained on high flow demonstrating respiratory acidosis (pH 7.29, CO2 50).  Laboratory workup demonstrating dehydration & anemia with hemoglobin 6.3.  Daughter reports episode of dark black stool/diarrhea this evening PTA .1 unit PRBCs ordered, patient initiated on BiPAP and ICU consulted for admission.     ______________________________________________________________________  DISCHARGE SUMMARY/HOSPITAL COURSE:  for full details see H&P, daily progress notes, labs, consult notes.     70-year-old female past      ________________________________________________________________    Risk of deterioration: Low    Condition at Discharge:  Stable  __________________________________________________________________    Disposition  Home with family and home health services    ____________________________________________________________________    Code Status: Full Code  ___________________________________________________________________      Total time in minutes spent coordinating this discharge (includes going over instructions, follow-up, prescriptions, and preparing report for sign off to her PCP) :  45 minutes    Signed:  Marlene Rey MD

## 2025-06-26 NOTE — PROGRESS NOTES
[facility] SURGERY Progress Notes          Chief Complaint: None    History of Present Illness:    Ms. Lizbeth Hogue is a 70 y.o. female is S/P robotic assisted right hemicolectomy performed on 6/20/2025, POD #5.    Has been complaining of abdominal pain.  No nausea or vomiting.  Passed flatus and had a small bowel movement.  Pain well under control.    Past Medical History:   Past Medical History:   Diagnosis Date    Arthritis     Asthma     Chronic kidney disease     Chronic obstructive pulmonary disease (HCC)     Diabetes (HCC)     Hypertension     Sleep apnea     no cpap       Past Surgical History:   Past Surgical History:   Procedure Laterality Date    COLONOSCOPY N/A 6/17/2025    COLONOSCOPY DIAGNOSTIC performed by Eamon Jacobson MD at [facility] ENDOSCOPY    COLONOSCOPY N/A 6/17/2025    COLONOSCOPY BIOPSY performed by Eamon Jacobson MD at [facility] ENDOSCOPY    HEMICOLECTOMY Right 6/20/2025    RIGHT HEMICOLECTOMY LAPAROSCOPIC ROBOTIC performed by Pelon Irene MD at [facility] MAIN OR    OTHER SURGICAL HISTORY Bilateral     Eye surgery    UPPER GASTROINTESTINAL ENDOSCOPY N/A 6/17/2025    ESOPHAGOGASTRODUODENOSCOPY performed by Eamon Jacobson MD at [facility] ENDOSCOPY    WRIST SURGERY Left     pins and screws        Allergy:  Allergies   Allergen Reactions    Lisinopril Swelling     Lip swelling    Pineapple Swelling     Lip swelling       Social History:  reports that she has never smoked. She has never used smokeless tobacco. She reports that she does not drink alcohol and does not use drugs.     Family History:  Family History   Problem Relation Age of Onset    Diabetes Mother     Hypertension Father     Diabetes Father     Hypertension Mother         Current Medications:  Current Facility-Administered Medications:     metoclopramide (REGLAN) injection 5 mg, 5 mg, IntraVENous, Q6H, Pelon Irene MD, 5 mg at 06/25/25 2112    furosemide (LASIX) tablet 40 mg, 40 mg, Oral, Daily, Tee Blue MD, 40 mg at 06/25/25 0920     5.0 g/dL   POCT Glucose    Collection Time: 06/25/25  8:39 AM   Result Value Ref Range    POC Glucose 108 (H) 65 - 100 mg/dL    Performed by: Boy Cruz    POCT Glucose    Collection Time: 06/25/25 11:45 AM   Result Value Ref Range    POC Glucose 158 (H) 65 - 100 mg/dL    Performed by: Boy Cruz    POCT Glucose    Collection Time: 06/25/25  7:55 PM   Result Value Ref Range    POC Glucose 227 (H) 65 - 100 mg/dL    Performed by: Yancy Bundy          XR ABDOMEN (KUB) (SINGLE AP VIEW)   Final Result      Worsening mild to moderate diffuse colonic distention/ileus.              Electronically signed by SCOTTRADHA MERRILL      XR ABDOMEN (KUB) (SINGLE AP VIEW)   Final Result   Nonspecific bowel gas pattern         Electronically signed by Tiffanie Gaffney      XR CHEST PORTABLE   Final Result      1.  NG tube terminates over the gastric fundus.    2.   Stable mild edema pattern. New right infrahilar consolidations and hazy   opacities concerning for pneumonia or aspiration.       Electronically signed by [employer]      XR CHEST PORTABLE   Final Result   Enteric tube tip overlies the stomach.         Electronically signed by BRETT Edward      CT ABDOMEN PELVIS W IV CONTRAST Additional Contrast? Oral   Final Result      1. Near circumferential mass at the ileocecal valve/proximal right colon as   discussed above. This does not result in significant upstream dilation of the   terminal ileum 5. Incidental findings as discussed above..   2. Small left pleural effusion.   3. Gallbladder wall versus artifact. Right upper quadrant ultrasound may be   helpful for further characterization.   4. Bony demineralization with probable Schmorl's node and degenerative changes   as discussed above. Alternatively this could represent bony metastatic disease,   but this is felt to be less likely.   Incidental findings as discussed above.         Electronically signed by Halina Reece      XR CHEST PORTABLE   Final Result      No acute process

## 2025-06-26 NOTE — CARE COORDINATION
CM reviewed chart, discharge order noted.     DCP: home self care. No home health agencies accepted due to insurance and/or location. Patient and daughter aware.     Transition of Care Plan:    RUR: 15%  Prior Level of Functioning: independent  Disposition: home, self care  ZURI: today  If SNF or IPR: Date FOC offered: n/a  Date FOC received: n/a  Accepting facility: n/a  Date authorization started with reference number: n/a  Date authorization received and expires: n/a  Follow up appointments: per discharge summary  DME needed: none   Transportation at discharge: family  IM/IMM Medicare/ letter given: n/a  Is patient a  and connected with VA? No  If yes, was [place] transfer form completed and [facility] notified? N/a  Caregiver Contact: yes, daughter  Discharge Caregiver contacted prior to discharge? Yes, at bedside  Care Conference needed? no  Barriers to discharge:  none

## 2025-06-26 NOTE — PROGRESS NOTES
Nephrology follow-up          Patient: Lizbeth Hogue MRN: [id number]  SSN: [id number]    YOB: 1955  Age: 70 y.o.  Sex: female      Subjective:   The patient is seen at the bedside  She looks comfortable  On nasal cannula 2->4->6 L  NG is out  Improving Bilateral lower extremity swelling  Blood pressures are normal  Resolving hyponatremia  Off IV fluids    Past Medical History:   Diagnosis Date    Arthritis     Asthma     Chronic kidney disease     Chronic obstructive pulmonary disease (HCC)     Diabetes (HCC)     Hypertension     Sleep apnea     no cpap     Past Surgical History:   Procedure Laterality Date    COLONOSCOPY N/A 6/17/2025    COLONOSCOPY DIAGNOSTIC performed by Eamon Jacobson MD at [facility] ENDOSCOPY    COLONOSCOPY N/A 6/17/2025    COLONOSCOPY BIOPSY performed by Eamon Jacobson MD at [facility] ENDOSCOPY    HEMICOLECTOMY Right 6/20/2025    RIGHT HEMICOLECTOMY LAPAROSCOPIC ROBOTIC performed by Pelon Irene MD at [facility] MAIN OR    OTHER SURGICAL HISTORY Bilateral     Eye surgery    UPPER GASTROINTESTINAL ENDOSCOPY N/A 6/17/2025    ESOPHAGOGASTRODUODENOSCOPY performed by Eamon Jacobson MD at [facility] ENDOSCOPY    WRIST SURGERY Left     pins and screws      Family History   Problem Relation Age of Onset    Diabetes Mother     Hypertension Father     Diabetes Father     Hypertension Mother      Social History     Tobacco Use    Smoking status: Never    Smokeless tobacco: Never   Substance Use Topics    Alcohol use: Never      Current Outpatient Medications   Medication Sig Dispense Refill    [START ON 6/27/2025] furosemide (LASIX) 40 MG tablet Take 1 tablet by mouth daily 60 tablet 3    HYDROcodone-acetaminophen (NORCO) 5-325 MG per tablet Take 1 tablet by mouth every 6 hours as needed for Pain for up to 3 days. Max Daily Amount: 4 tablets 12 tablet 0    levoFLOXacin (LEVAQUIN) 500 MG tablet Take 1 tablet by mouth daily for 7 days 7 tablet 0    metroNIDAZOLE (FLAGYL) 500 MG tablet Take 1 tablet  hemicolectomy laparoscopic on 6/20  NG tube removed  dc IV fluids    Plan:       Signed By: Tee Blue MD     June 26, 2025

## 2025-06-26 NOTE — DISCHARGE INSTR - COC
Continuity of Care Form    Patient Name: Lizbeth Hogue   :  1955  MRN:  [id number]    Admit date:  2025  Discharge date:  2025    Code Status Order: Full Code   Advance Directives:     Admitting Physician:  Zander Yeung MD  PCP: Jasmin Shankar DO    Discharging Nurse: tameka Kumar LPN  Discharging Hospital Unit/Room#: 526/01  Discharging Unit Phone Number: [phone]    Emergency Contact:   Extended Emergency Contact Information  Primary Emergency Contact: Sharon Hogue  Home Phone: [phone]  Mobile Phone: [phone]  Relation: Child    Past Surgical History:  Past Surgical History:   Procedure Laterality Date    COLONOSCOPY N/A 2025    COLONOSCOPY DIAGNOSTIC performed by Eamon Jacobson MD at [facility] ENDOSCOPY    COLONOSCOPY N/A 2025    COLONOSCOPY BIOPSY performed by Eamon Jacobson MD at [facility] ENDOSCOPY    HEMICOLECTOMY Right 2025    RIGHT HEMICOLECTOMY LAPAROSCOPIC ROBOTIC performed by Pelon Irene MD at [facility] MAIN OR    OTHER SURGICAL HISTORY Bilateral     Eye surgery    UPPER GASTROINTESTINAL ENDOSCOPY N/A 2025    ESOPHAGOGASTRODUODENOSCOPY performed by Eamon Jacobson MD at [facility] ENDOSCOPY    WRIST SURGERY Left     pins and screws       Immunization History:     There is no immunization history on file for this patient.    Active Problems:  Patient Active Problem List   Diagnosis Code    Community acquired pneumonia J18.9    COPD with acute exacerbation (HCC) J44.1    PNA (pneumonia) J18.9    Acute on chronic respiratory failure with hypoxemia (HCC) J96.21       Isolation/Infection:   Isolation            Contact          Patient Infection Status        Infection Onset Added Last Indicated Last Indicated By Review Planned Expiration    MRSA 25 MRSA by PCR                           Nurse Assessment:  Last Vital Signs: /62   Pulse 76   Temp 97.5 °F (36.4 °C) (Oral)   Resp 18   Ht 1.524 m (5')   Wt 64 kg (141 lb 1.5 oz)   SpO2  94%   BMI 27.56 kg/m²     Last documented pain score (0-10 scale): Pain Level: 0  Last Weight:   Wt Readings from Last 1 Encounters:   06/23/25 64 kg (141 lb 1.5 oz)     Mental Status:  {IP PT MENTAL STATUS:20030}    IV Access:  { MAGEN IV ACCESS:403105461}    Nursing Mobility/ADLs:  Walking   {CHP DME ADLs:458294061}  Transfer  {CHP DME ADLs:322118010}  Bathing  {CHP DME ADLs:212770792}  Dressing  {CHP DME ADLs:698253640}  Toileting  {CHP DME ADLs:948377973}  Feeding  {CHP DME ADLs:586466759}  Med Admin  {CHP DME ADLs:190926951}  Med Delivery   { MAGEN MED Delivery:246061463}    Wound Care Documentation and Therapy:  Wound 06/13/25 Thigh Left;Anterior small pinhole size open area; red in color with no depth (Active)   Dressing Status Clean;Dry 06/26/25 0800   Dressing/Treatment Open to air 06/24/25 2152   Wound Assessment Dry 06/26/25 0800   Drainage Amount None (dry) 06/26/25 0520   Odor None 06/25/25 1136   Number of days: 13       Incision 06/20/25 Abdomen (Active)   Dressing Status Clean;Dry;Intact 06/26/25 0520   Incision Cleansed Not Cleansed 06/25/25 1136   Dressing/Treatment Silver dressing 06/26/25 0520   Closure Adhesive bandage 06/25/25 1136   Margins Approximated 06/22/25 2333   Incision Assessment Dry 06/25/25 1136   Drainage Amount None (dry) 06/26/25 0520   Odor None 06/25/25 1136   Paulina-incision Assessment Other (Comment) 06/22/25 2333   Number of days: 5        Elimination:  Continence:   Bowel: {YES / NO:19727}  Bladder: {YES / NO:19727}  Urinary Catheter: {Urinary Catheter:149257329}   Colostomy/Ileostomy/Ileal Conduit: {YES / NO:19727}       Date of Last BM: ***    Intake/Output Summary (Last 24 hours) at 6/26/2025 1529  Last data filed at 6/26/2025 1245  Gross per 24 hour   Intake 291.4 ml   Output 700 ml   Net -408.6 ml     I/O last 3 completed shifts:  In: 891.4 [P.O.:600; I.V.:29.8; IV Piggyback:261.6]  Out: 1900 [Urine:1900]    Safety Concerns:     { MAGEN Safety

## 2025-06-26 NOTE — PLAN OF CARE
Problem: Chronic Conditions and Co-morbidities  Goal: Patient's chronic conditions and co-morbidity symptoms are monitored and maintained or improved  6/26/2025 1352 by Loreta Kumar LPN  Outcome: Progressing  6/26/2025 0335 by Gregor Haines RN  Outcome: Progressing     Problem: Discharge Planning  Goal: Discharge to home or other facility with appropriate resources  6/26/2025 1352 by Loreta Kumar LPN  Outcome: Progressing  6/26/2025 0335 by Gregor Haines RN  Outcome: Progressing     Problem: Respiratory - Adult  Goal: Achieves optimal ventilation and oxygenation  6/26/2025 1352 by Loreta Kumar LPN  Outcome: Progressing  6/26/2025 0335 by Gregor Haines RN  Outcome: Progressing     Problem: Cardiovascular - Adult  Goal: Maintains optimal cardiac output and hemodynamic stability  Outcome: Progressing  Goal: Absence of cardiac dysrhythmias or at baseline  Outcome: Progressing     Problem: Infection - Adult  Goal: Absence of infection at discharge  Outcome: Progressing  Goal: Absence of infection during hospitalization  Outcome: Progressing  Goal: Absence of fever/infection during anticipated neutropenic period  Outcome: Progressing     Problem: Skin/Tissue Integrity  Goal: Skin integrity remains intact  Description: 1.  Monitor for areas of redness and/or skin breakdown  2.  Assess vascular access sites hourly  3.  Every 4-6 hours minimum:  Change oxygen saturation probe site  4.  Every 4-6 hours:  If on nasal continuous positive airway pressure, respiratory therapy assess nares and determine need for appliance change or resting period  6/26/2025 1352 by Loreta Kumar LPN  Outcome: Progressing  6/26/2025 0335 by Gregor Haines RN  Outcome: Progressing     Problem: ABCDS Injury Assessment  Goal: Absence of physical injury  6/26/2025 1352 by Loreta Kumar LPN  Outcome: Progressing  6/26/2025 0335 by Gregor Haines RN  Outcome: Progressing     Problem: Safety -

## 2025-06-29 NOTE — PROGRESS NOTES
Physician Progress Note      PATIENT:               RADHA PERDUE  CSN #:                  [id number]  :                       1955  ADMIT DATE:       2025 1:57 AM  DISCH DATE:        2025 4:29 PM  RESPONDING  PROVIDER #:        Marlene Rey MD          QUERY TEXT:    Anemia is documented in the medical record    The clinical indicators include:  -Per H&P pt with known Multiple myeloma  -Per Progress notes on 06/15- Anemia, Pt is also on treatment for MM with   D-RVD,  -Per Admission Hgb @ 9.8- on  Hgb down to 6.3- x1 unit of PRBCS on -   Hgb @ 8.4  -Per - Heme notes, Iron studies demonstrate severe iron def. anemia,  -Per orders, Transfused 1 unit of PRBCS on - orders for IV Iron.  Options provided:  -- The patient has Multifocal causes of the anemia. Acute on chronic blood   loss on top of Chronic anemia and anemia from Multiple myeloma treatment.and   iron deficiency anemia., Please specify type)  -- Related to acute blood loss  -- Related to acute on chronic blood loss  -- The patient anemia is due to the antineoplastic treatment  -- The anemia is due to iron deficiency anemia.  -- Other - I will add my own diagnosis  -- Disagree - Not applicable / Not valid  -- Disagree - Clinically unable to determine / Unknown  -- Refer to Clinical Documentation Reviewer    PROVIDER RESPONSE TEXT:    The patients anemia is related to acute on chronic blood loss.    Query created by: Eunice Wilson on 2025 8:40 AM      Electronically signed by:  Marlene Rey MD 2025 9:02 AM

## 2025-07-07 NOTE — PROGRESS NOTES
Physician Progress Note      PATIENT:               RADHA PERDUE  CSN #:                  [id number]  :                       1955  ADMIT DATE:       2025 1:57 AM  DISCH DATE:        2025 4:29 PM  RESPONDING  PROVIDER #:        Marlene Rey MD          QUERY TEXT:    Attention Dr. Irene from General Surgery    The surgical pathology of - has been record.    Additional clarification is needed as pathology findings are not reported   unless a treating provider indicates their clinical significance.  Based on   your professional judgment & clinical indicators below, please select one of   the following options:    The clinical indicators include:  -Per Pathology results from 2025  Clinical history  \"Mass of colon  -Per Pathology final results on \"Colon, right hemicolectomy, Adenocarcinoma.  -Per Operative report on - \"Large partially obstructing mass in the   ascending colon suspicious for maligant neoplasm.  Options provided:  -- Agree with the pathology findings  -- Disagree with the pathology findings  -- Other - I will add my own diagnosis  -- Disagree - Not applicable / Not valid  -- Disagree - Clinically unable to determine / Unknown  -- Refer to Clinical Documentation Reviewer    PROVIDER RESPONSE TEXT:    I agree with the pathology findings.    Query created by: Eunice Wilson on 2025 8:59 AM      Electronically signed by:  Marlene Rey MD 2025 2:34 PM

## 2025-07-08 ENCOUNTER — HOSPITAL ENCOUNTER (INPATIENT)
Facility: HOSPITAL | Age: 70
LOS: 2 days | Discharge: HOME | DRG: 811 | End: 2025-07-10
Attending: EMERGENCY MEDICINE | Admitting: STUDENT IN AN ORGANIZED HEALTH CARE EDUCATION/TRAINING PROGRAM
Payer: MEDICAID

## 2025-07-08 ENCOUNTER — APPOINTMENT (OUTPATIENT)
Facility: HOSPITAL | Age: 70
DRG: 811 | End: 2025-07-08
Payer: MEDICAID

## 2025-07-08 DIAGNOSIS — R22.1 SWELLING OF LIP, TONGUE, AND THROAT: ICD-10-CM

## 2025-07-08 DIAGNOSIS — T78.3XXA ANGIOEDEMA, INITIAL ENCOUNTER: Primary | ICD-10-CM

## 2025-07-08 DIAGNOSIS — R22.0 SWELLING OF LIP, TONGUE, AND THROAT: ICD-10-CM

## 2025-07-08 LAB
ALBUMIN SERPL-MCNC: 2.7 G/DL (ref 3.5–5)
ALBUMIN/GLOB SERPL: 1 (ref 1.1–2.2)
ALP SERPL-CCNC: 59 U/L (ref 45–117)
ALT SERPL-CCNC: 29 U/L (ref 12–78)
ANION GAP SERPL CALC-SCNC: 8 MMOL/L (ref 2–12)
AST SERPL W P-5'-P-CCNC: 35 U/L (ref 15–37)
BASOPHILS # BLD: 0 K/UL (ref 0–0.1)
BASOPHILS NFR BLD: 0 % (ref 0–1)
BILIRUB SERPL-MCNC: 0.7 MG/DL (ref 0.2–1)
BUN SERPL-MCNC: 21 MG/DL (ref 6–20)
BUN/CREAT SERPL: 10 (ref 12–20)
CA-I BLD-MCNC: 8.6 MG/DL (ref 8.5–10.1)
CHLORIDE SERPL-SCNC: 105 MMOL/L (ref 97–108)
CO2 SERPL-SCNC: 27 MMOL/L (ref 21–32)
CREAT SERPL-MCNC: 2.08 MG/DL (ref 0.55–1.02)
DIFFERENTIAL METHOD BLD: ABNORMAL
EOSINOPHIL # BLD: 0 K/UL (ref 0–0.4)
EOSINOPHIL NFR BLD: 0 % (ref 0–7)
ERYTHROCYTE [DISTWIDTH] IN BLOOD BY AUTOMATED COUNT: 19.8 % (ref 11.5–14.5)
GLOBULIN SER CALC-MCNC: 2.7 G/DL (ref 2–4)
GLUCOSE BLD STRIP.AUTO-MCNC: 230 MG/DL (ref 65–100)
GLUCOSE SERPL-MCNC: 125 MG/DL (ref 65–100)
HCT VFR BLD AUTO: 22.8 % (ref 35–47)
HGB BLD-MCNC: 7.2 G/DL (ref 11.5–16)
IMM GRANULOCYTES # BLD AUTO: 0 K/UL
IMM GRANULOCYTES NFR BLD AUTO: 0 %
LYMPHOCYTES # BLD: 0.72 K/UL (ref 0.8–3.5)
LYMPHOCYTES NFR BLD: 13 % (ref 12–49)
MCH RBC QN AUTO: 33.5 PG (ref 26–34)
MCHC RBC AUTO-ENTMCNC: 31.6 G/DL (ref 30–36.5)
MCV RBC AUTO: 106 FL (ref 80–99)
MONOCYTES # BLD: 0.22 K/UL (ref 0–1)
MONOCYTES NFR BLD: 4 % (ref 5–13)
NEUTS SEG # BLD: 4.56 K/UL (ref 1.8–8)
NEUTS SEG NFR BLD: 83 % (ref 32–75)
NRBC # BLD: 0 K/UL (ref 0–0.01)
NRBC BLD-RTO: 0 PER 100 WBC
PERFORMED BY:: ABNORMAL
PLATELET # BLD AUTO: 102 K/UL (ref 150–400)
PMV BLD AUTO: 11.4 FL (ref 8.9–12.9)
POTASSIUM SERPL-SCNC: 4.6 MMOL/L (ref 3.5–5.1)
PROT SERPL-MCNC: 5.4 G/DL (ref 6.4–8.2)
RBC # BLD AUTO: 2.15 M/UL (ref 3.8–5.2)
RBC MORPH BLD: ABNORMAL
SODIUM SERPL-SCNC: 140 MMOL/L (ref 136–145)
WBC # BLD AUTO: 5.5 K/UL (ref 3.6–11)

## 2025-07-08 PROCEDURE — 96372 THER/PROPH/DIAG INJ SC/IM: CPT

## 2025-07-08 PROCEDURE — 80053 COMPREHEN METABOLIC PANEL: CPT

## 2025-07-08 PROCEDURE — 36415 COLL VENOUS BLD VENIPUNCTURE: CPT

## 2025-07-08 PROCEDURE — 96374 THER/PROPH/DIAG INJ IV PUSH: CPT

## 2025-07-08 PROCEDURE — 2500000003 HC RX 250 WO HCPCS: Performed by: STUDENT IN AN ORGANIZED HEALTH CARE EDUCATION/TRAINING PROGRAM

## 2025-07-08 PROCEDURE — 71045 X-RAY EXAM CHEST 1 VIEW: CPT

## 2025-07-08 PROCEDURE — 2580000003 HC RX 258: Performed by: EMERGENCY MEDICINE

## 2025-07-08 PROCEDURE — 82962 GLUCOSE BLOOD TEST: CPT

## 2025-07-08 PROCEDURE — 96361 HYDRATE IV INFUSION ADD-ON: CPT

## 2025-07-08 PROCEDURE — 99285 EMERGENCY DEPT VISIT HI MDM: CPT

## 2025-07-08 PROCEDURE — 96375 TX/PRO/DX INJ NEW DRUG ADDON: CPT

## 2025-07-08 PROCEDURE — 6360000002 HC RX W HCPCS: Performed by: STUDENT IN AN ORGANIZED HEALTH CARE EDUCATION/TRAINING PROGRAM

## 2025-07-08 PROCEDURE — 2000000000 HC ICU R&B

## 2025-07-08 PROCEDURE — 6360000002 HC RX W HCPCS: Performed by: EMERGENCY MEDICINE

## 2025-07-08 PROCEDURE — 2500000003 HC RX 250 WO HCPCS: Performed by: EMERGENCY MEDICINE

## 2025-07-08 PROCEDURE — 85025 COMPLETE CBC W/AUTO DIFF WBC: CPT

## 2025-07-08 PROCEDURE — 6370000000 HC RX 637 (ALT 250 FOR IP): Performed by: STUDENT IN AN ORGANIZED HEALTH CARE EDUCATION/TRAINING PROGRAM

## 2025-07-08 RX ORDER — ALBUTEROL SULFATE 5 MG/ML
2.5 SOLUTION RESPIRATORY (INHALATION)
Status: DISCONTINUED | OUTPATIENT
Start: 2025-07-08 | End: 2025-07-10 | Stop reason: HOSPADM

## 2025-07-08 RX ORDER — INSULIN LISPRO 100 [IU]/ML
0-8 INJECTION, SOLUTION INTRAVENOUS; SUBCUTANEOUS EVERY 6 HOURS
Status: DISCONTINUED | OUTPATIENT
Start: 2025-07-08 | End: 2025-07-10 | Stop reason: HOSPADM

## 2025-07-08 RX ORDER — SODIUM CHLORIDE 0.9 % (FLUSH) 0.9 %
5-40 SYRINGE (ML) INJECTION EVERY 12 HOURS SCHEDULED
Status: DISCONTINUED | OUTPATIENT
Start: 2025-07-08 | End: 2025-07-10 | Stop reason: HOSPADM

## 2025-07-08 RX ORDER — ACETAMINOPHEN 325 MG/1
650 TABLET ORAL EVERY 6 HOURS PRN
Status: DISCONTINUED | OUTPATIENT
Start: 2025-07-08 | End: 2025-07-10 | Stop reason: HOSPADM

## 2025-07-08 RX ORDER — SODIUM CHLORIDE 0.9 % (FLUSH) 0.9 %
5-40 SYRINGE (ML) INJECTION PRN
Status: DISCONTINUED | OUTPATIENT
Start: 2025-07-08 | End: 2025-07-10 | Stop reason: HOSPADM

## 2025-07-08 RX ORDER — 0.9 % SODIUM CHLORIDE 0.9 %
1000 INTRAVENOUS SOLUTION INTRAVENOUS ONCE
Status: COMPLETED | OUTPATIENT
Start: 2025-07-08 | End: 2025-07-08

## 2025-07-08 RX ORDER — ONDANSETRON 4 MG/1
4 TABLET, ORALLY DISINTEGRATING ORAL EVERY 8 HOURS PRN
Status: DISCONTINUED | OUTPATIENT
Start: 2025-07-08 | End: 2025-07-10 | Stop reason: HOSPADM

## 2025-07-08 RX ORDER — DEXAMETHASONE SODIUM PHOSPHATE 10 MG/ML
10 INJECTION, SOLUTION INTRAMUSCULAR; INTRAVENOUS ONCE
Status: COMPLETED | OUTPATIENT
Start: 2025-07-08 | End: 2025-07-08

## 2025-07-08 RX ORDER — ENOXAPARIN SODIUM 100 MG/ML
30 INJECTION SUBCUTANEOUS DAILY
Status: DISCONTINUED | OUTPATIENT
Start: 2025-07-08 | End: 2025-07-09

## 2025-07-08 RX ORDER — SODIUM CHLORIDE 9 MG/ML
INJECTION, SOLUTION INTRAVENOUS PRN
Status: DISCONTINUED | OUTPATIENT
Start: 2025-07-08 | End: 2025-07-10 | Stop reason: HOSPADM

## 2025-07-08 RX ORDER — DIPHENHYDRAMINE HYDROCHLORIDE 50 MG/ML
25 INJECTION, SOLUTION INTRAMUSCULAR; INTRAVENOUS EVERY 6 HOURS
Status: DISCONTINUED | OUTPATIENT
Start: 2025-07-08 | End: 2025-07-09

## 2025-07-08 RX ORDER — EPINEPHRINE 0.3 MG/.3ML
0.3 INJECTION SUBCUTANEOUS ONCE
Status: COMPLETED | OUTPATIENT
Start: 2025-07-08 | End: 2025-07-08

## 2025-07-08 RX ORDER — ONDANSETRON 2 MG/ML
4 INJECTION INTRAMUSCULAR; INTRAVENOUS EVERY 6 HOURS PRN
Status: DISCONTINUED | OUTPATIENT
Start: 2025-07-08 | End: 2025-07-10 | Stop reason: HOSPADM

## 2025-07-08 RX ORDER — ACETAMINOPHEN 650 MG/1
650 SUPPOSITORY RECTAL EVERY 6 HOURS PRN
Status: DISCONTINUED | OUTPATIENT
Start: 2025-07-08 | End: 2025-07-10 | Stop reason: HOSPADM

## 2025-07-08 RX ORDER — VALACYCLOVIR HYDROCHLORIDE 500 MG/1
1000 TABLET, FILM COATED ORAL EVERY 24 HOURS
Status: DISCONTINUED | OUTPATIENT
Start: 2025-07-08 | End: 2025-07-10 | Stop reason: HOSPADM

## 2025-07-08 RX ORDER — DEXAMETHASONE SODIUM PHOSPHATE 4 MG/ML
8 INJECTION, SOLUTION INTRA-ARTICULAR; INTRALESIONAL; INTRAMUSCULAR; INTRAVENOUS; SOFT TISSUE EVERY 8 HOURS
Status: DISCONTINUED | OUTPATIENT
Start: 2025-07-08 | End: 2025-07-09

## 2025-07-08 RX ORDER — POLYETHYLENE GLYCOL 3350 17 G/17G
17 POWDER, FOR SOLUTION ORAL DAILY PRN
Status: DISCONTINUED | OUTPATIENT
Start: 2025-07-08 | End: 2025-07-10 | Stop reason: HOSPADM

## 2025-07-08 RX ADMIN — EPINEPHRINE 0.3 MG: 0.3 INJECTION INTRAMUSCULAR at 13:52

## 2025-07-08 RX ADMIN — ENOXAPARIN SODIUM 30 MG: 100 INJECTION SUBCUTANEOUS at 17:48

## 2025-07-08 RX ADMIN — DEXAMETHASONE SODIUM PHOSPHATE 10 MG: 10 INJECTION INTRAMUSCULAR; INTRAVENOUS at 14:45

## 2025-07-08 RX ADMIN — SODIUM CHLORIDE, PRESERVATIVE FREE 10 ML: 5 INJECTION INTRAVENOUS at 22:04

## 2025-07-08 RX ADMIN — VALACYCLOVIR HYDROCHLORIDE 1000 MG: 500 TABLET, FILM COATED ORAL at 22:07

## 2025-07-08 RX ADMIN — DEXAMETHASONE SODIUM PHOSPHATE 8 MG: 4 INJECTION, SOLUTION INTRA-ARTICULAR; INTRALESIONAL; INTRAMUSCULAR; INTRAVENOUS; SOFT TISSUE at 17:44

## 2025-07-08 RX ADMIN — SODIUM CHLORIDE, PRESERVATIVE FREE 20 MG: 5 INJECTION INTRAVENOUS at 14:45

## 2025-07-08 RX ADMIN — DIPHENHYDRAMINE HYDROCHLORIDE 25 MG: 50 INJECTION INTRAMUSCULAR; INTRAVENOUS at 17:48

## 2025-07-08 RX ADMIN — INSULIN LISPRO 2 UNITS: 100 INJECTION, SOLUTION INTRAVENOUS; SUBCUTANEOUS at 18:56

## 2025-07-08 RX ADMIN — SODIUM CHLORIDE, PRESERVATIVE FREE 20 MG: 5 INJECTION INTRAVENOUS at 22:02

## 2025-07-08 RX ADMIN — SODIUM CHLORIDE 1000 ML: 0.9 INJECTION, SOLUTION INTRAVENOUS at 15:07

## 2025-07-08 ASSESSMENT — PAIN SCALES - GENERAL
PAINLEVEL_OUTOF10: 0
PAINLEVEL_OUTOF10: 6
PAINLEVEL_OUTOF10: 0
PAINLEVEL_OUTOF10: 0

## 2025-07-08 ASSESSMENT — PAIN - FUNCTIONAL ASSESSMENT: PAIN_FUNCTIONAL_ASSESSMENT: 0-10

## 2025-07-08 NOTE — ED TRIAGE NOTES
Pt arrives with complaints of swelling to bottom lip that started last night, daughter gave her benadryl last night helped some but woke up and swelling was back. Also states breathing became a little more difficult.     50mg benadryl PO given by EMS

## 2025-07-08 NOTE — ED PROVIDER NOTES
Angioedema, initial encounter    2. Swelling of lip, tongue, and throat       SDOH/DISPOSITION/PLAN   Social Determinants affecting Treatment Plan: None    DISPOSITION Admitted 07/08/2025 05:38:49 PM             Admit Note: Pt is being admitted by Intensivist . The results of their tests and reason(s) for their admission have been discussed with pt and/or available family. They convey agreement and understanding for the need to be admitted and for the admission diagnosis.     PATIENT REFERRED TO:  No follow-up provider specified.      DISCHARGE MEDICATIONS:     Medication List        ASK your doctor about these medications      acetaminophen 325 MG tablet  Commonly known as: TYLENOL     acyclovir 400 MG tablet  Commonly known as: ZOVIRAX     albuterol sulfate  (90 Base) MCG/ACT inhaler  Commonly known as: PROVENTIL;VENTOLIN;PROAIR     allopurinol 100 MG tablet  Commonly known as: ZYLOPRIM     amLODIPine 10 MG tablet  Commonly known as: NORVASC     aspirin 81 MG EC tablet     atorvastatin 40 MG tablet  Commonly known as: LIPITOR     BORTEZOMIB IJ     budesonide 0.5 MG/2ML nebulizer suspension  Commonly known as: PULMICORT  Take 2 mLs by nebulization in the morning and 2 mLs in the evening.     DARZALEX FASPRO SC     dexAMETHasone 4 MG tablet  Commonly known as: DECADRON     diphenhydrAMINE 50 MG capsule  Commonly known as: BENADRYL     ergocalciferol 1.25 MG (65576 UT) capsule  Commonly known as: ERGOCALCIFEROL     ezetimibe 10 MG tablet  Commonly known as: ZETIA     fexofenadine 60 MG tablet  Commonly known as: ALLEGRA     fluticasone 50 MCG/ACT nasal spray  Commonly known as: FLONASE  2 sprays by Each Nostril route daily     folic acid 1 MG tablet  Commonly known as: FOLVITE     furosemide 40 MG tablet  Commonly known as: LASIX  Take 1 tablet by mouth daily     gabapentin 100 MG capsule  Commonly known as: NEURONTIN     lidocaine 5 %  Commonly known as: LIDODERM     methotrexate 2.5 MG chemo tablet  Commonly

## 2025-07-09 LAB
ANION GAP SERPL CALC-SCNC: 8 MMOL/L (ref 2–12)
BUN SERPL-MCNC: 25 MG/DL (ref 6–20)
BUN/CREAT SERPL: 13 (ref 12–20)
CA-I BLD-MCNC: 8.4 MG/DL (ref 8.5–10.1)
CHLORIDE SERPL-SCNC: 107 MMOL/L (ref 97–108)
CO2 SERPL-SCNC: 27 MMOL/L (ref 21–32)
CREAT SERPL-MCNC: 1.95 MG/DL (ref 0.55–1.02)
ERYTHROCYTE [DISTWIDTH] IN BLOOD BY AUTOMATED COUNT: 18.8 % (ref 11.5–14.5)
GLUCOSE BLD STRIP.AUTO-MCNC: 144 MG/DL (ref 65–100)
GLUCOSE BLD STRIP.AUTO-MCNC: 184 MG/DL (ref 65–100)
GLUCOSE BLD STRIP.AUTO-MCNC: 191 MG/DL (ref 65–100)
GLUCOSE BLD STRIP.AUTO-MCNC: 347 MG/DL (ref 65–100)
GLUCOSE BLD STRIP.AUTO-MCNC: 359 MG/DL (ref 65–100)
GLUCOSE SERPL-MCNC: 151 MG/DL (ref 65–100)
HCT VFR BLD AUTO: 20.9 % (ref 35–47)
HGB BLD-MCNC: 6.9 G/DL (ref 11.5–16)
MAGNESIUM SERPL-MCNC: 1.7 MG/DL (ref 1.6–2.4)
MCH RBC QN AUTO: 32.2 PG (ref 26–34)
MCHC RBC AUTO-ENTMCNC: 33 G/DL (ref 30–36.5)
MCV RBC AUTO: 97.7 FL (ref 80–99)
NRBC # BLD: 0 K/UL (ref 0–0.01)
NRBC BLD-RTO: 0 PER 100 WBC
PERFORMED BY:: ABNORMAL
PHOSPHATE SERPL-MCNC: 4.1 MG/DL (ref 2.6–4.7)
PLATELET # BLD AUTO: 91 K/UL (ref 150–400)
PMV BLD AUTO: 10.6 FL (ref 8.9–12.9)
POTASSIUM SERPL-SCNC: 4.6 MMOL/L (ref 3.5–5.1)
RBC # BLD AUTO: 2.14 M/UL (ref 3.8–5.2)
SODIUM SERPL-SCNC: 142 MMOL/L (ref 136–145)
WBC # BLD AUTO: 3.1 K/UL (ref 3.6–11)

## 2025-07-09 PROCEDURE — 80048 BASIC METABOLIC PNL TOTAL CA: CPT

## 2025-07-09 PROCEDURE — 2500000003 HC RX 250 WO HCPCS: Performed by: STUDENT IN AN ORGANIZED HEALTH CARE EDUCATION/TRAINING PROGRAM

## 2025-07-09 PROCEDURE — 1100000000 HC RM PRIVATE

## 2025-07-09 PROCEDURE — 83735 ASSAY OF MAGNESIUM: CPT

## 2025-07-09 PROCEDURE — 6360000002 HC RX W HCPCS: Performed by: STUDENT IN AN ORGANIZED HEALTH CARE EDUCATION/TRAINING PROGRAM

## 2025-07-09 PROCEDURE — 82962 GLUCOSE BLOOD TEST: CPT

## 2025-07-09 PROCEDURE — 36415 COLL VENOUS BLD VENIPUNCTURE: CPT

## 2025-07-09 PROCEDURE — 85027 COMPLETE CBC AUTOMATED: CPT

## 2025-07-09 PROCEDURE — 6370000000 HC RX 637 (ALT 250 FOR IP): Performed by: STUDENT IN AN ORGANIZED HEALTH CARE EDUCATION/TRAINING PROGRAM

## 2025-07-09 PROCEDURE — 84100 ASSAY OF PHOSPHORUS: CPT

## 2025-07-09 PROCEDURE — 05HB33Z INSERTION OF INFUSION DEVICE INTO RIGHT BASILIC VEIN, PERCUTANEOUS APPROACH: ICD-10-PCS | Performed by: INTERNAL MEDICINE

## 2025-07-09 PROCEDURE — 36410 VNPNXR 3YR/> PHY/QHP DX/THER: CPT

## 2025-07-09 RX ORDER — SODIUM CHLORIDE 0.9 % (FLUSH) 0.9 %
5-40 SYRINGE (ML) INJECTION PRN
Status: DISCONTINUED | OUTPATIENT
Start: 2025-07-09 | End: 2025-07-10 | Stop reason: HOSPADM

## 2025-07-09 RX ORDER — SODIUM CHLORIDE 0.9 % (FLUSH) 0.9 %
5-40 SYRINGE (ML) INJECTION EVERY 12 HOURS SCHEDULED
Status: DISCONTINUED | OUTPATIENT
Start: 2025-07-09 | End: 2025-07-10 | Stop reason: HOSPADM

## 2025-07-09 RX ORDER — DEXAMETHASONE SODIUM PHOSPHATE 4 MG/ML
4 INJECTION, SOLUTION INTRA-ARTICULAR; INTRALESIONAL; INTRAMUSCULAR; INTRAVENOUS; SOFT TISSUE EVERY 8 HOURS
Status: COMPLETED | OUTPATIENT
Start: 2025-07-09 | End: 2025-07-10

## 2025-07-09 RX ORDER — SODIUM CHLORIDE 9 MG/ML
INJECTION, SOLUTION INTRAVENOUS PRN
Status: DISCONTINUED | OUTPATIENT
Start: 2025-07-09 | End: 2025-07-10 | Stop reason: HOSPADM

## 2025-07-09 RX ADMIN — DIPHENHYDRAMINE HYDROCHLORIDE 25 MG: 50 INJECTION INTRAMUSCULAR; INTRAVENOUS at 05:24

## 2025-07-09 RX ADMIN — DEXAMETHASONE SODIUM PHOSPHATE 8 MG: 4 INJECTION, SOLUTION INTRA-ARTICULAR; INTRALESIONAL; INTRAMUSCULAR; INTRAVENOUS; SOFT TISSUE at 06:50

## 2025-07-09 RX ADMIN — INSULIN LISPRO 2 UNITS: 100 INJECTION, SOLUTION INTRAVENOUS; SUBCUTANEOUS at 12:35

## 2025-07-09 RX ADMIN — DEXAMETHASONE SODIUM PHOSPHATE 8 MG: 4 INJECTION, SOLUTION INTRA-ARTICULAR; INTRALESIONAL; INTRAMUSCULAR; INTRAVENOUS; SOFT TISSUE at 00:20

## 2025-07-09 RX ADMIN — SODIUM CHLORIDE, PRESERVATIVE FREE 10 ML: 5 INJECTION INTRAVENOUS at 21:23

## 2025-07-09 RX ADMIN — INSULIN LISPRO 8 UNITS: 100 INJECTION, SOLUTION INTRAVENOUS; SUBCUTANEOUS at 21:54

## 2025-07-09 RX ADMIN — DIPHENHYDRAMINE HYDROCHLORIDE 25 MG: 50 INJECTION INTRAMUSCULAR; INTRAVENOUS at 00:19

## 2025-07-09 RX ADMIN — INSULIN LISPRO 6 UNITS: 100 INJECTION, SOLUTION INTRAVENOUS; SUBCUTANEOUS at 18:40

## 2025-07-09 RX ADMIN — VALACYCLOVIR HYDROCHLORIDE 1000 MG: 500 TABLET, FILM COATED ORAL at 21:22

## 2025-07-09 RX ADMIN — INSULIN LISPRO 2 UNITS: 100 INJECTION, SOLUTION INTRAVENOUS; SUBCUTANEOUS at 00:33

## 2025-07-09 RX ADMIN — SODIUM CHLORIDE, PRESERVATIVE FREE 20 MG: 5 INJECTION INTRAVENOUS at 09:11

## 2025-07-09 RX ADMIN — DEXAMETHASONE SODIUM PHOSPHATE 4 MG: 4 INJECTION, SOLUTION INTRA-ARTICULAR; INTRALESIONAL; INTRAMUSCULAR; INTRAVENOUS; SOFT TISSUE at 21:27

## 2025-07-09 RX ADMIN — DEXAMETHASONE SODIUM PHOSPHATE 4 MG: 4 INJECTION, SOLUTION INTRA-ARTICULAR; INTRALESIONAL; INTRAMUSCULAR; INTRAVENOUS; SOFT TISSUE at 15:33

## 2025-07-09 RX ADMIN — ENOXAPARIN SODIUM 30 MG: 100 INJECTION SUBCUTANEOUS at 09:05

## 2025-07-09 ASSESSMENT — PAIN SCALES - GENERAL
PAINLEVEL_OUTOF10: 0

## 2025-07-09 ASSESSMENT — PAIN SCALES - WONG BAKER
WONGBAKER_NUMERICALRESPONSE: NO HURT
WONGBAKER_NUMERICALRESPONSE: NO HURT

## 2025-07-09 NOTE — H&P
Hospitalist Admission Note    NAME:   Lizbeth Hogue   : 1955   MRN: [id number]     Date/Time: 2025 12:54 PM    Patient PCP: Jasmin Shankar, DO    ______________________________________________________________________  Given the patient's current clinical presentation, I have a high level of concern for decompensation if discharged from the Intensivist department.  Complex decision making was performed, which includes reviewing the patient's available past medical records, laboratory results, and x-ray films.       My assessment of this patient's clinical condition and my plan of care is as follows.    Assessment / Plan:    Facial and lipswelling  Allergic reaction to medication  Remains hemodynamically stable at this time  Continue Decadron Q8  Continue to monitor airway  Continue supportive care    Hypertension-continue current antihypertensive medications    History of colonic mass status post hemicolectomy-patient to follow-up with surgery as an outpatient    History of chronic kidney disease stage III-serum creatinine currently at baseline  Continue trend serum creatinine    Hyperglycemia type 2 diabetes-insulin sliding scale    Prophylaxis-SCDs  FEN-clear liquid diet, replete potassium magnesium  Full code, clarify about surrogate decision maker    Disposition -pending clinical improvement, anticipate patient be stable for discharge in 24 hours                  Medical Decision Making:   I personally reviewed labs: CBC/BMP  I personally reviewed imaging:None  I personally reviewed EKG/Telemetry strip, consistent with NSR  Toxic drug monitoring: Decadron, monitor for iatrogenic hyperglycemia  Discussed case with: ICU provider. After discussion I am in agreement that acuity of patient's medical condition necessitates hospital stay.      Code Status:   DVT Prophylaxis:   Baseline:     Subjective:   CHIEF COMPLAINT: Lip and facial swelling    HISTORY OF PRESENT ILLNESS:     Lizbeth Hogue is a 70 
mouth every 8 hours as needed for Nausea or Vomiting    Yareli Macdonald MD   lidocaine (LIDODERM) 5 % Place 1 patch onto the skin daily 12 hours on, 12 hours off.    Yareli Macdonald MD   dexAMETHasone (DECADRON) 4 MG tablet Take 1 tablet by mouth once a week Take 10 tabs once weekly    Yareli Macdonald MD   fexofenadine (ALLEGRA) 60 MG tablet Take 1 tablet by mouth Once a week at 5 PM    Yareli Macdonald MD   lenalidomide (REVLIMID) 10 MG chemo capsule Take 1 capsule by mouth Once a week at 5 PM Three weeks on 1 week off    Yareli Macdonald MD   Daratumumab-Hyaluronidase-fij (DARZALEX FASPRO SC) Inject into the skin once a week On Wednesday, dose not given    Yareli Macdonald MD   fluticasone (FLONASE) 50 MCG/ACT nasal spray 2 sprays by Each Nostril route daily 5/30/25   Joe Graham PA-C   albuterol sulfate HFA (PROVENTIL;VENTOLIN;PROAIR) 108 (90 Base) MCG/ACT inhaler Inhale into the lungs    Automatic Reconciliation, Ar   amLODIPine (NORVASC) 10 MG tablet Take 1 tablet by mouth daily  Patient taking differently: Take 0.5 tablets by mouth daily    Automatic Reconciliation, Ar   aspirin 81 MG EC tablet Take 1 tablet by mouth daily    Automatic Reconciliation, Ar   atorvastatin (LIPITOR) 40 MG tablet Take 1 tablet by mouth daily    Automatic Reconciliation, Ar   ergocalciferol (ERGOCALCIFEROL) 1.25 MG (85377 UT) capsule Take 1 capsule by mouth    Automatic Reconciliation, Ar   ezetimibe (ZETIA) 10 MG tablet Take by mouth    Automatic Reconciliation, Ar   folic acid (FOLVITE) 1 MG tablet Take 1 tablet by mouth daily    Automatic Reconciliation, Ar   methotrexate (RHEUMATREX) 2.5 MG chemo tablet Take 5 tablets by mouth  Patient taking differently: Take 5 tablets by mouth Takes 7 tabs weekly    Automatic Reconciliation, Ar   montelukast (SINGULAIR) 10 MG tablet Take 1 tablet by mouth daily    Automatic Reconciliation, Ar   SITagliptin (JANUVIA) 50 MG tablet Take 1 tablet by mouth daily

## 2025-07-09 NOTE — PROGRESS NOTES
4 Eyes Skin Assessment     NAME:  Lizbeth Hogue  YOB: 1955  MEDICAL RECORD NUMBER:  [id number]    The patient is being assessed for  Transfer to New Unit    I agree that at least one RN has performed a thorough Head to Toe Skin Assessment on the patient. ALL assessment sites listed below have been assessed.      Areas assessed by both nurses:    Head, Face, Ears, Shoulders, Back, Chest, Arms, Elbows, Hands, Sacrum. Buttock, Coccyx, Ischium, Legs. Feet and Heels, and Under Medical Devices         Does the Patient have a Wound? No noted wound(s)       Edil Prevention initiated by RN: No  Wound Care Orders initiated by RN: No    Pressure Injury (Stage 1,2,3,4, Unstageable, DTI, NWPT, and Complex wounds) if present, place Wound referral order by RN under : No    New Ostomies, if present place, Ostomy referral order under : No     Nurse 1 eSignature: Electronically signed by Eleanor Monsalve RN on 7/9/25 at 6:29 PM EDT    **SHARE this note so that the co-signing nurse can place an eSignature**    Nurse 2 eSignature: Electronically signed by Maya Oreilly RN on 7/9/25 at 6:30 PM EDT

## 2025-07-09 NOTE — PROGRESS NOTES
Unable to obtain IV access, discussed with Dr. Shen, orders for midline received.  Consent obtained, request for midline faxed to nursing supervisor.

## 2025-07-09 NOTE — PROGRESS NOTES
Midline Insertion Procedure Note    Procedure: Insertion of #4 FR/18G Midline    Indications:  Poor Access    Procedure Details    Risks of hemorrhage, and adverse drug reaction were discussed.  Vessel assessment revealed compressible well defined vessels.  Multiple sticks noted to BUE prior to PICC RN arrival. UE Midline placed without complication for patient.  2ml of Lidocaine 1% administered via infiltration prior to Midline insertion.  Time-Out performed at bedside with MANISH Galvez.      #4 FR/18G Midline inserted to the R Basilic vein per hospital protocol.   Blood return:  yes    Catheter length 12 cm, with 0 cm exposed. Mid upper arm circumference is 27 cm.   Catheter was flushed with 20 cc NS. Patient did tolerate procedure well. Upon completion of procedure, all MIDLINE kit components accounted for and intact.  Post Procedure hand-off given to MANISH Galvez.      Midline Brochure given to patient with teaching instruction. Bed returned to locked position with call bell in reach.

## 2025-07-09 NOTE — CARE COORDINATION
07/09/25 1332   Service Assessment   Patient Orientation Alert and Oriented   Cognition Alert   History Provided By Patient   Primary Caregiver Friend   Support Systems Friends/Neighbors;Family Members   Patient's Healthcare Decision Maker is: Legal Next of Kin   PCP Verified by CM Yes   Last Visit to PCP Within last 3 months   Prior Functional Level Independent in ADLs/IADLs   Current Functional Level Other (see comment)  (TBD)   Can patient return to prior living arrangement Unknown at present   Ability to make needs known: Good   Family able to assist with home care needs: Yes   Would you like for me to discuss the discharge plan with any other family members/significant others, and if so, who? No   Financial Resources None   Community Resources None   Social/Functional History   Lives With Friend(s);Daughter   Services At/After Discharge   Mode of Transport at Discharge Other (see comment)   Confirm Follow Up Transport Family     Met f/f with Pt, she confirmed that the information on the face sheet is correct. Pt stated that she lives with a friend and her daughter. Pt stated that her friend and daughter take care of her.    No HH, no DME and independent with ADL    Send Rx to [facility]    Family will transport Pt home when D/C.    CM dispo: TBD    Advance Care Planning     General Advance Care Planning (ACP) Conversation    Date of Conversation: 7/9/2025  Conducted with:   Other persons present:     Healthcare Decision Maker:   Primary Decision Maker: Sharon Hogue - Child - [phone]       Content/Action Overview:    Reviewed DNR/DNI and patient elects Full Code (Attempt Resuscitation)

## 2025-07-09 NOTE — PROGRESS NOTES
CRITICAL CARE PROGRESS NOTE    Name: Lizbeth Hogue   : 1955   MRN: [id number]   Date: 2025      Diagnoses/problem list:   Angioedema    HPI   Ms. Hogue is a 70 year old female who presented with a 2 week history of intermittent lip and facial swelling that worsened overnight. Her daughter reported that she was discharged 2 weeks ago from [facility] due to acute hypoxic respiratory failure and was started on furosemide, levaquin and flagyl and since that discharge she has had increased intermittent facial swelling. Overnight her daughter gave Ms. Hogue 2 doses of benadryl but this morning noted the swelling had worsened and now included her tongue and lips. In the ambulance she received a dose of benadryl and then in the ED she received a dose of decadron, and pepcid with no significant improvement in her swelling. ICU was requested for admission for close observation.     : swelling resolved and no airway concern.     ROS negative except as otherwise documented.    Assessment and plan:     NEUROLOGICAL:    Facial swelling, resolved  -continue decadron, decreased to 4mg q8 hours  -discontinue benadryl    PULMONOLOGY:   COPD  - maintain SpO2>=92, pH>=7.30  - follow ABG and CXR    CARDIOVASCULAR:   Hypertension  -Goal MAP>=65  -Trend troponin / lactate PRN  -EKG PRN  -resume home norvasc, aspirin, statin    GASTROINTESTINAL:   Colonic mass, s/p hemicolectomy  Nutrition: CLD  Bowel regimen  -Colace 100mg po daily  -bisacodyl PRN  GI Px  -Pepcid 20mg IV Q12hr   -recent colonic mass resection with Dr. Irene. Dr. Irene notified of admission    RENAL/ELECTROLYTE:   CKD IIIb  - goal K>=4, Mg>=2, Phos >3  - daily BMP  - strict I/O's; daily weights  - avoid nephrotoxic medications    ENDOCRINE:   Diabetes mellitus  -glucose POCT  -ISS  -maintain euglycemia    HEMATOLOGY/ONCOLOGY:   Multiple myeloma  Colonic mass  VTE Px  -Goal Hb>=7  -Trend CBC,PTT/INR  -Enoxaparin, AC/AP   -SCDs  -currently undergoing

## 2025-07-09 NOTE — PROGRESS NOTES
TRANSFER - OUT REPORT:    Verbal report given to MANISH Webster on Lizbeth Hogue  being transferred to [facility] for routine progression of patient care       Report consisted of patient's Situation, Background, Assessment and   Recommendations(SBAR).     Information from the following report(s) Nurse Handoff Report was reviewed with the receiving nurse.           Lines:       Opportunity for questions and clarification was provided.      Patient transported with:  Monitor and Registered Nurse

## 2025-07-09 NOTE — CARE COORDINATION
07/09/25 1330   Readmission Assessment   Number of Days since last admission? 8-30 days   Previous Disposition Home with Family   Who is being Interviewed Patient   What was the patient's/caregiver's perception as to why they think they needed to return back to the hospital? Other (Comment)  (No suggestions)   Did you visit your Primary Care Physician after you left the hospital, before you returned this time? No   Why weren't you able to visit your PCP? Did not have an appointment   Did you see a specialist, such as Cardiac, Pulmonary, Orthopedic Physician, etc. after you left the hospital? No   Who advised the patient to return to the hospital? Caregiver   Does the patient report anything that got in the way of taking their medications? No   In our efforts to provide the best possible care to you and others like you, can you think of anything that we could have done to help you after you left the hospital the first time, so that you might not have needed to return so soon? Additional Community resources available for illness support;Education on how to continue taking medications upon discharge;Discharge instructions that are concise, clear, and non contradictory;Teach back instructions regarding management of illness;Arrange for more help when leaving the hospital;Identify patient's health literacy needs;Improved written discharge instructions;Teaching during hospitalization regarding your illness

## 2025-07-10 VITALS
WEIGHT: 125 LBS | BODY MASS INDEX: 24.54 KG/M2 | DIASTOLIC BLOOD PRESSURE: 66 MMHG | HEART RATE: 75 BPM | TEMPERATURE: 97.9 F | RESPIRATION RATE: 16 BRPM | OXYGEN SATURATION: 99 % | SYSTOLIC BLOOD PRESSURE: 122 MMHG | HEIGHT: 60 IN

## 2025-07-10 LAB
ANION GAP SERPL CALC-SCNC: 9 MMOL/L (ref 2–12)
BACTERIA SPEC CULT: NORMAL
BACTERIA SPEC CULT: NORMAL
BUN SERPL-MCNC: 25 MG/DL (ref 6–20)
BUN/CREAT SERPL: 13 (ref 12–20)
CA-I BLD-MCNC: 8.5 MG/DL (ref 8.5–10.1)
CHLORIDE SERPL-SCNC: 108 MMOL/L (ref 97–108)
CO2 SERPL-SCNC: 26 MMOL/L (ref 21–32)
CREAT SERPL-MCNC: 2 MG/DL (ref 0.55–1.02)
ERYTHROCYTE [DISTWIDTH] IN BLOOD BY AUTOMATED COUNT: 19.6 % (ref 11.5–14.5)
GLUCOSE BLD STRIP.AUTO-MCNC: 253 MG/DL (ref 65–100)
GLUCOSE BLD STRIP.AUTO-MCNC: 257 MG/DL (ref 65–100)
GLUCOSE BLD STRIP.AUTO-MCNC: 316 MG/DL (ref 65–100)
GLUCOSE BLD STRIP.AUTO-MCNC: 338 MG/DL (ref 65–100)
GLUCOSE SERPL-MCNC: 229 MG/DL (ref 65–100)
HCT VFR BLD AUTO: 21.8 % (ref 35–47)
HGB BLD-MCNC: 7.1 G/DL (ref 11.5–16)
Lab: NORMAL
MAGNESIUM SERPL-MCNC: 1.8 MG/DL (ref 1.6–2.4)
MCH RBC QN AUTO: 32.4 PG (ref 26–34)
MCHC RBC AUTO-ENTMCNC: 32.6 G/DL (ref 30–36.5)
MCV RBC AUTO: 99.5 FL (ref 80–99)
NRBC # BLD: 0.02 K/UL (ref 0–0.01)
NRBC BLD-RTO: 0.2 PER 100 WBC
PERFORMED BY:: ABNORMAL
PHOSPHATE SERPL-MCNC: 3 MG/DL (ref 2.6–4.7)
PLATELET # BLD AUTO: 84 K/UL (ref 150–400)
PMV BLD AUTO: 10 FL (ref 8.9–12.9)
POTASSIUM SERPL-SCNC: 3.7 MMOL/L (ref 3.5–5.1)
RBC # BLD AUTO: 2.19 M/UL (ref 3.8–5.2)
SODIUM SERPL-SCNC: 143 MMOL/L (ref 136–145)
WBC # BLD AUTO: 8.1 K/UL (ref 3.6–11)

## 2025-07-10 PROCEDURE — 80048 BASIC METABOLIC PNL TOTAL CA: CPT

## 2025-07-10 PROCEDURE — 85027 COMPLETE CBC AUTOMATED: CPT

## 2025-07-10 PROCEDURE — 36415 COLL VENOUS BLD VENIPUNCTURE: CPT

## 2025-07-10 PROCEDURE — 6360000002 HC RX W HCPCS: Performed by: STUDENT IN AN ORGANIZED HEALTH CARE EDUCATION/TRAINING PROGRAM

## 2025-07-10 PROCEDURE — 6370000000 HC RX 637 (ALT 250 FOR IP): Performed by: STUDENT IN AN ORGANIZED HEALTH CARE EDUCATION/TRAINING PROGRAM

## 2025-07-10 PROCEDURE — 83735 ASSAY OF MAGNESIUM: CPT

## 2025-07-10 PROCEDURE — 82962 GLUCOSE BLOOD TEST: CPT

## 2025-07-10 PROCEDURE — 84100 ASSAY OF PHOSPHORUS: CPT

## 2025-07-10 PROCEDURE — 2500000003 HC RX 250 WO HCPCS: Performed by: STUDENT IN AN ORGANIZED HEALTH CARE EDUCATION/TRAINING PROGRAM

## 2025-07-10 RX ORDER — METHYLPREDNISOLONE 4 MG/1
TABLET ORAL
Qty: 21 TABLET | Refills: 0 | Status: SHIPPED | OUTPATIENT
Start: 2025-07-10 | End: 2025-07-16

## 2025-07-10 RX ADMIN — INSULIN LISPRO 6 UNITS: 100 INJECTION, SOLUTION INTRAVENOUS; SUBCUTANEOUS at 12:07

## 2025-07-10 RX ADMIN — INSULIN LISPRO 6 UNITS: 100 INJECTION, SOLUTION INTRAVENOUS; SUBCUTANEOUS at 05:55

## 2025-07-10 RX ADMIN — DEXAMETHASONE SODIUM PHOSPHATE 4 MG: 4 INJECTION, SOLUTION INTRA-ARTICULAR; INTRALESIONAL; INTRAMUSCULAR; INTRAVENOUS; SOFT TISSUE at 05:56

## 2025-07-10 RX ADMIN — SODIUM CHLORIDE, PRESERVATIVE FREE 10 ML: 5 INJECTION INTRAVENOUS at 08:39

## 2025-07-10 ASSESSMENT — PAIN SCALES - GENERAL: PAINLEVEL_OUTOF10: 0

## 2025-07-10 ASSESSMENT — PAIN SCALES - WONG BAKER: WONGBAKER_NUMERICALRESPONSE: NO HURT

## 2025-07-10 NOTE — PLAN OF CARE
Problem: Chronic Conditions and Co-morbidities  Goal: Patient's chronic conditions and co-morbidity symptoms are monitored and maintained or improved  7/10/2025 1013 by Eleanor Monsalve RN  Outcome: Adequate for Discharge  Flowsheets (Taken 7/10/2025 0839)  Care Plan - Patient's Chronic Conditions and Co-Morbidity Symptoms are Monitored and Maintained or Improved: Monitor and assess patient's chronic conditions and comorbid symptoms for stability, deterioration, or improvement  7/9/2025 2302 by Trish Youssef RN  Outcome: Progressing  Flowsheets (Taken 7/9/2025 1630 by Eleanor Monsalve RN)  Care Plan - Patient's Chronic Conditions and Co-Morbidity Symptoms are Monitored and Maintained or Improved: Monitor and assess patient's chronic conditions and comorbid symptoms for stability, deterioration, or improvement     Problem: Discharge Planning  Goal: Discharge to home or other facility with appropriate resources  7/10/2025 1013 by Eleanor Monsalve RN  Outcome: Adequate for Discharge  Flowsheets (Taken 7/10/2025 0839)  Discharge to home or other facility with appropriate resources: Identify barriers to discharge with patient and caregiver  7/9/2025 2302 by Trish Youssef RN  Outcome: Progressing  Flowsheets  Taken 7/9/2025 2302 by Trish Youssef RN  Discharge to home or other facility with appropriate resources:   Identify barriers to discharge with patient and caregiver   Identify discharge learning needs (meds, wound care, etc)  Taken 7/9/2025 1630 by Eleanor Monsalve RN  Discharge to home or other facility with appropriate resources: Identify barriers to discharge with patient and caregiver     Problem: Pain  Goal: Verbalizes/displays adequate comfort level or baseline comfort level  7/10/2025 1013 by Eleanor Monsalve RN  Outcome: Adequate for Discharge  Flowsheets  Taken 7/10/2025 0839 by Eleanor Monsalve RN  Verbalizes/displays adequate comfort level or baseline comfort level: Encourage patient to 
  Problem: Chronic Conditions and Co-morbidities  Goal: Patient's chronic conditions and co-morbidity symptoms are monitored and maintained or improved  Outcome: Progressing     Problem: Discharge Planning  Goal: Discharge to home or other facility with appropriate resources  Outcome: Progressing     Problem: Pain  Goal: Verbalizes/displays adequate comfort level or baseline comfort level  Outcome: Progressing     Problem: Safety - Adult  Goal: Free from fall injury  Outcome: Progressing     Problem: Skin/Tissue Integrity - Adult  Goal: Skin integrity remains intact  Outcome: Progressing  Goal: Incisions, wounds, or drain sites healing without S/S of infection  Outcome: Progressing  Goal: Oral mucous membranes remain intact  Outcome: Progressing     Problem: Musculoskeletal - Adult  Goal: Return mobility to safest level of function  Outcome: Progressing  Goal: Maintain proper alignment of affected body part  Outcome: Progressing  Goal: Return ADL status to a safe level of function  Outcome: Progressing     Problem: Gastrointestinal - Adult  Goal: Minimal or absence of nausea and vomiting  Outcome: Progressing  Goal: Maintains or returns to baseline bowel function  Outcome: Progressing  Goal: Maintains adequate nutritional intake  Outcome: Progressing  Goal: Establish and maintain optimal ostomy function  Outcome: Progressing     Problem: Infection - Adult  Goal: Absence of infection at discharge  Outcome: Progressing  Goal: Absence of infection during hospitalization  Outcome: Progressing  Goal: Absence of fever/infection during anticipated neutropenic period  Outcome: Progressing     Problem: Hematologic - Adult  Goal: Maintains hematologic stability  Outcome: Progressing     
proper alignment of affected body part: Support and protect limb and body alignment per provider's orders  Goal: Return ADL status to a safe level of function  Outcome: Progressing  Flowsheets  Taken 7/9/2025 2006 by Trish Youssef RN  Return ADL Status to a Safe Level of Function: Administer medication as ordered  Taken 7/9/2025 1630 by Eleanor Monsalve RN  Return ADL Status to a Safe Level of Function: Administer medication as ordered     Problem: Gastrointestinal - Adult  Goal: Minimal or absence of nausea and vomiting  Outcome: Progressing  Flowsheets  Taken 7/9/2025 2006 by Trish Youssef RN  Minimal or absence of nausea and vomiting: Administer ordered antiemetic medications as needed  Taken 7/9/2025 1630 by Eleanor Monsalve RN  Minimal or absence of nausea and vomiting: Administer IV fluids as ordered to ensure adequate hydration  Goal: Maintains or returns to baseline bowel function  Outcome: Progressing  Flowsheets  Taken 7/9/2025 2006 by Trish Youssef RN  Maintains or returns to baseline bowel function: Assess bowel function  Taken 7/9/2025 1630 by Eleanor Monsalve RN  Maintains or returns to baseline bowel function: Assess bowel function  Goal: Maintains adequate nutritional intake  Outcome: Progressing  Flowsheets  Taken 7/9/2025 2006 by Trish Youssef RN  Maintains adequate nutritional intake:   Monitor percentage of each meal consumed   Assist with meals as needed  Taken 7/9/2025 1630 by Eleanor Monsalve RN  Maintains adequate nutritional intake: Monitor percentage of each meal consumed     Problem: Infection - Adult  Goal: Absence of infection at discharge  Outcome: Progressing  Flowsheets (Taken 7/9/2025 1630 by Eleanor Monsalve RN)  Absence of infection at discharge: Assess and monitor for signs and symptoms of infection  Goal: Absence of infection during hospitalization  Outcome: Progressing  Flowsheets  Taken 7/9/2025 2302 by Trish Youssef RN  Absence of infection during

## 2025-07-10 NOTE — CARE COORDINATION
Transition of Care Plan:    RUR: 27%   Prior Level of Functioning:   Disposition: Home   Follow up appointments: PCP  Transportation at discharge: Family  IM/IMM Medicare/ letter given: N/A  Is patient a [facility] [place] and connected with [facility]? N/A  If yes, was [place] transfer form completed and [facility] notified?   Caregiver Contact:   Discharge Caregiver contacted prior to discharge? Daughter at the bedside  Care Conference needed? N/A  Barriers to discharge: N/A      SEGUN Loza

## 2025-07-10 NOTE — DISCHARGE SUMMARY
Hospitalist Discharge Summary     Patient ID:  Lizbeth Hogue  [id number]  70 y.o.  1955 7/8/2025    PCP on record: Jasmin Shankar DO    Admit date: 7/8/2025  Discharge date and time: 7/10/2025    DISCHARGE DIAGNOSIS:    Facial and lipswelling  Allergic reaction to medication  HTN  History of colonic mass status post hemicolectomy   History of chronic kidney disease stage III   Hyperglycemia type 2 diabetes     CONSULTATIONS:  IP CONSULT TO VASCULAR ACCESS TEAM    Excerpted HPI from H&P of Audelia Shen MD:  HISTORY OF PRESENT ILLNESS:     Lizbeth Hogue is a 70 y.o.  female with PMHx significant for multiple comorbidities presented to [facility] with complaints of lip/facial swelling. Patient was initially admitted to MICU with hospital course as follows:     \"Ms. Hogue is a 70 year old female who presented with a 2 week history of intermittent lip and facial swelling that worsened overnight. Her daughter reported that she was discharged 2 weeks ago from [facility] due to acute hypoxic respiratory failure and was started on furosemide, levaquin and flagyl and since that discharge she has had increased intermittent facial swelling. Overnight her daughter gave Ms. Hogue 2 doses of benadryl but this morning noted the swelling had worsened and now included her tongue and lips. In the ambulance she received a dose of benadryl and then in the ED she received a dose of decadron, and pepcid with no significant improvement in her swelling. ICU was requested for admission for close observation.      7/9: swelling resolved and no airway concern.      ROS negative except as otherwise documented.\"     Patient was subsequently deemed stable for transfer out of ICU and accepted to  service  We were asked to admit for work up and evaluation of the above problems.     ______________________________________________________________________  DISCHARGE SUMMARY/HOSPITAL COURSE:

## 2025-07-10 NOTE — PROGRESS NOTES
Discharge plan of care/case management plan validated with provider discharge order. Went over discharge packet with patient and her daughter. Removed midline and applied quikclot. Patient was wheeled down to the front door to go home with daughter.

## 2025-07-11 ENCOUNTER — HOSPITAL ENCOUNTER (OUTPATIENT)
Facility: HOSPITAL | Age: 70
Discharge: HOME | End: 2025-07-14
Payer: MEDICAID

## 2025-07-11 ENCOUNTER — TRANSCRIBE ORDERS (OUTPATIENT)
Facility: HOSPITAL | Age: 70
End: 2025-07-11

## 2025-07-11 DIAGNOSIS — E85.9 MYELOMA ASSOCIATED AMYLOIDOSIS (HCC): ICD-10-CM

## 2025-07-11 DIAGNOSIS — C90.00 MYELOMA ASSOCIATED AMYLOIDOSIS (HCC): Primary | ICD-10-CM

## 2025-07-11 DIAGNOSIS — C90.00 MYELOMA ASSOCIATED AMYLOIDOSIS (HCC): ICD-10-CM

## 2025-07-11 DIAGNOSIS — E85.9 MYELOMA ASSOCIATED AMYLOIDOSIS (HCC): Primary | ICD-10-CM

## 2025-07-11 PROCEDURE — 86921 COMPATIBILITY TEST INCUBATE: CPT

## 2025-07-11 PROCEDURE — 86901 BLOOD TYPING SEROLOGIC RH(D): CPT

## 2025-07-11 PROCEDURE — 86880 COOMBS TEST DIRECT: CPT

## 2025-07-11 PROCEDURE — 36430 TRANSFUSION BLD/BLD COMPNT: CPT

## 2025-07-11 PROCEDURE — 86850 RBC ANTIBODY SCREEN: CPT

## 2025-07-11 PROCEDURE — 86900 BLOOD TYPING SEROLOGIC ABO: CPT

## 2025-07-11 PROCEDURE — 86922 COMPATIBILITY TEST ANTIGLOB: CPT

## 2025-07-11 PROCEDURE — 86870 RBC ANTIBODY IDENTIFICATION: CPT

## 2025-07-11 PROCEDURE — 36415 COLL VENOUS BLD VENIPUNCTURE: CPT

## 2025-07-11 PROCEDURE — 86920 COMPATIBILITY TEST SPIN: CPT

## 2025-07-12 LAB
ABO + RH BLD: NORMAL
BLD PROD TYP BPU: NORMAL
BLD PROD TYP BPU: NORMAL
BLOOD BANK CMNT PATIENT-IMP: NORMAL
BLOOD BANK DISPENSE STATUS: NORMAL
BLOOD BANK DISPENSE STATUS: NORMAL
BLOOD GROUP ANTIBODIES SERPL: NORMAL
BLOOD GROUP ANTIBODIES SERPL: POSITIVE
BPU ID: NORMAL
BPU ID: NORMAL
CROSSMATCH RESULT: NORMAL
CROSSMATCH RESULT: NORMAL
SPECIMEN EXP DATE BLD: NORMAL
TRANSFUSION STATUS PATIENT QL: NORMAL
TRANSFUSION STATUS PATIENT QL: NORMAL
UNIT DIVISION: 0
UNIT DIVISION: 0

## 2025-07-13 LAB
BACTERIA SPEC CULT: NORMAL
Lab: NORMAL

## 2025-07-14 ENCOUNTER — HOSPITAL ENCOUNTER (OUTPATIENT)
Facility: HOSPITAL | Age: 70
Setting detail: INFUSION SERIES
Discharge: HOME | End: 2025-07-14
Payer: MEDICAID

## 2025-07-14 VITALS
RESPIRATION RATE: 18 BRPM | OXYGEN SATURATION: 99 % | SYSTOLIC BLOOD PRESSURE: 155 MMHG | DIASTOLIC BLOOD PRESSURE: 66 MMHG | HEART RATE: 64 BPM | TEMPERATURE: 98.3 F

## 2025-07-14 LAB
GLUCOSE BLD STRIP.AUTO-MCNC: 419 MG/DL (ref 65–100)
HCT VFR BLD AUTO: 23.3 % (ref 35–47)
HGB BLD-MCNC: 7.7 G/DL (ref 11.5–16)
PERFORMED BY:: ABNORMAL

## 2025-07-14 PROCEDURE — 85014 HEMATOCRIT: CPT

## 2025-07-14 PROCEDURE — 85018 HEMOGLOBIN: CPT

## 2025-07-14 PROCEDURE — P9016 RBC LEUKOCYTES REDUCED: HCPCS

## 2025-07-14 PROCEDURE — 6360000002 HC RX W HCPCS: Performed by: INTERNAL MEDICINE

## 2025-07-14 PROCEDURE — 36430 TRANSFUSION BLD/BLD COMPNT: CPT

## 2025-07-14 PROCEDURE — 36415 COLL VENOUS BLD VENIPUNCTURE: CPT

## 2025-07-14 PROCEDURE — 96374 THER/PROPH/DIAG INJ IV PUSH: CPT

## 2025-07-14 PROCEDURE — 82962 GLUCOSE BLOOD TEST: CPT

## 2025-07-14 RX ORDER — FUROSEMIDE 10 MG/ML
20 INJECTION INTRAMUSCULAR; INTRAVENOUS PRN
Status: DISCONTINUED | OUTPATIENT
Start: 2025-07-14 | End: 2025-07-15 | Stop reason: HOSPADM

## 2025-07-14 RX ORDER — SODIUM CHLORIDE 9 MG/ML
INJECTION, SOLUTION INTRAVENOUS PRN
Status: DISCONTINUED | OUTPATIENT
Start: 2025-07-14 | End: 2025-07-15 | Stop reason: HOSPADM

## 2025-07-14 RX ADMIN — FUROSEMIDE 20 MG: 10 INJECTION, SOLUTION INTRAMUSCULAR; INTRAVENOUS at 13:19

## 2025-07-14 ASSESSMENT — PAIN DESCRIPTION - DESCRIPTORS: DESCRIPTORS: CRAMPING

## 2025-07-14 ASSESSMENT — PAIN DESCRIPTION - ORIENTATION: ORIENTATION: RIGHT

## 2025-07-14 ASSESSMENT — PAIN SCALES - GENERAL: PAINLEVEL_OUTOF10: 10

## 2025-07-14 ASSESSMENT — PAIN DESCRIPTION - LOCATION: LOCATION: BACK;LEG;FLANK

## 2025-07-14 ASSESSMENT — PAIN DESCRIPTION - PAIN TYPE: TYPE: ACUTE PAIN

## 2025-07-14 NOTE — PERIOP NOTE
Due to blood originally being released at 0917-- the 4 hour expiration time would be 1317. Blood infusion stopped at 1317 with less than 100 mL remaining, removed and discarded. Lasix given IVP. H&H drawn and sent down to lab. Waiting to see new Hgb & Hct result prior to infusing the next unit. Patient resting comfortably in bed with friend at bedside.

## 2025-07-14 NOTE — PROGRESS NOTES
Pt here in SDS for 2 units of PRBCs pt came on 7/11 for Type and cross. Allergies, history, medications, reviewed with patient and daughter. Pt VSS, pt has had recent hemicolectomy and has c/o right leg , right flank, and back cramping 10/10. Pt has had last BM yesterday and no issues urinating. Pt still needs to follow up with surgeon post surgery. Noted pitting edema on pts bilateral legs pts daughter states that she takes lasix and took it this AM. Pt is SOB with exertion  normally due to her COPD, and asthma. Bilateral lung sounds clear, s1s2 regular ,Pt is not symptomatic otherwise educated pt we could take her to the ER for further evaluation but, pt declined.     0913: blood started at 60 ml/hr, VSS  0928: rate increased to 77 ml/hr per MD order 3-4 hr per each unit. Pt VSS, will continue to monitor pt VS.   1201: rate increased to 80 ml/hr per MD order, Pt VSS, pt tolerating 1st unit well with no issues at this time.  1250: BSSR given to Sarah JORGE RN

## 2025-07-14 NOTE — PROGRESS NOTES
Hgb & Hct resulted in 7.7. Patient has a PMH of CKD and fluid overload. Patient currently has pedal edema. 20mg IV Lasix given per order. Carmella with [facility] called and notified regarding above, orders received to cancel 2nd unit of PRBCs. Patient and family notified and verbalized understanding. Patient has an appointment with [facility] on Friday, they will recheck her then. DC instructions reviewed with patient. IV removed—tip intact.

## 2025-07-15 ENCOUNTER — OFFICE VISIT (OUTPATIENT)
Age: 70
End: 2025-07-15
Payer: MEDICAID

## 2025-07-15 ENCOUNTER — PROCEDURE VISIT (OUTPATIENT)
Age: 70
End: 2025-07-15
Payer: MEDICAID

## 2025-07-15 VITALS — HEART RATE: 62 BPM | OXYGEN SATURATION: 97 % | SYSTOLIC BLOOD PRESSURE: 140 MMHG | DIASTOLIC BLOOD PRESSURE: 80 MMHG

## 2025-07-15 DIAGNOSIS — H93.13 TINNITUS OF BOTH EARS: ICD-10-CM

## 2025-07-15 DIAGNOSIS — H90.6 MIXED HEARING LOSS, BILATERAL: ICD-10-CM

## 2025-07-15 DIAGNOSIS — J30.9 ALLERGIC RHINITIS, UNSPECIFIED SEASONALITY, UNSPECIFIED TRIGGER: ICD-10-CM

## 2025-07-15 DIAGNOSIS — H90.6 MIXED CONDUCTIVE AND SENSORINEURAL HEARING LOSS OF BOTH EARS: Primary | ICD-10-CM

## 2025-07-15 DIAGNOSIS — H93.13 TINNITUS, BILATERAL: Primary | ICD-10-CM

## 2025-07-15 DIAGNOSIS — M26.609 TMJ DYSFUNCTION: ICD-10-CM

## 2025-07-15 DIAGNOSIS — H60.543 ECZEMA OF EXTERNAL EAR, BILATERAL: ICD-10-CM

## 2025-07-15 DIAGNOSIS — M54.2 MYOFASCIAL NECK PAIN: ICD-10-CM

## 2025-07-15 LAB
ABO + RH BLD: NORMAL
BLD PROD TYP BPU: NORMAL
BLD PROD TYP BPU: NORMAL
BLOOD BANK BLOOD PRODUCT EXPIRATION DATE: NORMAL
BLOOD BANK CMNT PATIENT-IMP: NORMAL
BLOOD BANK DISPENSE STATUS: NORMAL
BLOOD BANK DISPENSE STATUS: NORMAL
BLOOD BANK ISBT PRODUCT BLOOD TYPE: 6200
BLOOD BANK PRODUCT CODE: NORMAL
BLOOD BANK UNIT TYPE AND RH: NORMAL
BLOOD GROUP ANTIBODIES SERPL: NORMAL
BLOOD GROUP ANTIBODIES SERPL: POSITIVE
BPU ID: NORMAL
BPU ID: NORMAL
CROSSMATCH RESULT: NORMAL
CROSSMATCH RESULT: NORMAL
SPECIMEN EXP DATE BLD: NORMAL
TRANSFUSION STATUS PATIENT QL: NORMAL
TRANSFUSION STATUS PATIENT QL: NORMAL
UNIT DIVISION: 0
UNIT DIVISION: 0
UNIT ISSUE DATE/TIME: NORMAL

## 2025-07-15 PROCEDURE — 92567 TYMPANOMETRY: CPT

## 2025-07-15 PROCEDURE — 1123F ACP DISCUSS/DSCN MKR DOCD: CPT

## 2025-07-15 PROCEDURE — 92557 COMPREHENSIVE HEARING TEST: CPT

## 2025-07-15 PROCEDURE — 99213 OFFICE O/P EST LOW 20 MIN: CPT

## 2025-07-15 RX ORDER — BUDESONIDE AND FORMOTEROL FUMARATE DIHYDRATE 80; 4.5 UG/1; UG/1
2 AEROSOL RESPIRATORY (INHALATION)
COMMUNITY

## 2025-07-15 RX ORDER — INSULIN GLARGINE 100 [IU]/ML
INJECTION, SOLUTION SUBCUTANEOUS
COMMUNITY
Start: 2025-07-04

## 2025-07-15 RX ORDER — TRIAMCINOLONE ACETONIDE 0.25 MG/G
OINTMENT TOPICAL
Qty: 15 G | Refills: 1 | Status: SHIPPED | OUTPATIENT
Start: 2025-07-15

## 2025-07-15 NOTE — PROGRESS NOTES
Lizbeth Hogue   1955, 70 y.o. female   [id number]       Referring Provider: Joe Graham PA-C    AUDIOLOGIC EVALUATION      Lizbeth Hogue is a 70 y.o. female seen today, 7/15/2025, for an audiologic evaluation. Patient was seen by otolaryngology following today's evaluation.    PERTINENT MEDICAL HISTORY:      Lizbeth Hogue has reported intermittent bilateral otalgia and tinnitus over the past few months. She indicates her daughter had noticed her hearing has dropped over the past year, especially over the past few months. She reported currently undergoing chemotherapy. Her tinnitus, she noted, began prior to starting chemotherapy.    She denied dizziness.    IMPRESSIONS:      Today's results revealed moderate to severe mixed hearing loss. Fair speech understanding when in quiet. Tympanometry indicates normal movement of the tympanic membrane, consistent with middle ear function.    Follow medical recommendations of primary care physician and referring provider.    ASSESSMENT AND FINDINGS:     Otoscopy: Clear ear canals and normal tympanic membranes      RIGHT EAR:  Hearing Sensitivity: Moderate to severe mixed hearing loss. Unable to mask bone-conduction  Speech Reception Threshold: 60 dB HL  Word Recognition: Fair 72%, based on NU-6 25-word list at 90 dB HL using recorded speech stimuli  Tympanometry: Type A, consistent with normal middle ear function      LEFT EAR:  Hearing Sensitivity: Moderate to moderately-severe mixed hearing loss. Unable to mask bone-conduction  Speech Reception Threshold: 60 dB HL  Word Recognition: Fair 72%, based on NU-6 25-word list at 90 dB HL using recorded speech stimuli  Tympanometry: Type A, consistent with normal middle ear function      Reliability: Poor (Inconsistent responses, misunderstanding of instructions, unclear utterances, unable to mask)  Transducer: Inserts    See scanned audiogram dated 7/15/2025 for results.        PATIENT EDUCATION:     The following

## 2025-07-15 NOTE — PROGRESS NOTES
[facility]  Follow-up Visit    Patient: Lizbeth Hogue  YOB: 1955  MRN: [id number]  Date of Service:  7/15/2025    Subjective:     Chief Complaint:  intermittent ear pain, tinnitus, congestion    History of Present Illness:     Initial HPI: 5/30/25  Lizbeth Hogue is a 70 y.o. female who presents today for the evaluation of intermittent ear pain and occasional tinnitus over the past few months.  She notes that the ear pain seems to come and go on its own, is sometimes worse in 1 ear than the other, and occasionally accompanied by a popping sensation of the ears. Her daughter notes that her hearing has decreased over the past year, possibly more so in the past few months. She states she has never had a hearing test before. She does note a history of allergies, for which she takes montelukast daily and cetirizine occasionally. She has used Flonase in the past, although not consistently.  She also notes a history of chronic neck pain, mainly along the sides of her neck, as well as the back of her neck.  Notes that this pain has been there for many years, is likely related to her posture.       She notes a diagnosis of multiple myeloma, for which she recently started chemotherapy.  She is on Revlimid, among other medications.  Notes that the occasional tinnitus began before her chemotherapy started, and has not worsened since then. Denies any ear drainage, vertigo, fevers.    Interval History:    7/15/2025  Is doing well overall. States she is continuing chemo for multiple myeloma  Has been using flonase which is helping with nasal congestion  Ear pain and neck pain somewhat improved with stretches  Has been having itching of the ears; has a history of eczema  Notes gradual hearing loss and tinnitus bilaterally over the past year  Is also scheduled for audiometry today   Denies any current otalgia, otorrhea, vertigo, epistaxis    Review of Systems:  Consitutional: denies fever,

## 2025-07-15 NOTE — PROGRESS NOTES
Identified pt with two pt identifiers(name and ). Reviewed record in preparation for visit and have obtained necessary documentation. All patient medications has been reviewed.  Chief Complaint   Patient presents with    auido      4-6 weeks follow up        Vitals:    07/15/25 1310   BP: (!) 140/80   Pulse: 62   SpO2: 97%                   Coordination of Care Questionnaire:   1) Have you been to an emergency room, urgent care, or hospitalized since your last visit?   no       2. Have seen or consulted any other health care provider since your last visit? no        Patient is accompanied by daughter I have received verbal consent from Lizbeth Hogue to discuss any/all medical information while they are present in the room.

## 2025-07-19 NOTE — ANESTHESIA POSTPROCEDURE EVALUATION
Department of Anesthesiology  Postprocedure Note    Patient: Lizebth Hogue  MRN: [id number]  YOB: 1955    Procedure Summary       Date: 06/20/25 Room / Location: [facility] MAIN OR 06 / [facility] MAIN OR    Anesthesia Start: 1451 Anesthesia Stop: 1813    Procedure: RIGHT HEMICOLECTOMY LAPAROSCOPIC ROBOTIC (Right: Abdomen) Diagnosis:       Mass of colon      (Mass of colon [K63.89])    Surgeons: Pelon Irene MD Responsible Provider: Pj Ahuja MD    Anesthesia Type: general ASA Status: 4            Anesthesia Type: No value filed.    Simi Phase I: Simi Score: 8    Simi Phase II: Simi Score: 10    Anesthesia Post Evaluation    Patient location during evaluation: PACU  Patient participation: complete - patient cannot participate  Level of consciousness: awake  Pain score: 0  Airway patency: patent  Nausea & Vomiting: no nausea and no vomiting  Cardiovascular status: hemodynamically stable  Respiratory status: acceptable  Hydration status: stable  Multimodal analgesia pain management approach        No notable events documented.

## 2025-07-31 ENCOUNTER — APPOINTMENT (OUTPATIENT)
Facility: HOSPITAL | Age: 70
DRG: 141 | End: 2025-07-31
Payer: MEDICAID

## 2025-07-31 ENCOUNTER — HOSPITAL ENCOUNTER (INPATIENT)
Facility: HOSPITAL | Age: 70
LOS: 5 days | Discharge: HOME | DRG: 141 | End: 2025-08-05
Attending: STUDENT IN AN ORGANIZED HEALTH CARE EDUCATION/TRAINING PROGRAM | Admitting: STUDENT IN AN ORGANIZED HEALTH CARE EDUCATION/TRAINING PROGRAM
Payer: MEDICAID

## 2025-07-31 DIAGNOSIS — R06.03 RESPIRATORY DISTRESS: ICD-10-CM

## 2025-07-31 DIAGNOSIS — J44.1 COPD EXACERBATION (HCC): Primary | ICD-10-CM

## 2025-07-31 LAB
ALBUMIN SERPL-MCNC: 3.3 G/DL (ref 3.5–5)
ALBUMIN/GLOB SERPL: 1.1 (ref 1.1–2.2)
ALP SERPL-CCNC: 74 U/L (ref 45–117)
ALT SERPL-CCNC: 49 U/L (ref 12–78)
ANION GAP SERPL CALC-SCNC: 10 MMOL/L (ref 2–12)
AST SERPL W P-5'-P-CCNC: ABNORMAL U/L (ref 15–37)
BASE EXCESS BLD CALC-SCNC: 8.2 MMOL/L
BASOPHILS # BLD: 0.01 K/UL (ref 0–0.1)
BASOPHILS NFR BLD: 0.1 % (ref 0–1)
BILIRUB SERPL-MCNC: 0.4 MG/DL (ref 0.2–1)
BNP SERPL-MCNC: 5526 PG/ML
BUN SERPL-MCNC: 34 MG/DL (ref 6–20)
BUN/CREAT SERPL: 18 (ref 12–20)
CA-I BLD-MCNC: 1.22 MMOL/L (ref 1.12–1.32)
CA-I BLD-MCNC: 9.2 MG/DL (ref 8.5–10.1)
CHLORIDE BLD-SCNC: 93 MMOL/L (ref 98–107)
CHLORIDE SERPL-SCNC: 94 MMOL/L (ref 97–108)
CO2 BLD-SCNC: 33 MMOL/L
CO2 SERPL-SCNC: 28 MMOL/L (ref 21–32)
CREAT SERPL-MCNC: 1.84 MG/DL (ref 0.55–1.02)
CREAT UR-MCNC: 1.57 MG/DL (ref 0.6–1.3)
DIFFERENTIAL METHOD BLD: ABNORMAL
EKG ATRIAL RATE: 91 BPM
EKG DIAGNOSIS: NORMAL
EKG P AXIS: 67 DEGREES
EKG P-R INTERVAL: 170 MS
EKG Q-T INTERVAL: 358 MS
EKG QRS DURATION: 94 MS
EKG QTC CALCULATION (BAZETT): 440 MS
EKG R AXIS: 25 DEGREES
EKG T AXIS: 62 DEGREES
EKG VENTRICULAR RATE: 91 BPM
EOSINOPHIL # BLD: 0 K/UL (ref 0–0.4)
EOSINOPHIL NFR BLD: 0 % (ref 0–7)
ERYTHROCYTE [DISTWIDTH] IN BLOOD BY AUTOMATED COUNT: 21.3 % (ref 11.5–14.5)
FLUAV RNA SPEC QL NAA+PROBE: NOT DETECTED
FLUBV RNA SPEC QL NAA+PROBE: NOT DETECTED
GLOBULIN SER CALC-MCNC: 3.1 G/DL (ref 2–4)
GLUCOSE BLD STRIP.AUTO-MCNC: 197 MG/DL (ref 65–100)
GLUCOSE BLD STRIP.AUTO-MCNC: 248 MG/DL (ref 65–100)
GLUCOSE BLD STRIP.AUTO-MCNC: 285 MG/DL (ref 65–100)
GLUCOSE BLD STRIP.AUTO-MCNC: 437 MG/DL (ref 65–100)
GLUCOSE SERPL-MCNC: 211 MG/DL (ref 65–100)
HCO3 BLD-SCNC: 34.6 MMOL/L (ref 19–28)
HCT VFR BLD AUTO: 26.4 % (ref 35–47)
HGB BLD-MCNC: 8.7 G/DL (ref 11.5–16)
IMM GRANULOCYTES # BLD AUTO: 0.09 K/UL (ref 0–0.04)
IMM GRANULOCYTES NFR BLD AUTO: 0.6 % (ref 0–0.5)
LACTATE BLD-SCNC: 0.93 MMOL/L (ref 0.4–2)
LACTATE SERPL-SCNC: 2.4 MMOL/L (ref 0.4–2)
LACTATE SERPL-SCNC: 2.9 MMOL/L (ref 0.4–2)
LYMPHOCYTES # BLD: 0.24 K/UL (ref 0.8–3.5)
LYMPHOCYTES NFR BLD: 1.7 % (ref 12–49)
MAGNESIUM SERPL-MCNC: 1.9 MG/DL (ref 1.6–2.4)
MCH RBC QN AUTO: 33.7 PG (ref 26–34)
MCHC RBC AUTO-ENTMCNC: 33 G/DL (ref 30–36.5)
MCV RBC AUTO: 102.3 FL (ref 80–99)
MONOCYTES # BLD: 0.96 K/UL (ref 0–1)
MONOCYTES NFR BLD: 6.7 % (ref 5–13)
MRSA DNA SPEC QL NAA+PROBE: DETECTED
NEUTS SEG # BLD: 13 K/UL (ref 1.8–8)
NEUTS SEG NFR BLD: 90.9 % (ref 32–75)
NRBC # BLD: 0.05 K/UL (ref 0–0.01)
NRBC BLD-RTO: 0.3 PER 100 WBC
PCO2 BLD: 57.4 MMHG (ref 35–45)
PERFORMED BY:: ABNORMAL
PH BLD: 7.39 (ref 7.35–7.45)
PHOSPHATE SERPL-MCNC: 3.4 MG/DL (ref 2.6–4.7)
PLATELET # BLD AUTO: 74 K/UL (ref 150–400)
PMV BLD AUTO: 11.4 FL (ref 8.9–12.9)
PO2 BLD: 96 MMHG (ref 75–100)
POTASSIUM BLD-SCNC: 3.7 MMOL/L (ref 3.5–5.5)
POTASSIUM SERPL-SCNC: ABNORMAL MMOL/L (ref 3.5–5.1)
PROT SERPL-MCNC: 6.4 G/DL (ref 6.4–8.2)
RBC # BLD AUTO: 2.58 M/UL (ref 3.8–5.2)
RBC MORPH BLD: ABNORMAL
RBC MORPH BLD: ABNORMAL
SAO2 % BLD: 97 %
SARS-COV-2 RNA RESP QL NAA+PROBE: NOT DETECTED
SODIUM BLD-SCNC: 136 MMOL/L (ref 136–145)
SODIUM SERPL-SCNC: 132 MMOL/L (ref 136–145)
SPECIMEN SITE: ABNORMAL
WBC # BLD AUTO: 14.3 K/UL (ref 3.6–11)

## 2025-07-31 PROCEDURE — 83605 ASSAY OF LACTIC ACID: CPT

## 2025-07-31 PROCEDURE — 2580000003 HC RX 258: Performed by: STUDENT IN AN ORGANIZED HEALTH CARE EDUCATION/TRAINING PROGRAM

## 2025-07-31 PROCEDURE — 94640 AIRWAY INHALATION TREATMENT: CPT

## 2025-07-31 PROCEDURE — 83880 ASSAY OF NATRIURETIC PEPTIDE: CPT

## 2025-07-31 PROCEDURE — 82962 GLUCOSE BLOOD TEST: CPT

## 2025-07-31 PROCEDURE — 84295 ASSAY OF SERUM SODIUM: CPT

## 2025-07-31 PROCEDURE — 96365 THER/PROPH/DIAG IV INF INIT: CPT

## 2025-07-31 PROCEDURE — 6360000002 HC RX W HCPCS: Performed by: STUDENT IN AN ORGANIZED HEALTH CARE EDUCATION/TRAINING PROGRAM

## 2025-07-31 PROCEDURE — 2500000003 HC RX 250 WO HCPCS: Performed by: STUDENT IN AN ORGANIZED HEALTH CARE EDUCATION/TRAINING PROGRAM

## 2025-07-31 PROCEDURE — 85027 COMPLETE CBC AUTOMATED: CPT

## 2025-07-31 PROCEDURE — 82746 ASSAY OF FOLIC ACID SERUM: CPT

## 2025-07-31 PROCEDURE — 82728 ASSAY OF FERRITIN: CPT

## 2025-07-31 PROCEDURE — 6370000000 HC RX 637 (ALT 250 FOR IP): Performed by: STUDENT IN AN ORGANIZED HEALTH CARE EDUCATION/TRAINING PROGRAM

## 2025-07-31 PROCEDURE — 82330 ASSAY OF CALCIUM: CPT

## 2025-07-31 PROCEDURE — 2700000000 HC OXYGEN THERAPY PER DAY

## 2025-07-31 PROCEDURE — 99285 EMERGENCY DEPT VISIT HI MDM: CPT

## 2025-07-31 PROCEDURE — 6370000000 HC RX 637 (ALT 250 FOR IP): Performed by: INTERNAL MEDICINE

## 2025-07-31 PROCEDURE — 83540 ASSAY OF IRON: CPT

## 2025-07-31 PROCEDURE — 94664 DEMO&/EVAL PT USE INHALER: CPT

## 2025-07-31 PROCEDURE — 6370000000 HC RX 637 (ALT 250 FOR IP)

## 2025-07-31 PROCEDURE — 93005 ELECTROCARDIOGRAM TRACING: CPT | Performed by: STUDENT IN AN ORGANIZED HEALTH CARE EDUCATION/TRAINING PROGRAM

## 2025-07-31 PROCEDURE — 87636 SARSCOV2 & INF A&B AMP PRB: CPT

## 2025-07-31 PROCEDURE — 94761 N-INVAS EAR/PLS OXIMETRY MLT: CPT

## 2025-07-31 PROCEDURE — 83735 ASSAY OF MAGNESIUM: CPT

## 2025-07-31 PROCEDURE — 94660 CPAP INITIATION&MGMT: CPT

## 2025-07-31 PROCEDURE — 83550 IRON BINDING TEST: CPT

## 2025-07-31 PROCEDURE — 84100 ASSAY OF PHOSPHORUS: CPT

## 2025-07-31 PROCEDURE — 2000000000 HC ICU R&B

## 2025-07-31 PROCEDURE — 82947 ASSAY GLUCOSE BLOOD QUANT: CPT

## 2025-07-31 PROCEDURE — 87641 MR-STAPH DNA AMP PROBE: CPT

## 2025-07-31 PROCEDURE — 80053 COMPREHEN METABOLIC PANEL: CPT

## 2025-07-31 PROCEDURE — 87040 BLOOD CULTURE FOR BACTERIA: CPT

## 2025-07-31 PROCEDURE — 84132 ASSAY OF SERUM POTASSIUM: CPT

## 2025-07-31 PROCEDURE — 82803 BLOOD GASES ANY COMBINATION: CPT

## 2025-07-31 PROCEDURE — 71045 X-RAY EXAM CHEST 1 VIEW: CPT

## 2025-07-31 PROCEDURE — 2580000003 HC RX 258: Performed by: NURSE PRACTITIONER

## 2025-07-31 PROCEDURE — 82607 VITAMIN B-12: CPT

## 2025-07-31 PROCEDURE — 96375 TX/PRO/DX INJ NEW DRUG ADDON: CPT

## 2025-07-31 RX ORDER — FOLIC ACID 1 MG/1
1 TABLET ORAL DAILY
Status: DISCONTINUED | OUTPATIENT
Start: 2025-07-31 | End: 2025-08-05 | Stop reason: HOSPADM

## 2025-07-31 RX ORDER — ACETAMINOPHEN 325 MG/1
650 TABLET ORAL EVERY 6 HOURS PRN
Status: DISCONTINUED | OUTPATIENT
Start: 2025-07-31 | End: 2025-08-05 | Stop reason: HOSPADM

## 2025-07-31 RX ORDER — PANTOPRAZOLE SODIUM 40 MG/1
40 TABLET, DELAYED RELEASE ORAL DAILY
Status: DISCONTINUED | OUTPATIENT
Start: 2025-07-31 | End: 2025-08-05 | Stop reason: HOSPADM

## 2025-07-31 RX ORDER — DEXAMETHASONE 4 MG/1
40 TABLET ORAL WEEKLY
COMMUNITY
Start: 2025-07-17

## 2025-07-31 RX ORDER — POLYETHYLENE GLYCOL 3350 17 G/17G
17 POWDER, FOR SOLUTION ORAL DAILY PRN
Status: DISCONTINUED | OUTPATIENT
Start: 2025-07-31 | End: 2025-08-05 | Stop reason: HOSPADM

## 2025-07-31 RX ORDER — ACYCLOVIR 800 MG/1
400 TABLET ORAL 2 TIMES DAILY
Status: DISCONTINUED | OUTPATIENT
Start: 2025-07-31 | End: 2025-08-01

## 2025-07-31 RX ORDER — AMLODIPINE BESYLATE 5 MG/1
5 TABLET ORAL DAILY
Status: ON HOLD | COMMUNITY
Start: 2025-05-20 | End: 2025-08-05 | Stop reason: HOSPADM

## 2025-07-31 RX ORDER — LENALIDOMIDE 10 MG/1
10 CAPSULE ORAL WEEKLY
Status: DISCONTINUED | OUTPATIENT
Start: 2025-07-31 | End: 2025-07-31

## 2025-07-31 RX ORDER — LIDOCAINE 4 G/G
1 PATCH TOPICAL EVERY 12 HOURS PRN
Status: DISCONTINUED | OUTPATIENT
Start: 2025-07-31 | End: 2025-08-05 | Stop reason: HOSPADM

## 2025-07-31 RX ORDER — INSULIN LISPRO 100 [IU]/ML
0-4 INJECTION, SOLUTION INTRAVENOUS; SUBCUTANEOUS
Status: DISCONTINUED | OUTPATIENT
Start: 2025-07-31 | End: 2025-08-02

## 2025-07-31 RX ORDER — DEXTROSE MONOHYDRATE 100 MG/ML
INJECTION, SOLUTION INTRAVENOUS CONTINUOUS PRN
Status: DISCONTINUED | OUTPATIENT
Start: 2025-07-31 | End: 2025-08-05 | Stop reason: HOSPADM

## 2025-07-31 RX ORDER — MAGNESIUM SULFATE HEPTAHYDRATE 40 MG/ML
2000 INJECTION, SOLUTION INTRAVENOUS ONCE
Status: COMPLETED | OUTPATIENT
Start: 2025-07-31 | End: 2025-07-31

## 2025-07-31 RX ORDER — FUROSEMIDE 10 MG/ML
20 INJECTION INTRAMUSCULAR; INTRAVENOUS
Status: COMPLETED | OUTPATIENT
Start: 2025-07-31 | End: 2025-07-31

## 2025-07-31 RX ORDER — IPRATROPIUM BROMIDE AND ALBUTEROL SULFATE 2.5; .5 MG/3ML; MG/3ML
1 SOLUTION RESPIRATORY (INHALATION)
Status: DISCONTINUED | OUTPATIENT
Start: 2025-07-31 | End: 2025-07-31

## 2025-07-31 RX ORDER — BUDESONIDE 0.5 MG/2ML
1 INHALANT ORAL
Status: DISCONTINUED | OUTPATIENT
Start: 2025-07-31 | End: 2025-07-31

## 2025-07-31 RX ORDER — ACETAMINOPHEN 650 MG/1
650 SUPPOSITORY RECTAL EVERY 6 HOURS PRN
Status: DISCONTINUED | OUTPATIENT
Start: 2025-07-31 | End: 2025-08-05 | Stop reason: HOSPADM

## 2025-07-31 RX ORDER — FLUTICASONE PROPIONATE 50 MCG
2 SPRAY, SUSPENSION (ML) NASAL DAILY
Status: DISCONTINUED | OUTPATIENT
Start: 2025-07-31 | End: 2025-08-05 | Stop reason: HOSPADM

## 2025-07-31 RX ORDER — EZETIMIBE 10 MG/1
10 TABLET ORAL NIGHTLY
Status: DISCONTINUED | OUTPATIENT
Start: 2025-07-31 | End: 2025-08-05 | Stop reason: HOSPADM

## 2025-07-31 RX ORDER — SODIUM CHLORIDE 9 MG/ML
INJECTION, SOLUTION INTRAVENOUS PRN
Status: DISCONTINUED | OUTPATIENT
Start: 2025-07-31 | End: 2025-08-05 | Stop reason: HOSPADM

## 2025-07-31 RX ORDER — SODIUM CHLORIDE 0.9 % (FLUSH) 0.9 %
5-40 SYRINGE (ML) INJECTION PRN
Status: DISCONTINUED | OUTPATIENT
Start: 2025-07-31 | End: 2025-08-05 | Stop reason: HOSPADM

## 2025-07-31 RX ORDER — AMLODIPINE BESYLATE 5 MG/1
5 TABLET ORAL DAILY
Status: DISCONTINUED | OUTPATIENT
Start: 2025-07-31 | End: 2025-08-01

## 2025-07-31 RX ORDER — IPRATROPIUM BROMIDE AND ALBUTEROL SULFATE 2.5; .5 MG/3ML; MG/3ML
SOLUTION RESPIRATORY (INHALATION)
Status: COMPLETED
Start: 2025-07-31 | End: 2025-07-31

## 2025-07-31 RX ORDER — POTASSIUM CHLORIDE 29.8 MG/ML
20 INJECTION INTRAVENOUS PRN
Status: DISCONTINUED | OUTPATIENT
Start: 2025-07-31 | End: 2025-08-05 | Stop reason: HOSPADM

## 2025-07-31 RX ORDER — GABAPENTIN 300 MG/1
300 CAPSULE ORAL NIGHTLY
Status: DISCONTINUED | OUTPATIENT
Start: 2025-07-31 | End: 2025-08-05 | Stop reason: HOSPADM

## 2025-07-31 RX ORDER — ONDANSETRON 2 MG/ML
4 INJECTION INTRAMUSCULAR; INTRAVENOUS EVERY 6 HOURS PRN
Status: DISCONTINUED | OUTPATIENT
Start: 2025-07-31 | End: 2025-08-05 | Stop reason: HOSPADM

## 2025-07-31 RX ORDER — ONDANSETRON 4 MG/1
4 TABLET, ORALLY DISINTEGRATING ORAL EVERY 8 HOURS PRN
Status: DISCONTINUED | OUTPATIENT
Start: 2025-07-31 | End: 2025-08-05 | Stop reason: HOSPADM

## 2025-07-31 RX ORDER — IPRATROPIUM BROMIDE AND ALBUTEROL SULFATE 2.5; .5 MG/3ML; MG/3ML
1 SOLUTION RESPIRATORY (INHALATION) EVERY 6 HOURS SCHEDULED
Status: DISCONTINUED | OUTPATIENT
Start: 2025-07-31 | End: 2025-07-31

## 2025-07-31 RX ORDER — INSULIN LISPRO 100 [IU]/ML
5 INJECTION, SOLUTION INTRAVENOUS; SUBCUTANEOUS
COMMUNITY
Start: 2025-07-17

## 2025-07-31 RX ORDER — SODIUM CHLORIDE 0.9 % (FLUSH) 0.9 %
5-40 SYRINGE (ML) INJECTION EVERY 12 HOURS SCHEDULED
Status: DISCONTINUED | OUTPATIENT
Start: 2025-07-31 | End: 2025-08-05 | Stop reason: HOSPADM

## 2025-07-31 RX ORDER — METHYLPREDNISOLONE SODIUM SUCCINATE 125 MG/2ML
125 INJECTION INTRAMUSCULAR; INTRAVENOUS ONCE
Status: COMPLETED | OUTPATIENT
Start: 2025-07-31 | End: 2025-07-31

## 2025-07-31 RX ORDER — ALOGLIPTIN 12.5 MG/1
12.5 TABLET, FILM COATED ORAL DAILY
Status: DISCONTINUED | OUTPATIENT
Start: 2025-07-31 | End: 2025-08-05

## 2025-07-31 RX ORDER — MAGNESIUM SULFATE IN WATER 40 MG/ML
2000 INJECTION, SOLUTION INTRAVENOUS PRN
Status: DISCONTINUED | OUTPATIENT
Start: 2025-07-31 | End: 2025-08-05 | Stop reason: HOSPADM

## 2025-07-31 RX ORDER — SODIUM CHLORIDE, SODIUM LACTATE, POTASSIUM CHLORIDE, AND CALCIUM CHLORIDE .6; .31; .03; .02 G/100ML; G/100ML; G/100ML; G/100ML
500 INJECTION, SOLUTION INTRAVENOUS ONCE
Status: COMPLETED | OUTPATIENT
Start: 2025-07-31 | End: 2025-07-31

## 2025-07-31 RX ORDER — IPRATROPIUM BROMIDE AND ALBUTEROL SULFATE 2.5; .5 MG/3ML; MG/3ML
1 SOLUTION RESPIRATORY (INHALATION)
Status: DISCONTINUED | OUTPATIENT
Start: 2025-07-31 | End: 2025-08-05 | Stop reason: HOSPADM

## 2025-07-31 RX ORDER — ALBUTEROL SULFATE 5 MG/ML
2.5 SOLUTION RESPIRATORY (INHALATION)
Status: DISCONTINUED | OUTPATIENT
Start: 2025-07-31 | End: 2025-08-05 | Stop reason: HOSPADM

## 2025-07-31 RX ORDER — INSULIN GLARGINE 100 [IU]/ML
10 INJECTION, SOLUTION SUBCUTANEOUS NIGHTLY
Status: DISCONTINUED | OUTPATIENT
Start: 2025-07-31 | End: 2025-08-02

## 2025-07-31 RX ORDER — HYDRALAZINE HYDROCHLORIDE 20 MG/ML
10 INJECTION INTRAMUSCULAR; INTRAVENOUS EVERY 4 HOURS PRN
Status: DISCONTINUED | OUTPATIENT
Start: 2025-07-31 | End: 2025-08-01

## 2025-07-31 RX ORDER — ASPIRIN 81 MG/1
81 TABLET ORAL DAILY
Status: DISCONTINUED | OUTPATIENT
Start: 2025-07-31 | End: 2025-08-05 | Stop reason: HOSPADM

## 2025-07-31 RX ORDER — BUDESONIDE 0.5 MG/2ML
0.5 INHALANT ORAL
Status: DISCONTINUED | OUTPATIENT
Start: 2025-07-31 | End: 2025-08-01

## 2025-07-31 RX ORDER — ENOXAPARIN SODIUM 100 MG/ML
40 INJECTION SUBCUTANEOUS DAILY
Status: DISCONTINUED | OUTPATIENT
Start: 2025-07-31 | End: 2025-08-01

## 2025-07-31 RX ORDER — POTASSIUM CHLORIDE 7.45 MG/ML
10 INJECTION INTRAVENOUS PRN
Status: DISCONTINUED | OUTPATIENT
Start: 2025-07-31 | End: 2025-08-05 | Stop reason: HOSPADM

## 2025-07-31 RX ORDER — ALLOPURINOL 100 MG/1
100 TABLET ORAL DAILY
Status: DISCONTINUED | OUTPATIENT
Start: 2025-07-31 | End: 2025-08-05 | Stop reason: HOSPADM

## 2025-07-31 RX ORDER — ATORVASTATIN CALCIUM 40 MG/1
40 TABLET, FILM COATED ORAL DAILY
Status: DISCONTINUED | OUTPATIENT
Start: 2025-07-31 | End: 2025-08-05 | Stop reason: HOSPADM

## 2025-07-31 RX ORDER — ACETAMINOPHEN AND CODEINE PHOSPHATE 300; 30 MG/1; MG/1
2 TABLET ORAL WEEKLY
COMMUNITY

## 2025-07-31 RX ORDER — METHYLPREDNISOLONE SODIUM SUCCINATE 40 MG/ML
40 INJECTION INTRAMUSCULAR; INTRAVENOUS EVERY 8 HOURS
Status: DISCONTINUED | OUTPATIENT
Start: 2025-07-31 | End: 2025-08-01

## 2025-07-31 RX ORDER — MONTELUKAST SODIUM 10 MG/1
10 TABLET ORAL DAILY
Status: DISCONTINUED | OUTPATIENT
Start: 2025-07-31 | End: 2025-08-01

## 2025-07-31 RX ORDER — FAMOTIDINE 40 MG/1
40 TABLET, FILM COATED ORAL DAILY
Status: ON HOLD | COMMUNITY
End: 2025-08-05 | Stop reason: HOSPADM

## 2025-07-31 RX ORDER — FUROSEMIDE 40 MG/1
40 TABLET ORAL DAILY
Status: DISCONTINUED | OUTPATIENT
Start: 2025-07-31 | End: 2025-08-05 | Stop reason: HOSPADM

## 2025-07-31 RX ORDER — GLUCAGON 1 MG/ML
1 KIT INJECTION PRN
Status: DISCONTINUED | OUTPATIENT
Start: 2025-07-31 | End: 2025-08-05 | Stop reason: HOSPADM

## 2025-07-31 RX ADMIN — CEFTRIAXONE SODIUM 2000 MG: 2 INJECTION, POWDER, FOR SOLUTION INTRAMUSCULAR; INTRAVENOUS at 13:57

## 2025-07-31 RX ADMIN — EZETIMIBE 10 MG: 10 TABLET ORAL at 21:49

## 2025-07-31 RX ADMIN — METHYLPREDNISOLONE SODIUM SUCCINATE 40 MG: 40 INJECTION INTRAMUSCULAR; INTRAVENOUS at 21:49

## 2025-07-31 RX ADMIN — FUROSEMIDE 20 MG: 10 INJECTION, SOLUTION INTRAMUSCULAR; INTRAVENOUS at 07:40

## 2025-07-31 RX ADMIN — IPRATROPIUM BROMIDE AND ALBUTEROL SULFATE 1 DOSE: 2.5; .5 SOLUTION RESPIRATORY (INHALATION) at 09:57

## 2025-07-31 RX ADMIN — SODIUM CHLORIDE, SODIUM LACTATE, POTASSIUM CHLORIDE, AND CALCIUM CHLORIDE 500 ML: .6; .31; .03; .02 INJECTION, SOLUTION INTRAVENOUS at 21:58

## 2025-07-31 RX ADMIN — BUDESONIDE INHALATION 500 MCG: 0.5 SUSPENSION RESPIRATORY (INHALATION) at 08:32

## 2025-07-31 RX ADMIN — SODIUM CHLORIDE, PRESERVATIVE FREE 10 ML: 5 INJECTION INTRAVENOUS at 21:49

## 2025-07-31 RX ADMIN — IPRATROPIUM BROMIDE AND ALBUTEROL SULFATE 1 DOSE: 2.5; .5 SOLUTION RESPIRATORY (INHALATION) at 07:25

## 2025-07-31 RX ADMIN — INSULIN GLARGINE 10 UNITS: 100 INJECTION, SOLUTION SUBCUTANEOUS at 21:49

## 2025-07-31 RX ADMIN — METHYLPREDNISOLONE SODIUM SUCCINATE 125 MG: 125 INJECTION INTRAMUSCULAR; INTRAVENOUS at 07:37

## 2025-07-31 RX ADMIN — BUDESONIDE INHALATION 500 MCG: 0.5 SUSPENSION RESPIRATORY (INHALATION) at 20:20

## 2025-07-31 RX ADMIN — IPRATROPIUM BROMIDE AND ALBUTEROL SULFATE 1 DOSE: .5; 2.5 SOLUTION RESPIRATORY (INHALATION) at 20:20

## 2025-07-31 RX ADMIN — SODIUM CHLORIDE, PRESERVATIVE FREE 10 ML: 5 INJECTION INTRAVENOUS at 14:00

## 2025-07-31 RX ADMIN — MONTELUKAST 10 MG: 10 TABLET, FILM COATED ORAL at 13:58

## 2025-07-31 RX ADMIN — GABAPENTIN 300 MG: 300 CAPSULE ORAL at 21:49

## 2025-07-31 RX ADMIN — INSULIN LISPRO 2 UNITS: 100 INJECTION, SOLUTION INTRAVENOUS; SUBCUTANEOUS at 17:14

## 2025-07-31 RX ADMIN — PANTOPRAZOLE SODIUM 40 MG: 40 TABLET, DELAYED RELEASE ORAL at 13:59

## 2025-07-31 RX ADMIN — ATORVASTATIN CALCIUM 40 MG: 40 TABLET, FILM COATED ORAL at 13:58

## 2025-07-31 RX ADMIN — AZITHROMYCIN 500 MG: 500 INJECTION, POWDER, LYOPHILIZED, FOR SOLUTION INTRAVENOUS at 12:12

## 2025-07-31 RX ADMIN — FOLIC ACID 1 MG: 1 TABLET ORAL at 13:59

## 2025-07-31 RX ADMIN — ENOXAPARIN SODIUM 40 MG: 100 INJECTION SUBCUTANEOUS at 14:02

## 2025-07-31 RX ADMIN — ASPIRIN 81 MG: 81 TABLET, DELAYED RELEASE ORAL at 13:59

## 2025-07-31 RX ADMIN — MAGNESIUM SULFATE HEPTAHYDRATE 2000 MG: 40 INJECTION, SOLUTION INTRAVENOUS at 08:04

## 2025-07-31 RX ADMIN — AMLODIPINE BESYLATE 5 MG: 5 TABLET ORAL at 13:59

## 2025-07-31 RX ADMIN — FLUTICASONE PROPIONATE 2 SPRAY: 50 SPRAY, METERED NASAL at 17:09

## 2025-07-31 RX ADMIN — METHYLPREDNISOLONE SODIUM SUCCINATE 40 MG: 40 INJECTION INTRAMUSCULAR; INTRAVENOUS at 13:57

## 2025-07-31 RX ADMIN — IPRATROPIUM BROMIDE AND ALBUTEROL SULFATE 1 DOSE: 2.5; .5 SOLUTION RESPIRATORY (INHALATION) at 07:22

## 2025-07-31 RX ADMIN — IPRATROPIUM BROMIDE AND ALBUTEROL SULFATE 1 DOSE: .5; 2.5 SOLUTION RESPIRATORY (INHALATION) at 09:57

## 2025-07-31 RX ADMIN — INSULIN LISPRO 4 UNITS: 100 INJECTION, SOLUTION INTRAVENOUS; SUBCUTANEOUS at 21:49

## 2025-07-31 RX ADMIN — PSYLLIUM HUSK 1 PACKET: 3.4 POWDER ORAL at 21:49

## 2025-08-01 ENCOUNTER — APPOINTMENT (OUTPATIENT)
Facility: HOSPITAL | Age: 70
DRG: 141 | End: 2025-08-01
Payer: MEDICAID

## 2025-08-01 LAB
ALBUMIN SERPL-MCNC: 3 G/DL (ref 3.5–5)
ALBUMIN/GLOB SERPL: 1.1 (ref 1.1–2.2)
ALP SERPL-CCNC: 72 U/L (ref 45–117)
ALT SERPL-CCNC: 35 U/L (ref 12–78)
ANION GAP SERPL CALC-SCNC: 6 MMOL/L (ref 2–12)
AST SERPL W P-5'-P-CCNC: 24 U/L (ref 15–37)
BASOPHILS # BLD: 0.01 K/UL (ref 0–0.1)
BASOPHILS NFR BLD: 0.1 % (ref 0–1)
BILIRUB SERPL-MCNC: 0.4 MG/DL (ref 0.2–1)
BUN SERPL-MCNC: 34 MG/DL (ref 6–20)
BUN/CREAT SERPL: 19 (ref 12–20)
CA-I BLD-MCNC: 8.6 MG/DL (ref 8.5–10.1)
CHLORIDE SERPL-SCNC: 96 MMOL/L (ref 97–108)
CO2 SERPL-SCNC: 32 MMOL/L (ref 21–32)
CREAT SERPL-MCNC: 1.83 MG/DL (ref 0.55–1.02)
DIFFERENTIAL METHOD BLD: ABNORMAL
EKG ATRIAL RATE: 83 BPM
EKG DIAGNOSIS: NORMAL
EKG P AXIS: 69 DEGREES
EKG P-R INTERVAL: 162 MS
EKG Q-T INTERVAL: 374 MS
EKG QRS DURATION: 90 MS
EKG QTC CALCULATION (BAZETT): 439 MS
EKG R AXIS: 13 DEGREES
EKG T AXIS: 58 DEGREES
EKG VENTRICULAR RATE: 83 BPM
EOSINOPHIL # BLD: 0 K/UL (ref 0–0.4)
EOSINOPHIL NFR BLD: 0 % (ref 0–7)
ERYTHROCYTE [DISTWIDTH] IN BLOOD BY AUTOMATED COUNT: 21.8 % (ref 11.5–14.5)
EST. AVERAGE GLUCOSE BLD GHB EST-MCNC: 170 MG/DL
FERRITIN SERPL-MCNC: 614 NG/ML (ref 13–252)
FOLATE SERPL-MCNC: >40 NG/ML (ref 4.8–24.2)
GLOBULIN SER CALC-MCNC: 2.7 G/DL (ref 2–4)
GLUCOSE BLD STRIP.AUTO-MCNC: 209 MG/DL (ref 65–100)
GLUCOSE BLD STRIP.AUTO-MCNC: 301 MG/DL (ref 65–100)
GLUCOSE BLD STRIP.AUTO-MCNC: 301 MG/DL (ref 65–100)
GLUCOSE BLD STRIP.AUTO-MCNC: 353 MG/DL (ref 65–100)
GLUCOSE SERPL-MCNC: 281 MG/DL (ref 65–100)
HBA1C MFR BLD: 7.5 % (ref 4–5.6)
HCT VFR BLD AUTO: 25.2 % (ref 35–47)
HGB BLD-MCNC: 8.4 G/DL (ref 11.5–16)
IMM GRANULOCYTES # BLD AUTO: 0.07 K/UL (ref 0–0.04)
IMM GRANULOCYTES NFR BLD AUTO: 0.6 % (ref 0–0.5)
IRON SATN MFR SERPL: 18 %
IRON SERPL-MCNC: 50 UG/DL (ref 37–145)
LACTATE SERPL-SCNC: 1.3 MMOL/L (ref 0.4–2)
LYMPHOCYTES # BLD: 0.05 K/UL (ref 0.8–3.5)
LYMPHOCYTES NFR BLD: 0.4 % (ref 12–49)
MAGNESIUM SERPL-MCNC: 2 MG/DL (ref 1.6–2.4)
MCH RBC QN AUTO: 34 PG (ref 26–34)
MCHC RBC AUTO-ENTMCNC: 33.3 G/DL (ref 30–36.5)
MCV RBC AUTO: 102 FL (ref 80–99)
MONOCYTES # BLD: 0.31 K/UL (ref 0–1)
MONOCYTES NFR BLD: 2.5 % (ref 5–13)
NEUTS SEG # BLD: 11.76 K/UL (ref 1.8–8)
NEUTS SEG NFR BLD: 96.4 % (ref 32–75)
NRBC # BLD: 0.07 K/UL (ref 0–0.01)
NRBC BLD-RTO: 0.6 PER 100 WBC
PERFORMED BY:: ABNORMAL
PHOSPHATE SERPL-MCNC: 2.7 MG/DL (ref 2.6–4.7)
PLATELET # BLD AUTO: 65 K/UL (ref 150–400)
PMV BLD AUTO: 11.6 FL (ref 8.9–12.9)
POTASSIUM SERPL-SCNC: 4.5 MMOL/L (ref 3.5–5.1)
PROCALCITONIN SERPL-MCNC: <0.05 NG/ML
PROT SERPL-MCNC: 5.7 G/DL (ref 6.4–8.2)
RBC # BLD AUTO: 2.47 M/UL (ref 3.8–5.2)
RBC MORPH BLD: ABNORMAL
RBC MORPH BLD: ABNORMAL
SODIUM SERPL-SCNC: 134 MMOL/L (ref 136–145)
TIBC SERPL-MCNC: 273 UG/DL (ref 250–450)
UIBC SERPL-MCNC: 223 UG/DL (ref 112–347)
VIT B12 SERPL-MCNC: 526 PG/ML (ref 232–1245)
WBC # BLD AUTO: 12.2 K/UL (ref 3.6–11)

## 2025-08-01 PROCEDURE — 2580000003 HC RX 258: Performed by: STUDENT IN AN ORGANIZED HEALTH CARE EDUCATION/TRAINING PROGRAM

## 2025-08-01 PROCEDURE — 6360000002 HC RX W HCPCS: Performed by: STUDENT IN AN ORGANIZED HEALTH CARE EDUCATION/TRAINING PROGRAM

## 2025-08-01 PROCEDURE — 83036 HEMOGLOBIN GLYCOSYLATED A1C: CPT

## 2025-08-01 PROCEDURE — 6370000000 HC RX 637 (ALT 250 FOR IP): Performed by: STUDENT IN AN ORGANIZED HEALTH CARE EDUCATION/TRAINING PROGRAM

## 2025-08-01 PROCEDURE — 82962 GLUCOSE BLOOD TEST: CPT

## 2025-08-01 PROCEDURE — 94660 CPAP INITIATION&MGMT: CPT

## 2025-08-01 PROCEDURE — 94640 AIRWAY INHALATION TREATMENT: CPT

## 2025-08-01 PROCEDURE — 83735 ASSAY OF MAGNESIUM: CPT

## 2025-08-01 PROCEDURE — 85025 COMPLETE CBC W/AUTO DIFF WBC: CPT

## 2025-08-01 PROCEDURE — 93005 ELECTROCARDIOGRAM TRACING: CPT | Performed by: STUDENT IN AN ORGANIZED HEALTH CARE EDUCATION/TRAINING PROGRAM

## 2025-08-01 PROCEDURE — 2500000003 HC RX 250 WO HCPCS: Performed by: STUDENT IN AN ORGANIZED HEALTH CARE EDUCATION/TRAINING PROGRAM

## 2025-08-01 PROCEDURE — 71045 X-RAY EXAM CHEST 1 VIEW: CPT

## 2025-08-01 PROCEDURE — 84145 PROCALCITONIN (PCT): CPT

## 2025-08-01 PROCEDURE — 2000000000 HC ICU R&B

## 2025-08-01 PROCEDURE — 2700000000 HC OXYGEN THERAPY PER DAY

## 2025-08-01 PROCEDURE — 83605 ASSAY OF LACTIC ACID: CPT

## 2025-08-01 PROCEDURE — 51798 US URINE CAPACITY MEASURE: CPT

## 2025-08-01 PROCEDURE — 84100 ASSAY OF PHOSPHORUS: CPT

## 2025-08-01 PROCEDURE — 94761 N-INVAS EAR/PLS OXIMETRY MLT: CPT

## 2025-08-01 PROCEDURE — 80053 COMPREHEN METABOLIC PANEL: CPT

## 2025-08-01 PROCEDURE — 6370000000 HC RX 637 (ALT 250 FOR IP): Performed by: INTERNAL MEDICINE

## 2025-08-01 PROCEDURE — 36415 COLL VENOUS BLD VENIPUNCTURE: CPT

## 2025-08-01 RX ORDER — INSULIN LISPRO 100 [IU]/ML
5 INJECTION, SOLUTION INTRAVENOUS; SUBCUTANEOUS ONCE
Status: COMPLETED | OUTPATIENT
Start: 2025-08-01 | End: 2025-08-01

## 2025-08-01 RX ORDER — ENOXAPARIN SODIUM 100 MG/ML
30 INJECTION SUBCUTANEOUS DAILY
Status: DISCONTINUED | OUTPATIENT
Start: 2025-08-02 | End: 2025-08-05 | Stop reason: HOSPADM

## 2025-08-01 RX ORDER — CARVEDILOL 3.12 MG/1
6.25 TABLET ORAL 2 TIMES DAILY WITH MEALS
Status: DISCONTINUED | OUTPATIENT
Start: 2025-08-01 | End: 2025-08-01

## 2025-08-01 RX ORDER — MONTELUKAST SODIUM 10 MG/1
10 TABLET ORAL NIGHTLY
Status: DISCONTINUED | OUTPATIENT
Start: 2025-08-02 | End: 2025-08-05 | Stop reason: HOSPADM

## 2025-08-01 RX ORDER — ACYCLOVIR 800 MG/1
400 TABLET ORAL DAILY
Status: DISCONTINUED | OUTPATIENT
Start: 2025-08-02 | End: 2025-08-05 | Stop reason: HOSPADM

## 2025-08-01 RX ORDER — HYDRALAZINE HYDROCHLORIDE 20 MG/ML
20 INJECTION INTRAMUSCULAR; INTRAVENOUS EVERY 4 HOURS PRN
Status: DISCONTINUED | OUTPATIENT
Start: 2025-08-01 | End: 2025-08-05 | Stop reason: HOSPADM

## 2025-08-01 RX ORDER — METHYLPREDNISOLONE SODIUM SUCCINATE 40 MG/ML
40 INJECTION INTRAMUSCULAR; INTRAVENOUS EVERY 6 HOURS
Status: DISCONTINUED | OUTPATIENT
Start: 2025-08-01 | End: 2025-08-03

## 2025-08-01 RX ORDER — AMLODIPINE BESYLATE 5 MG/1
5 TABLET ORAL ONCE
Status: COMPLETED | OUTPATIENT
Start: 2025-08-01 | End: 2025-08-01

## 2025-08-01 RX ORDER — NIFEDIPINE 30 MG/1
30 TABLET, EXTENDED RELEASE ORAL DAILY
Status: DISCONTINUED | OUTPATIENT
Start: 2025-08-02 | End: 2025-08-02

## 2025-08-01 RX ORDER — ARFORMOTEROL TARTRATE 15 UG/2ML
15 SOLUTION RESPIRATORY (INHALATION)
Status: DISCONTINUED | OUTPATIENT
Start: 2025-08-01 | End: 2025-08-05 | Stop reason: HOSPADM

## 2025-08-01 RX ORDER — AMLODIPINE BESYLATE 5 MG/1
10 TABLET ORAL DAILY
Status: DISCONTINUED | OUTPATIENT
Start: 2025-08-02 | End: 2025-08-01

## 2025-08-01 RX ORDER — BUDESONIDE 0.5 MG/2ML
1 INHALANT ORAL
Status: DISCONTINUED | OUTPATIENT
Start: 2025-08-01 | End: 2025-08-05 | Stop reason: HOSPADM

## 2025-08-01 RX ADMIN — INSULIN LISPRO 1 UNITS: 100 INJECTION, SOLUTION INTRAVENOUS; SUBCUTANEOUS at 17:44

## 2025-08-01 RX ADMIN — EZETIMIBE 10 MG: 10 TABLET ORAL at 21:40

## 2025-08-01 RX ADMIN — SODIUM CHLORIDE: 0.9 INJECTION, SOLUTION INTRAVENOUS at 10:52

## 2025-08-01 RX ADMIN — IPRATROPIUM BROMIDE AND ALBUTEROL SULFATE 1 DOSE: .5; 2.5 SOLUTION RESPIRATORY (INHALATION) at 01:15

## 2025-08-01 RX ADMIN — SODIUM CHLORIDE, PRESERVATIVE FREE 10 ML: 5 INJECTION INTRAVENOUS at 21:41

## 2025-08-01 RX ADMIN — BUDESONIDE 1000 MCG: 0.5 SUSPENSION RESPIRATORY (INHALATION) at 20:26

## 2025-08-01 RX ADMIN — CEFTRIAXONE SODIUM 2000 MG: 2 INJECTION, POWDER, FOR SOLUTION INTRAMUSCULAR; INTRAVENOUS at 14:39

## 2025-08-01 RX ADMIN — AMLODIPINE BESYLATE 5 MG: 5 TABLET ORAL at 08:09

## 2025-08-01 RX ADMIN — ALBUTEROL SULFATE 2.5 MG: 2.5 SOLUTION RESPIRATORY (INHALATION) at 17:26

## 2025-08-01 RX ADMIN — IPRATROPIUM BROMIDE AND ALBUTEROL SULFATE 1 DOSE: .5; 2.5 SOLUTION RESPIRATORY (INHALATION) at 12:10

## 2025-08-01 RX ADMIN — IPRATROPIUM BROMIDE AND ALBUTEROL SULFATE 1 DOSE: .5; 2.5 SOLUTION RESPIRATORY (INHALATION) at 20:26

## 2025-08-01 RX ADMIN — PANTOPRAZOLE SODIUM 40 MG: 40 TABLET, DELAYED RELEASE ORAL at 08:09

## 2025-08-01 RX ADMIN — METHYLPREDNISOLONE SODIUM SUCCINATE 40 MG: 40 INJECTION, POWDER, LYOPHILIZED, FOR SOLUTION INTRAMUSCULAR; INTRAVENOUS at 18:46

## 2025-08-01 RX ADMIN — FOLIC ACID 1 MG: 1 TABLET ORAL at 08:09

## 2025-08-01 RX ADMIN — INSULIN LISPRO 3 UNITS: 100 INJECTION, SOLUTION INTRAVENOUS; SUBCUTANEOUS at 08:19

## 2025-08-01 RX ADMIN — IPRATROPIUM BROMIDE AND ALBUTEROL SULFATE 1 DOSE: .5; 2.5 SOLUTION RESPIRATORY (INHALATION) at 06:50

## 2025-08-01 RX ADMIN — AZITHROMYCIN 500 MG: 500 INJECTION, POWDER, LYOPHILIZED, FOR SOLUTION INTRAVENOUS at 11:00

## 2025-08-01 RX ADMIN — INSULIN GLARGINE 10 UNITS: 100 INJECTION, SOLUTION SUBCUTANEOUS at 21:40

## 2025-08-01 RX ADMIN — ARFORMOTEROL TARTRATE 15 MCG: 15 SOLUTION RESPIRATORY (INHALATION) at 20:35

## 2025-08-01 RX ADMIN — CARVEDILOL 6.25 MG: 3.12 TABLET, FILM COATED ORAL at 11:21

## 2025-08-01 RX ADMIN — METHYLPREDNISOLONE SODIUM SUCCINATE 40 MG: 40 INJECTION INTRAMUSCULAR; INTRAVENOUS at 12:30

## 2025-08-01 RX ADMIN — ASPIRIN 81 MG: 81 TABLET, DELAYED RELEASE ORAL at 08:09

## 2025-08-01 RX ADMIN — INSULIN LISPRO 5 UNITS: 100 INJECTION, SOLUTION INTRAVENOUS; SUBCUTANEOUS at 11:22

## 2025-08-01 RX ADMIN — METHYLPREDNISOLONE SODIUM SUCCINATE 40 MG: 40 INJECTION INTRAMUSCULAR; INTRAVENOUS at 05:21

## 2025-08-01 RX ADMIN — BUDESONIDE INHALATION 500 MCG: 0.5 SUSPENSION RESPIRATORY (INHALATION) at 06:50

## 2025-08-01 RX ADMIN — ENOXAPARIN SODIUM 40 MG: 100 INJECTION SUBCUTANEOUS at 08:09

## 2025-08-01 RX ADMIN — PSYLLIUM HUSK 1 PACKET: 3.4 POWDER ORAL at 21:40

## 2025-08-01 RX ADMIN — INSULIN LISPRO 3 UNITS: 100 INJECTION, SOLUTION INTRAVENOUS; SUBCUTANEOUS at 21:41

## 2025-08-01 RX ADMIN — GABAPENTIN 300 MG: 300 CAPSULE ORAL at 21:40

## 2025-08-01 RX ADMIN — AMLODIPINE BESYLATE 5 MG: 5 TABLET ORAL at 11:21

## 2025-08-01 RX ADMIN — ATORVASTATIN CALCIUM 40 MG: 40 TABLET, FILM COATED ORAL at 08:09

## 2025-08-01 RX ADMIN — FLUTICASONE PROPIONATE 2 SPRAY: 50 SPRAY, METERED NASAL at 08:19

## 2025-08-01 RX ADMIN — SODIUM CHLORIDE, PRESERVATIVE FREE 10 ML: 5 INJECTION INTRAVENOUS at 08:10

## 2025-08-01 RX ADMIN — HYDRALAZINE HYDROCHLORIDE 10 MG: 20 INJECTION, SOLUTION INTRAMUSCULAR; INTRAVENOUS at 04:11

## 2025-08-01 ASSESSMENT — PAIN SCALES - GENERAL
PAINLEVEL_OUTOF10: 0

## 2025-08-02 ENCOUNTER — APPOINTMENT (OUTPATIENT)
Facility: HOSPITAL | Age: 70
DRG: 141 | End: 2025-08-02
Payer: MEDICAID

## 2025-08-02 LAB
ALBUMIN SERPL-MCNC: 2.6 G/DL (ref 3.5–5)
ALBUMIN/GLOB SERPL: 1 (ref 1.1–2.2)
ALP SERPL-CCNC: 58 U/L (ref 45–117)
ALT SERPL-CCNC: 30 U/L (ref 12–78)
ANION GAP SERPL CALC-SCNC: 9 MMOL/L (ref 2–12)
AST SERPL W P-5'-P-CCNC: 42 U/L (ref 15–37)
B PERT DNA SPEC QL NAA+PROBE: NOT DETECTED
BASOPHILS # BLD: 0.01 K/UL (ref 0–0.1)
BASOPHILS NFR BLD: 0.1 % (ref 0–1)
BILIRUB SERPL-MCNC: 0.3 MG/DL (ref 0.2–1)
BORDETELLA PARAPERTUSSIS BY PCR: NOT DETECTED
BUN SERPL-MCNC: 41 MG/DL (ref 6–20)
BUN/CREAT SERPL: 24 (ref 12–20)
C PNEUM DNA SPEC QL NAA+PROBE: NOT DETECTED
CA-I BLD-MCNC: 8.4 MG/DL (ref 8.5–10.1)
CHLORIDE SERPL-SCNC: 97 MMOL/L (ref 97–108)
CO2 SERPL-SCNC: 28 MMOL/L (ref 21–32)
CREAT SERPL-MCNC: 1.69 MG/DL (ref 0.55–1.02)
DIFFERENTIAL METHOD BLD: ABNORMAL
EOSINOPHIL # BLD: 0 K/UL (ref 0–0.4)
EOSINOPHIL NFR BLD: 0 % (ref 0–7)
ERYTHROCYTE [DISTWIDTH] IN BLOOD BY AUTOMATED COUNT: 22 % (ref 11.5–14.5)
FLUAV SUBTYP SPEC NAA+PROBE: NOT DETECTED
FLUBV RNA SPEC QL NAA+PROBE: NOT DETECTED
GLOBULIN SER CALC-MCNC: 2.6 G/DL (ref 2–4)
GLUCOSE BLD STRIP.AUTO-MCNC: 245 MG/DL (ref 65–100)
GLUCOSE BLD STRIP.AUTO-MCNC: 326 MG/DL (ref 65–100)
GLUCOSE BLD STRIP.AUTO-MCNC: 335 MG/DL (ref 65–100)
GLUCOSE BLD STRIP.AUTO-MCNC: 337 MG/DL (ref 65–100)
GLUCOSE BLD STRIP.AUTO-MCNC: 374 MG/DL (ref 65–100)
GLUCOSE SERPL-MCNC: 334 MG/DL (ref 65–100)
HADV DNA SPEC QL NAA+PROBE: NOT DETECTED
HCOV 229E RNA SPEC QL NAA+PROBE: NOT DETECTED
HCOV HKU1 RNA SPEC QL NAA+PROBE: NOT DETECTED
HCOV NL63 RNA SPEC QL NAA+PROBE: NOT DETECTED
HCOV OC43 RNA SPEC QL NAA+PROBE: NOT DETECTED
HCT VFR BLD AUTO: 22.9 % (ref 35–47)
HGB BLD-MCNC: 7.8 G/DL (ref 11.5–16)
HMPV RNA SPEC QL NAA+PROBE: NOT DETECTED
HPIV1 RNA SPEC QL NAA+PROBE: NOT DETECTED
HPIV2 RNA SPEC QL NAA+PROBE: NOT DETECTED
HPIV3 RNA SPEC QL NAA+PROBE: NOT DETECTED
HPIV4 RNA SPEC QL NAA+PROBE: NOT DETECTED
IMM GRANULOCYTES # BLD AUTO: 0.08 K/UL (ref 0–0.04)
IMM GRANULOCYTES NFR BLD AUTO: 0.7 % (ref 0–0.5)
LYMPHOCYTES # BLD: 0.08 K/UL (ref 0.8–3.5)
LYMPHOCYTES NFR BLD: 0.7 % (ref 12–49)
M PNEUMO DNA SPEC QL NAA+PROBE: NOT DETECTED
MAGNESIUM SERPL-MCNC: 2 MG/DL (ref 1.6–2.4)
MCH RBC QN AUTO: 34.4 PG (ref 26–34)
MCHC RBC AUTO-ENTMCNC: 34.1 G/DL (ref 30–36.5)
MCV RBC AUTO: 100.9 FL (ref 80–99)
MONOCYTES # BLD: 0.14 K/UL (ref 0–1)
MONOCYTES NFR BLD: 1.2 % (ref 5–13)
NEUTS SEG # BLD: 11.29 K/UL (ref 1.8–8)
NEUTS SEG NFR BLD: 97.3 % (ref 32–75)
NRBC # BLD: 0.02 K/UL (ref 0–0.01)
NRBC BLD-RTO: 0.2 PER 100 WBC
PERFORMED BY:: ABNORMAL
PHOSPHATE SERPL-MCNC: 3.3 MG/DL (ref 2.6–4.7)
PLATELET # BLD AUTO: 69 K/UL (ref 150–400)
POTASSIUM SERPL-SCNC: 4.6 MMOL/L (ref 3.5–5.1)
PROCALCITONIN SERPL-MCNC: <0.05 NG/ML
PROT SERPL-MCNC: 5.2 G/DL (ref 6.4–8.2)
RBC # BLD AUTO: 2.27 M/UL (ref 3.8–5.2)
RBC MORPH BLD: ABNORMAL
RBC MORPH BLD: ABNORMAL
RSV RNA SPEC QL NAA+PROBE: NOT DETECTED
RV+EV RNA SPEC QL NAA+PROBE: DETECTED
SARS-COV-2 RNA RESP QL NAA+PROBE: NOT DETECTED
SODIUM SERPL-SCNC: 134 MMOL/L (ref 136–145)
WBC # BLD AUTO: 11.6 K/UL (ref 3.6–11)

## 2025-08-02 PROCEDURE — 36415 COLL VENOUS BLD VENIPUNCTURE: CPT

## 2025-08-02 PROCEDURE — 6370000000 HC RX 637 (ALT 250 FOR IP): Performed by: STUDENT IN AN ORGANIZED HEALTH CARE EDUCATION/TRAINING PROGRAM

## 2025-08-02 PROCEDURE — 82962 GLUCOSE BLOOD TEST: CPT

## 2025-08-02 PROCEDURE — 2500000003 HC RX 250 WO HCPCS: Performed by: STUDENT IN AN ORGANIZED HEALTH CARE EDUCATION/TRAINING PROGRAM

## 2025-08-02 PROCEDURE — 80053 COMPREHEN METABOLIC PANEL: CPT

## 2025-08-02 PROCEDURE — 2700000000 HC OXYGEN THERAPY PER DAY

## 2025-08-02 PROCEDURE — 0202U NFCT DS 22 TRGT SARS-COV-2: CPT

## 2025-08-02 PROCEDURE — 6370000000 HC RX 637 (ALT 250 FOR IP): Performed by: INTERNAL MEDICINE

## 2025-08-02 PROCEDURE — 84100 ASSAY OF PHOSPHORUS: CPT

## 2025-08-02 PROCEDURE — 83735 ASSAY OF MAGNESIUM: CPT

## 2025-08-02 PROCEDURE — 94761 N-INVAS EAR/PLS OXIMETRY MLT: CPT

## 2025-08-02 PROCEDURE — 84145 PROCALCITONIN (PCT): CPT

## 2025-08-02 PROCEDURE — 85025 COMPLETE CBC W/AUTO DIFF WBC: CPT

## 2025-08-02 PROCEDURE — 94640 AIRWAY INHALATION TREATMENT: CPT

## 2025-08-02 PROCEDURE — 6360000002 HC RX W HCPCS: Performed by: STUDENT IN AN ORGANIZED HEALTH CARE EDUCATION/TRAINING PROGRAM

## 2025-08-02 PROCEDURE — 71045 X-RAY EXAM CHEST 1 VIEW: CPT

## 2025-08-02 PROCEDURE — 2060000000 HC ICU INTERMEDIATE R&B

## 2025-08-02 PROCEDURE — 94660 CPAP INITIATION&MGMT: CPT

## 2025-08-02 PROCEDURE — 2580000003 HC RX 258: Performed by: STUDENT IN AN ORGANIZED HEALTH CARE EDUCATION/TRAINING PROGRAM

## 2025-08-02 RX ORDER — INSULIN LISPRO 100 [IU]/ML
15 INJECTION, SOLUTION INTRAVENOUS; SUBCUTANEOUS ONCE
Status: COMPLETED | OUTPATIENT
Start: 2025-08-02 | End: 2025-08-02

## 2025-08-02 RX ORDER — NIFEDIPINE 30 MG/1
60 TABLET, EXTENDED RELEASE ORAL DAILY
Status: DISCONTINUED | OUTPATIENT
Start: 2025-08-02 | End: 2025-08-05 | Stop reason: HOSPADM

## 2025-08-02 RX ORDER — INSULIN LISPRO 100 [IU]/ML
0-16 INJECTION, SOLUTION INTRAVENOUS; SUBCUTANEOUS
Status: DISCONTINUED | OUTPATIENT
Start: 2025-08-02 | End: 2025-08-05 | Stop reason: HOSPADM

## 2025-08-02 RX ORDER — INSULIN GLARGINE 100 [IU]/ML
10 INJECTION, SOLUTION SUBCUTANEOUS 2 TIMES DAILY
Status: DISCONTINUED | OUTPATIENT
Start: 2025-08-02 | End: 2025-08-04

## 2025-08-02 RX ADMIN — GABAPENTIN 300 MG: 300 CAPSULE ORAL at 21:40

## 2025-08-02 RX ADMIN — METHYLPREDNISOLONE SODIUM SUCCINATE 40 MG: 40 INJECTION, POWDER, LYOPHILIZED, FOR SOLUTION INTRAMUSCULAR; INTRAVENOUS at 18:23

## 2025-08-02 RX ADMIN — INSULIN LISPRO 15 UNITS: 100 INJECTION, SOLUTION INTRAVENOUS; SUBCUTANEOUS at 13:47

## 2025-08-02 RX ADMIN — IPRATROPIUM BROMIDE AND ALBUTEROL SULFATE 1 DOSE: .5; 2.5 SOLUTION RESPIRATORY (INHALATION) at 21:18

## 2025-08-02 RX ADMIN — SODIUM CHLORIDE, PRESERVATIVE FREE 5 ML: 5 INJECTION INTRAVENOUS at 21:50

## 2025-08-02 RX ADMIN — PSYLLIUM HUSK 1 PACKET: 3.4 POWDER ORAL at 21:41

## 2025-08-02 RX ADMIN — NIFEDIPINE 60 MG: 30 TABLET, FILM COATED, EXTENDED RELEASE ORAL at 10:06

## 2025-08-02 RX ADMIN — INSULIN GLARGINE 10 UNITS: 100 INJECTION, SOLUTION SUBCUTANEOUS at 10:07

## 2025-08-02 RX ADMIN — SODIUM CHLORIDE: 0.9 INJECTION, SOLUTION INTRAVENOUS at 10:01

## 2025-08-02 RX ADMIN — INSULIN LISPRO 12 UNITS: 100 INJECTION, SOLUTION INTRAVENOUS; SUBCUTANEOUS at 21:41

## 2025-08-02 RX ADMIN — INSULIN LISPRO 16 UNITS: 100 INJECTION, SOLUTION INTRAVENOUS; SUBCUTANEOUS at 11:52

## 2025-08-02 RX ADMIN — BUDESONIDE 1000 MCG: 0.5 SUSPENSION RESPIRATORY (INHALATION) at 21:18

## 2025-08-02 RX ADMIN — ENOXAPARIN SODIUM 30 MG: 100 INJECTION SUBCUTANEOUS at 10:02

## 2025-08-02 RX ADMIN — IPRATROPIUM BROMIDE AND ALBUTEROL SULFATE 1 DOSE: .5; 2.5 SOLUTION RESPIRATORY (INHALATION) at 02:53

## 2025-08-02 RX ADMIN — IPRATROPIUM BROMIDE AND ALBUTEROL SULFATE 1 DOSE: .5; 2.5 SOLUTION RESPIRATORY (INHALATION) at 15:38

## 2025-08-02 RX ADMIN — SODIUM CHLORIDE, PRESERVATIVE FREE 10 ML: 5 INJECTION INTRAVENOUS at 11:32

## 2025-08-02 RX ADMIN — PANTOPRAZOLE SODIUM 40 MG: 40 TABLET, DELAYED RELEASE ORAL at 10:03

## 2025-08-02 RX ADMIN — METHYLPREDNISOLONE SODIUM SUCCINATE 40 MG: 40 INJECTION, POWDER, LYOPHILIZED, FOR SOLUTION INTRAMUSCULAR; INTRAVENOUS at 06:51

## 2025-08-02 RX ADMIN — ARFORMOTEROL TARTRATE 15 MCG: 15 SOLUTION RESPIRATORY (INHALATION) at 10:14

## 2025-08-02 RX ADMIN — METHYLPREDNISOLONE SODIUM SUCCINATE 40 MG: 40 INJECTION, POWDER, LYOPHILIZED, FOR SOLUTION INTRAMUSCULAR; INTRAVENOUS at 02:38

## 2025-08-02 RX ADMIN — INSULIN GLARGINE 10 UNITS: 100 INJECTION, SOLUTION SUBCUTANEOUS at 21:41

## 2025-08-02 RX ADMIN — ATORVASTATIN CALCIUM 40 MG: 40 TABLET, FILM COATED ORAL at 10:03

## 2025-08-02 RX ADMIN — MONTELUKAST 10 MG: 10 TABLET, FILM COATED ORAL at 21:40

## 2025-08-02 RX ADMIN — FLUTICASONE PROPIONATE 2 SPRAY: 50 SPRAY, METERED NASAL at 11:30

## 2025-08-02 RX ADMIN — ASPIRIN 81 MG: 81 TABLET, DELAYED RELEASE ORAL at 10:03

## 2025-08-02 RX ADMIN — METHYLPREDNISOLONE SODIUM SUCCINATE 40 MG: 40 INJECTION, POWDER, LYOPHILIZED, FOR SOLUTION INTRAMUSCULAR; INTRAVENOUS at 11:53

## 2025-08-02 RX ADMIN — IPRATROPIUM BROMIDE AND ALBUTEROL SULFATE 1 DOSE: .5; 2.5 SOLUTION RESPIRATORY (INHALATION) at 10:14

## 2025-08-02 RX ADMIN — INSULIN LISPRO 3 UNITS: 100 INJECTION, SOLUTION INTRAVENOUS; SUBCUTANEOUS at 10:07

## 2025-08-02 RX ADMIN — AZITHROMYCIN 500 MG: 500 INJECTION, POWDER, LYOPHILIZED, FOR SOLUTION INTRAVENOUS at 10:01

## 2025-08-02 RX ADMIN — ARFORMOTEROL TARTRATE 15 MCG: 15 SOLUTION RESPIRATORY (INHALATION) at 21:18

## 2025-08-02 RX ADMIN — CEFTRIAXONE SODIUM 2000 MG: 2 INJECTION, POWDER, FOR SOLUTION INTRAMUSCULAR; INTRAVENOUS at 13:46

## 2025-08-02 RX ADMIN — EZETIMIBE 10 MG: 10 TABLET ORAL at 21:41

## 2025-08-02 RX ADMIN — FOLIC ACID 1 MG: 1 TABLET ORAL at 10:03

## 2025-08-02 RX ADMIN — INSULIN LISPRO 4 UNITS: 100 INJECTION, SOLUTION INTRAVENOUS; SUBCUTANEOUS at 18:22

## 2025-08-02 RX ADMIN — ACYCLOVIR 400 MG: 800 TABLET ORAL at 10:02

## 2025-08-02 RX ADMIN — ALLOPURINOL 100 MG: 100 TABLET ORAL at 10:03

## 2025-08-02 ASSESSMENT — PAIN SCALES - GENERAL: PAINLEVEL_OUTOF10: 0

## 2025-08-03 LAB
ALBUMIN SERPL-MCNC: 2.7 G/DL (ref 3.5–5)
ALBUMIN/GLOB SERPL: 1 (ref 1.1–2.2)
ALP SERPL-CCNC: 63 U/L (ref 45–117)
ALT SERPL-CCNC: 26 U/L (ref 12–78)
ANION GAP SERPL CALC-SCNC: 9 MMOL/L (ref 2–12)
AST SERPL W P-5'-P-CCNC: 15 U/L (ref 15–37)
BASOPHILS # BLD: 0 K/UL (ref 0–0.1)
BASOPHILS NFR BLD: 0 % (ref 0–1)
BILIRUB SERPL-MCNC: 0.3 MG/DL (ref 0.2–1)
BNP SERPL-MCNC: 6419 PG/ML
BUN SERPL-MCNC: 47 MG/DL (ref 6–20)
BUN/CREAT SERPL: 29 (ref 12–20)
CA-I BLD-MCNC: 8.5 MG/DL (ref 8.5–10.1)
CHLORIDE SERPL-SCNC: 99 MMOL/L (ref 97–108)
CO2 SERPL-SCNC: 29 MMOL/L (ref 21–32)
CREAT SERPL-MCNC: 1.61 MG/DL (ref 0.55–1.02)
DIFFERENTIAL METHOD BLD: ABNORMAL
EOSINOPHIL # BLD: 0 K/UL (ref 0–0.4)
EOSINOPHIL NFR BLD: 0 % (ref 0–7)
ERYTHROCYTE [DISTWIDTH] IN BLOOD BY AUTOMATED COUNT: 21 % (ref 11.5–14.5)
GLOBULIN SER CALC-MCNC: 2.7 G/DL (ref 2–4)
GLUCOSE BLD STRIP.AUTO-MCNC: 152 MG/DL (ref 65–100)
GLUCOSE BLD STRIP.AUTO-MCNC: 245 MG/DL (ref 65–100)
GLUCOSE BLD STRIP.AUTO-MCNC: 305 MG/DL (ref 65–100)
GLUCOSE BLD STRIP.AUTO-MCNC: 312 MG/DL (ref 65–100)
GLUCOSE SERPL-MCNC: 181 MG/DL (ref 65–100)
HCT VFR BLD AUTO: 24 % (ref 35–47)
HGB BLD-MCNC: 8.3 G/DL (ref 11.5–16)
IMM GRANULOCYTES # BLD AUTO: 0.08 K/UL (ref 0–0.04)
IMM GRANULOCYTES NFR BLD AUTO: 0.7 % (ref 0–0.5)
LYMPHOCYTES # BLD: 0.06 K/UL (ref 0.8–3.5)
LYMPHOCYTES NFR BLD: 0.5 % (ref 12–49)
MAGNESIUM SERPL-MCNC: 2 MG/DL (ref 1.6–2.4)
MCH RBC QN AUTO: 34.6 PG (ref 26–34)
MCHC RBC AUTO-ENTMCNC: 34.6 G/DL (ref 30–36.5)
MCV RBC AUTO: 100 FL (ref 80–99)
MONOCYTES # BLD: 0.08 K/UL (ref 0–1)
MONOCYTES NFR BLD: 0.7 % (ref 5–13)
NEUTS SEG # BLD: 10.83 K/UL (ref 1.8–8)
NEUTS SEG NFR BLD: 98.1 % (ref 32–75)
NRBC # BLD: 0 K/UL (ref 0–0.01)
NRBC BLD-RTO: 0 PER 100 WBC
PERFORMED BY:: ABNORMAL
PHOSPHATE SERPL-MCNC: 3 MG/DL (ref 2.6–4.7)
PLATELET # BLD AUTO: 79 K/UL (ref 150–400)
PMV BLD AUTO: 12.5 FL (ref 8.9–12.9)
POTASSIUM SERPL-SCNC: 3.5 MMOL/L (ref 3.5–5.1)
PROCALCITONIN SERPL-MCNC: <0.05 NG/ML
PROT SERPL-MCNC: 5.4 G/DL (ref 6.4–8.2)
RBC # BLD AUTO: 2.4 M/UL (ref 3.8–5.2)
SODIUM SERPL-SCNC: 137 MMOL/L (ref 136–145)
WBC # BLD AUTO: 11.1 K/UL (ref 3.6–11)

## 2025-08-03 PROCEDURE — 82962 GLUCOSE BLOOD TEST: CPT

## 2025-08-03 PROCEDURE — 2500000003 HC RX 250 WO HCPCS: Performed by: STUDENT IN AN ORGANIZED HEALTH CARE EDUCATION/TRAINING PROGRAM

## 2025-08-03 PROCEDURE — 94761 N-INVAS EAR/PLS OXIMETRY MLT: CPT

## 2025-08-03 PROCEDURE — 6360000002 HC RX W HCPCS: Performed by: STUDENT IN AN ORGANIZED HEALTH CARE EDUCATION/TRAINING PROGRAM

## 2025-08-03 PROCEDURE — 80053 COMPREHEN METABOLIC PANEL: CPT

## 2025-08-03 PROCEDURE — 83880 ASSAY OF NATRIURETIC PEPTIDE: CPT

## 2025-08-03 PROCEDURE — 94640 AIRWAY INHALATION TREATMENT: CPT

## 2025-08-03 PROCEDURE — 6370000000 HC RX 637 (ALT 250 FOR IP)

## 2025-08-03 PROCEDURE — 6370000000 HC RX 637 (ALT 250 FOR IP): Performed by: STUDENT IN AN ORGANIZED HEALTH CARE EDUCATION/TRAINING PROGRAM

## 2025-08-03 PROCEDURE — 84145 PROCALCITONIN (PCT): CPT

## 2025-08-03 PROCEDURE — 84100 ASSAY OF PHOSPHORUS: CPT

## 2025-08-03 PROCEDURE — 85025 COMPLETE CBC W/AUTO DIFF WBC: CPT

## 2025-08-03 PROCEDURE — 2700000000 HC OXYGEN THERAPY PER DAY

## 2025-08-03 PROCEDURE — 36415 COLL VENOUS BLD VENIPUNCTURE: CPT

## 2025-08-03 PROCEDURE — 97161 PT EVAL LOW COMPLEX 20 MIN: CPT

## 2025-08-03 PROCEDURE — 6360000002 HC RX W HCPCS

## 2025-08-03 PROCEDURE — 2060000000 HC ICU INTERMEDIATE R&B

## 2025-08-03 PROCEDURE — 6370000000 HC RX 637 (ALT 250 FOR IP): Performed by: INTERNAL MEDICINE

## 2025-08-03 PROCEDURE — 83735 ASSAY OF MAGNESIUM: CPT

## 2025-08-03 PROCEDURE — 94660 CPAP INITIATION&MGMT: CPT

## 2025-08-03 RX ORDER — METHYLPREDNISOLONE SODIUM SUCCINATE 40 MG/ML
40 INJECTION INTRAMUSCULAR; INTRAVENOUS EVERY 12 HOURS
Status: DISCONTINUED | OUTPATIENT
Start: 2025-08-03 | End: 2025-08-03

## 2025-08-03 RX ORDER — METHYLPREDNISOLONE SODIUM SUCCINATE 40 MG/ML
40 INJECTION INTRAMUSCULAR; INTRAVENOUS EVERY 8 HOURS
Status: DISCONTINUED | OUTPATIENT
Start: 2025-08-03 | End: 2025-08-05

## 2025-08-03 RX ORDER — GUAIFENESIN 600 MG/1
600 TABLET, EXTENDED RELEASE ORAL 2 TIMES DAILY
Status: DISCONTINUED | OUTPATIENT
Start: 2025-08-03 | End: 2025-08-05 | Stop reason: HOSPADM

## 2025-08-03 RX ORDER — ACETYLCYSTEINE 200 MG/ML
600 SOLUTION ORAL; RESPIRATORY (INHALATION)
Status: DISCONTINUED | OUTPATIENT
Start: 2025-08-03 | End: 2025-08-05 | Stop reason: HOSPADM

## 2025-08-03 RX ORDER — DIPHENHYDRAMINE HCL 25 MG
25 CAPSULE ORAL EVERY 6 HOURS PRN
Status: DISCONTINUED | OUTPATIENT
Start: 2025-08-03 | End: 2025-08-05 | Stop reason: HOSPADM

## 2025-08-03 RX ADMIN — NIFEDIPINE 60 MG: 30 TABLET, FILM COATED, EXTENDED RELEASE ORAL at 08:56

## 2025-08-03 RX ADMIN — BUDESONIDE 1000 MCG: 0.5 SUSPENSION RESPIRATORY (INHALATION) at 08:51

## 2025-08-03 RX ADMIN — ALLOPURINOL 100 MG: 100 TABLET ORAL at 08:56

## 2025-08-03 RX ADMIN — ACETYLCYSTEINE 600 MG: 200 SOLUTION ORAL; RESPIRATORY (INHALATION) at 20:47

## 2025-08-03 RX ADMIN — PANTOPRAZOLE SODIUM 40 MG: 40 TABLET, DELAYED RELEASE ORAL at 08:56

## 2025-08-03 RX ADMIN — MONTELUKAST 10 MG: 10 TABLET, FILM COATED ORAL at 20:24

## 2025-08-03 RX ADMIN — ACETYLCYSTEINE 600 MG: 200 SOLUTION ORAL; RESPIRATORY (INHALATION) at 13:01

## 2025-08-03 RX ADMIN — IPRATROPIUM BROMIDE AND ALBUTEROL SULFATE 1 DOSE: .5; 2.5 SOLUTION RESPIRATORY (INHALATION) at 08:51

## 2025-08-03 RX ADMIN — PSYLLIUM HUSK 1 PACKET: 3.4 POWDER ORAL at 20:24

## 2025-08-03 RX ADMIN — ARFORMOTEROL TARTRATE 15 MCG: 15 SOLUTION RESPIRATORY (INHALATION) at 09:00

## 2025-08-03 RX ADMIN — ARFORMOTEROL TARTRATE 15 MCG: 15 SOLUTION RESPIRATORY (INHALATION) at 20:48

## 2025-08-03 RX ADMIN — METHYLPREDNISOLONE SODIUM SUCCINATE 40 MG: 40 INJECTION, POWDER, LYOPHILIZED, FOR SOLUTION INTRAMUSCULAR; INTRAVENOUS at 12:07

## 2025-08-03 RX ADMIN — DIPHENHYDRAMINE HYDROCHLORIDE 25 MG: 25 CAPSULE ORAL at 18:39

## 2025-08-03 RX ADMIN — ASPIRIN 81 MG: 81 TABLET, DELAYED RELEASE ORAL at 08:56

## 2025-08-03 RX ADMIN — GABAPENTIN 300 MG: 300 CAPSULE ORAL at 20:23

## 2025-08-03 RX ADMIN — SODIUM CHLORIDE, PRESERVATIVE FREE 10 ML: 5 INJECTION INTRAVENOUS at 08:56

## 2025-08-03 RX ADMIN — INSULIN LISPRO 16 UNITS: 100 INJECTION, SOLUTION INTRAVENOUS; SUBCUTANEOUS at 16:54

## 2025-08-03 RX ADMIN — METHYLPREDNISOLONE SODIUM SUCCINATE 40 MG: 40 INJECTION, POWDER, LYOPHILIZED, FOR SOLUTION INTRAMUSCULAR; INTRAVENOUS at 06:09

## 2025-08-03 RX ADMIN — FLUTICASONE PROPIONATE 2 SPRAY: 50 SPRAY, METERED NASAL at 11:04

## 2025-08-03 RX ADMIN — INSULIN GLARGINE 10 UNITS: 100 INJECTION, SOLUTION SUBCUTANEOUS at 20:24

## 2025-08-03 RX ADMIN — METHYLPREDNISOLONE SODIUM SUCCINATE 40 MG: 40 INJECTION INTRAMUSCULAR; INTRAVENOUS at 20:24

## 2025-08-03 RX ADMIN — IPRATROPIUM BROMIDE AND ALBUTEROL SULFATE 1 DOSE: .5; 2.5 SOLUTION RESPIRATORY (INHALATION) at 02:29

## 2025-08-03 RX ADMIN — FOLIC ACID 1 MG: 1 TABLET ORAL at 08:56

## 2025-08-03 RX ADMIN — ATORVASTATIN CALCIUM 40 MG: 40 TABLET, FILM COATED ORAL at 08:56

## 2025-08-03 RX ADMIN — GUAIFENESIN 600 MG: 600 TABLET, EXTENDED RELEASE ORAL at 12:07

## 2025-08-03 RX ADMIN — INSULIN LISPRO 4 UNITS: 100 INJECTION, SOLUTION INTRAVENOUS; SUBCUTANEOUS at 12:07

## 2025-08-03 RX ADMIN — ACYCLOVIR 400 MG: 800 TABLET ORAL at 08:56

## 2025-08-03 RX ADMIN — IPRATROPIUM BROMIDE AND ALBUTEROL SULFATE 1 DOSE: .5; 2.5 SOLUTION RESPIRATORY (INHALATION) at 13:01

## 2025-08-03 RX ADMIN — INSULIN GLARGINE 10 UNITS: 100 INJECTION, SOLUTION SUBCUTANEOUS at 08:55

## 2025-08-03 RX ADMIN — BUDESONIDE 1000 MCG: 0.5 SUSPENSION RESPIRATORY (INHALATION) at 20:47

## 2025-08-03 RX ADMIN — EZETIMIBE 10 MG: 10 TABLET ORAL at 20:23

## 2025-08-03 RX ADMIN — INSULIN LISPRO 12 UNITS: 100 INJECTION, SOLUTION INTRAVENOUS; SUBCUTANEOUS at 20:24

## 2025-08-03 RX ADMIN — IPRATROPIUM BROMIDE AND ALBUTEROL SULFATE 1 DOSE: .5; 2.5 SOLUTION RESPIRATORY (INHALATION) at 20:48

## 2025-08-03 RX ADMIN — METHYLPREDNISOLONE SODIUM SUCCINATE 40 MG: 40 INJECTION, POWDER, LYOPHILIZED, FOR SOLUTION INTRAMUSCULAR; INTRAVENOUS at 00:47

## 2025-08-03 RX ADMIN — SODIUM CHLORIDE, PRESERVATIVE FREE 5 ML: 5 INJECTION INTRAVENOUS at 20:25

## 2025-08-03 RX ADMIN — CEFTRIAXONE SODIUM 2000 MG: 2 INJECTION, POWDER, FOR SOLUTION INTRAMUSCULAR; INTRAVENOUS at 12:07

## 2025-08-03 RX ADMIN — GUAIFENESIN 600 MG: 600 TABLET, EXTENDED RELEASE ORAL at 20:23

## 2025-08-04 LAB
ALBUMIN SERPL-MCNC: 3 G/DL (ref 3.5–5)
ALBUMIN/GLOB SERPL: 1 (ref 1.1–2.2)
ALP SERPL-CCNC: 74 U/L (ref 45–117)
ALT SERPL-CCNC: 28 U/L (ref 12–78)
ANION GAP SERPL CALC-SCNC: 10 MMOL/L (ref 2–12)
AST SERPL W P-5'-P-CCNC: 26 U/L (ref 15–37)
BACTERIA SPEC CULT: NORMAL
BASOPHILS # BLD: 0.01 K/UL (ref 0–0.1)
BASOPHILS NFR BLD: 0.1 % (ref 0–1)
BILIRUB SERPL-MCNC: 0.5 MG/DL (ref 0.2–1)
BUN SERPL-MCNC: 47 MG/DL (ref 6–20)
BUN/CREAT SERPL: 30 (ref 12–20)
CA-I BLD-MCNC: 8.6 MG/DL (ref 8.5–10.1)
CHLORIDE SERPL-SCNC: 98 MMOL/L (ref 97–108)
CO2 SERPL-SCNC: 28 MMOL/L (ref 21–32)
CREAT SERPL-MCNC: 1.57 MG/DL (ref 0.55–1.02)
DIFFERENTIAL METHOD BLD: ABNORMAL
EOSINOPHIL # BLD: 0.01 K/UL (ref 0–0.4)
EOSINOPHIL NFR BLD: 0.1 % (ref 0–7)
ERYTHROCYTE [DISTWIDTH] IN BLOOD BY AUTOMATED COUNT: 21.1 % (ref 11.5–14.5)
GLOBULIN SER CALC-MCNC: 2.9 G/DL (ref 2–4)
GLUCOSE BLD STRIP.AUTO-MCNC: 132 MG/DL (ref 65–100)
GLUCOSE BLD STRIP.AUTO-MCNC: 238 MG/DL (ref 65–100)
GLUCOSE BLD STRIP.AUTO-MCNC: 269 MG/DL (ref 65–100)
GLUCOSE BLD STRIP.AUTO-MCNC: 391 MG/DL (ref 65–100)
GLUCOSE SERPL-MCNC: 271 MG/DL (ref 65–100)
HCT VFR BLD AUTO: 28.3 % (ref 35–47)
HGB BLD-MCNC: 9.7 G/DL (ref 11.5–16)
IMM GRANULOCYTES # BLD AUTO: 0.09 K/UL (ref 0–0.04)
IMM GRANULOCYTES NFR BLD AUTO: 0.7 % (ref 0–0.5)
LYMPHOCYTES # BLD: 0.04 K/UL (ref 0.8–3.5)
LYMPHOCYTES NFR BLD: 0.3 % (ref 12–49)
Lab: NORMAL
MAGNESIUM SERPL-MCNC: 2 MG/DL (ref 1.6–2.4)
MCH RBC QN AUTO: 34.4 PG (ref 26–34)
MCHC RBC AUTO-ENTMCNC: 34.3 G/DL (ref 30–36.5)
MCV RBC AUTO: 100.4 FL (ref 80–99)
MONOCYTES # BLD: 0.13 K/UL (ref 0–1)
MONOCYTES NFR BLD: 1.1 % (ref 5–13)
NEUTS SEG # BLD: 11.92 K/UL (ref 1.8–8)
NEUTS SEG NFR BLD: 97.7 % (ref 32–75)
NRBC # BLD: 0 K/UL (ref 0–0.01)
NRBC BLD-RTO: 0 PER 100 WBC
PERFORMED BY:: ABNORMAL
PHOSPHATE SERPL-MCNC: 4 MG/DL (ref 2.6–4.7)
PLATELET # BLD AUTO: 74 K/UL (ref 150–400)
POTASSIUM SERPL-SCNC: 3.8 MMOL/L (ref 3.5–5.1)
PROT SERPL-MCNC: 5.9 G/DL (ref 6.4–8.2)
RBC # BLD AUTO: 2.82 M/UL (ref 3.8–5.2)
RBC MORPH BLD: ABNORMAL
SODIUM SERPL-SCNC: 136 MMOL/L (ref 136–145)
WBC # BLD AUTO: 12.2 K/UL (ref 3.6–11)

## 2025-08-04 PROCEDURE — 85025 COMPLETE CBC W/AUTO DIFF WBC: CPT

## 2025-08-04 PROCEDURE — 6360000002 HC RX W HCPCS: Performed by: INTERNAL MEDICINE

## 2025-08-04 PROCEDURE — 6360000002 HC RX W HCPCS: Performed by: STUDENT IN AN ORGANIZED HEALTH CARE EDUCATION/TRAINING PROGRAM

## 2025-08-04 PROCEDURE — 94761 N-INVAS EAR/PLS OXIMETRY MLT: CPT

## 2025-08-04 PROCEDURE — 82962 GLUCOSE BLOOD TEST: CPT

## 2025-08-04 PROCEDURE — 2060000000 HC ICU INTERMEDIATE R&B

## 2025-08-04 PROCEDURE — 6370000000 HC RX 637 (ALT 250 FOR IP): Performed by: STUDENT IN AN ORGANIZED HEALTH CARE EDUCATION/TRAINING PROGRAM

## 2025-08-04 PROCEDURE — 94660 CPAP INITIATION&MGMT: CPT

## 2025-08-04 PROCEDURE — 2500000003 HC RX 250 WO HCPCS: Performed by: STUDENT IN AN ORGANIZED HEALTH CARE EDUCATION/TRAINING PROGRAM

## 2025-08-04 PROCEDURE — 80053 COMPREHEN METABOLIC PANEL: CPT

## 2025-08-04 PROCEDURE — 94640 AIRWAY INHALATION TREATMENT: CPT

## 2025-08-04 PROCEDURE — 36415 COLL VENOUS BLD VENIPUNCTURE: CPT

## 2025-08-04 PROCEDURE — 6360000002 HC RX W HCPCS

## 2025-08-04 PROCEDURE — 94010 BREATHING CAPACITY TEST: CPT

## 2025-08-04 PROCEDURE — 6370000000 HC RX 637 (ALT 250 FOR IP)

## 2025-08-04 PROCEDURE — 6370000000 HC RX 637 (ALT 250 FOR IP): Performed by: INTERNAL MEDICINE

## 2025-08-04 PROCEDURE — 2700000000 HC OXYGEN THERAPY PER DAY

## 2025-08-04 PROCEDURE — 83735 ASSAY OF MAGNESIUM: CPT

## 2025-08-04 PROCEDURE — 84100 ASSAY OF PHOSPHORUS: CPT

## 2025-08-04 RX ORDER — INSULIN GLARGINE 100 [IU]/ML
12 INJECTION, SOLUTION SUBCUTANEOUS 2 TIMES DAILY
Status: DISCONTINUED | OUTPATIENT
Start: 2025-08-04 | End: 2025-08-05 | Stop reason: HOSPADM

## 2025-08-04 RX ORDER — FUROSEMIDE 10 MG/ML
40 INJECTION INTRAMUSCULAR; INTRAVENOUS ONCE
Status: COMPLETED | OUTPATIENT
Start: 2025-08-04 | End: 2025-08-04

## 2025-08-04 RX ORDER — INSULIN LISPRO 100 [IU]/ML
4 INJECTION, SOLUTION INTRAVENOUS; SUBCUTANEOUS
Status: DISCONTINUED | OUTPATIENT
Start: 2025-08-04 | End: 2025-08-05 | Stop reason: HOSPADM

## 2025-08-04 RX ADMIN — ASPIRIN 81 MG: 81 TABLET, DELAYED RELEASE ORAL at 10:49

## 2025-08-04 RX ADMIN — NIFEDIPINE 60 MG: 30 TABLET, FILM COATED, EXTENDED RELEASE ORAL at 10:47

## 2025-08-04 RX ADMIN — INSULIN GLARGINE 10 UNITS: 100 INJECTION, SOLUTION SUBCUTANEOUS at 10:58

## 2025-08-04 RX ADMIN — METHYLPREDNISOLONE SODIUM SUCCINATE 40 MG: 40 INJECTION INTRAMUSCULAR; INTRAVENOUS at 10:48

## 2025-08-04 RX ADMIN — INSULIN LISPRO 16 UNITS: 100 INJECTION, SOLUTION INTRAVENOUS; SUBCUTANEOUS at 12:07

## 2025-08-04 RX ADMIN — ACETYLCYSTEINE 600 MG: 200 SOLUTION ORAL; RESPIRATORY (INHALATION) at 19:07

## 2025-08-04 RX ADMIN — GABAPENTIN 300 MG: 300 CAPSULE ORAL at 20:30

## 2025-08-04 RX ADMIN — FUROSEMIDE 40 MG: 10 INJECTION, SOLUTION INTRAMUSCULAR; INTRAVENOUS at 10:49

## 2025-08-04 RX ADMIN — ARFORMOTEROL TARTRATE 15 MCG: 15 SOLUTION RESPIRATORY (INHALATION) at 09:13

## 2025-08-04 RX ADMIN — IPRATROPIUM BROMIDE AND ALBUTEROL SULFATE 1 DOSE: .5; 2.5 SOLUTION RESPIRATORY (INHALATION) at 19:06

## 2025-08-04 RX ADMIN — INSULIN LISPRO 4 UNITS: 100 INJECTION, SOLUTION INTRAVENOUS; SUBCUTANEOUS at 14:56

## 2025-08-04 RX ADMIN — IPRATROPIUM BROMIDE AND ALBUTEROL SULFATE 1 DOSE: .5; 2.5 SOLUTION RESPIRATORY (INHALATION) at 08:37

## 2025-08-04 RX ADMIN — ACYCLOVIR 400 MG: 800 TABLET ORAL at 10:47

## 2025-08-04 RX ADMIN — CEFTRIAXONE SODIUM 2000 MG: 2 INJECTION, POWDER, FOR SOLUTION INTRAMUSCULAR; INTRAVENOUS at 14:56

## 2025-08-04 RX ADMIN — INSULIN GLARGINE 12 UNITS: 100 INJECTION, SOLUTION SUBCUTANEOUS at 20:30

## 2025-08-04 RX ADMIN — MONTELUKAST 10 MG: 10 TABLET, FILM COATED ORAL at 20:30

## 2025-08-04 RX ADMIN — FOLIC ACID 1 MG: 1 TABLET ORAL at 10:49

## 2025-08-04 RX ADMIN — INSULIN LISPRO 4 UNITS: 100 INJECTION, SOLUTION INTRAVENOUS; SUBCUTANEOUS at 20:29

## 2025-08-04 RX ADMIN — ACETYLCYSTEINE 600 MG: 200 SOLUTION ORAL; RESPIRATORY (INHALATION) at 08:37

## 2025-08-04 RX ADMIN — SODIUM CHLORIDE, PRESERVATIVE FREE 10 ML: 5 INJECTION INTRAVENOUS at 20:30

## 2025-08-04 RX ADMIN — INSULIN LISPRO 8 UNITS: 100 INJECTION, SOLUTION INTRAVENOUS; SUBCUTANEOUS at 18:01

## 2025-08-04 RX ADMIN — PSYLLIUM HUSK 1 PACKET: 3.4 POWDER ORAL at 20:40

## 2025-08-04 RX ADMIN — PANTOPRAZOLE SODIUM 40 MG: 40 TABLET, DELAYED RELEASE ORAL at 10:49

## 2025-08-04 RX ADMIN — SODIUM CHLORIDE, PRESERVATIVE FREE 10 ML: 5 INJECTION INTRAVENOUS at 10:58

## 2025-08-04 RX ADMIN — BUDESONIDE 1000 MCG: 0.5 SUSPENSION RESPIRATORY (INHALATION) at 19:07

## 2025-08-04 RX ADMIN — GUAIFENESIN 600 MG: 600 TABLET, EXTENDED RELEASE ORAL at 20:30

## 2025-08-04 RX ADMIN — ARFORMOTEROL TARTRATE 15 MCG: 15 SOLUTION RESPIRATORY (INHALATION) at 19:39

## 2025-08-04 RX ADMIN — IPRATROPIUM BROMIDE AND ALBUTEROL SULFATE 1 DOSE: .5; 2.5 SOLUTION RESPIRATORY (INHALATION) at 01:51

## 2025-08-04 RX ADMIN — BUDESONIDE 1000 MCG: 0.5 SUSPENSION RESPIRATORY (INHALATION) at 08:37

## 2025-08-04 RX ADMIN — GUAIFENESIN 600 MG: 600 TABLET, EXTENDED RELEASE ORAL at 10:49

## 2025-08-04 RX ADMIN — FLUTICASONE PROPIONATE 2 SPRAY: 50 SPRAY, METERED NASAL at 12:06

## 2025-08-04 RX ADMIN — ALLOPURINOL 100 MG: 100 TABLET ORAL at 10:49

## 2025-08-04 RX ADMIN — EZETIMIBE 10 MG: 10 TABLET ORAL at 20:30

## 2025-08-04 RX ADMIN — ATORVASTATIN CALCIUM 40 MG: 40 TABLET, FILM COATED ORAL at 10:49

## 2025-08-04 RX ADMIN — IPRATROPIUM BROMIDE AND ALBUTEROL SULFATE 1 DOSE: .5; 2.5 SOLUTION RESPIRATORY (INHALATION) at 13:02

## 2025-08-04 RX ADMIN — METHYLPREDNISOLONE SODIUM SUCCINATE 40 MG: 40 INJECTION INTRAMUSCULAR; INTRAVENOUS at 04:09

## 2025-08-04 RX ADMIN — METHYLPREDNISOLONE SODIUM SUCCINATE 40 MG: 40 INJECTION INTRAMUSCULAR; INTRAVENOUS at 20:30

## 2025-08-04 RX ADMIN — INSULIN LISPRO 4 UNITS: 100 INJECTION, SOLUTION INTRAVENOUS; SUBCUTANEOUS at 18:01

## 2025-08-04 RX ADMIN — ENOXAPARIN SODIUM 30 MG: 100 INJECTION SUBCUTANEOUS at 10:58

## 2025-08-04 ASSESSMENT — ENCOUNTER SYMPTOMS
WHEEZING: 0
GASTROINTESTINAL NEGATIVE: 1
COUGH: 1

## 2025-08-04 ASSESSMENT — PAIN SCALES - GENERAL: PAINLEVEL_OUTOF10: 0

## 2025-08-05 VITALS
SYSTOLIC BLOOD PRESSURE: 134 MMHG | TEMPERATURE: 98.5 F | WEIGHT: 140.87 LBS | RESPIRATION RATE: 18 BRPM | DIASTOLIC BLOOD PRESSURE: 78 MMHG | BODY MASS INDEX: 27.66 KG/M2 | HEART RATE: 76 BPM | HEIGHT: 60 IN | OXYGEN SATURATION: 98 %

## 2025-08-05 LAB
ALBUMIN SERPL-MCNC: 2.6 G/DL (ref 3.5–5)
ALBUMIN/GLOB SERPL: 1 (ref 1.1–2.2)
ALP SERPL-CCNC: 64 U/L (ref 45–117)
ALT SERPL-CCNC: 24 U/L (ref 12–78)
ANION GAP SERPL CALC-SCNC: 11 MMOL/L (ref 2–12)
AST SERPL W P-5'-P-CCNC: 13 U/L (ref 15–37)
BASOPHILS # BLD: 0.01 K/UL (ref 0–0.1)
BASOPHILS NFR BLD: 0.1 % (ref 0–1)
BILIRUB SERPL-MCNC: 0.3 MG/DL (ref 0.2–1)
BUN SERPL-MCNC: 52 MG/DL (ref 6–20)
BUN/CREAT SERPL: 27 (ref 12–20)
CA-I BLD-MCNC: 8.4 MG/DL (ref 8.5–10.1)
CHLORIDE SERPL-SCNC: 99 MMOL/L (ref 97–108)
CO2 SERPL-SCNC: 28 MMOL/L (ref 21–32)
CREAT SERPL-MCNC: 1.95 MG/DL (ref 0.55–1.02)
DIFFERENTIAL METHOD BLD: ABNORMAL
EOSINOPHIL # BLD: 0 K/UL (ref 0–0.4)
EOSINOPHIL NFR BLD: 0 % (ref 0–7)
ERYTHROCYTE [DISTWIDTH] IN BLOOD BY AUTOMATED COUNT: 21 % (ref 11.5–14.5)
GLOBULIN SER CALC-MCNC: 2.6 G/DL (ref 2–4)
GLUCOSE BLD STRIP.AUTO-MCNC: 188 MG/DL (ref 65–100)
GLUCOSE BLD STRIP.AUTO-MCNC: 435 MG/DL (ref 65–100)
GLUCOSE SERPL-MCNC: 268 MG/DL (ref 65–100)
HCT VFR BLD AUTO: 25 % (ref 35–47)
HGB BLD-MCNC: 8.6 G/DL (ref 11.5–16)
IMM GRANULOCYTES # BLD AUTO: 0.07 K/UL (ref 0–0.04)
IMM GRANULOCYTES NFR BLD AUTO: 0.7 % (ref 0–0.5)
LYMPHOCYTES # BLD: 0.05 K/UL (ref 0.8–3.5)
LYMPHOCYTES NFR BLD: 0.5 % (ref 12–49)
MAGNESIUM SERPL-MCNC: 1.9 MG/DL (ref 1.6–2.4)
MCH RBC QN AUTO: 34.3 PG (ref 26–34)
MCHC RBC AUTO-ENTMCNC: 34.4 G/DL (ref 30–36.5)
MCV RBC AUTO: 99.6 FL (ref 80–99)
MONOCYTES # BLD: 0.19 K/UL (ref 0–1)
MONOCYTES NFR BLD: 1.9 % (ref 5–13)
NEUTS SEG # BLD: 9.65 K/UL (ref 1.8–8)
NEUTS SEG NFR BLD: 96.8 % (ref 32–75)
NRBC # BLD: 0 K/UL (ref 0–0.01)
NRBC BLD-RTO: 0 PER 100 WBC
PERFORMED BY:: ABNORMAL
PERFORMED BY:: ABNORMAL
PHOSPHATE SERPL-MCNC: 3.5 MG/DL (ref 2.6–4.7)
PLATELET # BLD AUTO: 75 K/UL (ref 150–400)
PMV BLD AUTO: 13 FL (ref 8.9–12.9)
POTASSIUM SERPL-SCNC: 3.5 MMOL/L (ref 3.5–5.1)
PROT SERPL-MCNC: 5.2 G/DL (ref 6.4–8.2)
RBC # BLD AUTO: 2.51 M/UL (ref 3.8–5.2)
SODIUM SERPL-SCNC: 138 MMOL/L (ref 136–145)
WBC # BLD AUTO: 10 K/UL (ref 3.6–11)

## 2025-08-05 PROCEDURE — 83735 ASSAY OF MAGNESIUM: CPT

## 2025-08-05 PROCEDURE — 2500000003 HC RX 250 WO HCPCS: Performed by: STUDENT IN AN ORGANIZED HEALTH CARE EDUCATION/TRAINING PROGRAM

## 2025-08-05 PROCEDURE — 36415 COLL VENOUS BLD VENIPUNCTURE: CPT

## 2025-08-05 PROCEDURE — 6360000002 HC RX W HCPCS: Performed by: STUDENT IN AN ORGANIZED HEALTH CARE EDUCATION/TRAINING PROGRAM

## 2025-08-05 PROCEDURE — 6360000002 HC RX W HCPCS

## 2025-08-05 PROCEDURE — 94640 AIRWAY INHALATION TREATMENT: CPT

## 2025-08-05 PROCEDURE — 6370000000 HC RX 637 (ALT 250 FOR IP): Performed by: STUDENT IN AN ORGANIZED HEALTH CARE EDUCATION/TRAINING PROGRAM

## 2025-08-05 PROCEDURE — 80053 COMPREHEN METABOLIC PANEL: CPT

## 2025-08-05 PROCEDURE — 82962 GLUCOSE BLOOD TEST: CPT

## 2025-08-05 PROCEDURE — 84100 ASSAY OF PHOSPHORUS: CPT

## 2025-08-05 PROCEDURE — 6370000000 HC RX 637 (ALT 250 FOR IP): Performed by: INTERNAL MEDICINE

## 2025-08-05 PROCEDURE — 85025 COMPLETE CBC W/AUTO DIFF WBC: CPT

## 2025-08-05 PROCEDURE — 2700000000 HC OXYGEN THERAPY PER DAY

## 2025-08-05 PROCEDURE — 6370000000 HC RX 637 (ALT 250 FOR IP)

## 2025-08-05 RX ORDER — GUAIFENESIN 600 MG/1
600 TABLET, EXTENDED RELEASE ORAL 2 TIMES DAILY
Qty: 10 TABLET | Refills: 0 | Status: SHIPPED | OUTPATIENT
Start: 2025-08-05 | End: 2025-08-10

## 2025-08-05 RX ORDER — PREDNISONE 20 MG/1
TABLET ORAL
Qty: 20 TABLET | Refills: 0 | Status: SHIPPED | OUTPATIENT
Start: 2025-08-05 | End: 2025-08-21

## 2025-08-05 RX ORDER — NIFEDIPINE 30 MG/1
60 TABLET, EXTENDED RELEASE ORAL DAILY
Qty: 30 TABLET | Refills: 3 | Status: SHIPPED | OUTPATIENT
Start: 2025-08-05

## 2025-08-05 RX ORDER — PANTOPRAZOLE SODIUM 40 MG/1
40 TABLET, DELAYED RELEASE ORAL DAILY
Qty: 30 TABLET | Refills: 3 | Status: SHIPPED | OUTPATIENT
Start: 2025-08-05

## 2025-08-05 RX ORDER — ALOGLIPTIN 6.25 MG/1
6.25 TABLET, FILM COATED ORAL DAILY
Status: DISCONTINUED | OUTPATIENT
Start: 2025-08-05 | End: 2025-08-05 | Stop reason: HOSPADM

## 2025-08-05 RX ORDER — METHYLPREDNISOLONE SODIUM SUCCINATE 40 MG/ML
40 INJECTION INTRAMUSCULAR; INTRAVENOUS DAILY
Status: DISCONTINUED | OUTPATIENT
Start: 2025-08-06 | End: 2025-08-05 | Stop reason: HOSPADM

## 2025-08-05 RX ORDER — ARFORMOTEROL TARTRATE 15 UG/2ML
15 SOLUTION RESPIRATORY (INHALATION)
Qty: 120 ML | Refills: 3 | Status: SHIPPED | OUTPATIENT
Start: 2025-08-05

## 2025-08-05 RX ADMIN — PANTOPRAZOLE SODIUM 40 MG: 40 TABLET, DELAYED RELEASE ORAL at 09:02

## 2025-08-05 RX ADMIN — IPRATROPIUM BROMIDE AND ALBUTEROL SULFATE 1 DOSE: .5; 2.5 SOLUTION RESPIRATORY (INHALATION) at 13:07

## 2025-08-05 RX ADMIN — IPRATROPIUM BROMIDE AND ALBUTEROL SULFATE 1 DOSE: .5; 2.5 SOLUTION RESPIRATORY (INHALATION) at 08:54

## 2025-08-05 RX ADMIN — METHYLPREDNISOLONE SODIUM SUCCINATE 40 MG: 40 INJECTION INTRAMUSCULAR; INTRAVENOUS at 03:14

## 2025-08-05 RX ADMIN — FOLIC ACID 1 MG: 1 TABLET ORAL at 09:02

## 2025-08-05 RX ADMIN — INSULIN GLARGINE 12 UNITS: 100 INJECTION, SOLUTION SUBCUTANEOUS at 09:02

## 2025-08-05 RX ADMIN — CEFTRIAXONE SODIUM 2000 MG: 2 INJECTION, POWDER, FOR SOLUTION INTRAMUSCULAR; INTRAVENOUS at 12:54

## 2025-08-05 RX ADMIN — ACYCLOVIR 400 MG: 800 TABLET ORAL at 09:02

## 2025-08-05 RX ADMIN — INSULIN LISPRO 4 UNITS: 100 INJECTION, SOLUTION INTRAVENOUS; SUBCUTANEOUS at 12:53

## 2025-08-05 RX ADMIN — GUAIFENESIN 600 MG: 600 TABLET, EXTENDED RELEASE ORAL at 09:02

## 2025-08-05 RX ADMIN — ATORVASTATIN CALCIUM 40 MG: 40 TABLET, FILM COATED ORAL at 09:02

## 2025-08-05 RX ADMIN — INSULIN LISPRO 4 UNITS: 100 INJECTION, SOLUTION INTRAVENOUS; SUBCUTANEOUS at 09:01

## 2025-08-05 RX ADMIN — SODIUM CHLORIDE, PRESERVATIVE FREE 10 ML: 5 INJECTION INTRAVENOUS at 09:03

## 2025-08-05 RX ADMIN — NIFEDIPINE 60 MG: 30 TABLET, FILM COATED, EXTENDED RELEASE ORAL at 09:02

## 2025-08-05 RX ADMIN — FUROSEMIDE 40 MG: 40 TABLET ORAL at 09:02

## 2025-08-05 RX ADMIN — ENOXAPARIN SODIUM 30 MG: 100 INJECTION SUBCUTANEOUS at 09:03

## 2025-08-05 RX ADMIN — FLUTICASONE PROPIONATE 2 SPRAY: 50 SPRAY, METERED NASAL at 09:03

## 2025-08-05 RX ADMIN — INSULIN LISPRO 4 UNITS: 100 INJECTION, SOLUTION INTRAVENOUS; SUBCUTANEOUS at 12:54

## 2025-08-05 RX ADMIN — ASPIRIN 81 MG: 81 TABLET, DELAYED RELEASE ORAL at 09:02

## 2025-08-05 RX ADMIN — ALOGLIPTIN 6.25 MG: 6.25 TABLET, FILM COATED ORAL at 09:13

## 2025-08-05 RX ADMIN — ALLOPURINOL 100 MG: 100 TABLET ORAL at 09:03

## 2025-08-05 RX ADMIN — BUDESONIDE 500 MCG: 0.5 SUSPENSION RESPIRATORY (INHALATION) at 08:54

## 2025-08-05 RX ADMIN — ACETYLCYSTEINE 600 MG: 200 SOLUTION ORAL; RESPIRATORY (INHALATION) at 08:54

## 2025-08-05 RX ADMIN — IPRATROPIUM BROMIDE AND ALBUTEROL SULFATE 1 DOSE: .5; 2.5 SOLUTION RESPIRATORY (INHALATION) at 02:05

## 2025-08-05 RX ADMIN — INSULIN LISPRO 16 UNITS: 100 INJECTION, SOLUTION INTRAVENOUS; SUBCUTANEOUS at 09:00

## 2025-08-05 RX ADMIN — ARFORMOTEROL TARTRATE 15 MCG: 15 SOLUTION RESPIRATORY (INHALATION) at 08:54

## 2025-08-06 LAB
BACTERIA SPEC CULT: NORMAL
BACTERIA SPEC CULT: NORMAL
Lab: NORMAL
Lab: NORMAL

## (undated) DEVICE — TROCAR: Brand: KII FIOS FIRST ENTRY

## (undated) DEVICE — LAPAROSCOPIC CHOLE PACK: Brand: MEDLINE INDUSTRIES, INC.

## (undated) DEVICE — STERILE-Z MAYO STAND COVERS CLEAR POLYETHYLENE STERILE UNIVERSAL FIT 20 PER CASE: Brand: STERILE-Z

## (undated) DEVICE — REDUCER: Brand: ENDOWRIST

## (undated) DEVICE — ELECTRODE,RT,STRESS,FOAM,5PK: Brand: MEDLINE

## (undated) DEVICE — FORCEPS BX L240CM JAW DIA2.8MM L CAP W/ NDL MIC MESH TOOTH

## (undated) DEVICE — KIT COLON WITH 1.1 OZ ORCA HYDRA SEAL 2 GOWN ADAPT SECONDARY

## (undated) DEVICE — STAPLER INT L75MM CUT LN L73MM STPL LN L77MM BLU B FRM 8

## (undated) DEVICE — YANKAUER,POOLE TIP,STERILE,50/CS: Brand: MEDLINE

## (undated) DEVICE — Device: Brand: JELCO

## (undated) DEVICE — SYRINGE MED 10ML LUERLOCK TIP W/O SFTY DISP

## (undated) DEVICE — SUTURE VICRYL + SZ 2-0 L36IN ABSRB UD L36MM CT-1 1/2 CIR VCP945H

## (undated) DEVICE — GLOVE SURG SZ 7 L12IN FNGR THK79MIL GRN LTX FREE

## (undated) DEVICE — SUTURE PDS II SZ 0 L60IN ABSRB VLT L48MM CTX 1/2 CIR Z990G

## (undated) DEVICE — LINE SAMPLING ADVANCE ORAL NASAL MICROSTREAM O2 TUBING 6.5'

## (undated) DEVICE — SUTURE PERMAHAND SZ 3-0 L18IN NONABSORBABLE BLK L26MM SH C013D

## (undated) DEVICE — RELOAD STPL L75MM OPN H3.8MM CLS 1.5MM WIRE DIA0.2MM REG

## (undated) DEVICE — INTENDED FOR TISSUE SEPARATION, AND OTHER PROCEDURES THAT REQUIRE A SHARP SURGICAL BLADE TO PUNCTURE OR CUT.: Brand: BARD-PARKER ® CARBON RIB-BACK BLADES

## (undated) DEVICE — SPONGE LAP SFT 18X18 IN X RAY DETECTABLE

## (undated) DEVICE — DISPOSABLE EQUIPMENT COVER: Brand: SLUSH DRAPE

## (undated) DEVICE — VESSEL SEALER EXTEND: Brand: ENDOWRIST

## (undated) DEVICE — AIRSEAL BIFURCATED SMOKE EVAC FILTERED TUBE SET: Brand: AIRSEAL

## (undated) DEVICE — COVER,MAYO STAND,STERILE: Brand: MEDLINE

## (undated) DEVICE — BLADELESS OBTURATOR: Brand: WECK VISTA

## (undated) DEVICE — YANKAUER,TAPERED BULBOUS TIP,W/O VENT: Brand: MEDLINE

## (undated) DEVICE — BITE BLOCK ENDOSCP AD 60 FR W/ ADJ STRP PLAS GRN BLOX

## (undated) DEVICE — SUTURE PDS II SZ 1 L36IN ABSRB VLT CT L40MM 1/2 CIR TAPR Z359T

## (undated) DEVICE — STAPLER INT DIA29MM CLS STPL H1.5-2.2MM OPN LEG L5.2MM 26

## (undated) DEVICE — LIQUIBAND RAPID ADHESIVE 36/CS 0.8ML: Brand: MEDLINE

## (undated) DEVICE — TIP COVER ACCESSORY

## (undated) DEVICE — NEPTUNE E-SEP SMOKE EVACUATION PENCIL, COATED, 70MM BLADE, PUSH BUTTON SWITCH: Brand: NEPTUNE E-SEP

## (undated) DEVICE — SUTURE VICRYL + SZ 2-0 L27IN ABSRB WHT SH 1/2 CIR TAPERCUT VCP417H

## (undated) DEVICE — ARM DRAPE

## (undated) DEVICE — SUTURE MONOCRYL + SZ 4-0 L27IN ABSRB UD L19MM PS-2 3/8 CIR MCP426H

## (undated) DEVICE — GLOVE ORANGE PI 7   MSG9070

## (undated) DEVICE — TUBING, SUCTION, 1/4" X 10', STRAIGHT: Brand: MEDLINE

## (undated) DEVICE — SEAL

## (undated) DEVICE — SOLUTION IRRIG 1000ML 09% SOD CHL USP PIC PLAS CONTAINER

## (undated) DEVICE — SOLUTION IV 1000 ML 0.9 NACL INJ USP EXCEL PLAS CONTAINER

## (undated) DEVICE — BLADE,CARBON-STEEL,11,STRL,DISPOSABLE,TB: Brand: MEDLINE

## (undated) DEVICE — SEALER ENDOSCP NANO COAT OPN DIV CRV L JAW LIGASURE IMPACT